# Patient Record
Sex: MALE | Race: WHITE | Employment: OTHER | URBAN - METROPOLITAN AREA
[De-identification: names, ages, dates, MRNs, and addresses within clinical notes are randomized per-mention and may not be internally consistent; named-entity substitution may affect disease eponyms.]

---

## 2017-01-30 ENCOUNTER — THERAPY VISIT (OUTPATIENT)
Dept: PHYSICAL THERAPY | Facility: CLINIC | Age: 67
End: 2017-01-30
Payer: COMMERCIAL

## 2017-01-30 ENCOUNTER — OFFICE VISIT (OUTPATIENT)
Dept: INTERNAL MEDICINE | Facility: CLINIC | Age: 67
End: 2017-01-30
Payer: COMMERCIAL

## 2017-01-30 VITALS
HEIGHT: 64 IN | BODY MASS INDEX: 35 KG/M2 | DIASTOLIC BLOOD PRESSURE: 70 MMHG | RESPIRATION RATE: 16 BRPM | HEART RATE: 70 BPM | TEMPERATURE: 97.1 F | SYSTOLIC BLOOD PRESSURE: 128 MMHG | WEIGHT: 205 LBS | OXYGEN SATURATION: 100 %

## 2017-01-30 DIAGNOSIS — M50.30 DDD (DEGENERATIVE DISC DISEASE), CERVICAL: Primary | ICD-10-CM

## 2017-01-30 DIAGNOSIS — D64.9 ANEMIA: Primary | ICD-10-CM

## 2017-01-30 DIAGNOSIS — M54.2 CERVICAL PAIN: Primary | ICD-10-CM

## 2017-01-30 DIAGNOSIS — D50.9 IRON DEFICIENCY ANEMIA, UNSPECIFIED IRON DEFICIENCY ANEMIA TYPE: ICD-10-CM

## 2017-01-30 DIAGNOSIS — Z12.11 ENCOUNTER FOR SCREENING FOR MALIGNANT NEOPLASM OF COLON: ICD-10-CM

## 2017-01-30 LAB
ERYTHROCYTE [DISTWIDTH] IN BLOOD BY AUTOMATED COUNT: 14.8 % (ref 10–15)
HCT VFR BLD AUTO: 35.8 % (ref 40–53)
HGB BLD-MCNC: 11.4 G/DL (ref 13.3–17.7)
MCH RBC QN AUTO: 28.6 PG (ref 26.5–33)
MCHC RBC AUTO-ENTMCNC: 31.8 G/DL (ref 31.5–36.5)
MCV RBC AUTO: 90 FL (ref 78–100)
PLATELET # BLD AUTO: 297 10E9/L (ref 150–450)
RBC # BLD AUTO: 3.98 10E12/L (ref 4.4–5.9)
WBC # BLD AUTO: 8.6 10E9/L (ref 4–11)

## 2017-01-30 PROCEDURE — 97161 PT EVAL LOW COMPLEX 20 MIN: CPT | Mod: GP | Performed by: PHYSICAL THERAPIST

## 2017-01-30 PROCEDURE — 85027 COMPLETE CBC AUTOMATED: CPT | Performed by: INTERNAL MEDICINE

## 2017-01-30 PROCEDURE — 99214 OFFICE O/P EST MOD 30 MIN: CPT | Performed by: INTERNAL MEDICINE

## 2017-01-30 PROCEDURE — 36415 COLL VENOUS BLD VENIPUNCTURE: CPT | Performed by: INTERNAL MEDICINE

## 2017-01-30 PROCEDURE — 97110 THERAPEUTIC EXERCISES: CPT | Mod: GP | Performed by: PHYSICAL THERAPIST

## 2017-01-30 NOTE — NURSING NOTE
"Chief Complaint   Patient presents with     Neck Pain       Initial /70 mmHg  Pulse 70  Temp(Src) 97.1  F (36.2  C) (Oral)  Resp 16  Ht 5' 4\" (1.626 m)  Wt 205 lb (92.987 kg)  BMI 35.17 kg/m2  SpO2 100% Estimated body mass index is 35.17 kg/(m^2) as calculated from the following:    Height as of this encounter: 5' 4\" (1.626 m).    Weight as of this encounter: 205 lb (92.987 kg).  BP completed using cuff size: large    "

## 2017-01-30 NOTE — MR AVS SNAPSHOT
After Visit Summary   1/30/2017    Angel Sanders    MRN: [id number]           Patient Information     Date Of Birth          1950        Visit Information        Provider Department      1/30/2017 10:20 AM Tony Peter MD [facility]        Today's Diagnoses     DDD (degenerative disc disease), cervical    -  1       Care Instructions    You can reach your [place] Care Team any time of the day by calling [phone].  This number will put you in touch with the 24 hour nurse line even if the clinic is closed.  The clinic hours for [facility] are:  Monday through Thursday 7am to 6pm   Friday 7am to 5pm   Saturday 8am to 12p for Pediatrics only.    To  contact your  please call [phone].  This is a direct number for your care team during clinic hours.    [facility] is now open for your convenience:  Monday through Friday 7:30am to 7pm  Saturday and Sunday 9am to 3pm  They are closed on all major holidays.          Follow-ups after your visit        Additional Services     [facility] PT, HAND, AND CHIROPRACTIC REFERRAL       **This order will print in the [facility] Scheduling Office**    Physical Therapy, Hand Therapy and Chiropractic Care are available through:    *[facility]  *[facility]  *[facility]    Call one number to schedule at any of the above locations: [phone].    Your provider has referred you to: Physical Therapy at [facility] or [facility]    Indication/Reason for Referral: Neck Pain  Onset of Illness: 2 months   Therapy Orders: Evaluate and Treat  Special Programs: None  Special Request: None    Leana Dueñas      Additional Comments for the Therapist or Chiropractor: history of DDD     Please be aware that coverage of these services is subject to the terms and limitations of your health insurance plan.  Call member services at your health plan with any benefit or coverage questions.       Please bring the following to your appointment:    *Your personal calendar for scheduling future appointments  *Comfortable clothing                  Your next 10 appointments already scheduled     Mar 17, 2017 10:00 AM   US CAROTID BILATERAL with RAYMUNDO   [facility] [place] ([facility])    [address]  [place] [address]   [phone]           Please bring a list of your medicines (including vitamins, minerals and over-the-counter drugs). Also, tell your doctor about any allergies you may have. Wear comfortable clothes and leave your valuables at home.  You do not need to do anything special to prepare for your exam.  Please call the Imaging Department at your exam site with any questions.            Mar 22, 2017 10:15 AM   New Visit with Dre Kruse MD   [facility] [place] ([facility])    [address]  [place] [address]   [phone]              Who to contact     If you have questions or need follow up information about today's clinic visit or your schedule please contact [facility] directly at [phone].  Normal or non-critical lab and imaging results will be communicated to you by MyChart, letter or phone within 4 business days after the clinic has received the results. If you do not hear from us within 7 days, please contact the clinic through MyChart or phone. If you have a critical or abnormal lab result, we will notify you by phone as soon as possible.  Submit refill requests through [employer] or call your pharmacy and they will forward the refill request to us. Please allow 3 business days for your refill to be completed.          Additional Information About Your Visit        [employer] Information     [employer] lets you send messages to your doctor, view your test results, renew your prescriptions, schedule appointments and more. To sign up, go to  [email or web address] . Click on \"Log in\" on the left side of the screen, which will take you to the Welcome page. Then click on \"Sign up Now\" on the right side of the page.     You will be asked to enter the access code listed below, as well as some personal information. Please follow the directions to create your username and password.     Your access code is: [email or web address]  Expires: 3/22/2017 11:46 AM     Your access code will  in 90 days. If you need help or a new code, please call your [facility] or [phone].        Care EveryWhere ID     This is your Care EveryWhere ID. This could be used by other organizations to access your [place] medical records  [id number]        Your Vitals Were     Pulse Temperature Respirations Height BMI (Body Mass Index) Pulse Oximetry    70 97.1  F (36.2  C) (Oral) 16 5' 4\" (1.626 m) 35.17 kg/m2 100%       Blood Pressure from Last 3 Encounters:   17 128/70   16 132/80   10/28/16 112/64    Weight from Last 3 Encounters:   17 205 lb (92.987 kg)   16 205 lb (92.987 kg)   10/28/16 208 lb (94.348 kg)              We Performed the Following     [facility] PT, HAND, AND CHIROPRACTIC REFERRAL        Primary Care Provider Office Phone # Fax #    Tony Peter [phone]       [facility] [address] [place] [address]        Thank you!     Thank you for choosing [facility]  for your care. Our goal is always to provide you with excellent care. Hearing back from our patients is one way we can continue to improve our services. Please take a few minutes to complete the written survey that you may receive in the mail after your visit with us. Thank you!             Your Updated Medication List - Protect others around you: Learn how to safely use, store and throw away your medicines at www.disposemymeds.org.          This list is accurate as of: 17 11:01 AM.  Always use your most recent med list. "                   Brand Name Dispense Instructions for use    aspirin 81 MG tablet      1 TABLET DAILY       atorvastatin 80 MG tablet    LIPITOR    90 tablet    Take 1 tablet (80 mg) by mouth daily       folic acid-vit B6-vit B12 0.8-10-0.115 MG Tabs per tablet    FOLGARD     Take 1 tablet by mouth daily       IRON SUPPLEMENT PO      Take 325 mg by mouth daily (with breakfast)       lisinopril 5 MG tablet    PRINIVIL/ZESTRIL    90 tablet    Take 1 tablet (5 mg) by mouth daily       methocarbamol 750 MG tablet    ROBAXIN    30 tablet    Take 1 tablet (750 mg) by mouth 3 times daily as needed for muscle spasms       metoprolol 25 MG tablet    LOPRESSOR    180 tablet    Take 1 tablet (25 mg) by mouth 2 times daily       nitroglycerin 0.4 MG sublingual tablet    NITROSTAT    25 tablet    Place 1 tablet (0.4 mg) under the tongue every 5 minutes as needed May repeat X 2 and if chest pain persists, call 91

## 2017-01-30 NOTE — PROGRESS NOTES
"  SUBJECTIVE:                                                    Angel Sanders is a 66 year old male who presents to clinic today for the following health issues:      Neck Pain      Duration: long term    Description:  Location: all of neck  Radiation: none and radiates to either right or left top of shoulder    Intensity:  moderate    Accompanying signs and symptoms: NA    History (similar episodes/previous evaluation): None    Precipitating or alleviating factors: None    Therapies tried and outcome: None     Patient is seen for a follow up visit.  Has had recurrent neck pain, stiffness, for 2 - 3 months. No injury. Pain in the posterior left neck with radiation to the shoulder.   No arm weakness or numbness. Tried NSAID, Tylenol, Prednisone and muscle relaxant. Not better. Temporary improvement with recurrent pain .   Patient has anemia with low iron . Has been treated with PO iron and vit D . Currently symptomatic with none. No significant other symptoms of GI bleed.  Needs recheck .     Problem list and histories reviewed & adjusted, as indicated.  Additional history: as documented    Problem list, Medication list, Allergies, and Medical/Social/Surgical histories reviewed in [facility] and updated as appropriate.    ROS:  Constitutional, HEENT, cardiovascular, pulmonary, gi and gu systems are negative, except as otherwise noted.    OBJECTIVE:                                                    /70 mmHg  Pulse 70  Temp(Src) 97.1  F (36.2  C) (Oral)  Resp 16  Ht 5' 4\" (1.626 m)  Wt 205 lb (92.987 kg)  BMI 35.17 kg/m2  SpO2 100%  Body mass index is 35.17 kg/(m^2).  GENERAL: healthy, alert and no distress  NECK: no adenopathy, no asymmetry, masses, or scars and thyroid normal to palpation  RESP: lungs clear to auscultation - no rales, rhonchi or wheezes  CV: regular rate and rhythm, normal S1 S2, no S3 or S4, no murmur, click or rub, no peripheral edema and peripheral pulses strong  ABDOMEN: soft, " nontender, no hepatosplenomegaly, no masses and bowel sounds normal  MS: no gross musculoskeletal defects noted, no edema,          Posterior neck tenderness left para vertebral. Restricted ROM   NEURO: Normal strength and tone, mentation intact and speech normal    Diagnostic Test Results:  none      ASSESSMENT/PLAN:                                                      Problem List Items Addressed This Visit     Anemia    Relevant Orders    CBC with platelets (Completed)      Other Visit Diagnoses     DDD (degenerative disc disease), cervical    -  Primary     Relevant Orders     [facility] PT, HAND, AND CHIROPRACTIC REFERRAL            Refer to PT   Consider MRI if not improved   Assess anemia status     Follow-Up:with results     Tony Peter MD  [facility]

## 2017-01-30 NOTE — PROGRESS NOTES
Subjective:    Angel Sanders is a 66 year old male with a cervical spine condition.  Condition occurred with:  Insidious onset.  Condition occurred: other.  This is a recurrent condition  History of intermittent cervical pain since 2012 of unknown etiology. Most recent episode began 12-16. Pt referred by MD for therapy on 1-30-17.    Patient reports pain:  Cervical right side and cervical left side (L>R).    Pain is described as aching and is intermittent and reported as 4/10.  Associated symptoms:  Loss of strength and loss of motion/stiffness. Pain is worse during the night.  Symptoms are exacerbated by rotating head, looking up or down and driving and relieved by rest, heat and NSAID's.  Since onset symptoms are gradually improving.  Special tests:  CT scan (2013 pt reports herniated disc).  Previous treatment: 2-16.  There was moderate improvement following previous treatment.  General health as reported by patient is good.  Pertinent medical history includes:  Chemical dependency.  Medical allergies: yes (latex).  Other surgeries include:  Heart surgery, orthopedic surgery and other (cardiac stent, carotid stend, rotator cuff).  Current medications:  High blood pressure medication.  Current occupation is retired.        Barriers include:  None as reported by the patient.    Red flags:  None as reported by the patient.                      Objective:    Standing Alignment:    Cervical/thoracic deviations alignment: forward head and shoulder posture.                    Flexibility/Screens:         Spine:  Decreased left spine flexibility:  Upper Trap and Levator    Decreased right spine flexibility:  Upper Trap and Levator                  Cervical/Thoracic Evaluation    AROM:  AROM Cervical:    Flexion:            60%  Extension:       40%  Rotation:         Left: 50%     Right: 50%  Side Bend:      Left: 30%     Right:  30%    Strength: weak scapular stabilizers  Headaches: none  Cervical Myotomes:   normal                  DTR's:  normal          Cervical Dermatomes:  normal                    Cervical Palpation:  : muscle guarding and tenderness to palpation left uppr trapezius.                                                      General     ROS    Assessment/Plan:      Patient is a 66 year old male with cervical complaints.    Patient has the following significant findings with corresponding treatment plan.                Diagnosis 1:  Cervical pain  Pain -  hot/cold therapy, manual therapy, self management, education and home program  Decreased ROM/flexibility - manual therapy, therapeutic exercise, therapeutic activity and home program  Decreased strength - therapeutic exercise, therapeutic activities and home program    Therapy Evaluation Codes:   1) History comprised of:   Personal factors that impact the plan of care:      Age, Past/current experiences and Time since onset of symptoms.    Comorbidity factors that impact the plan of care are:      Chemical Dependency, Heart problems and High blood pressure.     Medications impacting care: High blood pressure.  2) Examination of Body Systems comprised of:   Body structures and functions that impact the plan of care:      Cervical spine and Shoulder.   Activity limitations that impact the plan of care are:      Driving, Lifting, Reading/Computer work, Sitting and Sleeping.  3) Clinical presentation characteristics are:   Stable/Uncomplicated.  4) Decision-Making    Low complexity using standardized patient assessment instrument and/or measureable assessment of functional outcome.  Cumulative Therapy Evaluation is: Low complexity.    Previous and current functional limitations:  (See Goal Flow Sheet for this information)    Short term and Long term goals: (See Goal Flow Sheet for this information)     Communication ability:  Patient appears to be able to clearly communicate and understand verbal and written communication and follow directions  correctly.  Treatment Explanation - The following has been discussed with the patient:   RX ordered/plan of care  Anticipated outcomes  Possible risks and side effects  This patient would benefit from PT intervention to resume normal activities.   Rehab potential is good.    Frequency:  1 X week, once daily  Duration:  for 6 weeks  Discharge Plan:  Achieve all LTG.  Independent in home treatment program.  Reach maximal therapeutic benefit.    Please refer to the daily flowsheet for treatment today, total treatment time and time spent performing 1:1 timed codes.

## 2017-02-02 DIAGNOSIS — D64.9 ANEMIA: ICD-10-CM

## 2017-02-02 LAB
FOLATE SERPL-MCNC: 31.2 NG/ML
IRON SATN MFR SERPL: 15 % (ref 15–46)
IRON SERPL-MCNC: 50 UG/DL (ref 35–180)
TIBC SERPL-MCNC: 331 UG/DL (ref 240–430)
VIT B12 SERPL-MCNC: 893 PG/ML (ref 193–986)

## 2017-02-02 PROCEDURE — 36415 COLL VENOUS BLD VENIPUNCTURE: CPT | Performed by: INTERNAL MEDICINE

## 2017-02-02 PROCEDURE — 83550 IRON BINDING TEST: CPT | Performed by: INTERNAL MEDICINE

## 2017-02-02 PROCEDURE — 82607 VITAMIN B-12: CPT | Performed by: INTERNAL MEDICINE

## 2017-02-02 PROCEDURE — 82746 ASSAY OF FOLIC ACID SERUM: CPT | Performed by: INTERNAL MEDICINE

## 2017-02-02 PROCEDURE — 83540 ASSAY OF IRON: CPT | Performed by: INTERNAL MEDICINE

## 2017-02-02 PROCEDURE — 82274 ASSAY TEST FOR BLOOD FECAL: CPT | Performed by: INTERNAL MEDICINE

## 2017-02-03 DIAGNOSIS — Z12.11 ENCOUNTER FOR SCREENING FOR MALIGNANT NEOPLASM OF COLON: ICD-10-CM

## 2017-02-03 DIAGNOSIS — D64.9 ANEMIA: ICD-10-CM

## 2017-02-03 LAB — HEMOCCULT STL QL IA: NEGATIVE

## 2017-02-06 ENCOUNTER — THERAPY VISIT (OUTPATIENT)
Dept: PHYSICAL THERAPY | Facility: CLINIC | Age: 67
End: 2017-02-06
Payer: COMMERCIAL

## 2017-02-06 DIAGNOSIS — M54.2 CERVICAL PAIN: Primary | ICD-10-CM

## 2017-02-06 PROCEDURE — 97110 THERAPEUTIC EXERCISES: CPT | Mod: GP | Performed by: PHYSICAL THERAPY ASSISTANT

## 2017-02-06 NOTE — PROGRESS NOTES
Subjective:    HPI                    Objective:    System    Physical Exam    General     ROS    Assessment/Plan:      PROGRESS  REPORT    Progress reporting period is from 1/30/17 to 2/6/17.      SUBJECTIVE  Subjective changes noted by patient:  The pain has been 0/10.  Has not taken any med's OTC for 1 week.   Driving is better and not having any issues.  Current pain level is .  Current Pain level: 0/10    Previous pain level was:   Initial Pain level: 4/10   Changes in function:  Yes (See Goal flowsheet attached for changes in current functional level)     Adverse reaction to treatment or activity: None     OBJECTIVE  Changes noted in objective findings:  Yes, Objective: CROM flexion 100%, extension at 90%, left rotation at 50%, right at 75%. NDI scores are at 0/10.  Patient has some tightness at the end range of extension and rotation to the left.  Instructed patient to continue to push the ROM everyday.

## 2017-03-03 PROBLEM — M54.2 CERVICAL PAIN: Status: RESOLVED | Noted: 2017-01-30 | Resolved: 2017-03-03

## 2017-03-03 NOTE — PROGRESS NOTES
Subjective:    HPI                    Objective:    System    Physical Exam    General     ROS    Assessment/Plan:      ASSESSMENT/PLAN  Updated problem list and treatment plan: Diagnosis 1:  Cervical pain  Pain -  self management, education and home program  Decreased ROM/flexibility - therapeutic exercise, therapeutic activity and home program  Decreased strength - therapeutic exercise, therapeutic activities and home program  Progress toward STG/LTGs have been made:  Yes,   Assessment of Progress: The patient's condition is improving.  Self Management Plans:  Patient has been instructed in a home treatment program.  I have re-evaluated this patient and find that the nature, scope, duration and intensity of the therapy is appropriate for the medical condition of the patient.  Angel continues to require the following intervention to meet STG and LT's:  PT intervention is no longer required to meet STG/LTG.    Recommendations:  This patient is ready to be discharged from therapy and continue their home treatment program.    Please refer to the daily flowsheet for treatment today, total treatment time and time spent performing 1:1 timed codes.

## 2017-03-17 ENCOUNTER — HOSPITAL ENCOUNTER (OUTPATIENT)
Dept: CARDIOLOGY | Facility: CLINIC | Age: 67
Discharge: HOME | End: 2017-03-17
Attending: INTERNAL MEDICINE | Admitting: INTERNAL MEDICINE
Payer: MEDICARE

## 2017-03-17 DIAGNOSIS — I77.9 BILATERAL CAROTID ARTERY DISEASE (H): ICD-10-CM

## 2017-03-17 PROCEDURE — 93880 EXTRACRANIAL BILAT STUDY: CPT | Mod: 26 | Performed by: INTERNAL MEDICINE

## 2017-03-17 PROCEDURE — 93880 EXTRACRANIAL BILAT STUDY: CPT

## 2017-03-22 ENCOUNTER — OFFICE VISIT (OUTPATIENT)
Dept: CARDIOLOGY | Facility: CLINIC | Age: 67
End: 2017-03-22
Attending: INTERNAL MEDICINE
Payer: COMMERCIAL

## 2017-03-22 VITALS
DIASTOLIC BLOOD PRESSURE: 78 MMHG | SYSTOLIC BLOOD PRESSURE: 136 MMHG | WEIGHT: 208 LBS | BODY MASS INDEX: 35.51 KG/M2 | OXYGEN SATURATION: 93 % | HEIGHT: 64 IN | HEART RATE: 60 BPM

## 2017-03-22 DIAGNOSIS — I77.9 BILATERAL CAROTID ARTERY DISEASE (H): ICD-10-CM

## 2017-03-22 DIAGNOSIS — I77.9 LEFT-SIDED CAROTID ARTERY DISEASE (H): Primary | ICD-10-CM

## 2017-03-22 DIAGNOSIS — I65.22 OCCLUSION AND STENOSIS OF LEFT CAROTID ARTERY: ICD-10-CM

## 2017-03-22 DIAGNOSIS — I25.10 CORONARY ARTERY DISEASE INVOLVING NATIVE CORONARY ARTERY OF NATIVE HEART WITHOUT ANGINA PECTORIS: ICD-10-CM

## 2017-03-22 DIAGNOSIS — E78.00 HIGH CHOLESTEROL: ICD-10-CM

## 2017-03-22 DIAGNOSIS — I10 ESSENTIAL HYPERTENSION, BENIGN: ICD-10-CM

## 2017-03-22 PROCEDURE — 93000 ELECTROCARDIOGRAM COMPLETE: CPT | Performed by: INTERNAL MEDICINE

## 2017-03-22 PROCEDURE — 99213 OFFICE O/P EST LOW 20 MIN: CPT | Mod: 25 | Performed by: INTERNAL MEDICINE

## 2017-03-22 RX ORDER — LISINOPRIL 5 MG/1
5 TABLET ORAL DAILY
Qty: 90 TABLET | Refills: 3 | Status: SHIPPED | OUTPATIENT
Start: 2017-03-22 | End: 2018-03-18

## 2017-03-22 RX ORDER — METOPROLOL TARTRATE 25 MG/1
25 TABLET, FILM COATED ORAL 2 TIMES DAILY
Qty: 180 TABLET | Refills: 3 | Status: SHIPPED | OUTPATIENT
Start: 2017-03-22 | End: 2018-03-18

## 2017-03-22 RX ORDER — ATORVASTATIN CALCIUM 80 MG/1
80 TABLET, FILM COATED ORAL DAILY
Qty: 90 TABLET | Refills: 3 | Status: SHIPPED | OUTPATIENT
Start: 2017-03-22 | End: 2018-03-18

## 2017-03-22 NOTE — PROGRESS NOTES
HPI and Plan:   See dictation(#[id number])    Orders Placed This Encounter   Procedures     US Carotid Bilateral     Follow-Up with Cardiologist       Orders Placed This Encounter   Medications     lisinopril (PRINIVIL/ZESTRIL) 5 MG tablet     Sig: Take 1 tablet (5 mg) by mouth daily     Dispense:  90 tablet     Refill:  3     metoprolol (LOPRESSOR) 25 MG tablet     Sig: Take 1 tablet (25 mg) by mouth 2 times daily     Dispense:  180 tablet     Refill:  3     atorvastatin (LIPITOR) 80 MG tablet     Sig: Take 1 tablet (80 mg) by mouth daily     Dispense:  90 tablet     Refill:  3       Medications Discontinued During This Encounter   Medication Reason     lisinopril (PRINIVIL,ZESTRIL) 5 MG tablet Reorder     metoprolol (LOPRESSOR) 25 MG tablet Reorder     atorvastatin (LIPITOR) 80 MG tablet Reorder         Encounter Diagnoses   Name Primary?     Bilateral carotid artery disease (H)      Left-sided carotid artery disease (H) Yes     Occlusion and stenosis of left carotid artery       Coronary artery disease involving native coronary artery of native heart without angina pectoris      Essential hypertension, benign      High cholesterol        CURRENT MEDICATIONS:  Current Outpatient Prescriptions   Medication Sig Dispense Refill     lisinopril (PRINIVIL/ZESTRIL) 5 MG tablet Take 1 tablet (5 mg) by mouth daily 90 tablet 3     metoprolol (LOPRESSOR) 25 MG tablet Take 1 tablet (25 mg) by mouth 2 times daily 180 tablet 3     atorvastatin (LIPITOR) 80 MG tablet Take 1 tablet (80 mg) by mouth daily 90 tablet 3     folic acid-vit B6-vit B12 (FOLGARD) 0.8-10-0.115 MG TABS Take 1 tablet by mouth daily       nitroglycerin (NITROSTAT) 0.4 MG SL tablet Place 1 tablet (0.4 mg) under the tongue every 5 minutes as needed May repeat X 2 and if chest pain persists, call 911 25 tablet 2     Ferrous Sulfate (IRON SUPPLEMENT PO) Take 325 mg by mouth daily (with breakfast)        ASPIRIN 81 MG OR TABS 1 TABLET DAILY       methocarbamol  (ROBAXIN) 750 MG tablet Take 1 tablet (750 mg) by mouth 3 times daily as needed for muscle spasms (Patient not taking: Reported on 3/22/2017) 30 tablet 1     [DISCONTINUED] atorvastatin (LIPITOR) 80 MG tablet Take 1 tablet (80 mg) by mouth daily 90 tablet 3     [DISCONTINUED] lisinopril (PRINIVIL,ZESTRIL) 5 MG tablet Take 1 tablet (5 mg) by mouth daily 90 tablet 3     [DISCONTINUED] metoprolol (LOPRESSOR) 25 MG tablet Take 1 tablet (25 mg) by mouth 2 times daily 180 tablet 3       ALLERGIES     Allergies   Allergen Reactions     Adhesive Tape Rash       PAST MEDICAL HISTORY:  Past Medical History:   Diagnosis Date     Carotid artery obstruction 2009    stent placed     Coronary atherosclerosis of unspecified type of vessel, native or graft 2008    Acute inf MI; RCA stent; followed by Cardiology     Heart attack (H)      Herniated cervical disc      Hyperlipidaemia      Hyperlipidemia LDL goal <100 8/9/2012     Hypertension      Occlusion and stenosis of carotid artery without mention of cerebral infarction 2008    50-69% stenosis of left ICA     Stented coronary artery     1 coronary /1 carotid       PAST SURGICAL HISTORY:  Past Surgical History:   Procedure Laterality Date     ARTHROTOMY SHOULDER, ROTATOR CUFF REPAIR, COMBINED  10/23/2013    Procedure: COMBINED ARTHROTOMY SHOULDER, ROTATOR CUFF REPAIR;  Left Shoulder Open Decompression, Distal Clavical Resection, Rotator Cuff Repair , Bicep Tenolysis, and Bicep Tenodesis   ;  Surgeon: Zhang Mattson MD;  Location: [facility] OR     C NONSPECIFIC PROCEDURE  2008    RCA stent; see [facility]     PHACOEMULSIFICATION CLEAR CORNEA WITH STANDARD INTRAOCULAR LENS IMPLANT  11/20/2013    Procedure: PHACOEMULSIFICATION CLEAR CORNEA WITH STANDARD INTRAOCULAR LENS IMPLANT;  RIGHT PHACOEMULSIFICATION CLEAR CORNEA WITH STANDARD INTRAOCULAR LENS IMPLANT ;  Surgeon: Rikki Reddy MD;  Location: [facility]     PHACOEMULSIFICATION CLEAR CORNEA WITH STANDARD INTRAOCULAR LENS IMPLANT  12/3/2013  "   Procedure: PHACOEMULSIFICATION CLEAR CORNEA WITH STANDARD INTRAOCULAR LENS IMPLANT;  LEFT PHACOEMULSIFICATION CLEAR CORNEA WITH STANDARD INTRAOCULAR LENS IMPLANT;  Surgeon: Rikki Reddy MD;  Location[facility]       FAMILY HISTORY:  Family History   Problem Relation Age of Onset     Breast Cancer Mother      HEART DISEASE Father        SOCIAL HISTORY:  Social History     Social History     Marital status:      Spouse name: N/A     Number of children: N/A     Years of education: N/A     Social History Main Topics     Smoking status: Former Smoker     Packs/day: 1.00     Years: 40.00     Types: Cigarettes     Quit date: 1/4/2008     Smokeless tobacco: Never Used     Alcohol use No      Comment: Sober for 25 years     Drug use: No     Sexual activity: Yes     Partners: Female     Other Topics Concern     Caffeine Concern No     Occupational Exposure No     Hobby Hazards No     Sleep Concern No     Stress Concern No     Weight Concern Yes     overweight     Special Diet Yes     low salt and low cholesterol     Back Care No     Exercise Yes     3-4 x week walking     Seat Belt Yes     Social History Narrative       Review of Systems:  Skin:  Negative       Eyes:  Negative      ENT:  Negative      Respiratory:  Negative       Cardiovascular:  Negative      Gastroenterology: Negative      Genitourinary:  Negative      Musculoskeletal:  Positive for neck pain herniated disc in neck   Neurologic:  Negative      Psychiatric:  Negative      Heme/Lymph/Imm:  Negative      Endocrine:  Negative        Physical Exam:  Vitals: /78  Pulse 60  Ht 1.626 m (5' 4\")  Wt 94.3 kg (208 lb)  SpO2 93%  BMI 35.7 kg/m2    Constitutional:           Skin:           Head:           Eyes:           ENT:           Neck:           Chest:             Cardiac:                    Abdomen:           Vascular:                                          Extremities and Back:                 Neurological:                 CC  Enrique " Terrell Tierney MD   [facility]  [address]  [place] [id number]

## 2017-03-22 NOTE — MR AVS SNAPSHOT
After Visit Summary   3/22/2017    Angel Sanders    MRN: [id number]           Patient Information     Date Of Birth          1950        Visit Information        Provider Department      3/22/2017 10:15 AM Dre Kruse MD [facility]        Today's Diagnoses     Left-sided carotid artery disease (H)    -  1    Bilateral carotid artery disease (H)        Occlusion and stenosis of left carotid artery         Coronary artery disease involving native coronary artery of native heart without angina pectoris        Essential hypertension, benign        High cholesterol           Follow-ups after your visit        Additional Services     Follow-Up with Cardiologist                 Future tests that were ordered for you today     Open Future Orders        Priority Expected Expires Ordered    Follow-Up with Cardiologist Routine 3/22/2018 8/4/2018 3/22/2017    US Carotid Bilateral Routine 3/22/2018 4/12/2018 3/22/2017            Who to contact     If you have questions or need follow up information about today's clinic visit or your schedule please contact [facility] directly at [phone].  Normal or non-critical lab and imaging results will be communicated to you by MyChart, letter or phone within 4 business days after the clinic has received the results. If you do not hear from us within 7 days, please contact the clinic through MyChart or phone. If you have a critical or abnormal lab result, we will notify you by phone as soon as possible.  Submit refill requests through [employer] or call your pharmacy and they will forward the refill request to us. Please allow 3 business days for your refill to be completed.          Additional Information About Your Visit        Care EveryWhere ID     This is your Care EveryWhere ID. This could be used by other organizations to access your [facility] medical records  [id number]       "  Your Vitals Were     Pulse Height Pulse Oximetry BMI (Body Mass Index)          60 1.626 m (5' 4\") 93% 35.7 kg/m2         Blood Pressure from Last 3 Encounters:   03/22/17 136/78   01/30/17 128/70   12/22/16 132/80    Weight from Last 3 Encounters:   03/22/17 94.3 kg (208 lb)   01/30/17 93 kg (205 lb)   12/22/16 93 kg (205 lb)              We Performed the Following     EKG 12-lead complete w/read - Clinics (to be scheduled)     Follow-Up with Cardiologist          Where to get your medicines      [facility] - [place], [place] - [address] [facility]  [address] [facility], [place] [address]     Phone:  [phone]     atorvastatin 80 MG tablet    lisinopril 5 MG tablet    metoprolol 25 MG tablet          Primary Care Provider Office Phone # Fax #    Tony Peter [phone]       [facility] [address] [place] [address]        Thank you!     Thank you for choosing [facility] [place]  for your care. Our goal is always to provide you with excellent care. Hearing back from our patients is one way we can continue to improve our services. Please take a few minutes to complete the written survey that you may receive in the mail after your visit with us. Thank you!             Your Updated Medication List - Protect others around you: Learn how to safely use, store and throw away your medicines at www.disposemymeds.org.          This list is accurate as of: 3/22/17 10:58 AM.  Always use your most recent med list.                   Brand Name Dispense Instructions for use    aspirin 81 MG tablet      1 TABLET DAILY       atorvastatin 80 MG tablet    LIPITOR    90 tablet    Take 1 tablet (80 mg) by mouth daily       folic acid-vit B6-vit B12 0.8-10-0.115 MG Tabs per tablet    FOLGARD     Take 1 tablet by mouth daily       IRON SUPPLEMENT PO      Take 325 mg by mouth daily (with " breakfast)       lisinopril 5 MG tablet    PRINIVIL/ZESTRIL    90 tablet    Take 1 tablet (5 mg) by mouth daily       methocarbamol 750 MG tablet    ROBAXIN    30 tablet    Take 1 tablet (750 mg) by mouth 3 times daily as needed for muscle spasms       metoprolol 25 MG tablet    LOPRESSOR    180 tablet    Take 1 tablet (25 mg) by mouth 2 times daily       nitroglycerin 0.4 MG sublingual tablet    NITROSTAT    25 tablet    Place 1 tablet (0.4 mg) under the tongue every 5 minutes as needed May repeat X 2 and if chest pain persists, call [phone]

## 2017-03-22 NOTE — LETTER
3/22/2017    Tony Peter MD  [facility]   [address] [facility] [address]    RE: Angel Sanders       Dear Colleague,    I had the pleasure of seeing Angel Sanders in the [facility].    REASON FOR CLINIC VISIT:  Followup CAD.      Mr. Sanders is a very pleasant 66-year-old gentleman with history of coronary artery disease with history of inferior STEMI in 2008 due to occlusion of the RCA, and he underwent successful angioplasty and stenting with 3 x 28 mm Cypher drug-eluting stent.  There was 40%-50% narrowing distal to the stent in the RCA, LAD had 40% proximal, 30% mid and 50% distal stenosis, circumflex had 10%-20% proximal stenosis and first OM had 25% stenosis.  He had a nuclear stress test in 2012 that showed inferior infarct, no ischemia.  He had an echocardiogram done in 2014 that showed normal EF of 55%-60%.  He has other comorbidities of history of left internal carotid artery stenting in 2009.  He has other comorbidities of hypertension and dyslipidemia.  He has been seen by my colleague, Dr. Tierney, in the Cardiology Clinic in the past.  Today, I am seeing him for the first time.  He tells me overall he is doing quite well.  He does not do any dedicated exercise, but he walks a lot and with none of the physical activities has he noticed any chest pain or shortness of breath.  No current tobacco abuse.  He had an ultrasound of the carotid arteries that showed less than 50% stenosis of bilateral internal carotid artery, and left carotid artery stent appears patent with normal velocities.  Lipid panel last year shows LDL quite well controlled at 42.  Blood pressure is also well controlled.  He is on 80 mg of Lipitor, 25 mg b.i.d. of metoprolol, 5 mg daily of lisinopril and baby aspirin.      PHYSICAL EXAMINATION:   VITAL SIGNS:  Blood pressure 136/78, heart rate 60 regular, weight 208 pounds, BMI 35.78.   GENERAL:  The patient appears pleasant,  comfortable.   NECK:  Normal JVP, no bruit.   CARDIOVASCULAR SYSTEM:  S1, S2 normal, no murmur, rub or gallop.   RESPIRATORY SYSTEM:  Clear to auscultation bilaterally.   ABDOMEN:  Soft, nontender.   EXTREMITIES:  No pitting pedal edema.   NEUROLOGICAL:  Alert, oriented x3.   PSYCHIATRIC:  Normal affect.   SKIN:  No obvious rash.   HEENT:  No pallor or icterus.     Outpatient Encounter Prescriptions as of 3/22/2017   Medication Sig Dispense Refill     lisinopril (PRINIVIL/ZESTRIL) 5 MG tablet Take 1 tablet (5 mg) by mouth daily 90 tablet 3     metoprolol (LOPRESSOR) 25 MG tablet Take 1 tablet (25 mg) by mouth 2 times daily 180 tablet 3     atorvastatin (LIPITOR) 80 MG tablet Take 1 tablet (80 mg) by mouth daily 90 tablet 3     folic acid-vit B6-vit B12 (FOLGARD) 0.8-10-0.115 MG TABS Take 1 tablet by mouth daily       nitroglycerin (NITROSTAT) 0.4 MG SL tablet Place 1 tablet (0.4 mg) under the tongue every 5 minutes as needed May repeat X 2 and if chest pain persists, call 911 25 tablet 2     Ferrous Sulfate (IRON SUPPLEMENT PO) Take 325 mg by mouth daily (with breakfast)        ASPIRIN 81 MG OR TABS 1 TABLET DAILY       methocarbamol (ROBAXIN) 750 MG tablet Take 1 tablet (750 mg) by mouth 3 times daily as needed for muscle spasms (Patient not taking: Reported on 3/22/2017) 30 tablet 1     [DISCONTINUED] atorvastatin (LIPITOR) 80 MG tablet Take 1 tablet (80 mg) by mouth daily 90 tablet 3     [DISCONTINUED] lisinopril (PRINIVIL,ZESTRIL) 5 MG tablet Take 1 tablet (5 mg) by mouth daily 90 tablet 3     [DISCONTINUED] metoprolol (LOPRESSOR) 25 MG tablet Take 1 tablet (25 mg) by mouth 2 times daily 180 tablet 3     No facility-administered encounter medications on file as of 3/22/2017.       IMPRESSION AND PLAN:  A very delightful 66-year-old gentleman with history of inferior STEMI in 2008, status post drug-eluting stent PCI of the RCA.  He has mild to moderate residual RCA disease distal to the stent and mild to moderate  LAD disease.  Stress test in 2012 showed inferior infarct, no ischemia.  He has other comorbidities of history of left internal carotid artery stenting in 2009, hypertension, and dyslipidemia.  Overall, cardiovascular status-wise he is doing quite well.  Ultrasound of carotid showed patent stent.  LDL and blood pressure are well controlled.  He is on appropriate CAD medical regimen therapy of aspirin, high-intensity statin, beta blocker and ACE inhibitor.  Clinically, he does not appear to have any anginal symptoms.  If he continues to feel well, we can see him back in 1 year's time with a repeat ultrasound of carotids.  In the interim, if he notices any exertional symptoms, he should call us and we will see him sooner.                 Again, thank you for allowing me to participate in the care of your patient.      Sincerely,    Dre Kruse MD     [facility]

## 2017-03-23 NOTE — PROGRESS NOTES
REASON FOR CLINIC VISIT:  Followup CAD.      HISTORY OF PRESENT ILLNESS:  Mr. Sanders is a very pleasant 66-year-old gentleman with history of coronary artery disease with history of inferior STEMI in 2008 due to occlusion of the RCA, and he underwent successful angioplasty and stenting with 3 x 28 mm Cypher drug-eluting stent.  There was 40%-50% narrowing distal to the stent in the RCA, LAD had 40% proximal, 30% mid and 50% distal stenosis, circumflex had 10%-20% proximal stenosis and first OM had 25% stenosis.  He had a nuclear stress test in 2012 that showed inferior infarct, no ischemia.  He had an echocardiogram done in 2014 that showed normal EF of 55%-60%.  He has other comorbidities of history of left internal carotid artery stenting in 2009.  He has other comorbidities of hypertension and dyslipidemia.  He has been seen by my colleague, Dr. Tierney, in the Cardiology Clinic in the past.  Today, I am seeing him for the first time.  He tells me overall he is doing quite well.  He does not do any dedicated exercise, but he walks a lot and with none of the physical activities has he noticed any chest pain or shortness of breath.  No current tobacco abuse.  He had an ultrasound of the carotid arteries that showed less than 50% stenosis of bilateral internal carotid artery, and left carotid artery stent appears patent with normal velocities.  Lipid panel last year shows LDL quite well controlled at 42.  Blood pressure is also well controlled.  He is on 80 mg of Lipitor, 25 mg b.i.d. of metoprolol, 5 mg daily of lisinopril and baby aspirin.      PHYSICAL EXAMINATION:   VITAL SIGNS:  Blood pressure 136/78, heart rate 60 regular, weight 208 pounds, BMI 35.78.   GENERAL:  The patient appears pleasant, comfortable.   NECK:  Normal JVP, no bruit.   CARDIOVASCULAR SYSTEM:  S1, S2 normal, no murmur, rub or gallop.   RESPIRATORY SYSTEM:  Clear to auscultation bilaterally.   ABDOMEN:  Soft, nontender.   EXTREMITIES:  No  pitting pedal edema.   NEUROLOGICAL:  Alert, oriented x3.   PSYCHIATRIC:  Normal affect.   SKIN:  No obvious rash.   HEENT:  No pallor or icterus.      IMPRESSION AND PLAN:  A very delightful 66-year-old gentleman with history of inferior STEMI in , status post drug-eluting stent PCI of the RCA.  He has mild to moderate residual RCA disease distal to the stent and mild to moderate LAD disease.  Stress test in  showed inferior infarct, no ischemia.  He has other comorbidities of history of left internal carotid artery stenting in , hypertension, and dyslipidemia.  Overall, cardiovascular status-wise he is doing quite well.  Ultrasound of carotid showed patent stent.  LDL and blood pressure are well controlled.  He is on appropriate CAD medical regimen therapy of aspirin, high-intensity statin, beta blocker and ACE inhibitor.  Clinically, he does not appear to have any anginal symptoms.  If he continues to feel well, we can see him back in 1 year's time with a repeat ultrasound of carotids.  In the interim, if he notices any exertional symptoms, he should call us and we will see him sooner.         PAL BOWSER MD             D: 2017 10:57   T: 2017 01:13   MT: ENOCH      Name:     TIMBO RICH   MRN:      [id number]        Account:      [id number]   :      1950           Service Date: 2017      Document: [id number]

## 2017-10-22 ENCOUNTER — OFFICE VISIT (OUTPATIENT)
Dept: URGENT CARE | Facility: URGENT CARE | Age: 67
End: 2017-10-22
Payer: COMMERCIAL

## 2017-10-22 VITALS
WEIGHT: 211.2 LBS | BODY MASS INDEX: 36.25 KG/M2 | SYSTOLIC BLOOD PRESSURE: 145 MMHG | HEART RATE: 78 BPM | OXYGEN SATURATION: 96 % | DIASTOLIC BLOOD PRESSURE: 70 MMHG | TEMPERATURE: 98.3 F

## 2017-10-22 DIAGNOSIS — J01.00 ACUTE MAXILLARY SINUSITIS, RECURRENCE NOT SPECIFIED: Primary | ICD-10-CM

## 2017-10-22 PROCEDURE — 99213 OFFICE O/P EST LOW 20 MIN: CPT | Performed by: NURSE PRACTITIONER

## 2017-10-22 RX ORDER — MULTIPLE VITAMINS W/ MINERALS TAB 9MG-400MCG
1 TAB ORAL DAILY
COMMUNITY

## 2017-10-22 RX ORDER — POLYMYXIN B SULFATE AND TRIMETHOPRIM 1; 10000 MG/ML; [USP'U]/ML
1 SOLUTION OPHTHALMIC
Qty: 1 BOTTLE | Refills: 0 | Status: SHIPPED | OUTPATIENT
Start: 2017-10-22 | End: 2017-10-29

## 2017-10-22 NOTE — PROGRESS NOTES
"SUBJECTIVE:   Angel Sanders is a 67 year old male presenting with a chief complaint of   Chief Complaint   Patient presents with     Eye Problem     Bilateral eye redness, itching  x 2 days      Angel has had a URI for 2 weeks.  On 10/19 he got suddenly worse with congestion and eye irritation.  He has had problems with feeling congested, blowing his nose a lot.  No fever.  Eating and drinking okay.    On 10/20 his eyes became irritated.  They are itchy.  He is using \"clear eyes\" since yesterday without improvement.  His eyes are watery.  His vision is okay.  No eye pain.  He is in clinic today mostly to make sure his eyes are okay.    He is taking coricidan for his URIsymptoms which helps slightly.       Past Medical History:   Diagnosis Date     Carotid artery obstruction 2009    stent placed     Coronary atherosclerosis of unspecified type of vessel, native or graft 2008    Acute inf MI; RCA stent; followed by Cardiology     Heart attack      Herniated cervical disc      Hyperlipidaemia      Hyperlipidemia LDL goal <100 8/9/2012     Hypertension      Occlusion and stenosis of carotid artery without mention of cerebral infarction 2008    50-69% stenosis of left ICA     Stented coronary artery     1 coronary /1 carotid     Current Outpatient Prescriptions   Medication Sig Dispense Refill     multivitamin, therapeutic with minerals (MULTI-VITAMIN) TABS tablet Take 1 tablet by mouth daily       lisinopril (PRINIVIL/ZESTRIL) 5 MG tablet Take 1 tablet (5 mg) by mouth daily 90 tablet 3     metoprolol (LOPRESSOR) 25 MG tablet Take 1 tablet (25 mg) by mouth 2 times daily 180 tablet 3     atorvastatin (LIPITOR) 80 MG tablet Take 1 tablet (80 mg) by mouth daily 90 tablet 3     folic acid-vit B6-vit B12 (FOLGARD) 0.8-10-0.115 MG TABS Take 1 tablet by mouth daily       nitroglycerin (NITROSTAT) 0.4 MG SL tablet Place 1 tablet (0.4 mg) under the tongue every 5 minutes as needed May repeat X 2 and if chest pain persists, call " 911 25 tablet 2     Ferrous Sulfate (IRON SUPPLEMENT PO) Take 325 mg by mouth daily (with breakfast)        ASPIRIN 81 MG OR TABS 1 TABLET DAILY       methocarbamol (ROBAXIN) 750 MG tablet Take 1 tablet (750 mg) by mouth 3 times daily as needed for muscle spasms (Patient not taking: Reported on 3/22/2017) 30 tablet 1     Social History   Substance Use Topics     Smoking status: Former Smoker     Packs/day: 1.00     Years: 40.00     Types: Cigarettes     Quit date: 1/4/2008     Smokeless tobacco: Never Used     Alcohol use No      Comment: Sober for 25 years       ROS:  Constitutional, HEENT, cardiovascular, pulmonary, GI, , musculoskeletal, neuro, skin, endocrine and psych systems are negative, except as otherwise noted.      OBJECTIVE:  /70  Pulse 78  Temp 98.3  F (36.8  C) (Oral)  Wt 211 lb 3.2 oz (95.8 kg)  SpO2 96%  BMI 36.25 kg/m2  EXAM:  Constitutional: healthy, alert, active and no distress  Neck: Neck supple. No adenopathy.  Eyes: bilateral eyes MAY, + conjunctival injection. Watery.   ENT: Bilateral TM's are retracted wtihout erythema.   Posterior oropharynx is clear.  Nares clear with congestion.  Cardiovascular: S1, S2  Respiratory: Respirations easy and regular. No respiratory distress. Lungs sounds CTA.  Skin: warm and dry. The skin below his eyes is slightly puffy, irritated.   Psychiatric: mentation appears normal. and affect normal/bright      ASSESSMENT:  (J01.00) Acute maxillary sinusitis, recurrence not specified  (primary encounter diagnosis)  Comment: with conjunctival irritation  Plan: amoxicillin-clavulanate (AUGMENTIN) 875-125 MG         per tablet, trimethoprim-polymyxin b (POLYTRIM)        ophthalmic solution          Take antibiotic as prescribed.   He is to use the eye drops as prescribed. Hot pack to the eyes 4+ times a day.  Symptomatic management (push fluids, good nutrition, MVI if indicated, OTC decongestants, etc).   Wash hands frequently.   Coricidan okay. Return prn  any problems, questions, or concerns.

## 2017-10-22 NOTE — PATIENT INSTRUCTIONS
Sinusitis (Antibiotic Treatment)    The sinuses are air-filled spaces within the bones of the face. They connect to the inside of the nose. Sinusitis is an inflammation of the tissue lining the sinus cavity. Sinus inflammation can occur during a cold. It can also be due to allergies to pollens and other particles in the air. Sinusitis can cause symptoms of sinus congestion and fullness. A sinus infection causes fever, headache and facial pain. There is often green or yellow drainage from the nose or into the back of the throat (post-nasal drip). You have been given antibiotics to treat this condition.  Home care:    Take the full course of antibiotics as instructed. Do not stop taking them, even if you feel better.    Drink plenty of water, hot tea, and other liquids. This may help thin mucus. It also may promote sinus drainage.    Heat may help soothe painful areas of the face. Use a towel soaked in hot water. Or,  the shower and direct the hot spray onto your face. Using a vaporizer along with a menthol rub at night may also help.     An expectorant containing guaifenesin may help thin the mucus and promote drainage from the sinuses.    Over-the-counter decongestants may be used unless a similar medicine was prescribed. Nasal sprays work the fastest. Use one that contains phenylephrine or oxymetazoline. First blow the nose gently. Then use the spray. Do not use these medicines more often than directed on the label or symptoms may get worse. You may also use tablets containing pseudoephedrine. Avoid products that combine ingredients, because side effects may be increased. Read labels. You can also ask the pharmacist for help. (NOTE: Persons with high blood pressure should not use decongestants. They can raise blood pressure.)    Over-the-counter antihistamines may help if allergies contributed to your sinusitis.      Do not use nasal rinses or irrigation during an acute sinus infection, unless told to by  your health care provider. Rinsing may spread the infection to other sinuses.    Use acetaminophen or ibuprofen to control pain, unless another pain medicine was prescribed. (If you have chronic liver or kidney disease or ever had a stomach ulcer, talk with your doctor before using these medicines. Aspirin should never be used in anyone under 18 years of age who is ill with a fever. It may cause severe liver damage.)    Don't smoke. This can worsen symptoms.  Follow-up care  Follow up with your healthcare provider or our staff if you are not improving within the next week.  When to seek medical advice  Call your healthcare provider if any of these occur:    Facial pain or headache becoming more severe    Stiff neck    Unusual drowsiness or confusion    Swelling of the forehead or eyelids    Vision problems, including blurred or double vision    Fever of 100.4 F (38 C) or higher, or as directed by your healthcare provider    Seizure    Breathing problems    Symptoms not resolving within 10 days  Date Last Reviewed: 4/13/2015 2000-2017 [employer]. [address] [place] [address], [place] [id number]. All rights reserved. This information is not intended as a substitute for professional medical care. Always follow your healthcare professional's instructions.

## 2017-10-22 NOTE — NURSING NOTE
"Chief Complaint   Patient presents with     Eye Problem     Bilateral eye redness, itching  x 2 days        Initial /70  Pulse 78  Temp 98.3  F (36.8  C) (Oral)  Wt 211 lb 3.2 oz (95.8 kg)  SpO2 96%  BMI 36.25 kg/m2 Estimated body mass index is 36.25 kg/(m^2) as calculated from the following:    Height as of 3/22/17: 5' 4\" (1.626 m).    Weight as of this encounter: 211 lb 3.2 oz (95.8 kg).  Medication Reconciliation: complete    "

## 2017-11-08 ENCOUNTER — OFFICE VISIT (OUTPATIENT)
Dept: INTERNAL MEDICINE | Facility: CLINIC | Age: 67
End: 2017-11-08
Payer: COMMERCIAL

## 2017-11-08 VITALS
WEIGHT: 207.6 LBS | RESPIRATION RATE: 16 BRPM | SYSTOLIC BLOOD PRESSURE: 136 MMHG | TEMPERATURE: 97.9 F | HEART RATE: 100 BPM | DIASTOLIC BLOOD PRESSURE: 70 MMHG | OXYGEN SATURATION: 92 % | BODY MASS INDEX: 35.44 KG/M2 | HEIGHT: 64 IN

## 2017-11-08 DIAGNOSIS — H10.13 ALLERGIC CONJUNCTIVITIS, BILATERAL: Primary | ICD-10-CM

## 2017-11-08 PROCEDURE — 99213 OFFICE O/P EST LOW 20 MIN: CPT | Performed by: NURSE PRACTITIONER

## 2017-11-08 RX ORDER — OLOPATADINE HYDROCHLORIDE 1 MG/ML
1 SOLUTION/ DROPS OPHTHALMIC 2 TIMES DAILY
Qty: 5 ML | Refills: 3 | Status: SHIPPED | OUTPATIENT
Start: 2017-11-08 | End: 2017-12-01

## 2017-11-08 RX ORDER — NITROGLYCERIN 0.4 MG/1
0.4 TABLET SUBLINGUAL EVERY 5 MIN PRN
Qty: 25 TABLET | Refills: 2 | Status: SHIPPED | OUTPATIENT
Start: 2017-11-08

## 2017-11-08 NOTE — PROGRESS NOTES
SUBJECTIVE:   Angel Sanders is a 67 year old male who presents to clinic today for the following health issues:      ED/UC Followup:    Facility:  [facility]  Date of visit: 10/22/17  Reason for visit: URI, conjunctivitis   Current Status: improved on augmentin             Problem list and histories reviewed & adjusted, as indicated.  Additional history: as documented    Patient Active Problem List   Diagnosis     Coronary atherosclerosis     Cerebral infarction due to occlusion or stenosis of carotid artery     CARDIOVASCULAR SCREENING; LDL GOAL LESS THAN 100     Heel pain     Hyperlipidemia LDL goal <100     Advanced directives, counseling/discussion     Anemia     Benign essential hypertension     Past Surgical History:   Procedure Laterality Date     ARTHROTOMY SHOULDER, ROTATOR CUFF REPAIR, COMBINED  10/23/2013    Procedure: COMBINED ARTHROTOMY SHOULDER, ROTATOR CUFF REPAIR;  Left Shoulder Open Decompression, Distal Clavical Resection, Rotator Cuff Repair , Bicep Tenolysis, and Bicep Tenodesis   ;  Surgeon: Zhang Mattson MD;  Location: [facility] OR     C NONSPECIFIC PROCEDURE  2008    RCA stent; see PMH     PHACOEMULSIFICATION CLEAR CORNEA WITH STANDARD INTRAOCULAR LENS IMPLANT  11/20/2013    Procedure: PHACOEMULSIFICATION CLEAR CORNEA WITH STANDARD INTRAOCULAR LENS IMPLANT;  RIGHT PHACOEMULSIFICATION CLEAR CORNEA WITH STANDARD INTRAOCULAR LENS IMPLANT ;  Surgeon: Rikki Reddy MD;  Location: [facility]     PHACOEMULSIFICATION CLEAR CORNEA WITH STANDARD INTRAOCULAR LENS IMPLANT  12/3/2013    Procedure: PHACOEMULSIFICATION CLEAR CORNEA WITH STANDARD INTRAOCULAR LENS IMPLANT;  LEFT PHACOEMULSIFICATION CLEAR CORNEA WITH STANDARD INTRAOCULAR LENS IMPLANT;  Surgeon: Rikki Reddy MD;  Location: [facility]       Social History   Substance Use Topics     Smoking status: Former Smoker     Packs/day: 1.00     Years: 40.00     Types: Cigarettes     Quit date: 1/4/2008     Smokeless tobacco: Never Used     Alcohol use  No      Comment: Sober for 25 years     Family History   Problem Relation Age of Onset     Breast Cancer Mother      HEART DISEASE Father          Current Outpatient Prescriptions   Medication Sig Dispense Refill     nitroGLYcerin (NITROSTAT) 0.4 MG sublingual tablet Place 1 tablet (0.4 mg) under the tongue every 5 minutes as needed May repeat X 2 and if chest pain persists, call 911 25 tablet 2     olopatadine (PATANOL) 0.1 % ophthalmic solution Place 1 drop into both eyes 2 times daily 5 mL 3     multivitamin, therapeutic with minerals (MULTI-VITAMIN) TABS tablet Take 1 tablet by mouth daily       lisinopril (PRINIVIL/ZESTRIL) 5 MG tablet Take 1 tablet (5 mg) by mouth daily 90 tablet 3     metoprolol (LOPRESSOR) 25 MG tablet Take 1 tablet (25 mg) by mouth 2 times daily 180 tablet 3     atorvastatin (LIPITOR) 80 MG tablet Take 1 tablet (80 mg) by mouth daily 90 tablet 3     folic acid-vit B6-vit B12 (FOLGARD) 0.8-10-0.115 MG TABS Take 1 tablet by mouth daily       Ferrous Sulfate (IRON SUPPLEMENT PO) Take 325 mg by mouth daily (with breakfast)        ASPIRIN 81 MG OR TABS 1 TABLET DAILY       [DISCONTINUED] nitroglycerin (NITROSTAT) 0.4 MG SL tablet Place 1 tablet (0.4 mg) under the tongue every 5 minutes as needed May repeat X 2 and if chest pain persists, call 911 25 tablet 2     BP Readings from Last 3 Encounters:   11/08/17 136/70   10/22/17 145/70   03/22/17 136/78    Wt Readings from Last 3 Encounters:   11/08/17 207 lb 9.6 oz (94.2 kg)   10/22/17 211 lb 3.2 oz (95.8 kg)   03/22/17 208 lb (94.3 kg)                        Reviewed and updated as needed this visit by clinical staffTobacco  Allergies  Meds  Med Hx  Surg Hx  Fam Hx  Soc Hx      Reviewed and updated as needed this visit by Provider         ROS:  C: NEGATIVE for fever, chills, change in weight  EYES: POSITIVE for itching bilateral and redness bilateral  E/M: NEGATIVE for ear, mouth and throat problems  R: NEGATIVE for significant cough or  "SOB  CV: NEGATIVE for chest pain, palpitations or peripheral edema    OBJECTIVE:     /70 (BP Location: Right arm, Patient Position: Sitting, Cuff Size: Adult Large)  Pulse 100  Temp 97.9  F (36.6  C) (Oral)  Resp 16  Ht 5' 4\" (1.626 m)  Wt 207 lb 9.6 oz (94.2 kg)  SpO2 92%  BMI 35.63 kg/m2  Body mass index is 35.63 kg/(m^2).  GENERAL: healthy, alert and no distress  EYES: Eyes grossly normal to inspection, eyelids- periorbital edema OU and conjunctiva/corneas- conjunctival injection OU  HENT: ear canals and TM's normal, nose and mouth without ulcers or lesions  NECK: no adenopathy, no asymmetry, masses, or scars and thyroid normal to palpation  RESP: lungs clear to auscultation - no rales, rhonchi or wheezes  CV: regular rate and rhythm, normal S1 S2, no S3 or S4, no murmur, click or rub, no peripheral edema and peripheral pulses strong        ASSESSMENT/PLAN:               ICD-10-CM    1. Allergic conjunctivitis, bilateral H10.13 nitroGLYcerin (NITROSTAT) 0.4 MG sublingual tablet     olopatadine (PATANOL) 0.1 % ophthalmic solution       Call if not improvement.    Jen Jacob NP  [facility]    "

## 2017-11-08 NOTE — MR AVS SNAPSHOT
"              After Visit Summary   11/8/2017    Angel Sanders    MRN: [id number]           Patient Information     Date Of Birth          1950        Visit Information        Provider Department      11/8/2017 7:20 AM Jen Jacob NP [facility]        Today's Diagnoses     Allergic conjunctivitis, bilateral    -  1       Follow-ups after your visit        Your next 10 appointments already scheduled     Nov 13, 2017  7:40 AM CST   PHYSICAL with Tony Peter MD   [facility] ([facility])    [address]  [facility] [id number] [phone]              Who to contact     If you have questions or need follow up information about today's clinic visit or your schedule please contact [facility] directly at [phone].  Normal or non-critical lab and imaging results will be communicated to you by MyChart, letter or phone within 4 business days after the clinic has received the results. If you do not hear from us within 7 days, please contact the clinic through MyChart or phone. If you have a critical or abnormal lab result, we will notify you by phone as soon as possible.  Submit refill requests through [employer] or call your pharmacy and they will forward the refill request to us. Please allow 3 business days for your refill to be completed.          Additional Information About Your Visit        MyChart Information     [employer] lets you send messages to your doctor, view your test results, renew your prescriptions, schedule appointments and more. To sign up, go to [email or web address] . Click on \"Log in\" on the left side of the screen, which will take you to the Welcome page. Then click on \"Sign up Now\" on the right side of the page.     You will be asked to enter the access code listed below, as well as some personal information. Please follow the directions to create your username and password.     Your access code " "is: [email or web address]  Expires: 2018  4:34 PM     Your access code will  in 90 days. If you need help or a new code, please call your [facility] clinic or [phone].        Care EveryWhere ID     This is your Care EveryWhere ID. This could be used by other organizations to access your [facility] medical records  [id number]        Your Vitals Were     Pulse Temperature Respirations Height Pulse Oximetry BMI (Body Mass Index)    100 97.9  F (36.6  C) (Oral) 16 5' 4\" (1.626 m) 92% 35.63 kg/m2       Blood Pressure from Last 3 Encounters:   17 136/70   10/22/17 145/70   17 136/78    Weight from Last 3 Encounters:   17 207 lb 9.6 oz (94.2 kg)   10/22/17 211 lb 3.2 oz (95.8 kg)   17 208 lb (94.3 kg)              Today, you had the following     No orders found for display         Today's Medication Changes          These changes are accurate as of: 17  8:25 AM.  If you have any questions, ask your nurse or doctor.               Start taking these medicines.        Dose/Directions    olopatadine 0.1 % ophthalmic solution   Commonly known as:  PATANOL   Used for:  Allergic conjunctivitis, bilateral   Started by:  Jen Jacob NP        Dose:  1 drop   Place 1 drop into both eyes 2 times daily   Quantity:  5 mL   Refills:  3            Where to get your medicines      These medications were sent to [facility] [place], [place] - [address]  [facility] [address] [facility] [address]     Phone:  [phone]     nitroGLYcerin 0.4 MG sublingual tablet    olopatadine 0.1 % ophthalmic solution                Primary Care Provider Office Phone # Fax #    Tony Peter [phone]       [address]  [facility] [address]        Equal Access to Services     CUONG HUYNH AH: Veronica franz Sosally, waaxda luqadaha, qaybta kaalmelissa lowry, duke victoria'aan ah. [facility] [phone].    ATENCIÓN: Si habla " español, tiene a perez disposición servicios gratuitos de asistencia lingüística. Chris anthony [phone].    We comply with applicable federal civil rights laws and [place] laws. We do not discriminate on the basis of race, color, national origin, age, disability, sex, sexual orientation, or gender identity.            Thank you!     Thank you for choosing [facility]  for your care. Our goal is always to provide you with excellent care. Hearing back from our patients is one way we can continue to improve our services. Please take a few minutes to complete the written survey that you may receive in the mail after your visit with us. Thank you!             Your Updated Medication List - Protect others around you: Learn how to safely use, store and throw away your medicines at www.disposemymeds.org.          This list is accurate as of: 11/8/17  8:25 AM.  Always use your most recent med list.                   Brand Name Dispense Instructions for use Diagnosis    aspirin 81 MG tablet      1 TABLET DAILY        atorvastatin 80 MG tablet    LIPITOR    90 tablet    Take 1 tablet (80 mg) by mouth daily    High cholesterol       folic acid-vit B6-vit B12 0.8-10-0.115 MG Tabs per tablet    FOLGARD     Take 1 tablet by mouth daily        IRON SUPPLEMENT PO      Take 325 mg by mouth daily (with breakfast)        lisinopril 5 MG tablet    PRINIVIL/ZESTRIL    90 tablet    Take 1 tablet (5 mg) by mouth daily    Essential hypertension, benign       metoprolol 25 MG tablet    LOPRESSOR    180 tablet    Take 1 tablet (25 mg) by mouth 2 times daily    Essential hypertension, benign       Multi-vitamin Tabs tablet      Take 1 tablet by mouth daily        nitroGLYcerin 0.4 MG sublingual tablet    NITROSTAT    25 tablet    Place 1 tablet (0.4 mg) under the tongue every 5 minutes as needed May repeat X 2 and if chest pain persists, call 911    Allergic conjunctivitis, bilateral       olopatadine 0.1 % ophthalmic solution     PATANOL    5 mL    Place 1 drop into both eyes 2 times daily    Allergic conjunctivitis, bilateral

## 2017-11-08 NOTE — NURSING NOTE
"Chief Complaint   Patient presents with     URI     cold since 10/22/17, was seen at [facility] given rx of amoxicillin and polymyxin eye drops, cold now back.       Initial /70 (BP Location: Right arm, Patient Position: Sitting, Cuff Size: Adult Large)  Pulse 100  Temp 97.9  F (36.6  C) (Oral)  Resp 16  Ht 5' 4\" (1.626 m)  Wt 207 lb 9.6 oz (94.2 kg)  SpO2 92%  BMI 35.63 kg/m2 Estimated body mass index is 35.63 kg/(m^2) as calculated from the following:    Height as of this encounter: 5' 4\" (1.626 m).    Weight as of this encounter: 207 lb 9.6 oz (94.2 kg).  Medication Reconciliation: complete    "

## 2017-11-11 ENCOUNTER — NURSE TRIAGE (OUTPATIENT)
Dept: NURSING | Facility: CLINIC | Age: 67
End: 2017-11-11

## 2017-11-11 ENCOUNTER — OFFICE VISIT (OUTPATIENT)
Dept: URGENT CARE | Facility: URGENT CARE | Age: 67
End: 2017-11-11
Payer: COMMERCIAL

## 2017-11-11 VITALS
OXYGEN SATURATION: 94 % | DIASTOLIC BLOOD PRESSURE: 91 MMHG | TEMPERATURE: 97 F | WEIGHT: 210.13 LBS | HEART RATE: 76 BPM | BODY MASS INDEX: 36.07 KG/M2 | SYSTOLIC BLOOD PRESSURE: 154 MMHG

## 2017-11-11 DIAGNOSIS — B96.89 BACTERIAL CONJUNCTIVITIS OF BOTH EYES: Primary | ICD-10-CM

## 2017-11-11 DIAGNOSIS — A49.01 INFECTION DUE TO STAPHYLOCOCCUS AUREUS: ICD-10-CM

## 2017-11-11 DIAGNOSIS — J01.90 ACUTE SINUSITIS, RECURRENCE NOT SPECIFIED, UNSPECIFIED LOCATION: ICD-10-CM

## 2017-11-11 DIAGNOSIS — H02.849 SWELLING OF EYELID, UNSPECIFIED LATERALITY: ICD-10-CM

## 2017-11-11 DIAGNOSIS — H10.9 BACTERIAL CONJUNCTIVITIS OF BOTH EYES: Primary | ICD-10-CM

## 2017-11-11 PROCEDURE — 99214 OFFICE O/P EST MOD 30 MIN: CPT | Performed by: PHYSICIAN ASSISTANT

## 2017-11-11 RX ORDER — MUPIROCIN 20 MG/G
OINTMENT TOPICAL 3 TIMES DAILY
Qty: 22 G | Refills: 1 | Status: SHIPPED | OUTPATIENT
Start: 2017-11-11 | End: 2017-11-16

## 2017-11-11 RX ORDER — OFLOXACIN 3 MG/ML
SOLUTION/ DROPS OPHTHALMIC
Qty: 1 BOTTLE | Refills: 0 | Status: SHIPPED | OUTPATIENT
Start: 2017-11-11 | End: 2017-12-01

## 2017-11-11 RX ORDER — LORATADINE 10 MG/1
10 TABLET ORAL DAILY
Qty: 30 TABLET | Refills: 0 | Status: SHIPPED | OUTPATIENT
Start: 2017-11-11 | End: 2018-03-28

## 2017-11-11 NOTE — MR AVS SNAPSHOT
"              After Visit Summary   11/11/2017    Angel Sanders    MRN: [id number]           Patient Information     Date Of Birth          1950        Visit Information        Provider Department      11/11/2017 4:45 PM Yogesh Younger PA-C [facility]        Today's Diagnoses     Bacterial conjunctivitis of both eyes    -  1    Infection due to Staphylococcus aureus        Acute sinusitis, recurrence not specified, unspecified location        Swelling of eyelid, unspecified laterality           Follow-ups after your visit        Your next 10 appointments already scheduled     Nov 13, 2017  7:40 AM CST   PHYSICAL with Tony Peter MD   [facility] ([facility])    [address]  [facility] [address] [phone]              Who to contact     If you have questions or need follow up information about today's clinic visit or your schedule please contact [facility] directly at [phone].  Normal or non-critical lab and imaging results will be communicated to you by [email or web address], letter or phone within 4 business days after the clinic has received the results. If you do not hear from us within 7 days, please contact the clinic through [email or web address] or phone. If you have a critical or abnormal lab result, we will notify you by phone as soon as possible.  Submit refill requests through [employer] or call your pharmacy and they will forward the refill request to us. Please allow 3 business days for your refill to be completed.          Additional Information About Your Visit        [email or web address] Information     [employer] lets you send messages to your doctor, view your test results, renew your prescriptions, schedule appointments and more. To sign up, go to [email or web address] . Click on \"Log in\" on the left side of the screen, which will take you to the Welcome page. Then click on \"Sign up Now\" on the right side of the " page.     You will be asked to enter the access code listed below, as well as some personal information. Please follow the directions to create your username and password.     Your access code is: [email or web address]  Expires: 2018  4:34 PM     Your access code will  in 90 days. If you need help or a new code, please call your [facility] clinic or [phone].        Care EveryWhere ID     This is your Care EveryWhere ID. This could be used by other organizations to access your [facility] medical records  [id number]        Your Vitals Were     Pulse Temperature Pulse Oximetry BMI (Body Mass Index)          76 97  F (36.1  C) (Oral) 94% 36.07 kg/m2         Blood Pressure from Last 3 Encounters:   17 (!) 154/91   17 136/70   10/22/17 145/70    Weight from Last 3 Encounters:   17 210 lb 2 oz (95.3 kg)   17 207 lb 9.6 oz (94.2 kg)   10/22/17 211 lb 3.2 oz (95.8 kg)              Today, you had the following     No orders found for display         Today's Medication Changes          These changes are accurate as of: 17  5:56 PM.  If you have any questions, ask your nurse or doctor.               Start taking these medicines.        Dose/Directions    amoxicillin-clavulanate 875-125 MG per tablet   Commonly known as:  AUGMENTIN   Used for:  Bacterial conjunctivitis of both eyes, Acute sinusitis, recurrence not specified, unspecified location   Started by:  Yogesh Younger PA-C        Dose:  1 tablet   Take 1 tablet by mouth 2 times daily   Quantity:  20 tablet   Refills:  0       loratadine 10 MG tablet   Commonly known as:  CLARITIN   Used for:  Swelling of eyelid, unspecified laterality   Started by:  Yogesh Younger PA-C        Dose:  10 mg   Take 1 tablet (10 mg) by mouth daily   Quantity:  30 tablet   Refills:  0       mupirocin 2 % ointment   Commonly known as:  BACTROBAN   Used for:  Infection due to Staphylococcus aureus   Started by:  Yogesh Younger PA-C        Apply topically 3  times daily for 5 days   Quantity:  22 g   Refills:  1       ofloxacin 0.3 % ophthalmic solution   Commonly known as:  OCUFLOX   Used for:  Bacterial conjunctivitis of both eyes, Infection due to Staphylococcus aureus, Acute sinusitis, recurrence not specified, unspecified location   Started by:  Yogesh Younger PA-C        Instill 1-2 drops in the affected eye(s) every 2 hours while awake for 2 days then 1-2 drops every 4 hours while awake for the next 5 days.   Quantity:  1 Bottle   Refills:  0            Where to get your medicines      These medications were sent to [address] [address]     Phone:  [phone]     amoxicillin-clavulanate 875-125 MG per tablet    loratadine 10 MG tablet    mupirocin 2 % ointment    ofloxacin 0.3 % ophthalmic solution                Primary Care Provider Office Phone # Fax #    Tony Peter [phone]       [address] [facility] [address]        Equal Access to Services     [facility]: Hadii aad ku hadasho Soomaali, waaxda luqadaha, qaybta kaalmada adeegyada, waxay suman haysean nevarez . So [facility] [place] [facility] [phone].    ATENCIÓN: Si habla español, tiene a perez disposición servicios gratuitos de asistencia lingüística. Llame al [phone].    We comply with applicable federal civil rights laws and [place] laws. We do not discriminate on the basis of race, color, national origin, age, disability, sex, sexual orientation, or gender identity.            Thank you!     Thank you for choosing [facility]  for your care. Our goal is always to provide you with excellent care. Hearing back from our patients is one way we can continue to improve our services. Please take a few minutes to complete the written survey that you may receive in the mail after your visit with us. Thank you!             Your Updated Medication List - Protect  others around you: Learn how to safely use, store and throw away your medicines at www.disposemymeds.org.          This list is accurate as of: 11/11/17  5:56 PM.  Always use your most recent med list.                   Brand Name Dispense Instructions for use Diagnosis    amoxicillin-clavulanate 875-125 MG per tablet    AUGMENTIN    20 tablet    Take 1 tablet by mouth 2 times daily    Bacterial conjunctivitis of both eyes, Acute sinusitis, recurrence not specified, unspecified location       aspirin 81 MG tablet      1 TABLET DAILY        atorvastatin 80 MG tablet    LIPITOR    90 tablet    Take 1 tablet (80 mg) by mouth daily    High cholesterol       folic acid-vit B6-vit B12 0.8-10-0.115 MG Tabs per tablet    FOLGARD     Take 1 tablet by mouth daily        IRON SUPPLEMENT PO      Take 325 mg by mouth daily (with breakfast)        lisinopril 5 MG tablet    PRINIVIL/ZESTRIL    90 tablet    Take 1 tablet (5 mg) by mouth daily    Essential hypertension, benign       loratadine 10 MG tablet    CLARITIN    30 tablet    Take 1 tablet (10 mg) by mouth daily    Swelling of eyelid, unspecified laterality       metoprolol 25 MG tablet    LOPRESSOR    180 tablet    Take 1 tablet (25 mg) by mouth 2 times daily    Essential hypertension, benign       Multi-vitamin Tabs tablet      Take 1 tablet by mouth daily        mupirocin 2 % ointment    BACTROBAN    22 g    Apply topically 3 times daily for 5 days    Infection due to Staphylococcus aureus       nitroGLYcerin 0.4 MG sublingual tablet    NITROSTAT    25 tablet    Place 1 tablet (0.4 mg) under the tongue every 5 minutes as needed May repeat X 2 and if chest pain persists, call 911    Allergic conjunctivitis, bilateral       ofloxacin 0.3 % ophthalmic solution    OCUFLOX    1 Bottle    Instill 1-2 drops in the affected eye(s) every 2 hours while awake for 2 days then 1-2 drops every 4 hours while awake for the next 5 days.    Bacterial conjunctivitis of both eyes, Infection  due to Staphylococcus aureus, Acute sinusitis, recurrence not specified, unspecified location       olopatadine 0.1 % ophthalmic solution    PATANOL    5 mL    Place 1 drop into both eyes 2 times daily    Allergic conjunctivitis, bilateral

## 2017-11-11 NOTE — PROGRESS NOTES
SUBJECTIVE:   Angel Sanders is a 67 year old male presenting with a chief complaint of having redness mattering or eyes and swelling of nose, nasal discharge.  Onset of symptoms was 3 day(s) ago.  Course of illness is worsening.    Severity moderate  Current and Associated symptoms: edema and erythema around eyes  Treatment measures tried include allergy eye drops.  Predisposing factors include recent eye infection and now having nasal infection.    Past Medical History:   Diagnosis Date     Carotid artery obstruction 2009    stent placed     Coronary atherosclerosis of unspecified type of vessel, native or graft 2008    Acute inf MI; RCA stent; followed by Cardiology     Heart attack      Herniated cervical disc      Hyperlipidaemia      Hyperlipidemia LDL goal <100 8/9/2012     Hypertension      Occlusion and stenosis of carotid artery without mention of cerebral infarction 2008    50-69% stenosis of left ICA     Stented coronary artery     1 coronary /1 carotid        Allergies   Allergen Reactions     Adhesive Tape Rash         Social History   Substance Use Topics     Smoking status: Former Smoker     Packs/day: 1.00     Years: 40.00     Types: Cigarettes     Quit date: 1/4/2008     Smokeless tobacco: Never Used     Alcohol use No      Comment: Sober for 25 years       ROS:  CONSTITUTIONAL:NEGATIVE for fever, chills, change in weight  INTEGUMENTARY/SKIN: POSITIVE for erythema, edema, nasal swelling   EYES: positive for erythema, conjuctival injection and discharge  ENT/MOUTH: positive for nasal infections, discharge and swelling around eyes  RESP:NEGATIVE for significant cough or SOB  CV: NEGATIVE for chest pain, palpitations or peripheral edema  MUSCULOSKELETAL: NEGATIVE for significant arthralgias or myalgia  NEURO: NEGATIVE for weakness, dizziness or paresthesias    OBJECTIVE  :BP (!) 154/91  Pulse 76  Temp 97  F (36.1  C) (Oral)  Wt 210 lb 2 oz (95.3 kg)  SpO2 94%  BMI 36.07 kg/m2  GENERAL APPEARANCE:  healthy, alert and no distress  EYES: Positive for bilateral eye redness, mattering of eyes bilaterally  HENT: TM normal.  Positive for nasal drainage, discharge and nasal sores  NECK: supple, nontender, no lymphadenopathy  RESP: lungs clear to auscultation - no rales, rhonchi or wheezes  CV: regular rates and rhythm, normal S1 S2, no murmur noted  ABDOMEN:  soft, nontender, no HSM or masses and bowel sounds normal  NEURO: Normal strength and tone, sensory exam grossly normal,  normal speech and mentation  SKIN: positive dermatitis around eyes, edema of cheeks, nasal sores and swelling around nares bilaterally    ASSESSMENT/PLAN:      ICD-10-CM    1. Bacterial conjunctivitis of both eyes H10.9 ofloxacin (OCUFLOX) 0.3 % ophthalmic solution     amoxicillin-clavulanate (AUGMENTIN) 875-125 MG per tablet   2. Infection due to Staphylococcus aureus A49.01 ofloxacin (OCUFLOX) 0.3 % ophthalmic solution     mupirocin (BACTROBAN) 2 % ointment   3. Acute sinusitis, recurrence not specified, unspecified location J01.90 ofloxacin (OCUFLOX) 0.3 % ophthalmic solution     amoxicillin-clavulanate (AUGMENTIN) 875-125 MG per tablet   4. Swelling of eyelid, unspecified laterality H02.849 loratadine (CLARITIN) 10 MG tablet       Concern about bacterial conjuctivitis  There seems to be some atopic dermatitis around eyes, possibly from drainage  There is likely nasal infection with staph around nose and possibly cheek  Will use augmentin, ocuflox and bactroban for staph coverage  If this fails to work then may consider using clindamycin for nasal MRSA coverage  Follow up with PCP in 2 days for recheck  See orders in Epic

## 2017-11-11 NOTE — TELEPHONE ENCOUNTER
Caller reports that he continues to have red puffy ,tearing eyes after using eye drops prescribed for allergic conjunctivitis for several days. Last month had same symptoms and was treatedwith antibiotic drops an got better. Denies ever having purulent drainage or crusting of eyelids.  Triage protocol reviewed  Advised to coninue current  Eye drops and follow up with provider  In 2 days as planned  Call for new or worsening symptoms  Additional Information    Negative: Red eye present more than 7 days    [1] Red eye AND [2] no blurred vision AND [3] minimal or no pain (all triage questions negative)    Protocols used: EYE - RED WITHOUT PUS-ADULT-  Magda Love RN  FNA

## 2017-11-11 NOTE — NURSING NOTE
"Chief Complaint   Patient presents with     Eye Problem     redness burning with pain and swelling of eyes.        Initial BP (!) 154/91  Pulse 76  Temp 97  F (36.1  C) (Oral)  Wt 210 lb 2 oz (95.3 kg)  SpO2 94%  BMI 36.07 kg/m2 Estimated body mass index is 36.07 kg/(m^2) as calculated from the following:    Height as of 11/8/17: 5' 4\" (1.626 m).    Weight as of this encounter: 210 lb 2 oz (95.3 kg).  Medication Reconciliation: complete    "

## 2017-11-13 ENCOUNTER — OFFICE VISIT (OUTPATIENT)
Dept: INTERNAL MEDICINE | Facility: CLINIC | Age: 67
End: 2017-11-13
Payer: COMMERCIAL

## 2017-11-13 VITALS
SYSTOLIC BLOOD PRESSURE: 122 MMHG | BODY MASS INDEX: 34.26 KG/M2 | HEIGHT: 65 IN | HEART RATE: 100 BPM | DIASTOLIC BLOOD PRESSURE: 78 MMHG | WEIGHT: 205.6 LBS | TEMPERATURE: 97.6 F | OXYGEN SATURATION: 96 %

## 2017-11-13 DIAGNOSIS — Z12.5 SCREENING FOR PROSTATE CANCER: ICD-10-CM

## 2017-11-13 DIAGNOSIS — I25.10 ATHEROSCLEROSIS OF NATIVE CORONARY ARTERY OF NATIVE HEART WITHOUT ANGINA PECTORIS: ICD-10-CM

## 2017-11-13 DIAGNOSIS — Z00.00 ROUTINE GENERAL MEDICAL EXAMINATION AT A HEALTH CARE FACILITY: Primary | ICD-10-CM

## 2017-11-13 DIAGNOSIS — I10 BENIGN ESSENTIAL HYPERTENSION: ICD-10-CM

## 2017-11-13 DIAGNOSIS — L03.211 FACIAL CELLULITIS: ICD-10-CM

## 2017-11-13 DIAGNOSIS — E78.5 HYPERLIPIDEMIA LDL GOAL <100: ICD-10-CM

## 2017-11-13 DIAGNOSIS — Z23 NEED FOR PROPHYLACTIC VACCINATION AND INOCULATION AGAINST INFLUENZA: ICD-10-CM

## 2017-11-13 LAB
ALBUMIN SERPL-MCNC: 3.7 G/DL (ref 3.4–5)
ALBUMIN UR-MCNC: 100 MG/DL
ALP SERPL-CCNC: 108 U/L (ref 40–150)
ALT SERPL W P-5'-P-CCNC: 31 U/L (ref 0–70)
ANION GAP SERPL CALCULATED.3IONS-SCNC: 5 MMOL/L (ref 3–14)
APPEARANCE UR: CLEAR
AST SERPL W P-5'-P-CCNC: 33 U/L (ref 0–45)
BILIRUB SERPL-MCNC: 0.6 MG/DL (ref 0.2–1.3)
BILIRUB UR QL STRIP: NEGATIVE
BUN SERPL-MCNC: 18 MG/DL (ref 7–30)
CALCIUM SERPL-MCNC: 10.1 MG/DL (ref 8.5–10.1)
CHLORIDE SERPL-SCNC: 100 MMOL/L (ref 94–109)
CHOLEST SERPL-MCNC: 94 MG/DL
CO2 SERPL-SCNC: 29 MMOL/L (ref 20–32)
COLOR UR AUTO: YELLOW
CREAT SERPL-MCNC: 1.13 MG/DL (ref 0.66–1.25)
ERYTHROCYTE [DISTWIDTH] IN BLOOD BY AUTOMATED COUNT: 14.3 % (ref 10–15)
GFR SERPL CREATININE-BSD FRML MDRD: 64 ML/MIN/1.7M2
GLUCOSE SERPL-MCNC: 96 MG/DL (ref 70–99)
GLUCOSE UR STRIP-MCNC: NEGATIVE MG/DL
HCT VFR BLD AUTO: 38.4 % (ref 40–53)
HDLC SERPL-MCNC: 45 MG/DL
HGB BLD-MCNC: 12.6 G/DL (ref 13.3–17.7)
HGB UR QL STRIP: NEGATIVE
HYALINE CASTS #/AREA URNS LPF: ABNORMAL /LPF
KETONES UR STRIP-MCNC: NEGATIVE MG/DL
LDLC SERPL CALC-MCNC: 38 MG/DL
LEUKOCYTE ESTERASE UR QL STRIP: NEGATIVE
MCH RBC QN AUTO: 28.9 PG (ref 26.5–33)
MCHC RBC AUTO-ENTMCNC: 32.8 G/DL (ref 31.5–36.5)
MCV RBC AUTO: 88 FL (ref 78–100)
MUCOUS THREADS #/AREA URNS LPF: PRESENT /LPF
NITRATE UR QL: NEGATIVE
NONHDLC SERPL-MCNC: 49 MG/DL
PH UR STRIP: 6 PH (ref 5–7)
PLATELET # BLD AUTO: 247 10E9/L (ref 150–450)
POTASSIUM SERPL-SCNC: 4.6 MMOL/L (ref 3.4–5.3)
PROT SERPL-MCNC: 7.8 G/DL (ref 6.8–8.8)
PSA SERPL-ACNC: 1.46 UG/L (ref 0–4)
RBC # BLD AUTO: 4.36 10E12/L (ref 4.4–5.9)
RBC #/AREA URNS AUTO: ABNORMAL /HPF
SODIUM SERPL-SCNC: 134 MMOL/L (ref 133–144)
SOURCE: ABNORMAL
SP GR UR STRIP: 1.01 (ref 1–1.03)
TRIGL SERPL-MCNC: 55 MG/DL
TSH SERPL DL<=0.005 MIU/L-ACNC: 1.3 MU/L (ref 0.4–4)
UROBILINOGEN UR STRIP-ACNC: 0.2 EU/DL (ref 0.2–1)
WBC # BLD AUTO: 8.7 10E9/L (ref 4–11)
WBC #/AREA URNS AUTO: ABNORMAL /HPF

## 2017-11-13 PROCEDURE — 84443 ASSAY THYROID STIM HORMONE: CPT | Performed by: INTERNAL MEDICINE

## 2017-11-13 PROCEDURE — G0008 ADMIN INFLUENZA VIRUS VAC: HCPCS | Performed by: INTERNAL MEDICINE

## 2017-11-13 PROCEDURE — G0103 PSA SCREENING: HCPCS | Performed by: INTERNAL MEDICINE

## 2017-11-13 PROCEDURE — 99397 PER PM REEVAL EST PAT 65+ YR: CPT | Mod: 25 | Performed by: INTERNAL MEDICINE

## 2017-11-13 PROCEDURE — 80053 COMPREHEN METABOLIC PANEL: CPT | Performed by: INTERNAL MEDICINE

## 2017-11-13 PROCEDURE — 81001 URINALYSIS AUTO W/SCOPE: CPT | Performed by: INTERNAL MEDICINE

## 2017-11-13 PROCEDURE — 36415 COLL VENOUS BLD VENIPUNCTURE: CPT | Performed by: INTERNAL MEDICINE

## 2017-11-13 PROCEDURE — 93000 ELECTROCARDIOGRAM COMPLETE: CPT | Performed by: INTERNAL MEDICINE

## 2017-11-13 PROCEDURE — 80061 LIPID PANEL: CPT | Performed by: INTERNAL MEDICINE

## 2017-11-13 PROCEDURE — 85027 COMPLETE CBC AUTOMATED: CPT | Performed by: INTERNAL MEDICINE

## 2017-11-13 PROCEDURE — 90662 IIV NO PRSV INCREASED AG IM: CPT | Performed by: INTERNAL MEDICINE

## 2017-11-13 NOTE — PROGRESS NOTES
SUBJECTIVE:   Angel Sanders is a 67 year old male who presents for Preventive Visit.      Are you in the first 12 months of your Medicare coverage?  No    Physical   Annual:     Getting at least 3 servings of Calcium per day::  NO    Bi-annual eye exam::  Yes    Dental care twice a year::  Yes    Sleep apnea or symptoms of sleep apnea::  None    Diet::  Regular (no restrictions)    Frequency of exercise::  2-3 days/week    Duration of exercise::  15-30 minutes    Taking medications regularly::  Yes    Medication side effects::  None    Additional concerns today::  YES      COGNITIVE SCREEN  1) Repeat 3 items (Banana, Sunrise, Chair)    2) Clock draw: NORMAL  3) 3 item recall: Recalls 3 objects  Results: 3 items recalled: COGNITIVE IMPAIRMENT LESS LIKELY    Mini-CogTM Copyright S Anastasia. Licensed by the author for use in [facility] Image Searcher; reprinted with permission ([email or web address]). All rights reserved.          Reviewed and updated as needed this visit by clinical staffTobacco  Allergies  Meds  Med Hx  Surg Hx  Fam Hx  Soc Hx        Reviewed and updated as needed this visit by Provider        Social History   Substance Use Topics     Smoking status: Former Smoker     Packs/day: 1.00     Years: 40.00     Types: Cigarettes     Quit date: 1/4/2008     Smokeless tobacco: Never Used     Alcohol use No      Comment: Sober for 25 years       none          PROBLEMS TO ADD ON...    Today's PHQ-2 Score: PHQ-2 ( 1999 Pfizer) 1/30/2017   Q1: Little interest or pleasure in doing things 0   Q2: Feeling down, depressed or hopeless 0   PHQ-2 Score 0       Do you feel safe in your environment - Yes    Do you have a Health Care Directive?: No: Advance care planning was reviewed with patient; patient declined at this time.      Current providers sharing in care for this patient include: Patient Care Team:  Tony Peter MD as PCP - General      Hearing impairment: No    Ability to successfully perform activities of  daily living: Yes, no assistance needed     Fall risk:         Home safety:  lack of grab bars in the bathroom  Has h/o HTN. on medical treatment. BP has been controlled. No side effects from medications. No CP, HA, dizziness. good compliance with medications and low salt diet.  Has H/O hyperlipidemia. On medical treatment and diet. No side effects. No muscle weakness, myalgias or upset stomach.   Has h/o ischemic heart disease, asymptomatic regarding chest pains, SOB,palpitations. Has good compliance with treatment, diet and exercise.  Has facial cellulitis, erythema, around the eyes, under the nose, drainage and bleeding under the nose.     The following health maintenance items are reviewed in Epic and correct as of today:Health Maintenance   Topic Date Due     HEPATITIS C SCREENING  04/19/1967     AORTIC ANEURYSM SCREENING (SYSTEM ASSIGNED)  04/19/2014     INFLUENZA VACCINE (SYSTEM ASSIGNED)  09/01/2017     FALL RISK ASSESSMENT  01/30/2018     TETANUS IMMUNIZATION (SYSTEM ASSIGNED)  03/25/2018     COLON CANCER SCREEN (SYSTEM ASSIGNED)  03/25/2018     ADVANCE DIRECTIVE PLANNING Q5 YRS  11/12/2018     LIPID SCREEN Q5 YR MALE (SYSTEM ASSIGNED)  10/03/2021     PNEUMOCOCCAL  Completed     Labs reviewed in EPIC        Review of Systems  C: NEGATIVE for fever, chills, change in weight  I: NEGATIVE for worrisome rashes, moles or lesions  E: NEGATIVE for vision changes or irritation  E/M: NEGATIVE for ear, mouth and throat problems  R: NEGATIVE for significant cough or SOB  B: NEGATIVE for masses, tenderness or discharge  CV: NEGATIVE for chest pain, palpitations or peripheral edema  GI: NEGATIVE for nausea, abdominal pain, heartburn, or change in bowel habits  : NEGATIVE for frequency, dysuria, or hematuria  M: NEGATIVE for significant arthralgias or myalgia  N: NEGATIVE for weakness, dizziness or paresthesias  E: NEGATIVE for temperature intolerance, skin/hair changes  H: NEGATIVE for bleeding problems  P: NEGATIVE  "for changes in mood or affect    OBJECTIVE:   /78 (BP Location: Right arm, Patient Position: Sitting, Cuff Size: Adult Large)  Pulse 100  Temp 97.6  F (36.4  C) (Oral)  Ht 5' 5\" (1.651 m)  Wt 205 lb 9.6 oz (93.3 kg)  SpO2 96%  BMI 34.21 kg/m2 Estimated body mass index is 34.21 kg/(m^2) as calculated from the following:    Height as of this encounter: 5' 5\" (1.651 m).    Weight as of this encounter: 205 lb 9.6 oz (93.3 kg).  Physical Exam  GENERAL: healthy, alert and no distress  EYES: Eyes grossly normal to inspection, PERRL and conjunctivae and sclerae normal  HENT: ear canals and TM's normal, nose and mouth without ulcers or lesions  NECK: no adenopathy, no asymmetry, masses, or scars and thyroid normal to palpation  RESP: lungs clear to auscultation - no rales, rhonchi or wheezes  CV: regular rate and rhythm, normal S1 S2, no S3 or S4, no murmur, click or rub, no peripheral edema and peripheral pulses strong  ABDOMEN: soft, nontender, no hepatosplenomegaly, no masses and bowel sounds normal  MS: no gross musculoskeletal defects noted, no edema  SKIN: skin erythema and edema periorbital and under nose   NEURO: Normal strength and tone, mentation intact and speech normal  PSYCH: mentation appears normal, affect normal/bright    ASSESSMENT / PLAN:       ICD-10-CM    1. Routine general medical examination at a health care facility Z00.00 Lipid panel reflex to direct LDL Fasting     CBC with platelets     Comprehensive metabolic panel     Prostate spec antigen screen     TSH with free T4 reflex     *UA reflex to Microscopic and Culture ([facility] (except [place] and [place])   2. Atherosclerosis of native coronary artery of native heart without angina pectoris I25.10 EKG 12-lead complete w/read - Clinics   3. Hyperlipidemia LDL goal <100 E78.5 Lipid panel reflex to direct LDL Fasting   4. Benign essential hypertension I10 CBC with platelets     Comprehensive metabolic panel     TSH " "with free T4 reflex     *UA reflex to Microscopic and Culture ([facility] (except [place] and [place])   5. Facial cellulitis L03.211    6. Screening for prostate cancer Z12.5 Prostate spec antigen screen     Assess EKG  Assess LAB  Cont treatment,finish Augmentin for 10 days. Improved.     End of Life Planning:  Patient currently has an advanced directive: Yes.  Practitioner is supportive of decision.    COUNSELING:  Reviewed preventive health counseling, as reflected in patient instructions       Regular exercise       Healthy diet/nutrition       Vision screening       Hearing screening       Immunizations    Vaccinated for: Influenza           Colon cancer screening       Prostate cancer screening          Estimated body mass index is 34.21 kg/(m^2) as calculated from the following:    Height as of this encounter: 5' 5\" (1.651 m).    Weight as of this encounter: 205 lb 9.6 oz (93.3 kg).  Weight management plan: Discussed healthy diet and exercise guidelines and patient will follow up in 12 months in clinic to re-evaluate.   reports that he quit smoking about 9 years ago. His smoking use included Cigarettes. He has a 40.00 pack-year smoking history. He has never used smokeless tobacco.        Appropriate preventive services were discussed with this patient, including applicable screening as appropriate for cardiovascular disease, diabetes, osteopenia/osteoporosis, and glaucoma.  As appropriate for age/gender, discussed screening for colorectal cancer, prostate cancer, breast cancer, and cervical cancer. Checklist reviewing preventive services available has been given to the patient.    Reviewed patients plan of care and provided an AVS. The Intermediate Care Plan ( asthma action plan, low back pain action plan, and migraine action plan) for Angel meets the Care Plan requirement. This Care Plan has been established and reviewed with the Patient.    Counseling Resources:  ATP IV " Guidelines  Pooled Cohorts Equation Calculator  Breast Cancer Risk Calculator  FRAX Risk Assessment  ICSI Preventive Guidelines  Dietary Guidelines for Americans, 2010  EUDOWEB's MyPlate  ASA Prophylaxis  Lung CA Screening    Tony Peter MD  [facility] for HPI/ROS submitted by the patient on 11/13/2017   PHQ-2 Score: 0

## 2017-11-13 NOTE — PROGRESS NOTES
Injectable Influenza Immunization Documentation    1.  Is the person to be vaccinated sick today?   No    2. Does the person to be vaccinated have an allergy to a component   of the vaccine?   No  Egg Allergy Algorithm Link    3. Has the person to be vaccinated ever had a serious reaction   to influenza vaccine in the past?   No    4. Has the person to be vaccinated ever had Guillain-Barré syndrome?   No    Form completed by Tony Peter MD           Answers for HPI/ROS submitted by the patient on 11/13/2017   Annual Exam:  Getting at least 3 servings of Calcium per day:: NO  Bi-annual eye exam:: Yes  Dental care twice a year:: Yes  Sleep apnea or symptoms of sleep apnea:: None  Diet:: Regular (no restrictions)  Frequency of exercise:: 2-3 days/week  Taking medications regularly:: Yes  Medication side effects:: None  Additional concerns today:: YES  PHQ-2 Score: 0  Duration of exercise:: 15-30 minutes

## 2017-11-13 NOTE — NURSING NOTE
"Chief Complaint   Patient presents with     Physical     physical is fasiting     RECHECK     [facility] last Wedn Dr. Jacob       Initial /78 (BP Location: Right arm, Patient Position: Sitting, Cuff Size: Adult Large)  Pulse 100  Temp 97.6  F (36.4  C) (Oral)  Ht 5' 5\" (1.651 m)  Wt 205 lb 9.6 oz (93.3 kg)  SpO2 96%  BMI 34.21 kg/m2 Estimated body mass index is 34.21 kg/(m^2) as calculated from the following:    Height as of this encounter: 5' 5\" (1.651 m).    Weight as of this encounter: 205 lb 9.6 oz (93.3 kg).  Medication Reconciliation: complete   Zeina Garcia, [facility]      "

## 2017-11-30 ENCOUNTER — OFFICE VISIT (OUTPATIENT)
Dept: URGENT CARE | Facility: URGENT CARE | Age: 67
End: 2017-11-30
Payer: COMMERCIAL

## 2017-11-30 VITALS
WEIGHT: 209 LBS | TEMPERATURE: 97 F | OXYGEN SATURATION: 94 % | HEIGHT: 65 IN | BODY MASS INDEX: 34.82 KG/M2 | HEART RATE: 74 BPM | DIASTOLIC BLOOD PRESSURE: 85 MMHG | RESPIRATION RATE: 16 BRPM | SYSTOLIC BLOOD PRESSURE: 159 MMHG

## 2017-11-30 DIAGNOSIS — H10.33 ACUTE CONJUNCTIVITIS OF BOTH EYES, UNSPECIFIED ACUTE CONJUNCTIVITIS TYPE: Primary | ICD-10-CM

## 2017-11-30 DIAGNOSIS — J34.89 NASAL CRUSTING: ICD-10-CM

## 2017-11-30 DIAGNOSIS — J30.9 ALLERGIC RHINITIS, UNSPECIFIED CHRONICITY, UNSPECIFIED SEASONALITY, UNSPECIFIED TRIGGER: ICD-10-CM

## 2017-11-30 PROCEDURE — 99213 OFFICE O/P EST LOW 20 MIN: CPT | Performed by: FAMILY MEDICINE

## 2017-11-30 RX ORDER — FLUTICASONE PROPIONATE 50 MCG
2 SPRAY, SUSPENSION (ML) NASAL DAILY
Qty: 1 BOTTLE | Refills: 0 | Status: SHIPPED | OUTPATIENT
Start: 2017-11-30 | End: 2018-03-28

## 2017-11-30 RX ORDER — ERYTHROMYCIN 5 MG/G
0.5 OINTMENT OPHTHALMIC 4 TIMES DAILY
Qty: 1 G | Refills: 0 | Status: SHIPPED | OUTPATIENT
Start: 2017-11-30 | End: 2017-12-07

## 2017-11-30 NOTE — MR AVS SNAPSHOT
"              After Visit Summary   11/30/2017    Angel Sanders    MRN: [id number]           Patient Information     Date Of Birth          1950        Visit Information        Provider Department      11/30/2017 6:20 PM Shanae Marcano MD [facility]        Today's Diagnoses     Acute conjunctivitis of both eyes, unspecified acute conjunctivitis type    -  1    Allergic rhinitis, unspecified chronicity, unspecified seasonality, unspecified trigger        Nasal crusting          Care Instructions    -Stop the Claritin.  -Start Flonase, as prescribed.  -Start Erythromycin opthalmic ointment, as prescribed.  -Warm packs 20 minutes 3 times daily.    -Mupirocin ointment (previously prescribed) - Apply twice daily for 5 days.    -Follow up if worsening symptoms or not improving over the next 48-72 hours.          Follow-ups after your visit        Who to contact     If you have questions or need follow up information about today's clinic visit or your schedule please contact [facility] [place] [facility] directly at [phone].  Normal or non-critical lab and imaging results will be communicated to you by [employer], letter or phone within 4 business days after the clinic has received the results. If you do not hear from us within 7 days, please contact the clinic through [employer] or phone. If you have a critical or abnormal lab result, we will notify you by phone as soon as possible.  Submit refill requests through [employer] or call your pharmacy and they will forward the refill request to us. Please allow 3 business days for your refill to be completed.          Additional Information About Your Visit        [employer] Information     [employer] lets you send messages to your doctor, view your test results, renew your prescriptions, schedule appointments and more. To sign up, go to [email or web address] . Click on \"Log in\" on the left side of the screen, which " "will take you to the Welcome page. Then click on \"Sign up Now\" on the right side of the page.     You will be asked to enter the access code listed below, as well as some personal information. Please follow the directions to create your username and password.     Your access code is: [email or web address]  Expires: 2018  4:34 PM     Your access code will  in 90 days. If you need help or a new code, please call your [place] clinic or [phone].        Care EveryWhere ID     This is your Care EveryWhere ID. This could be used by other organizations to access your [place] medical records  [id number]        Your Vitals Were     Pulse Temperature Respirations Height Pulse Oximetry BMI (Body Mass Index)    74 97  F (36.1  C) (Oral) 16 5' 5\" (1.651 m) 94% 34.78 kg/m2       Blood Pressure from Last 3 Encounters:   17 159/85   17 122/78   17 (!) 154/91    Weight from Last 3 Encounters:   17 209 lb (94.8 kg)   17 205 lb 9.6 oz (93.3 kg)   17 210 lb 2 oz (95.3 kg)              Today, you had the following     No orders found for display         Today's Medication Changes          These changes are accurate as of: 17  7:41 PM.  If you have any questions, ask your nurse or doctor.               Start taking these medicines.        Dose/Directions    erythromycin ophthalmic ointment   Commonly known as:  ROMYCIN   Used for:  Acute conjunctivitis of both eyes, unspecified acute conjunctivitis type   Started by:  Shanae Marcano MD        Dose:  0.5 inch   Place 0.5 inches into both eyes 4 times daily for 7 days   Quantity:  1 g   Refills:  0       fluticasone 50 MCG/ACT spray   Commonly known as:  FLONASE   Used for:  Allergic rhinitis, unspecified chronicity, unspecified seasonality, unspecified trigger   Started by:  Shanae Marcano MD        Dose:  2 spray   Spray 2 sprays into both nostrils daily   Quantity:  1 Bottle   Refills:  0       "      Where to get your medicines      These medications were sent to [facility], [place] - [address]  [address] [facility] [address]     Phone:  [phone]     erythromycin ophthalmic ointment    fluticasone 50 MCG/ACT spray                Primary Care Provider Office Phone # Fax #    Tony Peter [phone]       [address]  [facility] [address]        Equal Access to Services     [facility] CenterMARY[facility]UZ : Hadii aad ku hadasho Soomaali, waaxda luqadaha, qaybta kaalmada adeegyada, waxay idiin hayaan adeeg kharash la'sean . So [facility] [phone].    ATENCIÓN: Si habla español, tiene a perez disposición servicios gratuitos de asistencia lingüística. [facility] [phone].    We comply with applicable federal civil rights laws and [place] laws. We do not discriminate on the basis of race, color, national origin, age, disability, sex, sexual orientation, or gender identity.            Thank you!     Thank you for choosing [facility]  for your care. Our goal is always to provide you with excellent care. Hearing back from our patients is one way we can continue to improve our services. Please take a few minutes to complete the written survey that you may receive in the mail after your visit with us. Thank you!             Your Updated Medication List - Protect others around you: Learn how to safely use, store and throw away your medicines at www.disposemymeds.org.          This list is accurate as of: 11/30/17  7:41 PM.  Always use your most recent med list.                   Brand Name Dispense Instructions for use Diagnosis    amoxicillin-clavulanate 875-125 MG per tablet    AUGMENTIN    20 tablet    Take 1 tablet by mouth 2 times daily    Bacterial conjunctivitis of both eyes, Acute sinusitis, recurrence not specified, unspecified location       aspirin 81 MG tablet      1 TABLET DAILY        atorvastatin 80 MG tablet    LIPITOR     90 tablet    Take 1 tablet (80 mg) by mouth daily    High cholesterol       erythromycin ophthalmic ointment    ROMYCIN    1 g    Place 0.5 inches into both eyes 4 times daily for 7 days    Acute conjunctivitis of both eyes, unspecified acute conjunctivitis type       fluticasone 50 MCG/ACT spray    FLONASE    1 Bottle    Spray 2 sprays into both nostrils daily    Allergic rhinitis, unspecified chronicity, unspecified seasonality, unspecified trigger       folic acid-vit B6-vit B12 0.8-10-0.115 MG Tabs per tablet    FOLGARD     Take 1 tablet by mouth daily        IRON SUPPLEMENT PO      Take 325 mg by mouth daily (with breakfast)        lisinopril 5 MG tablet    PRINIVIL/ZESTRIL    90 tablet    Take 1 tablet (5 mg) by mouth daily    Essential hypertension, benign       loratadine 10 MG tablet    CLARITIN    30 tablet    Take 1 tablet (10 mg) by mouth daily    Swelling of eyelid, unspecified laterality       metoprolol 25 MG tablet    LOPRESSOR    180 tablet    Take 1 tablet (25 mg) by mouth 2 times daily    Essential hypertension, benign       Multi-vitamin Tabs tablet      Take 1 tablet by mouth daily        nitroGLYcerin 0.4 MG sublingual tablet    NITROSTAT    25 tablet    Place 1 tablet (0.4 mg) under the tongue every 5 minutes as needed May repeat X 2 and if chest pain persists, call 911    Allergic conjunctivitis, bilateral       ofloxacin 0.3 % ophthalmic solution    OCUFLOX    1 Bottle    Instill 1-2 drops in the affected eye(s) every 2 hours while awake for 2 days then 1-2 drops every 4 hours while awake for the next 5 days.    Bacterial conjunctivitis of both eyes, Infection due to Staphylococcus aureus, Acute sinusitis, recurrence not specified, unspecified location       olopatadine 0.1 % ophthalmic solution    PATANOL    5 mL    Place 1 drop into both eyes 2 times daily    Allergic conjunctivitis, bilateral

## 2017-12-01 ASSESSMENT — ENCOUNTER SYMPTOMS
SINUS PAIN: 0
FEVER: 0
DYSURIA: 0
HEADACHES: 0
SORE THROAT: 0
SHORTNESS OF BREATH: 0

## 2017-12-01 NOTE — PROGRESS NOTES
"SUBJECTIVE:   Angel Sanders is a 67 year old male presenting with a chief complaint of   Chief Complaint   Patient presents with     Urgent Care     Swollen Eye     On and off since 10/2017 states he was given antiobiotics and medication seemed to help but eyes are swollen still        Onset of symptoms was 36 hours ago.  Course of illness is gradually worsening.    Severity of symptoms is mild, but patient wants to make sure his condition doesn't get worse.  Current and Associated symptoms: runny nose, postnasal drainage, itchy/watery eyes (clear drainage, no purulence), and puffy/red eyes  Treatment measures tried include Claritin (not helping), Patanol (previous similar condition, \"it made it worse\"), and Ocuflox (possibly helping).  Predisposing factors include None.    Patient denies previous history of allergic rhinitis, but he has cats at home.  2 courses of Augmentin for sinusitis in the past month, with Polytrim and Ocuflox prescribed for conjunctivitis in the past month.  Similar symptoms resolved after 5 days with Augmentin and Ocuflox 11/11/2017 (note reviewed), per patient.  Patient states he was asymptomatic for 2 weeks after his recent treatment, but his symptoms recurred 36 hours ago.    Eye Pain/Pressure?:  No  Vision Concerns/Halo Effect?:  No  Photophobia?:  No  Foreign Body Sensation?:  No  Trauma?:  No  Associated Signs and Symptoms:  See above  Contact lens wearer:  No  History of Glaucoma?:  No    Patient has a crusted area involving his right nares, which he would like looked at today.  Patient was prescribed Mupirocin 2% ointment for this, but he is uncertain how to use it.    Review of Systems   Constitutional: Negative for fever.   HENT: Negative for sinus pain and sore throat.    Respiratory: Negative for shortness of breath.    Cardiovascular: Negative for chest pain.   Genitourinary: Negative for dysuria.   Neurological: Negative for headaches.       Past Medical History:   Diagnosis " "Date     Carotid artery obstruction 2009    stent placed     Coronary atherosclerosis of unspecified type of vessel, native or graft 2008    Acute inf MI; RCA stent; followed by Cardiology     Heart attack      Herniated cervical disc      Hyperlipidaemia      Hyperlipidemia LDL goal <100 8/9/2012     Hypertension      Occlusion and stenosis of carotid artery without mention of cerebral infarction 2008    50-69% stenosis of left ICA     Stented coronary artery     1 coronary /1 carotid     Current Outpatient Prescriptions   Medication Sig Dispense Refill                          loratadine (CLARITIN) 10 MG tablet Take 1 tablet (10 mg) by mouth daily 30 tablet 0     nitroGLYcerin (NITROSTAT) 0.4 MG sublingual tablet Place 1 tablet (0.4 mg) under the tongue every 5 minutes as needed May repeat X 2 and if chest pain persists, call 911 25 tablet 2            multivitamin, therapeutic with minerals (MULTI-VITAMIN) TABS tablet Take 1 tablet by mouth daily       lisinopril (PRINIVIL/ZESTRIL) 5 MG tablet Take 1 tablet (5 mg) by mouth daily 90 tablet 3     metoprolol (LOPRESSOR) 25 MG tablet Take 1 tablet (25 mg) by mouth 2 times daily 180 tablet 3     atorvastatin (LIPITOR) 80 MG tablet Take 1 tablet (80 mg) by mouth daily 90 tablet 3     folic acid-vit B6-vit B12 (FOLGARD) 0.8-10-0.115 MG TABS Take 1 tablet by mouth daily       Ferrous Sulfate (IRON SUPPLEMENT PO) Take 325 mg by mouth daily (with breakfast)        ASPIRIN 81 MG OR TABS 1 TABLET DAILY              Social History   Substance Use Topics     Smoking status: Former Smoker     Packs/day: 1.00     Years: 40.00     Types: Cigarettes     Quit date: 1/4/2008     Smokeless tobacco: Never Used     Alcohol use No      Comment: Sober for 25 years       OBJECTIVE  /85  Pulse 74  Temp 97  F (36.1  C) (Oral)  Resp 16  Ht 5' 5\" (1.651 m)  Wt 209 lb (94.8 kg)  SpO2 94%  BMI 34.78 kg/m2    Physical Exam   Constitutional: He is oriented to person, place, and time. " He does not have a sickly appearance. No distress.   HENT:   Right Ear: Tympanic membrane, external ear and ear canal normal.   Left Ear: Tympanic membrane, external ear and ear canal normal.   Nose: Mucosal edema and rhinorrhea present. Right sinus exhibits no maxillary sinus tenderness. Left sinus exhibits no maxillary sinus tenderness.   Mouth/Throat: Uvula is midline and mucous membranes are normal. No oral lesions. No trismus in the jaw. No uvula swelling. No oropharyngeal exudate, posterior oropharyngeal edema or tonsillar abscesses.   Mild erythema noted in the posterior pharynx, with clear postnasal drainage.  There is subtle yellowish crusting noted just within and exterior to the right nares.   Eyes: EOM are normal. Pupils are equal, round, and reactive to light. Right eye exhibits no hordeolum. No foreign body present in the right eye. Left eye exhibits no hordeolum. No foreign body present in the left eye. No scleral icterus.   The upper and lower eyelids (particularly the eyelash lines) are mildly erythematous and edematous bilaterally, with subtle injection of the conjunctiva noted.  No purulent drainage or photophobia.   Neck: Normal range of motion. Neck supple. No tracheal deviation present. No thyromegaly present.   Cardiovascular: Normal rate, regular rhythm and normal heart sounds.  Exam reveals no gallop and no friction rub.    No murmur heard.  Pulmonary/Chest: Effort normal and breath sounds normal. No stridor. No respiratory distress. He has no wheezes. He has no rales.   Abdominal: Soft. He exhibits no distension and no mass. There is no tenderness. There is no rebound and no guarding.   Musculoskeletal: Normal range of motion. He exhibits no edema.   Lymphadenopathy:     He has no cervical adenopathy.   Neurological: He is alert and oriented to person, place, and time.   No acute neurologic deficits.   Skin:   See Eye exam section.   Psychiatric: Affect normal.   Nursing note and vitals  reviewed.      Labs:  No results found for this or any previous visit (from the past 24 hour(s)).      ASSESSMENT:      ICD-10-CM    1. Acute conjunctivitis of both eyes, unspecified acute conjunctivitis type H10.33 erythromycin (ROMYCIN) ophthalmic ointment   2. Allergic rhinitis, unspecified chronicity, unspecified seasonality, unspecified trigger J30.9 fluticasone (FLONASE) 50 MCG/ACT spray   3. Nasal crusting J34.89         Medical Decision Making:    Differential Diagnosis:  Blepharitis, URI, MRSA of right nares, and Impetigo of right nares.  Doubt orbital cellulitis.    PLAN:    Patient Instructions   -Stop the Claritin.  -Start Flonase, as prescribed.  -Start Erythromycin opthalmic ointment, as prescribed.  -Warm packs 20 minutes 3 times daily.    -Mupirocin ointment (previously prescribed) - Apply twice daily for 5 days.    -Follow up if worsening symptoms or not improving over the next 48-72 hours.  -Follow up immediate if emergent symptoms, as discussed.

## 2017-12-01 NOTE — PATIENT INSTRUCTIONS
-Stop the Claritin.  -Start Flonase, as prescribed.  -Start Erythromycin opthalmic ointment, as prescribed.  -Warm packs 20 minutes 3 times daily.    -Mupirocin ointment (previously prescribed) - Apply twice daily for 5 days.    -Follow up if worsening symptoms or not improving over the next 48-72 hours.

## 2018-02-03 NOTE — MR AVS SNAPSHOT
"              After Visit Summary   11/13/2017    Angel Sanders    MRN: [id number]           Patient Information     Date Of Birth          1950        Visit Information        Provider Department      11/13/2017 7:40 AM Tony Peter MD [facility]        Today's Diagnoses     Routine general medical examination at a health care facility    -  1    Atherosclerosis of native coronary artery of native heart without angina pectoris        Hyperlipidemia LDL goal <100        Benign essential hypertension        Facial cellulitis        Screening for prostate cancer        Need for prophylactic vaccination and inoculation against influenza           Follow-ups after your visit        Who to contact     If you have questions or need follow up information about today's clinic visit or your schedule please contact [facility] directly at [phone].  Normal or non-critical lab and imaging results will be communicated to you by [employer], letter or phone within 4 business days after the clinic has received the results. If you do not hear from us within 7 days, please contact the clinic through [employer] or phone. If you have a critical or abnormal lab result, we will notify you by phone as soon as possible.  Submit refill requests through "[employer] or call your pharmacy and they will forward the refill request to us. Please allow 3 business days for your refill to be completed.          Additional Information About Your Visit        MyChart Information     "[employer] lets you send messages to your doctor, view your test results, renew your prescriptions, schedule appointments and more. To sign up, go to [email or web address] . Click on \"Log in\" on the left side of the screen, which will take you to the Welcome page. Then click on \"Sign up Now\" on the right side of the page.     You will be asked to enter the access code listed below, as well as some personal information. Please follow " "the directions to create your username and password.     Your access code is: [email or web address]  Expires: 2018  4:34 PM     Your access code will  in 90 days. If you need help or a new code, please call your [place] clinic or [phone].        Care EveryWhere ID     This is your Care EveryWhere ID. This could be used by other organizations to access your [place] medical records  [id number]        Your Vitals Were     Pulse Temperature Height Pulse Oximetry BMI (Body Mass Index)       100 97.6  F (36.4  C) (Oral) 5' 5\" (1.651 m) 96% 34.21 kg/m2        Blood Pressure from Last 3 Encounters:   17 122/78   17 (!) 154/91   17 136/70    Weight from Last 3 Encounters:   17 205 lb 9.6 oz (93.3 kg)   17 210 lb 2 oz (95.3 kg)   17 207 lb 9.6 oz (94.2 kg)              We Performed the Following     *UA reflex to Microscopic and Culture ([facility] (except [place])     ADMIN INFLUENZA (For MEDICARE Patients ONLY) []     CBC with platelets     Comprehensive metabolic panel     EKG 12-lead complete w/read - Clinics     FLU VACCINE, INCREASED ANTIGEN, PRESV FREE, AGE 65+ [[id number]]     Lipid panel reflex to direct LDL Fasting     Prostate spec antigen screen     TSH with free T4 reflex     Urine Microscopic     Vaccine Administration, Initial [[id number]]        Primary Care Provider Office Phone # Fax #    Tony Peter [phone]       [address] [facility] [address]        Equal Access to Services     CUONG HUYNH : Hadjudy Kemp, lori silver, qaduke pastrana. So [facility] [phone].    ATENCIÓN: Si habla español, tiene a perez disposición servicios gratuitos de asistencia lingüística. Llame al [phone].    We comply with applicable federal civil rights laws and [place] laws. We do not discriminate on the basis of race, color, national origin, age, " Post partum Day #1      Pt without complaints  vital signs stable      Abdomen soft  fundus firm, non tender  extremities non tender    lochia wnl      Patient doing well  Routine post partum care  Discharge for PP day #2 disability, sex, sexual orientation, or gender identity.            Thank you!     Thank you for choosing [facility]  for your care. Our goal is always to provide you with excellent care. Hearing back from our patients is one way we can continue to improve our services. Please take a few minutes to complete the written survey that you may receive in the mail after your visit with us. Thank you!             Your Updated Medication List - Protect others around you: Learn how to safely use, store and throw away your medicines at www.disposemymeds.org.          This list is accurate as of: 11/13/17 11:59 PM.  Always use your most recent med list.                   Brand Name Dispense Instructions for use Diagnosis    amoxicillin-clavulanate 875-125 MG per tablet    AUGMENTIN    20 tablet    Take 1 tablet by mouth 2 times daily    Bacterial conjunctivitis of both eyes, Acute sinusitis, recurrence not specified, unspecified location       aspirin 81 MG tablet      1 TABLET DAILY        atorvastatin 80 MG tablet    LIPITOR    90 tablet    Take 1 tablet (80 mg) by mouth daily    High cholesterol       folic acid-vit B6-vit B12 0.8-10-0.115 MG Tabs per tablet    FOLGARD     Take 1 tablet by mouth daily        IRON SUPPLEMENT PO      Take 325 mg by mouth daily (with breakfast)        lisinopril 5 MG tablet    PRINIVIL/ZESTRIL    90 tablet    Take 1 tablet (5 mg) by mouth daily    Essential hypertension, benign       loratadine 10 MG tablet    CLARITIN    30 tablet    Take 1 tablet (10 mg) by mouth daily    Swelling of eyelid, unspecified laterality       metoprolol 25 MG tablet    LOPRESSOR    180 tablet    Take 1 tablet (25 mg) by mouth 2 times daily    Essential hypertension, benign       Multi-vitamin Tabs tablet      Take 1 tablet by mouth daily        mupirocin 2 % ointment    BACTROBAN    22 g    Apply topically 3 times daily for 5 days    Infection due to Staphylococcus aureus       nitroGLYcerin 0.4  MG sublingual tablet    NITROSTAT    25 tablet    Place 1 tablet (0.4 mg) under the tongue every 5 minutes as needed May repeat X 2 and if chest pain persists, call 911    Allergic conjunctivitis, bilateral       ofloxacin 0.3 % ophthalmic solution    OCUFLOX    1 Bottle    Instill 1-2 drops in the affected eye(s) every 2 hours while awake for 2 days then 1-2 drops every 4 hours while awake for the next 5 days.    Bacterial conjunctivitis of both eyes, Infection due to Staphylococcus aureus, Acute sinusitis, recurrence not specified, unspecified location       olopatadine 0.1 % ophthalmic solution    PATANOL    5 mL    Place 1 drop into both eyes 2 times daily    Allergic conjunctivitis, bilateral

## 2018-03-18 DIAGNOSIS — I10 ESSENTIAL HYPERTENSION, BENIGN: ICD-10-CM

## 2018-03-18 DIAGNOSIS — E78.00 HIGH CHOLESTEROL: ICD-10-CM

## 2018-03-18 RX ORDER — METOPROLOL TARTRATE 25 MG/1
25 TABLET, FILM COATED ORAL 2 TIMES DAILY
Qty: 180 TABLET | Refills: 0 | Status: SHIPPED | OUTPATIENT
Start: 2018-03-18 | End: 2018-03-28

## 2018-03-18 RX ORDER — ATORVASTATIN CALCIUM 80 MG/1
80 TABLET, FILM COATED ORAL DAILY
Qty: 90 TABLET | Refills: 0 | Status: SHIPPED | OUTPATIENT
Start: 2018-03-18 | End: 2018-03-28

## 2018-03-18 RX ORDER — LISINOPRIL 5 MG/1
5 TABLET ORAL DAILY
Qty: 90 TABLET | Refills: 0 | Status: SHIPPED | OUTPATIENT
Start: 2018-03-18 | End: 2018-03-28

## 2018-03-23 ENCOUNTER — HOSPITAL ENCOUNTER (OUTPATIENT)
Dept: CARDIOLOGY | Facility: CLINIC | Age: 68
Discharge: HOME | End: 2018-03-23
Attending: INTERNAL MEDICINE | Admitting: INTERNAL MEDICINE
Payer: MEDICARE

## 2018-03-23 DIAGNOSIS — I65.22 OCCLUSION AND STENOSIS OF LEFT CAROTID ARTERY: ICD-10-CM

## 2018-03-23 PROCEDURE — 93880 EXTRACRANIAL BILAT STUDY: CPT | Mod: 26 | Performed by: INTERNAL MEDICINE

## 2018-03-23 PROCEDURE — 93880 EXTRACRANIAL BILAT STUDY: CPT

## 2018-03-26 ENCOUNTER — PRE VISIT (OUTPATIENT)
Dept: CARDIOLOGY | Facility: CLINIC | Age: 68
End: 2018-03-26

## 2018-03-28 ENCOUNTER — OFFICE VISIT (OUTPATIENT)
Dept: CARDIOLOGY | Facility: CLINIC | Age: 68
End: 2018-03-28
Attending: INTERNAL MEDICINE
Payer: COMMERCIAL

## 2018-03-28 VITALS
DIASTOLIC BLOOD PRESSURE: 79 MMHG | HEART RATE: 88 BPM | WEIGHT: 211.3 LBS | SYSTOLIC BLOOD PRESSURE: 132 MMHG | BODY MASS INDEX: 35.2 KG/M2 | HEIGHT: 65 IN

## 2018-03-28 DIAGNOSIS — I10 BENIGN ESSENTIAL HYPERTENSION: Primary | ICD-10-CM

## 2018-03-28 DIAGNOSIS — I25.10 CORONARY ARTERY DISEASE INVOLVING NATIVE CORONARY ARTERY OF NATIVE HEART WITHOUT ANGINA PECTORIS: ICD-10-CM

## 2018-03-28 DIAGNOSIS — I25.10 ATHEROSCLEROSIS OF NATIVE CORONARY ARTERY OF NATIVE HEART WITHOUT ANGINA PECTORIS: ICD-10-CM

## 2018-03-28 DIAGNOSIS — I10 ESSENTIAL HYPERTENSION, BENIGN: ICD-10-CM

## 2018-03-28 DIAGNOSIS — I77.9 CAROTID ARTERY DISEASE, UNSPECIFIED LATERALITY (H): ICD-10-CM

## 2018-03-28 DIAGNOSIS — E78.5 HYPERLIPIDEMIA LDL GOAL <100: ICD-10-CM

## 2018-03-28 DIAGNOSIS — E78.00 HIGH CHOLESTEROL: ICD-10-CM

## 2018-03-28 PROCEDURE — 93000 ELECTROCARDIOGRAM COMPLETE: CPT | Performed by: INTERNAL MEDICINE

## 2018-03-28 PROCEDURE — 99214 OFFICE O/P EST MOD 30 MIN: CPT | Performed by: INTERNAL MEDICINE

## 2018-03-28 RX ORDER — ATORVASTATIN CALCIUM 80 MG/1
80 TABLET, FILM COATED ORAL DAILY
Qty: 90 TABLET | Refills: 4 | Status: SHIPPED | OUTPATIENT
Start: 2018-03-28 | End: 2019-03-26

## 2018-03-28 RX ORDER — METOPROLOL TARTRATE 25 MG/1
25 TABLET, FILM COATED ORAL 2 TIMES DAILY
Qty: 180 TABLET | Refills: 4 | Status: SHIPPED | OUTPATIENT
Start: 2018-03-28 | End: 2019-02-27

## 2018-03-28 RX ORDER — LISINOPRIL 5 MG/1
5 TABLET ORAL DAILY
Qty: 90 TABLET | Refills: 4 | Status: SHIPPED | OUTPATIENT
Start: 2018-03-28 | End: 2019-03-26

## 2018-03-28 NOTE — LETTER
3/28/2018      Tony Peter MD  [address] [facility] [address]      RE: Angel Sanders       Dear Colleague,    I had the pleasure of seeing Angel Sanders in the [facility].    Service Date: 03/28/2018      REASON FOR CLINIC VISIT:  Followup CAD.      HISTORY OF PRESENT ILLNESS:  Mr. Sanders is a very pleasant, 67-year-old gentleman with history of CAD with history of inferior STEMI in 2008, status post 3 x 28 mm Cypher drug-eluting stent to the RCA.  He was noted to have 40%-50% narrowing at the distal RCA distal to the stent.  LAD had 40% proximal, 30% mid and 50% distal stenoses.  Circumflex had 10%-20% proximal stenosis.  OM1 had 25% stenosis.  He had a nuclear stress test in 2012 that showed an inferior infarct and no ischemia.  Echocardiogram in 2014 showed normal LV function.  He has other comorbidities of history of left internal carotid artery stenting in 2009 and comorbidities of hypertension, dyslipidemia and obesity.      Today he is coming for routine followup.  He has been seen by Dr. Tierney in the past, and I saw him for the first time last year.  He had a repeat carotid ultrasound that showed a patent left carotid artery stent, less than 50% right internal carotid artery stenosis with some irregular heterogeneous plaque at the carotid bulb and proximal to mid ICA.  There were increased velocities in terms of the left subclavian artery, but with normal antegrade vertebral flow.  The peak systolic subclavian velocity was recorded as 2.86 m/sec on the left side.  The patient is today coming for routine followup.  He has no significant complaints.  No chest discomfort, shortness of breath, dizziness, presyncope or syncope.  He is right-handed.  No arm discomfort, either left- or right-sided.  He is on:   1.  Lipitor 80 mg daily.   2.  Metoprolol tartrate 25 mg b.i.d.   3.  Lisinopril 5 mg daily.   4.  Baby aspirin.        LDL is well controlled at 38.   BMP checked a few months ago was normal.  He does not use any tobacco anymore; he quit more than 10 years ago.  He had an EKG done today that shows sinus rhythm with right bundle branch block, occasional PVC.  Right bundle branch block is old.      PHYSICAL EXAMINATION:   VITAL SIGNS:  Blood pressure 132/79; this was left upper extremity blood pressure measurement.  Right upper extremity blood pressure measurement was 138/78.  Heart rate 88 and regular.  Weight 211 pounds.  BMI 35.24.   GENERAL:  The patient appears pleasant, comfortable.   NECK:  Normal JVP, no subclavian bruit.   CARDIOVASCULAR:  S1, S2 normal.  No murmur, rub or gallop.   RESPIRATORY:  Clear to auscultation bilaterally.     GASTROINTESTINAL:  Abdomen is soft and nontender.   EXTREMITIES:  No pitting pedal edema.    VASCULAR:  Bilateral upper extremity radial and brachial 2+.   PSYCHIATRIC:  Normal affect.   SKIN:  No obvious rash.     HEENT:  No pallor or icterus.      IMAGING:  EKG as noted above shows sinus rhythm with right bundle branch block and occasional PVC.      IMPRESSION AND PLAN:  A pleasant, 67-year-old gentleman with history of CAD with history of inferior STEMI in 2008 with subsequent negative stress test in 2012.  Normal LV function on echocardiogram in 2014.  Clinically, no symptoms of angina or congestive heart failure.  Other comorbidities of history of left carotid artery stent with a patent stent on recent carotid ultrasound with mildly elevated left subclavian peak systolic velocity.  Clinically, 2+ pulses bilaterally in upper extremities.  No significant blood pressure difference between upper extremities and clinically no symptoms of claudication in the upper extremities.  At this time, I recommend continuing cardiac medications of aspirin, high-intensity statin, beta blocker and ACE inhibitor.  At his request, I renewed his cardiac prescriptions.  If he continues to feel well, we can see him back in 1 year with a repeat  carotid ultrasound, sooner if he notices any change in clinical status.         PAL BOWSER MD             D: 2018   T: 2018   MT: adonis      Name:     TIMBO RICH   MRN:      [id number]        Account:      [id number]   :      1950           Service Date: 2018      Document: [id number]         Outpatient Encounter Prescriptions as of 3/28/2018   Medication Sig Dispense Refill     Docusate Calcium (STOOL SOFTENER PO) Take by mouth daily       atorvastatin (LIPITOR) 80 MG tablet Take 1 tablet (80 mg) by mouth daily 90 tablet 4     metoprolol tartrate (LOPRESSOR) 25 MG tablet Take 1 tablet (25 mg) by mouth 2 times daily 180 tablet 4     lisinopril (PRINIVIL/ZESTRIL) 5 MG tablet Take 1 tablet (5 mg) by mouth daily 90 tablet 4     nitroGLYcerin (NITROSTAT) 0.4 MG sublingual tablet Place 1 tablet (0.4 mg) under the tongue every 5 minutes as needed May repeat X 2 and if chest pain persists, call 911 25 tablet 2     multivitamin, therapeutic with minerals (MULTI-VITAMIN) TABS tablet Take 1 tablet by mouth daily       folic acid-vit B6-vit B12 (FOLGARD) 0.8-10-0.115 MG TABS Take 1 tablet by mouth daily       Ferrous Sulfate (IRON SUPPLEMENT PO) Take 325 mg by mouth daily (with breakfast)        ASPIRIN 81 MG OR TABS 1 TABLET DAILY       [DISCONTINUED] atorvastatin (LIPITOR) 80 MG tablet Take 1 tablet (80 mg) by mouth daily 90 tablet 0     [DISCONTINUED] lisinopril (PRINIVIL/ZESTRIL) 5 MG tablet Take 1 tablet (5 mg) by mouth daily 90 tablet 0     [DISCONTINUED] metoprolol tartrate (LOPRESSOR) 25 MG tablet Take 1 tablet (25 mg) by mouth 2 times daily 180 tablet 0     [DISCONTINUED] fluticasone (FLONASE) 50 MCG/ACT spray Spray 2 sprays into both nostrils daily 1 Bottle 0     [DISCONTINUED] amoxicillin-clavulanate (AUGMENTIN) 875-125 MG per tablet Take 1 tablet by mouth 2 times daily (Patient not taking: Reported on 2017) 20 tablet 0     [DISCONTINUED] loratadine (CLARITIN) 10 MG tablet Take  1 tablet (10 mg) by mouth daily 30 tablet 0     No facility-administered encounter medications on file as of 3/28/2018.      Again, thank you for allowing me to participate in the care of your patient.      Sincerely,    Dre Kruse MD     [facility]

## 2018-03-28 NOTE — PROGRESS NOTES
Service Date: 03/28/2018      REASON FOR CLINIC VISIT:  Followup CAD.      HISTORY OF PRESENT ILLNESS:  Mr. Sanders is a very pleasant, 67-year-old gentleman with history of CAD with history of inferior STEMI in 2008, status post 3 x 28 mm Cypher drug-eluting stent to the RCA.  He was noted to have 40%-50% narrowing at the distal RCA distal to the stent.  LAD had 40% proximal, 30% mid and 50% distal stenoses.  Circumflex had 10%-20% proximal stenosis.  OM1 had 25% stenosis.  He had a nuclear stress test in 2012 that showed an inferior infarct and no ischemia.  Echocardiogram in 2014 showed normal LV function.  He has other comorbidities of history of left internal carotid artery stenting in 2009 and comorbidities of hypertension, dyslipidemia and obesity.      Today he is coming for routine followup.  He has been seen by Dr. Tierney in the past, and I saw him for the first time last year.  He had a repeat carotid ultrasound that showed a patent left carotid artery stent, less than 50% right internal carotid artery stenosis with some irregular heterogeneous plaque at the carotid bulb and proximal to mid ICA.  There were increased velocities in terms of the left subclavian artery, but with normal antegrade vertebral flow.  The peak systolic subclavian velocity was recorded as 2.86 m/sec on the left side.  The patient is today coming for routine followup.  He has no significant complaints.  No chest discomfort, shortness of breath, dizziness, presyncope or syncope.  He is right-handed.  No arm discomfort, either left- or right-sided.  He is on:   1.  Lipitor 80 mg daily.   2.  Metoprolol tartrate 25 mg b.i.d.   3.  Lisinopril 5 mg daily.   4.  Baby aspirin.        LDL is well controlled at 38.  BMP checked a few months ago was normal.  He does not use any tobacco anymore; he quit more than 10 years ago.  He had an EKG done today that shows sinus rhythm with right bundle branch block, occasional PVC.  Right bundle  branch block is old.      PHYSICAL EXAMINATION:   VITAL SIGNS:  Blood pressure 132/79; this was left upper extremity blood pressure measurement.  Right upper extremity blood pressure measurement was 138/78.  Heart rate 88 and regular.  Weight 211 pounds.  BMI 35.24.   GENERAL:  The patient appears pleasant, comfortable.   NECK:  Normal JVP, no subclavian bruit.   CARDIOVASCULAR:  S1, S2 normal.  No murmur, rub or gallop.   RESPIRATORY:  Clear to auscultation bilaterally.     GASTROINTESTINAL:  Abdomen is soft and nontender.   EXTREMITIES:  No pitting pedal edema.    VASCULAR:  Bilateral upper extremity radial and brachial 2+.   PSYCHIATRIC:  Normal affect.   SKIN:  No obvious rash.     HEENT:  No pallor or icterus.      IMAGING:  EKG as noted above shows sinus rhythm with right bundle branch block and occasional PVC.      IMPRESSION AND PLAN:  A pleasant, 67-year-old gentleman with history of CAD with history of inferior STEMI in 2008 with subsequent negative stress test in 2012.  Normal LV function on echocardiogram in 2014.  Clinically, no symptoms of angina or congestive heart failure.  Other comorbidities of history of left carotid artery stent with a patent stent on recent carotid ultrasound with mildly elevated left subclavian peak systolic velocity.  Clinically, 2+ pulses bilaterally in upper extremities.  No significant blood pressure difference between upper extremities and clinically no symptoms of claudication in the upper extremities.  At this time, I recommend continuing cardiac medications of aspirin, high-intensity statin, beta blocker and ACE inhibitor. I renewed his cardiac prescriptions.  If he continues to feel well, we can see him back in 1 year with a repeat carotid ultrasound, sooner if he notices any change in clinical status.         PAL BOWSER MD             D: 03/28/2018   T: 03/28/2018   MT: adonis      Name:     TIMBO RICH   MRN:      [id number]        Account:      [id number]    :      1950           Service Date: 2018      Document: [id number]

## 2018-03-28 NOTE — PROGRESS NOTES
HPI and Plan:   See dictation(#[id number])    Orders Placed This Encounter   Procedures     US Carotid Bilateral     Follow-Up with Cardiologist     EKG 12-lead complete w/read - Clinics (performed today)       Orders Placed This Encounter   Medications     Docusate Calcium (STOOL SOFTENER PO)     Sig: Take by mouth daily     atorvastatin (LIPITOR) 80 MG tablet     Sig: Take 1 tablet (80 mg) by mouth daily     Dispense:  90 tablet     Refill:  4     metoprolol tartrate (LOPRESSOR) 25 MG tablet     Sig: Take 1 tablet (25 mg) by mouth 2 times daily     Dispense:  180 tablet     Refill:  4     lisinopril (PRINIVIL/ZESTRIL) 5 MG tablet     Sig: Take 1 tablet (5 mg) by mouth daily     Dispense:  90 tablet     Refill:  4       Medications Discontinued During This Encounter   Medication Reason     amoxicillin-clavulanate (AUGMENTIN) 875-125 MG per tablet Therapy completed     fluticasone (FLONASE) 50 MCG/ACT spray Stopped by Patient     loratadine (CLARITIN) 10 MG tablet Stopped by Patient     atorvastatin (LIPITOR) 80 MG tablet Reorder     metoprolol tartrate (LOPRESSOR) 25 MG tablet Reorder     lisinopril (PRINIVIL/ZESTRIL) 5 MG tablet Reorder         Encounter Diagnoses   Name Primary?     Coronary artery disease involving native coronary artery of native heart without angina pectoris      Benign essential hypertension Yes     Atherosclerosis of native coronary artery of native heart without angina pectoris      Hyperlipidemia LDL goal <100      Carotid artery disease, unspecified laterality (H)      High cholesterol      Essential hypertension, benign        CURRENT MEDICATIONS:  Current Outpatient Prescriptions   Medication Sig Dispense Refill     Docusate Calcium (STOOL SOFTENER PO) Take by mouth daily       atorvastatin (LIPITOR) 80 MG tablet Take 1 tablet (80 mg) by mouth daily 90 tablet 4     metoprolol tartrate (LOPRESSOR) 25 MG tablet Take 1 tablet (25 mg) by mouth 2 times daily 180 tablet 4     lisinopril  (PRINIVIL/ZESTRIL) 5 MG tablet Take 1 tablet (5 mg) by mouth daily 90 tablet 4     nitroGLYcerin (NITROSTAT) 0.4 MG sublingual tablet Place 1 tablet (0.4 mg) under the tongue every 5 minutes as needed May repeat X 2 and if chest pain persists, call 911 25 tablet 2     multivitamin, therapeutic with minerals (MULTI-VITAMIN) TABS tablet Take 1 tablet by mouth daily       folic acid-vit B6-vit B12 (FOLGARD) 0.8-10-0.115 MG TABS Take 1 tablet by mouth daily       Ferrous Sulfate (IRON SUPPLEMENT PO) Take 325 mg by mouth daily (with breakfast)        ASPIRIN 81 MG OR TABS 1 TABLET DAILY       [DISCONTINUED] atorvastatin (LIPITOR) 80 MG tablet Take 1 tablet (80 mg) by mouth daily 90 tablet 0     [DISCONTINUED] lisinopril (PRINIVIL/ZESTRIL) 5 MG tablet Take 1 tablet (5 mg) by mouth daily 90 tablet 0     [DISCONTINUED] metoprolol tartrate (LOPRESSOR) 25 MG tablet Take 1 tablet (25 mg) by mouth 2 times daily 180 tablet 0       ALLERGIES     Allergies   Allergen Reactions     Adhesive Tape Rash       PAST MEDICAL HISTORY:  Past Medical History:   Diagnosis Date     Carotid artery obstruction 2009    stent placed     Coronary atherosclerosis of unspecified type of vessel, native or graft 2008    Acute inf MI; RCA stent; followed by Cardiology     Heart attack      Herniated cervical disc      Hyperlipidaemia      Hyperlipidemia LDL goal <100 8/9/2012     Hypertension      Occlusion and stenosis of carotid artery without mention of cerebral infarction 2008    50-69% stenosis of left ICA     Stented coronary artery     1 coronary /1 carotid       PAST SURGICAL HISTORY:  Past Surgical History:   Procedure Laterality Date     ARTHROTOMY SHOULDER, ROTATOR CUFF REPAIR, COMBINED  10/23/2013    Procedure: COMBINED ARTHROTOMY SHOULDER, ROTATOR CUFF REPAIR;  Left Shoulder Open Decompression, Distal Clavical Resection, Rotator Cuff Repair , Bicep Tenolysis, and Bicep Tenodesis   ;  Surgeon: Zhang Mattson MD;  Location:  OR       NONSPECIFIC PROCEDURE  2008    RCA stent; see PMH     PHACOEMULSIFICATION CLEAR CORNEA WITH STANDARD INTRAOCULAR LENS IMPLANT  11/20/2013    Procedure: PHACOEMULSIFICATION CLEAR CORNEA WITH STANDARD INTRAOCULAR LENS IMPLANT;  RIGHT PHACOEMULSIFICATION CLEAR CORNEA WITH STANDARD INTRAOCULAR LENS IMPLANT ;  Surgeon: Rikki Reddy MD;  Location: [facility]     PHACOEMULSIFICATION CLEAR CORNEA WITH STANDARD INTRAOCULAR LENS IMPLANT  12/3/2013    Procedure: PHACOEMULSIFICATION CLEAR CORNEA WITH STANDARD INTRAOCULAR LENS IMPLANT;  LEFT PHACOEMULSIFICATION CLEAR CORNEA WITH STANDARD INTRAOCULAR LENS IMPLANT;  Surgeon: Rikki Reddy MD;  Location: [facility]       FAMILY HISTORY:  Family History   Problem Relation Age of Onset     Breast Cancer Mother      HEART DISEASE Father        SOCIAL HISTORY:  Social History     Social History     Marital status:      Spouse name: N/A     Number of children: N/A     Years of education: N/A     Social History Main Topics     Smoking status: Former Smoker     Packs/day: 1.00     Years: 40.00     Types: Cigarettes     Quit date: 1/4/2008     Smokeless tobacco: Never Used     Alcohol use No      Comment: Sober for 25 years     Drug use: No     Sexual activity: Yes     Partners: Female     Other Topics Concern     Caffeine Concern No     Occupational Exposure No     Hobby Hazards No     Sleep Concern No     Stress Concern No     Weight Concern Yes     overweight     Special Diet Yes     low salt and low cholesterol     Back Care No     Exercise Yes     3-4 x week walking     Seat Belt Yes     Social History Narrative       Review of Systems:  Skin:  Positive for bruising     Eyes:  Negative      ENT:  Negative      Respiratory:  Negative       Cardiovascular:  Negative      Gastroenterology: Negative      Genitourinary:  Negative      Musculoskeletal:  Positive for neck pain herniated disc in neck  Neurologic:  Negative      Psychiatric:  Negative      Heme/Lymph/Imm:  Negative  "     Endocrine:  Negative        Physical Exam:  Vitals: /79 (BP Location: Left arm, Patient Position: Chair, Cuff Size: Adult Large)  Pulse 88  Ht 1.651 m (5' 5\")  Wt 95.8 kg (211 lb 4.8 oz)  BMI 35.16 kg/m2    Constitutional:           Skin:             Head:           Eyes:           Lymph:      ENT:           Neck:           Respiratory:            Cardiac:                                                           GI:           Extremities and Muscular Skeletal:                 Neurological:           Psych:             CC  Dre Kruse MD  [address]  [place] [address]                  "

## 2018-03-28 NOTE — LETTER
3/28/2018    Tony Peter MD  [address] [facility] [address]    RE: Angel Sanders       Dear Colleague,    I had the pleasure of seeing Angel Sanders in the [facility].    HPI and Plan:   See dictation(#[id number])    Orders Placed This Encounter   Procedures     US Carotid Bilateral     Follow-Up with Cardiologist     EKG 12-lead complete w/read - Clinics (performed today)       Orders Placed This Encounter   Medications     Docusate Calcium (STOOL SOFTENER PO)     Sig: Take by mouth daily     atorvastatin (LIPITOR) 80 MG tablet     Sig: Take 1 tablet (80 mg) by mouth daily     Dispense:  90 tablet     Refill:  4     metoprolol tartrate (LOPRESSOR) 25 MG tablet     Sig: Take 1 tablet (25 mg) by mouth 2 times daily     Dispense:  180 tablet     Refill:  4     lisinopril (PRINIVIL/ZESTRIL) 5 MG tablet     Sig: Take 1 tablet (5 mg) by mouth daily     Dispense:  90 tablet     Refill:  4       Medications Discontinued During This Encounter   Medication Reason     amoxicillin-clavulanate (AUGMENTIN) 875-125 MG per tablet Therapy completed     fluticasone (FLONASE) 50 MCG/ACT spray Stopped by Patient     loratadine (CLARITIN) 10 MG tablet Stopped by Patient     atorvastatin (LIPITOR) 80 MG tablet Reorder     metoprolol tartrate (LOPRESSOR) 25 MG tablet Reorder     lisinopril (PRINIVIL/ZESTRIL) 5 MG tablet Reorder         Encounter Diagnoses   Name Primary?     Coronary artery disease involving native coronary artery of native heart without angina pectoris      Benign essential hypertension Yes     Atherosclerosis of native coronary artery of native heart without angina pectoris      Hyperlipidemia LDL goal <100      Carotid artery disease, unspecified laterality (H)      High cholesterol      Essential hypertension, benign        CURRENT MEDICATIONS:  Current Outpatient Prescriptions   Medication Sig Dispense Refill     Docusate Calcium (STOOL SOFTENER PO) Take by mouth daily        atorvastatin (LIPITOR) 80 MG tablet Take 1 tablet (80 mg) by mouth daily 90 tablet 4     metoprolol tartrate (LOPRESSOR) 25 MG tablet Take 1 tablet (25 mg) by mouth 2 times daily 180 tablet 4     lisinopril (PRINIVIL/ZESTRIL) 5 MG tablet Take 1 tablet (5 mg) by mouth daily 90 tablet 4     nitroGLYcerin (NITROSTAT) 0.4 MG sublingual tablet Place 1 tablet (0.4 mg) under the tongue every 5 minutes as needed May repeat X 2 and if chest pain persists, call 911 25 tablet 2     multivitamin, therapeutic with minerals (MULTI-VITAMIN) TABS tablet Take 1 tablet by mouth daily       folic acid-vit B6-vit B12 (FOLGARD) 0.8-10-0.115 MG TABS Take 1 tablet by mouth daily       Ferrous Sulfate (IRON SUPPLEMENT PO) Take 325 mg by mouth daily (with breakfast)        ASPIRIN 81 MG OR TABS 1 TABLET DAILY       [DISCONTINUED] atorvastatin (LIPITOR) 80 MG tablet Take 1 tablet (80 mg) by mouth daily 90 tablet 0     [DISCONTINUED] lisinopril (PRINIVIL/ZESTRIL) 5 MG tablet Take 1 tablet (5 mg) by mouth daily 90 tablet 0     [DISCONTINUED] metoprolol tartrate (LOPRESSOR) 25 MG tablet Take 1 tablet (25 mg) by mouth 2 times daily 180 tablet 0       ALLERGIES     Allergies   Allergen Reactions     Adhesive Tape Rash       PAST MEDICAL HISTORY:  Past Medical History:   Diagnosis Date     Carotid artery obstruction 2009    stent placed     Coronary atherosclerosis of unspecified type of vessel, native or graft 2008    Acute inf MI; RCA stent; followed by Cardiology     Heart attack      Herniated cervical disc      Hyperlipidaemia      Hyperlipidemia LDL goal <100 8/9/2012     Hypertension      Occlusion and stenosis of carotid artery without mention of cerebral infarction 2008    50-69% stenosis of left ICA     Stented coronary artery     1 coronary /1 carotid       PAST SURGICAL HISTORY:  Past Surgical History:   Procedure Laterality Date     ARTHROTOMY SHOULDER, ROTATOR CUFF REPAIR, COMBINED  10/23/2013    Procedure: COMBINED ARTHROTOMY  SHOULDER, ROTATOR CUFF REPAIR;  Left Shoulder Open Decompression, Distal Clavical Resection, Rotator Cuff Repair , Bicep Tenolysis, and Bicep Tenodesis   ;  Surgeon: Zhang Mtatson MD;  Location: [facility]     C NONSPECIFIC PROCEDURE  2008    RCA stent; see PMH     PHACOEMULSIFICATION CLEAR CORNEA WITH STANDARD INTRAOCULAR LENS IMPLANT  11/20/2013    Procedure: PHACOEMULSIFICATION CLEAR CORNEA WITH STANDARD INTRAOCULAR LENS IMPLANT;  RIGHT PHACOEMULSIFICATION CLEAR CORNEA WITH STANDARD INTRAOCULAR LENS IMPLANT ;  Surgeon: Rikki Reddy MD;  Location: [facility]     PHACOEMULSIFICATION CLEAR CORNEA WITH STANDARD INTRAOCULAR LENS IMPLANT  12/3/2013    Procedure: PHACOEMULSIFICATION CLEAR CORNEA WITH STANDARD INTRAOCULAR LENS IMPLANT;  LEFT PHACOEMULSIFICATION CLEAR CORNEA WITH STANDARD INTRAOCULAR LENS IMPLANT;  Surgeon: Rikki Reddy MD;  Location: [facility]       FAMILY HISTORY:  Family History   Problem Relation Age of Onset     Breast Cancer Mother      HEART DISEASE Father        SOCIAL HISTORY:  Social History     Social History     Marital status:      Spouse name: N/A     Number of children: N/A     Years of education: N/A     Social History Main Topics     Smoking status: Former Smoker     Packs/day: 1.00     Years: 40.00     Types: Cigarettes     Quit date: 1/4/2008     Smokeless tobacco: Never Used     Alcohol use No      Comment: Sober for 25 years     Drug use: No     Sexual activity: Yes     Partners: Female     Other Topics Concern     Caffeine Concern No     Occupational Exposure No     Hobby Hazards No     Sleep Concern No     Stress Concern No     Weight Concern Yes     overweight     Special Diet Yes     low salt and low cholesterol     Back Care No     Exercise Yes     3-4 x week walking     Seat Belt Yes     Social History Narrative       Review of Systems:  Skin:  Positive for bruising     Eyes:  Negative      ENT:  Negative      Respiratory:  Negative       Cardiovascular:  Negative    "   Gastroenterology: Negative      Genitourinary:  Negative      Musculoskeletal:  Positive for neck pain herniated disc in neck  Neurologic:  Negative      Psychiatric:  Negative      Heme/Lymph/Imm:  Negative      Endocrine:  Negative        Physical Exam:  Vitals: /79 (BP Location: Left arm, Patient Position: Chair, Cuff Size: Adult Large)  Pulse 88  Ht 1.651 m (5' 5\")  Wt 95.8 kg (211 lb 4.8 oz)  BMI 35.16 kg/m2    Constitutional:           Skin:             Head:           Eyes:           Lymph:      ENT:           Neck:           Respiratory:            Cardiac:                                                           GI:           Extremities and Muscular Skeletal:                 Neurological:           Psych:             CC  Dre Kruse MD  [address]  [place] [address]                    Thank you for allowing me to participate in the care of your patient.      Sincerely,     Dre Kruse MD     [facility]    cc:   Dre Kruse MD  [address] [place]  [place] [address]        "

## 2018-03-28 NOTE — MR AVS SNAPSHOT
After Visit Summary   3/28/2018    Angel Sanders    MRN: [id number]           Patient Information     Date Of Birth          1950        Visit Information        Provider Department      3/28/2018 9:45 AM Dre Kruse MD [facility]        Today's Diagnoses     Benign essential hypertension    -  1    Coronary artery disease involving native coronary artery of native heart without angina pectoris        Atherosclerosis of native coronary artery of native heart without angina pectoris        Hyperlipidemia LDL goal <100        Carotid artery disease, unspecified laterality (H)        High cholesterol        Essential hypertension, benign           Follow-ups after your visit        Additional Services     Follow-Up with Cardiologist                 Future tests that were ordered for you today     Open Future Orders        Priority Expected Expires Ordered    US Carotid Bilateral Routine 3/28/2019 3/29/2019 3/28/2018    Follow-Up with Cardiologist Routine 3/28/2019 3/29/2019 3/28/2018            Who to contact     If you have questions or need follow up information about today's clinic visit or your schedule please contact [facility] directly at [phone].  Normal or non-critical lab and imaging results will be communicated to you by [employer], letter or phone within 4 business days after the clinic has received the results. If you do not hear from us within 7 days, please contact the clinic through [facility] or phone. If you have a critical or abnormal lab result, we will notify you by phone as soon as possible.  Submit refill requests through [employer] or call your pharmacy and they will forward the refill request to us. Please allow 3 business days for your refill to be completed.          Additional Information About Your Visit        [facility] [employer] Information     [employer] lets you send messages to your doctor, view  "your test results, renew your prescriptions, schedule appointments and more. To sign up, go to [email or web address] . Click on \"Log in\" on the left side of the screen, which will take you to the Welcome page. Then click on \"Sign up Now\" on the right side of the page.     You will be asked to enter the access code listed below, as well as some personal information. Please follow the directions to create your username and password.     Your access code is: [email or web address]  Expires: 2018 10:05 AM     Your access code will  in 90 days. If you need help or a new code, please call your [place] clinic or [phone].        Care EveryWhere ID     This is your Care EveryWhere ID. This could be used by other organizations to access your [place] medical records  [id number]        Your Vitals Were     Pulse Height BMI (Body Mass Index)             88 1.651 m (5' 5\") 35.16 kg/m2          Blood Pressure from Last 3 Encounters:   18 132/79   17 159/85   17 122/78    Weight from Last 3 Encounters:   18 95.8 kg (211 lb 4.8 oz)   17 94.8 kg (209 lb)   17 93.3 kg (205 lb 9.6 oz)              We Performed the Following     EKG 12-lead complete w/read - Clinics (performed today)     Follow-Up with Cardiologist          Where to get your medicines      These medications were sent to [facility] - [place], [place] - [address] [facility] [address]  [address] [facility] [address] [place] [address]     Phone:  [phone]     atorvastatin 80 MG tablet    lisinopril 5 MG tablet    metoprolol tartrate 25 MG tablet          Primary Care Provider Office Phone # Fax #    Tony Peter [phone]       [address] [facility] [address]        Equal Access to Services     [facility] AMINA AH: Hadii brendan Kemp, lori silver, qaybta kaduke gee. [facility] [phone].    ATENCIÓN: Si krishan blanchard, " tiene a perez disposición servicios gratuitos de asistencia lingüística. Chris anthony [phone].    We comply with applicable federal civil rights laws and [place] laws. We do not discriminate on the basis of race, color, national origin, age, disability, sex, sexual orientation, or gender identity.            Thank you!     Thank you for choosing [facility]  for your care. Our goal is always to provide you with excellent care. Hearing back from our patients is one way we can continue to improve our services. Please take a few minutes to complete the written survey that you may receive in the mail after your visit with us. Thank you!             Your Updated Medication List - Protect others around you: Learn how to safely use, store and throw away your medicines at www.disposemymeds.org.          This list is accurate as of 3/28/18 10:06 AM.  Always use your most recent med list.                   Brand Name Dispense Instructions for use Diagnosis    aspirin 81 MG tablet      1 TABLET DAILY        atorvastatin 80 MG tablet    LIPITOR    90 tablet    Take 1 tablet (80 mg) by mouth daily    High cholesterol       folic acid-vit B6-vit B12 0.8-10-0.115 MG Tabs per tablet    FOLGARD     Take 1 tablet by mouth daily        IRON SUPPLEMENT PO      Take 325 mg by mouth daily (with breakfast)        lisinopril 5 MG tablet    PRINIVIL/ZESTRIL    90 tablet    Take 1 tablet (5 mg) by mouth daily    Essential hypertension, benign       metoprolol tartrate 25 MG tablet    LOPRESSOR    180 tablet    Take 1 tablet (25 mg) by mouth 2 times daily    Essential hypertension, benign       Multi-vitamin Tabs tablet      Take 1 tablet by mouth daily        nitroGLYcerin 0.4 MG sublingual tablet    NITROSTAT    25 tablet    Place 1 tablet (0.4 mg) under the tongue every 5 minutes as needed May repeat X 2 and if chest pain persists, call 911    Allergic conjunctivitis, bilateral       STOOL  SOFTENER PO      Take by mouth daily

## 2018-05-01 ENCOUNTER — OFFICE VISIT (OUTPATIENT)
Dept: INTERNAL MEDICINE | Facility: CLINIC | Age: 68
End: 2018-05-01
Payer: COMMERCIAL

## 2018-05-01 VITALS
BODY MASS INDEX: 34.82 KG/M2 | HEART RATE: 80 BPM | WEIGHT: 209 LBS | RESPIRATION RATE: 16 BRPM | TEMPERATURE: 97.9 F | SYSTOLIC BLOOD PRESSURE: 134 MMHG | HEIGHT: 65 IN | OXYGEN SATURATION: 94 % | DIASTOLIC BLOOD PRESSURE: 84 MMHG

## 2018-05-01 DIAGNOSIS — Z23 NEED FOR TD VACCINE: Primary | ICD-10-CM

## 2018-05-01 DIAGNOSIS — Z12.11 SPECIAL SCREENING FOR MALIGNANT NEOPLASMS, COLON: ICD-10-CM

## 2018-05-01 DIAGNOSIS — D64.9 ANEMIA, UNSPECIFIED TYPE: ICD-10-CM

## 2018-05-01 LAB
BASOPHILS # BLD AUTO: 0 10E9/L (ref 0–0.2)
BASOPHILS NFR BLD AUTO: 0.3 %
DIFFERENTIAL METHOD BLD: ABNORMAL
EOSINOPHIL # BLD AUTO: 0 10E9/L (ref 0–0.7)
EOSINOPHIL NFR BLD AUTO: 0.4 %
ERYTHROCYTE [DISTWIDTH] IN BLOOD BY AUTOMATED COUNT: 14.8 % (ref 10–15)
HCT VFR BLD AUTO: 37.5 % (ref 40–53)
HGB BLD-MCNC: 12 G/DL (ref 13.3–17.7)
LYMPHOCYTES # BLD AUTO: 2.3 10E9/L (ref 0.8–5.3)
LYMPHOCYTES NFR BLD AUTO: 29.7 %
MCH RBC QN AUTO: 28.2 PG (ref 26.5–33)
MCHC RBC AUTO-ENTMCNC: 32 G/DL (ref 31.5–36.5)
MCV RBC AUTO: 88 FL (ref 78–100)
MONOCYTES # BLD AUTO: 1.4 10E9/L (ref 0–1.3)
MONOCYTES NFR BLD AUTO: 17.6 %
NEUTROPHILS # BLD AUTO: 4.1 10E9/L (ref 1.6–8.3)
NEUTROPHILS NFR BLD AUTO: 52 %
PLATELET # BLD AUTO: 247 10E9/L (ref 150–450)
RBC # BLD AUTO: 4.26 10E12/L (ref 4.4–5.9)
WBC # BLD AUTO: 7.9 10E9/L (ref 4–11)

## 2018-05-01 PROCEDURE — 85025 COMPLETE CBC W/AUTO DIFF WBC: CPT | Performed by: INTERNAL MEDICINE

## 2018-05-01 PROCEDURE — 90471 IMMUNIZATION ADMIN: CPT | Performed by: INTERNAL MEDICINE

## 2018-05-01 PROCEDURE — 36415 COLL VENOUS BLD VENIPUNCTURE: CPT | Performed by: INTERNAL MEDICINE

## 2018-05-01 PROCEDURE — 90714 TD VACC NO PRESV 7 YRS+ IM: CPT | Performed by: INTERNAL MEDICINE

## 2018-05-01 PROCEDURE — 99214 OFFICE O/P EST MOD 30 MIN: CPT | Mod: 25 | Performed by: INTERNAL MEDICINE

## 2018-05-01 ASSESSMENT — PAIN SCALES - GENERAL: PAINLEVEL: NO PAIN (0)

## 2018-05-01 NOTE — NURSING NOTE
"Chief Complaint   Patient presents with     RECHECK     Hypertension       Initial /86 (BP Location: Right arm, Patient Position: Chair, Cuff Size: Adult Large)  Pulse 80  Temp 97.9  F (36.6  C) (Oral)  Resp 16  Ht 5' 5\" (1.651 m)  Wt 209 lb (94.8 kg)  SpO2 94%  BMI 34.78 kg/m2 Estimated body mass index is 34.78 kg/(m^2) as calculated from the following:    Height as of this encounter: 5' 5\" (1.651 m).    Weight as of this encounter: 209 lb (94.8 kg).  Medication Reconciliation: complete     Screening Questionnaire for Adult Immunization    Are you sick today?   No   Do you have allergies to medications, food, a vaccine component or latex?   No   Have you ever had a serious reaction after receiving a vaccination?   No   Do you have a long-term health problem with heart disease, lung disease, asthma, kidney disease, metabolic disease (e.g. diabetes), anemia, or other blood disorder?   Yes   Do you have cancer, leukemia, HIV/AIDS, or any other immune system problem?   No   In the past 3 months, have you taken medications that affect  your immune system, such as prednisone, other steroids, or anticancer drugs; drugs for the treatment of rheumatoid arthritis, Crohn s disease, or psoriasis; or have you had radiation treatments?   No   Have you had a seizure, or a brain or other nervous system problem?   No   During the past year, have you received a transfusion of blood or blood     products, or been given immune (gamma) globulin or antiviral drug?   No   For women: Are you pregnant or is there a chance you could become        pregnant during the next month?   No   Have you received any vaccinations in the past 4 weeks?   No     Immunization questionnaire was positive for at least one answer.  Notified Dr. Peter.        Per orders of Dr. Peter, injection of Td given by Ayesha Monroy. Patient instructed to remain in clinic for 15 minutes afterwards, and to report any adverse reaction to me " immediately.       Screening performed by Ayesha Monroy on 5/1/2018 at 8:31 AM.

## 2018-05-01 NOTE — MR AVS SNAPSHOT
After Visit Summary   5/1/2018    Angel Sanders    MRN: [id number]           Patient Information     Date Of Birth          1950        Visit Information        Provider Department      5/1/2018 8:20 AM Tony Peter MD [facility]        Today's Diagnoses     Need for TD vaccine    -  1    Special screening for malignant neoplasms, colon        Anemia, unspecified type           Follow-ups after your visit        Additional Services     GASTROENTEROLOGY ADULT REF PROCEDURE ONLY       Last Lab Result: Creatinine (mg/dL)       Date                     Value                 11/13/2017               1.13             ----------  Body mass index is 34.78 kg/(m^2).     Needed:  No  Language:  English    Patient will be contacted to schedule procedure.     Please be aware that coverage of these services is subject to the terms and limitations of your health insurance plan.  Call member services at your health plan with any benefit or coverage questions.  Any procedures must be performed at a [place] facility OR coordinated by your clinic's referral office.    Please bring the following with you to your appointment:    (1) Any X-Rays, CTs or MRIs which have been performed.  Contact the facility where they were done to arrange for  prior to your scheduled appointment.    (2) List of current medications   (3) This referral request   (4) Any documents/labs given to you for this referral                  Who to contact     If you have questions or need follow up information about today's clinic visit or your schedule please contact [facility] directly at [phone].  Normal or non-critical lab and imaging results will be communicated to you by MyChart, letter or phone within 4 business days after the clinic has received the results. If you do not hear from us within 7 days, please contact the clinic through MyChart or phone. If you have a critical  "or abnormal lab result, we will notify you by phone as soon as possible.  Submit refill requests through [employer]. or call your pharmacy and they will forward the refill request to us. Please allow 3 business days for your refill to be completed.          Additional Information About Your Visit        Care EveryWhere ID     This is your Care EveryWhere ID. This could be used by other organizations to access your [place] medical records  [id number]        Your Vitals Were     Pulse Temperature Respirations Height Pulse Oximetry BMI (Body Mass Index)    80 97.9  F (36.6  C) (Oral) 16 5' 5\" (1.651 m) 94% 34.78 kg/m2       Blood Pressure from Last 3 Encounters:   05/01/18 144/86   03/28/18 132/79   11/30/17 159/85    Weight from Last 3 Encounters:   05/01/18 209 lb (94.8 kg)   03/28/18 211 lb 4.8 oz (95.8 kg)   11/30/17 209 lb (94.8 kg)              We Performed the Following     CBC with platelets differential     GASTROENTEROLOGY ADULT REF PROCEDURE ONLY     TD (ADULT, 7+) PRESERVE FREE        Primary Care Provider Office Phone # Fax #    Tony Peter [phone]       [address] [facility] [address]        Equal Access to Services     CUONG HUYNH : Hadii aad ku hadasho Soomaali, waaxda luqadaha, qaybta kaalmada adeegyada, waxay idiin haytariqn rand grewal lakev . So [facility] [phone].    ATENCIÓN: Si habla español, tiene a perez disposición servicios gratuitos de asistencia lingüística. Llame al [phone].    We comply with applicable federal civil rights laws and [place] laws. We do not discriminate on the basis of race, color, national origin, age, disability, sex, sexual orientation, or gender identity.            Thank you!     Thank you for choosing [facility]  for your care. Our goal is always to provide you with excellent care. Hearing back from our patients is one way we can continue to improve our services. Please take a few minutes to complete the " written survey that you may receive in the mail after your visit with us. Thank you!             Your Updated Medication List - Protect others around you: Learn how to safely use, store and throw away your medicines at www.disposemymeds.org.          This list is accurate as of 5/1/18  8:58 AM.  Always use your most recent med list.                   Brand Name Dispense Instructions for use Diagnosis    aspirin 81 MG tablet      1 TABLET DAILY        atorvastatin 80 MG tablet    LIPITOR    90 tablet    Take 1 tablet (80 mg) by mouth daily    High cholesterol       folic acid-vit B6-vit B12 0.8-10-0.115 MG Tabs per tablet    FOLGARD     Take 1 tablet by mouth daily        IRON SUPPLEMENT PO      Take 325 mg by mouth daily (with breakfast)        lisinopril 5 MG tablet    PRINIVIL/ZESTRIL    90 tablet    Take 1 tablet (5 mg) by mouth daily    Essential hypertension, benign       metoprolol tartrate 25 MG tablet    LOPRESSOR    180 tablet    Take 1 tablet (25 mg) by mouth 2 times daily    Essential hypertension, benign       Multi-vitamin Tabs tablet      Take 1 tablet by mouth daily        nitroGLYcerin 0.4 MG sublingual tablet    NITROSTAT    25 tablet    Place 1 tablet (0.4 mg) under the tongue every 5 minutes as needed May repeat X 2 and if chest pain persists, call 911    Allergic conjunctivitis, bilateral       STOOL SOFTENER PO      Take by mouth daily

## 2018-05-01 NOTE — PROGRESS NOTES
SUBJECTIVE:   Angel Sanders is a 68 year old male who presents to clinic today for the following health issues:     Anemia & Hypertension Follow-up    Patient is seen for a follow up visit.  No acute complaints, no medication change or new medical conditions.  Patient has anemia - mild, with normal B12 , Folate , iron, FIT testing. Currently not symptomatic with dizziness, weakness, SOB. No bleeding from GI,  noted.   Has h/o HTN. on medical treatment. BP has been controlled. No side effects from medications. No CP, HA, dizziness. good compliance with medications and low salt diet.        Outpatient blood pressures are being checked at home.  Results are 120-130/70-80.    Low Salt Diet: no added salt      Amount of exercise or physical activity: 3 days/week for an average of 15-30 minutes    Problems taking medications regularly: No    Medication side effects: none    Diet: low salt        PROBLEMS TO ADD ON...  Has H/O hyperlipidemia. On medical treatment and diet. No side effects. No muscle weakness, myalgias or upset stomach.       Problem list and histories reviewed & adjusted, as indicated.  Additional history: as documented    Patient Active Problem List   Diagnosis     Coronary atherosclerosis     Cerebral infarction due to occlusion or stenosis of carotid artery     CARDIOVASCULAR SCREENING; LDL GOAL LESS THAN 100     Heel pain     Hyperlipidemia LDL goal <100     Advanced directives, counseling/discussion     Anemia     Benign essential hypertension     Past Surgical History:   Procedure Laterality Date     ARTHROTOMY SHOULDER, ROTATOR CUFF REPAIR, COMBINED  10/23/2013    Procedure: COMBINED ARTHROTOMY SHOULDER, ROTATOR CUFF REPAIR;  Left Shoulder Open Decompression, Distal Clavical Resection, Rotator Cuff Repair , Bicep Tenolysis, and Bicep Tenodesis   ;  Surgeon: Zhang Mattson MD;  Location: [facility] OR     C NONSPECIFIC PROCEDURE  2008    RCA stent; see PMH     PHACOEMULSIFICATION CLEAR CORNEA WITH  STANDARD INTRAOCULAR LENS IMPLANT  11/20/2013    Procedure: PHACOEMULSIFICATION CLEAR CORNEA WITH STANDARD INTRAOCULAR LENS IMPLANT;  RIGHT PHACOEMULSIFICATION CLEAR CORNEA WITH STANDARD INTRAOCULAR LENS IMPLANT ;  Surgeon: Rikki Reddy MD;  Location: [facility]     PHACOEMULSIFICATION CLEAR CORNEA WITH STANDARD INTRAOCULAR LENS IMPLANT  12/3/2013    Procedure: PHACOEMULSIFICATION CLEAR CORNEA WITH STANDARD INTRAOCULAR LENS IMPLANT;  LEFT PHACOEMULSIFICATION CLEAR CORNEA WITH STANDARD INTRAOCULAR LENS IMPLANT;  Surgeon: Rikki Reddy MD;  Location: [facility]       Social History   Substance Use Topics     Smoking status: Former Smoker     Packs/day: 1.00     Years: 40.00     Types: Cigarettes     Quit date: 1/4/2008     Smokeless tobacco: Never Used     Alcohol use No      Comment: Sober for 25 years     Family History   Problem Relation Age of Onset     Breast Cancer Mother      HEART DISEASE Father          Current Outpatient Prescriptions   Medication Sig Dispense Refill     ASPIRIN 81 MG OR TABS 1 TABLET DAILY       atorvastatin (LIPITOR) 80 MG tablet Take 1 tablet (80 mg) by mouth daily 90 tablet 4     Docusate Calcium (STOOL SOFTENER PO) Take by mouth daily       Ferrous Sulfate (IRON SUPPLEMENT PO) Take 325 mg by mouth daily (with breakfast)        folic acid-vit B6-vit B12 (FOLGARD) 0.8-10-0.115 MG TABS Take 1 tablet by mouth daily       lisinopril (PRINIVIL/ZESTRIL) 5 MG tablet Take 1 tablet (5 mg) by mouth daily 90 tablet 4     metoprolol tartrate (LOPRESSOR) 25 MG tablet Take 1 tablet (25 mg) by mouth 2 times daily 180 tablet 4     multivitamin, therapeutic with minerals (MULTI-VITAMIN) TABS tablet Take 1 tablet by mouth daily       nitroGLYcerin (NITROSTAT) 0.4 MG sublingual tablet Place 1 tablet (0.4 mg) under the tongue every 5 minutes as needed May repeat X 2 and if chest pain persists, call 911 25 tablet 2       Reviewed and updated as needed this visit by clinical staff  Tobacco  Allergies   "Meds  Med Hx  Surg Hx  Fam Hx  Soc Hx      Reviewed and updated as needed this visit by Provider         ROS:  Constitutional, HEENT, cardiovascular, pulmonary, gi and gu systems are negative, except as otherwise noted.    OBJECTIVE:     /84  Pulse 80  Temp 97.9  F (36.6  C) (Oral)  Resp 16  Ht 5' 5\" (1.651 m)  Wt 209 lb (94.8 kg)  SpO2 94%  BMI 34.78 kg/m2  Body mass index is 34.78 kg/(m^2).   GENERAL: obese, alert and no distress  NECK: no adenopathy, no asymmetry, masses, or scars and thyroid normal to palpation  RESP: lungs clear to auscultation - no rales, rhonchi or wheezes  CV: regular rate and rhythm, normal S1 S2, no S3 or S4, no murmur, click or rub, no peripheral edema and peripheral pulses strong  ABDOMEN: soft, nontender, no hepatosplenomegaly, no masses and bowel sounds normal  MS: no gross musculoskeletal defects noted, no edema    Diagnostic Test Results:  none     ASSESSMENT/PLAN:     Problem List Items Addressed This Visit     Anemia    Relevant Orders    CBC with platelets differential (Completed)      Other Visit Diagnoses     Need for TD vaccine    -  Primary    Relevant Orders    TD (ADULT, 7+) PRESERVE FREE (Completed)    Special screening for malignant neoplasms, colon        Relevant Orders    GASTROENTEROLOGY ADULT REF PROCEDURE ONLY           Recheck CBC  Assess colonoscopy   Immunized   Continue current treatment     Follow-Up:in 6 months     Tony Peter MD  [facility]  "

## 2018-05-15 ENCOUNTER — HOSPITAL ENCOUNTER (OUTPATIENT)
Facility: CLINIC | Age: 68
Discharge: HOME | End: 2018-05-15
Attending: INTERNAL MEDICINE | Admitting: INTERNAL MEDICINE
Payer: MEDICARE

## 2018-05-15 VITALS
OXYGEN SATURATION: 95 % | DIASTOLIC BLOOD PRESSURE: 98 MMHG | SYSTOLIC BLOOD PRESSURE: 130 MMHG | RESPIRATION RATE: 15 BRPM

## 2018-05-15 LAB — COLONOSCOPY: NORMAL

## 2018-05-15 PROCEDURE — 25000128 H RX IP 250 OP 636: Performed by: INTERNAL MEDICINE

## 2018-05-15 PROCEDURE — 45378 DIAGNOSTIC COLONOSCOPY: CPT | Performed by: INTERNAL MEDICINE

## 2018-05-15 PROCEDURE — G0121 COLON CA SCRN NOT HI RSK IND: HCPCS | Performed by: INTERNAL MEDICINE

## 2018-05-15 PROCEDURE — G0500 MOD SEDAT ENDO SERVICE >5YRS: HCPCS | Performed by: INTERNAL MEDICINE

## 2018-05-15 RX ORDER — NALOXONE HYDROCHLORIDE 0.4 MG/ML
.1-.4 INJECTION, SOLUTION INTRAMUSCULAR; INTRAVENOUS; SUBCUTANEOUS
Status: DISCONTINUED | OUTPATIENT
Start: 2018-05-15 | End: 2018-05-15 | Stop reason: HOSPADM

## 2018-05-15 RX ORDER — ONDANSETRON 4 MG/1
4 TABLET, ORALLY DISINTEGRATING ORAL EVERY 6 HOURS PRN
Status: DISCONTINUED | OUTPATIENT
Start: 2018-05-15 | End: 2018-05-15 | Stop reason: HOSPADM

## 2018-05-15 RX ORDER — FLUMAZENIL 0.1 MG/ML
0.2 INJECTION, SOLUTION INTRAVENOUS
Status: DISCONTINUED | OUTPATIENT
Start: 2018-05-15 | End: 2018-05-15 | Stop reason: HOSPADM

## 2018-05-15 RX ORDER — ONDANSETRON 2 MG/ML
4 INJECTION INTRAMUSCULAR; INTRAVENOUS EVERY 6 HOURS PRN
Status: DISCONTINUED | OUTPATIENT
Start: 2018-05-15 | End: 2018-05-15 | Stop reason: HOSPADM

## 2018-05-15 RX ORDER — ONDANSETRON 2 MG/ML
4 INJECTION INTRAMUSCULAR; INTRAVENOUS
Status: DISCONTINUED | OUTPATIENT
Start: 2018-05-15 | End: 2018-05-15 | Stop reason: HOSPADM

## 2018-05-15 RX ORDER — FENTANYL CITRATE 50 UG/ML
INJECTION, SOLUTION INTRAMUSCULAR; INTRAVENOUS PRN
Status: DISCONTINUED | OUTPATIENT
Start: 2018-05-15 | End: 2018-05-15 | Stop reason: HOSPADM

## 2018-05-15 RX ORDER — LIDOCAINE 40 MG/G
CREAM TOPICAL
Status: DISCONTINUED | OUTPATIENT
Start: 2018-05-15 | End: 2018-05-15 | Stop reason: HOSPADM

## 2018-05-15 NOTE — DISCHARGE INSTRUCTIONS
Eating a High-Fiber Diet  Fiber is what gives strength and structure to plants. Most grains, beans, vegetables, and fruits contain fiber. Foods rich in fiber are often low in calories and fat, and they fill you up more. They may also reduce your risks for certain health problems. To find out the amount of fiber in canned, packaged, or frozen foods, read the  Nutrition Facts  label. It tells you how much fiber is in a serving.      Types of Fiber and Their Benefits  There are two types of fiber: insoluble and soluble. They both aid digestion and help you maintain a healthy weight.  Insoluble fiber: This is found in whole grains, cereals, certain fruits and vegetables (such as apple skin, corn, and carrots). Insoluble fiber may prevent constipation and reduce the risk of certain types of cancer.   Soluble fiber: This type of fiber is in oats, beans, and certain fruits and vegetables (such as strawberries and peas). Soluble fiber can reduce cholesterol (which may help lower the risk of heart disease), and helps control blood sugar levels.  Look for High-Fiber Foods  Whole-grain breads and cereals: Try to eat 6-8 ounces a day. Include wheat and oat bran cereals, whole-wheat muffins or toast, and corn tortillas in your meals.  Fruits: Try to eat 2 cups a day. Apples, oranges, strawberries, pears, and bananas are good sources. (Note: Fruit juice is low in fiber.)  Vegetables: Try to eat 3 cups a day. Add asparagus, carrots, broccoli, peas, and corn to your meals.  Legumes (beans): One cup of cooked lentils gives you over 15 grams of fiber. Try navy beans, lentils, and chickpeas.  Seeds:  A small handful of seeds gives you about 3 grams of fiber. Try sunflower seeds.    Keep Track of Your Fiber  A healthy diet includes 31 grams of fiber a day if you have a 2,000-calorie diet. Keep track of how much fiber you eat. Start by reading food labels. Then eat a variety of foods high in fiber. Ask your doctor about supplemental  fiber products.            [id number] [employer] [facility], [address] [place] [address], [place] [address]. All rights reserved. This information is not intended as a substitute for professional medical care. Always follow your healthcare professional's instructions.    Understanding Diverticulosis and Diverticulitis     Pouches or diverticula usually occur in the lower part of the colon called the sigmoid.      Diverticulitis occurs when the pouches become inflamed.     The colon (large intestine) is the last part of the digestive tract. It absorbs water from stool and changes it from a liquid to a solid. In certain cases, small pouches called diverticula can form in the colon wall. This condition is called diverticulosis. The pouches can become infected. If this happens, it becomes a more serious problem called diverticulitis. These problems can be painful. But they can be managed.   Managing Your Condition  Diet changes or taking medications are often tried first. These may be enough to bring relief. If the case is bad, surgery may be done. You and your doctor can discuss the plan that is best for you.  If You Have Diverticulosis  Diet changes are often enough to control symptoms. The main changes are adding fiber (roughage) and drinking more water. Fiber absorbs water as it travels through your colon. This helps your stool stay soft and move smoothly. Water helps this process. If needed, you may be told to take over-the-counter stool softeners. To help relieve pain, antispasmodic medications may be prescribed.  If You Have Diverticulitis  Treatment depends on how bad your symptoms are.  For mild symptoms: You may be put on a liquid diet for a short time. You may also be prescribed antibiotics. If these two steps relieve your symptoms, you may then be prescribed a high-fiber diet. If you still have symptoms, your doctor will discuss further treatment options with you.  For severe symptoms: You may need to be admitted to the  hospital. There, you can be given IV antibiotics and fluids. Once symptoms are under control, the above treatments may be tried. If these don t control your condition, your doctor may discuss the option of having surgery with you.  [place] to Colon Health  Help keep your colon healthy with a diet that includes plenty of high-fiber fruits, vegetables, and whole grains. Drink plenty of liquids like water and juice. Your doctor may also recommend avoiding seeds and nuts.          [id number] [employer] [place], [address] [place] [address], [place] [address]. All rights reserved. This information is not intended as a substitute for professional medical care. Always follow your healthcare professional's instructions.

## 2018-05-15 NOTE — IP AVS SNAPSHOT
MRN:[id number]                      After Visit Summary   5/15/2018    Angel Sanders    MRN: [id number]           Thank you!     Thank you for choosing [facility] for your care. Our goal is always to provide you with excellent care. Hearing back from our patients is one way we can continue to improve our services. Please take a few minutes to complete the written survey that you may receive in the mail after you visit. If you would like to speak to someone directly about your visit please contact Patient Relations at [phone]. Thank you!          Patient Information     Date Of Birth          1950        About your hospital stay     You were admitted on:  May 15, 2018 You last received care in the:  [facility]    You were discharged on:  May 15, 2018       Who to Call     For medical emergencies, please call 911.  For non-urgent questions about your medical care, please call your primary care provider or clinic, [phone]  For questions related to your surgery, please call your surgery clinic        Attending Provider     Provider Specialty    Cory Hernandez MD Gastroenterology       Primary Care Provider Office Phone # Fax #    Tony Peter [phone]      Further instructions from your care team         Eating a High-Fiber Diet  Fiber is what gives strength and structure to plants. Most grains, beans, vegetables, and fruits contain fiber. Foods rich in fiber are often low in calories and fat, and they fill you up more. They may also reduce your risks for certain health problems. To find out the amount of fiber in canned, packaged, or frozen foods, read the  Nutrition Facts  label. It tells you how much fiber is in a serving.      Types of Fiber and Their Benefits  There are two types of fiber: insoluble and soluble. They both aid digestion and help you maintain a healthy weight.  Insoluble fiber: This is found in whole grains,  cereals, certain fruits and vegetables (such as apple skin, corn, and carrots). Insoluble fiber may prevent constipation and reduce the risk of certain types of cancer.   Soluble fiber: This type of fiber is in oats, beans, and certain fruits and vegetables (such as strawberries and peas). Soluble fiber can reduce cholesterol (which may help lower the risk of heart disease), and helps control blood sugar levels.  Look for High-Fiber Foods  Whole-grain breads and cereals: Try to eat 6-8 ounces a day. Include wheat and oat bran cereals, whole-wheat muffins or toast, and corn tortillas in your meals.  Fruits: Try to eat 2 cups a day. Apples, oranges, strawberries, pears, and bananas are good sources. (Note: Fruit juice is low in fiber.)  Vegetables: Try to eat 3 cups a day. Add asparagus, carrots, broccoli, peas, and corn to your meals.  Legumes (beans): One cup of cooked lentils gives you over 15 grams of fiber. Try navy beans, lentils, and chickpeas.  Seeds:  A small handful of seeds gives you about 3 grams of fiber. Try sunflower seeds.    Keep Track of Your Fiber  A healthy diet includes 31 grams of fiber a day if you have a 2,000-calorie diet. Keep track of how much fiber you eat. Start by reading food labels. Then eat a variety of foods high in fiber. Ask your doctor about supplemental fiber products.            7660-8760 [address], [place] [address]. All rights reserved. This information is not intended as a substitute for professional medical care. Always follow your healthcare professional's instructions.    Understanding Diverticulosis and Diverticulitis     Pouches or diverticula usually occur in the lower part of the colon called the sigmoid.      Diverticulitis occurs when the pouches become inflamed.     The colon (large intestine) is the last part of the digestive tract. It absorbs water from stool and changes it from a liquid to a solid. In certain cases, small pouches  called diverticula can form in the colon wall. This condition is called diverticulosis. The pouches can become infected. If this happens, it becomes a more serious problem called diverticulitis. These problems can be painful. But they can be managed.   Managing Your Condition  Diet changes or taking medications are often tried first. These may be enough to bring relief. If the case is bad, surgery may be done. You and your doctor can discuss the plan that is best for you.  If You Have Diverticulosis  Diet changes are often enough to control symptoms. The main changes are adding fiber (roughage) and drinking more water. Fiber absorbs water as it travels through your colon. This helps your stool stay soft and move smoothly. Water helps this process. If needed, you may be told to take over-the-counter stool softeners. To help relieve pain, antispasmodic medications may be prescribed.  If You Have Diverticulitis  Treatment depends on how bad your symptoms are.  For mild symptoms: You may be put on a liquid diet for a short time. You may also be prescribed antibiotics. If these two steps relieve your symptoms, you may then be prescribed a high-fiber diet. If you still have symptoms, your doctor will discuss further treatment options with you.  For severe symptoms: You may need to be admitted to the hospital. There, you can be given IV antibiotics and fluids. Once symptoms are under control, the above treatments may be tried. If these don t control your condition, your doctor may discuss the option of having surgery with you.  [place] to Colon Health  Help keep your colon healthy with a diet that includes plenty of high-fiber fruits, vegetables, and whole grains. Drink plenty of liquids like water and juice. Your doctor may also recommend avoiding seeds and nuts.          1427-4642 [facility], [address] [place] [address], [place] [address]. All rights reserved. This information is not intended as a substitute for  professional medical care. Always follow your healthcare professional's instructions.    Pending Results     No orders found from 5/13/2018 to 5/16/2018.            Admission Information     Date & Time Provider Department Dept. Phone    5/15/2018 Cory Hernandez MD [facility] [phone]      Your Vitals Were     Blood Pressure Respirations Pulse Oximetry             159/102 22 94%         Care EveryWhere ID     This is your Care EveryWhere ID. This could be used by other organizations to access your [facility] medical records  [id number]        Equal Access to Services     [facility]: Hadii brendan golden hadasho Soomaali, waaxda luqadaha, qaybta kaalmada adeegyada, duke nevarez . So [facility] [phone].    ATENCIÓN: Si habla español, tiene a perez disposición servicios gratuitos de asistencia lingüística. Llame al [phone].    We comply with applicable federal civil rights laws and [place] laws. We do not discriminate on the basis of race, color, national origin, age, disability, sex, sexual orientation, or gender identity.               Review of your medicines      CONTINUE these medicines which have NOT CHANGED        Dose / Directions    aspirin 81 MG tablet        1 TABLET DAILY   Refills:  0       atorvastatin 80 MG tablet   Commonly known as:  LIPITOR   Used for:  High cholesterol        Dose:  80 mg   Take 1 tablet (80 mg) by mouth daily   Quantity:  90 tablet   Refills:  4       folic acid-vit B6-vit B12 0.8-10-0.115 MG Tabs per tablet   Commonly known as:  FOLGARD        Dose:  1 tablet   Take 1 tablet by mouth daily   Refills:  0       IRON SUPPLEMENT PO        Dose:  325 mg   Take 325 mg by mouth daily (with breakfast)   Refills:  0       lisinopril 5 MG tablet   Commonly known as:  PRINIVIL/ZESTRIL   Used for:  Essential hypertension, benign        Dose:  5 mg   Take 1 tablet (5 mg) by mouth daily   Quantity:  90 tablet   Refills:  4       metoprolol  tartrate 25 MG tablet   Commonly known as:  LOPRESSOR   Used for:  Essential hypertension, benign        Dose:  25 mg   Take 1 tablet (25 mg) by mouth 2 times daily   Quantity:  180 tablet   Refills:  4       Multi-vitamin Tabs tablet        Dose:  1 tablet   Take 1 tablet by mouth daily   Refills:  0       nitroGLYcerin 0.4 MG sublingual tablet   Commonly known as:  NITROSTAT   Used for:  Allergic conjunctivitis, bilateral        Dose:  0.4 mg   Place 1 tablet (0.4 mg) under the tongue every 5 minutes as needed May repeat X 2 and if chest pain persists, call 911   Quantity:  25 tablet   Refills:  2       STOOL SOFTENER PO        Take by mouth daily   Refills:  0                Protect others around you: Learn how to safely use, store and throw away your medicines at www.disposemymeds.org.             Medication List: This is a list of all your medications and when to take them. Check marks below indicate your daily home schedule. Keep this list as a reference.      Medications           Morning Afternoon Evening Bedtime As Needed    aspirin 81 MG tablet   1 TABLET DAILY                                atorvastatin 80 MG tablet   Commonly known as:  LIPITOR   Take 1 tablet (80 mg) by mouth daily                                folic acid-vit B6-vit B12 0.8-10-0.115 MG Tabs per tablet   Commonly known as:  FOLGARD   Take 1 tablet by mouth daily                                IRON SUPPLEMENT PO   Take 325 mg by mouth daily (with breakfast)                                lisinopril 5 MG tablet   Commonly known as:  PRINIVIL/ZESTRIL   Take 1 tablet (5 mg) by mouth daily                                metoprolol tartrate 25 MG tablet   Commonly known as:  LOPRESSOR   Take 1 tablet (25 mg) by mouth 2 times daily                                Multi-vitamin Tabs tablet   Take 1 tablet by mouth daily                                nitroGLYcerin 0.4 MG sublingual tablet   Commonly known as:  NITROSTAT   Place 1 tablet (0.4 mg)  under the tongue every 5 minutes as needed May repeat X 2 and if chest pain persists, call 911                                STOOL SOFTENER PO   Take by mouth daily

## 2018-05-15 NOTE — H&P
Pre-Endoscopy History and Physical     Angel Sanders MRN# [id number]   YOB: 1950 Age: 68 year old     Date of Procedure: 5/15/2018  Primary care provider: Tony Peter  Type of Endoscopy: Colonoscopy with possible biopsy, possible polypectomy  Reason for Procedure: screen  Type of Anesthesia Anticipated: Conscious Sedation    HPI:    Angel is a 68 year old male who will be undergoing the above procedure.      A history and physical has been performed. The patient's medications and allergies have been reviewed. The risks and benefits of the procedure and the sedation options and risks were discussed with the patient.  All questions were answered and informed consent was obtained.      He denies a personal or family history of anesthesia complications or bleeding disorders.     Patient Active Problem List   Diagnosis     Coronary atherosclerosis     Cerebral infarction due to occlusion or stenosis of carotid artery     CARDIOVASCULAR SCREENING; LDL GOAL LESS THAN 100     Heel pain     Hyperlipidemia LDL goal <100     Advanced directives, counseling/discussion     Anemia     Benign essential hypertension        Past Medical History:   Diagnosis Date     Carotid artery obstruction 2009    stent placed     Coronary atherosclerosis of unspecified type of vessel, native or graft 2008    Acute inf MI; RCA stent; followed by Cardiology     Heart attack      Herniated cervical disc      Hyperlipidaemia      Hyperlipidemia LDL goal <100 8/9/2012     Hypertension      Occlusion and stenosis of carotid artery without mention of cerebral infarction 2008    50-69% stenosis of left ICA     Stented coronary artery     1 coronary /1 carotid        Past Surgical History:   Procedure Laterality Date     ARTHROTOMY SHOULDER, ROTATOR CUFF REPAIR, COMBINED  10/23/2013    Procedure: COMBINED ARTHROTOMY SHOULDER, ROTATOR CUFF REPAIR;  Left Shoulder Open Decompression, Distal Clavical Resection, Rotator Cuff Repair ,  Bicep Tenolysis, and Bicep Tenodesis   ;  Surgeon: Zhang Mattson MD;  Location: [facility] OR     C NONSPECIFIC PROCEDURE  2008    RCA stent; see PMH     PHACOEMULSIFICATION CLEAR CORNEA WITH STANDARD INTRAOCULAR LENS IMPLANT  11/20/2013    Procedure: PHACOEMULSIFICATION CLEAR CORNEA WITH STANDARD INTRAOCULAR LENS IMPLANT;  RIGHT PHACOEMULSIFICATION CLEAR CORNEA WITH STANDARD INTRAOCULAR LENS IMPLANT ;  Surgeon: Rikki Reddy MD;  Location: [facility]     PHACOEMULSIFICATION CLEAR CORNEA WITH STANDARD INTRAOCULAR LENS IMPLANT  12/3/2013    Procedure: PHACOEMULSIFICATION CLEAR CORNEA WITH STANDARD INTRAOCULAR LENS IMPLANT;  LEFT PHACOEMULSIFICATION CLEAR CORNEA WITH STANDARD INTRAOCULAR LENS IMPLANT;  Surgeon: Rikki Rdedy MD;  Location: [facility]       Social History   Substance Use Topics     Smoking status: Former Smoker     Packs/day: 1.00     Years: 40.00     Types: Cigarettes     Quit date: 1/4/2008     Smokeless tobacco: Never Used     Alcohol use No      Comment: Sober for 25 years       Family History   Problem Relation Age of Onset     Breast Cancer Mother      HEART DISEASE Father      Colon Cancer No family hx of        Prior to Admission medications    Medication Sig Start Date End Date Taking? Authorizing Provider   ASPIRIN 81 MG OR TABS 1 TABLET DAILY   Yes Reported, Patient   atorvastatin (LIPITOR) 80 MG tablet Take 1 tablet (80 mg) by mouth daily 3/28/18  Yes Dre Kruse MD   Docusate Calcium (STOOL SOFTENER PO) Take by mouth daily   Yes Reported, Patient   Ferrous Sulfate (IRON SUPPLEMENT PO) Take 325 mg by mouth daily (with breakfast)    Yes Reported, Patient   folic acid-vit B6-vit B12 (FOLGARD) 0.8-10-0.115 MG TABS Take 1 tablet by mouth daily   Yes Reported, Patient   lisinopril (PRINIVIL/ZESTRIL) 5 MG tablet Take 1 tablet (5 mg) by mouth daily 3/28/18  Yes Dre Kruse MD   metoprolol tartrate (LOPRESSOR) 25 MG tablet Take 1 tablet (25 mg) by mouth 2 times daily 3/28/18  Yes Uriah  "MD Dre   multivitamin, therapeutic with minerals (MULTI-VITAMIN) TABS tablet Take 1 tablet by mouth daily   Yes Reported, Patient   nitroGLYcerin (NITROSTAT) 0.4 MG sublingual tablet Place 1 tablet (0.4 mg) under the tongue every 5 minutes as needed May repeat X 2 and if chest pain persists, call 911 11/8/17   Jen Jacob NP       Allergies   Allergen Reactions     Adhesive Tape Rash        REVIEW OF SYSTEMS:   5 point ROS negative except as noted above in HPI, including Gen., Resp., CV, GI &  system review.    PHYSICAL EXAM:   BP (!) 143/97 (Cuff Size: Adult Large)  Resp 16  SpO2 92% Estimated body mass index is 34.78 kg/(m^2) as calculated from the following:    Height as of 5/1/18: 1.651 m (5' 5\").    Weight as of 5/1/18: 94.8 kg (209 lb).   GENERAL APPEARANCE: alert, and oriented  MENTAL STATUS: alert  AIRWAY EXAM: Mallampatti Class I (visualization of the soft palate, fauces, uvula, anterior and posterior pillars)  RESP: lungs clear to auscultation - no rales, rhonchi or wheezes  CV: regular rates and rhythm  DIAGNOSTICS:    Not indicated    IMPRESSION   ASA Class 1 - Healthy patient, no medical problems    PLAN:   Plan for Colonoscopy with possible biopsy, possible polypectomy. We discussed the risks, benefits and alternatives and the patient wished to proceed.    The above has been forwarded to the consulting provider.      Signed Electronically by: Cory Hernandez  May 15, 2018          "

## 2018-05-15 NOTE — OR NURSING
Noticed pt heart rate in 110,S Md notified pt denies pain or sob or weakness pt said he feels good pt said he did not take his heart meds for two days pt said he will

## 2018-05-15 NOTE — IP AVS SNAPSHOT
[facility]    [address] [facility] [address]    Phone:  [phone]    Fax:  [phone]                                       After Visit Summary   5/15/2018    Angel Sanders    MRN: [id number]           After Visit Summary Signature Page     I have received my discharge instructions, and my questions have been answered. I have discussed any challenges I see with this plan with the nurse or doctor.    ..........................................................................................................................................  Patient/Patient Representative Signature      ..........................................................................................................................................  Patient Representative Print Name and Relationship to Patient    ..................................................               ................................................  Date                                            Time    ..........................................................................................................................................  Reviewed by Signature/Title    ...................................................              ..............................................  Date                                                            Time

## 2018-11-05 ENCOUNTER — OFFICE VISIT (OUTPATIENT)
Dept: URGENT CARE | Facility: URGENT CARE | Age: 68
End: 2018-11-05
Payer: COMMERCIAL

## 2018-11-05 VITALS
WEIGHT: 212.8 LBS | TEMPERATURE: 97.8 F | OXYGEN SATURATION: 94 % | BODY MASS INDEX: 35.41 KG/M2 | HEART RATE: 87 BPM | SYSTOLIC BLOOD PRESSURE: 136 MMHG | DIASTOLIC BLOOD PRESSURE: 96 MMHG
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
## 2018-11-05 DIAGNOSIS — S01.01XA LACERATION OF SCALP, INITIAL ENCOUNTER: ICD-10-CM

## 2018-11-05 PROCEDURE — 12002 RPR S/N/AX/GEN/TRNK2.6-7.5CM: CPT | Performed by: FAMILY MEDICINE

## 2018-11-05 PROCEDURE — 99213 OFFICE O/P EST LOW 20 MIN: CPT | Mod: 25 | Performed by: FAMILY MEDICINE

## 2018-11-05 NOTE — MR AVS SNAPSHOT
After Visit Summary   11/5/2018    Angel Sanders    MRN: [id number]           Patient Information     Date Of Birth          1950        Visit Information        Provider Department      11/5/2018 4:45 PM Dk Timmons MD [facility]        Today's Diagnoses     Fall from ground level    -  1    Laceration of scalp, initial encounter          Care Instructions    Return in 7-10 days to a [facility] site for removal of staples    Apply topical antibiotic to the area    If you develop confusion, moderate to severe headache, weakness, have slurred speech, or memory difficulties you should go to emergency department immediately          Follow-ups after your visit        Your next 10 appointments already scheduled     Nov 14, 2018  8:20 AM CST   PHYSICAL with Tony Peter MD   [facility] ([facility])    [address]  [facility] [place] [id number] [phone]              Who to contact     If you have questions or need follow up information about today's clinic visit or your schedule please contact [facility] directly at [phone].  Normal or non-critical lab and imaging results will be communicated to you by MyChart, letter or phone within 4 business days after the clinic has received the results. If you do not hear from us within 7 days, please contact the clinic through MyChart or phone. If you have a critical or abnormal lab result, we will notify you by phone as soon as possible.  Submit refill requests through [employer] or call your pharmacy and they will forward the refill request to us. Please allow 3 business days for your refill to be completed.          Additional Information About Your Visit        Care EveryWhere ID     This is your Care EveryWhere ID. This could be used by other organizations to access your [facility] medical records  [id number]        Your Vitals Were     Pulse  Temperature Pulse Oximetry BMI (Body Mass Index)          87 97.8  F (36.6  C) 94% 35.41 kg/m2         Blood Pressure from Last 3 Encounters:   11/05/18 (!) 136/96   05/15/18 (!) 130/98   05/01/18 134/84    Weight from Last 3 Encounters:   11/05/18 212 lb 12.8 oz (96.5 kg)   05/01/18 209 lb (94.8 kg)   03/28/18 211 lb 4.8 oz (95.8 kg)              Today, you had the following     No orders found for display       Primary Care Provider Office Phone # Fax #    Tony Peter [phone]       [address]  [facility] [address]        Equal Access to Services     CUONG HUYNH : Veronica farraro Sosally, waaxda luqadaha, qaybta kaalmada adeegyada, duke nevarez . So [facility] [phone].    ATENCIÓN: Si habla español, tiene a perez disposición servicios gratuitos de asistencia lingüística. Llame al [phone].    We comply with applicable federal civil rights laws and [place] laws. We do not discriminate on the basis of race, color, national origin, age, disability, sex, sexual orientation, or gender identity.            Thank you!     Thank you for choosing [facility]  for your care. Our goal is always to provide you with excellent care. Hearing back from our patients is one way we can continue to improve our services. Please take a few minutes to complete the written survey that you may receive in the mail after your visit with us. Thank you!             Your Updated Medication List - Protect others around you: Learn how to safely use, store and throw away your medicines at www.disposemymeds.org.          This list is accurate as of 11/5/18  6:35 PM.  Always use your most recent med list.                   Brand Name Dispense Instructions for use Diagnosis    aspirin 81 MG tablet      1 TABLET DAILY        atorvastatin 80 MG tablet    LIPITOR    90 tablet    Take 1 tablet (80 mg) by mouth daily    High cholesterol       folic acid-vit B6-vit  B12 0.8-10-0.115 MG Tabs per tablet    FOLGARD     Take 1 tablet by mouth daily        IRON SUPPLEMENT PO      Take 325 mg by mouth daily (with breakfast)        lisinopril 5 MG tablet    PRINIVIL/ZESTRIL    90 tablet    Take 1 tablet (5 mg) by mouth daily    Essential hypertension, benign       metoprolol tartrate 25 MG tablet    LOPRESSOR    180 tablet    Take 1 tablet (25 mg) by mouth 2 times daily    Essential hypertension, benign       Multi-vitamin Tabs tablet      Take 1 tablet by mouth daily        nitroGLYcerin 0.4 MG sublingual tablet    NITROSTAT    25 tablet    Place 1 tablet (0.4 mg) under the tongue every 5 minutes as needed May repeat X 2 and if chest pain persists, call 911    Allergic conjunctivitis, bilateral       STOOL SOFTENER PO      Take by mouth daily

## 2018-11-05 NOTE — PATIENT INSTRUCTIONS
Return in 7-10 days to a [facility] site for removal of staples    Apply topical antibiotic to the area    If you develop confusion, moderate to severe headache, weakness, have slurred speech, or memory difficulties you should go to emergency department immediately

## 2018-11-05 NOTE — PROGRESS NOTES
"Subjective:   Angel Sanders is a 68 year old male who presents for   Chief Complaint   Patient presents with     Head Injury     Pt presents with large 4\"  gash on Left side of head.  Bleeding under control.  Pt is NOT presenting with any dizziness or vision issues.   4pm fell/lost balance and hit left side of head on a can containing paint/liquid. This can was not open. Patient did not pass out. No hx of previous head injuries. No slurred speech or confusion. Wife accompanies him and drove him today. Denies visual difficulties. Pain is minimal to mild at this time.  No hx of falls.     Patient takes aspirin.     Patient Active Problem List    Diagnosis Date Noted     Benign essential hypertension 09/12/2016     Priority: Medium     Advanced directives, counseling/discussion 11/12/2013     Priority: Medium     Discussed Advance Directive planning with patient; information given to patient to review.           Anemia 11/12/2013     Priority: Medium     Hyperlipidemia LDL goal <100 08/09/2012     Priority: Medium     Heel pain 12/19/2011     Priority: Medium     CARDIOVASCULAR SCREENING; LDL GOAL LESS THAN 100 10/31/2010     Priority: Medium     Coronary atherosclerosis      Priority: Medium     Acute inf MI; RCA stent; followed by Cardiology  Problem list name updated by automated process. Provider to review       Cerebral infarction due to occlusion or stenosis of carotid artery      Priority: Medium     50-69% stenosis of left ICA  Problem list name updated by automated process. Provider to review         Current Outpatient Prescriptions   Medication     ASPIRIN 81 MG OR TABS     atorvastatin (LIPITOR) 80 MG tablet     Docusate Calcium (STOOL SOFTENER PO)     Ferrous Sulfate (IRON SUPPLEMENT PO)     folic acid-vit B6-vit B12 (FOLGARD) 0.8-10-0.115 MG TABS     lisinopril (PRINIVIL/ZESTRIL) 5 MG tablet     metoprolol tartrate (LOPRESSOR) 25 MG tablet     multivitamin, therapeutic with minerals (MULTI-VITAMIN) TABS " tablet     nitroGLYcerin (NITROSTAT) 0.4 MG sublingual tablet     No current facility-administered medications for this visit.        ROS:  As above per HPI    Objective:   BP (!) 136/96 (BP Location: Right arm, Patient Position: Chair)  Pulse 87  Temp 97.8  F (36.6  C)  Wt 212 lb 12.8 oz (96.5 kg)  SpO2 94%  BMI 35.41 kg/m2, Body mass index is 35.41 kg/(m^2).  Gen:  NAD, well-nourished, sitting in chair comfortably, pleasant  HEENT: EOMI, sclera anicteric, Head normocephalic, ; nares patent; 4-4.5 cm laceration of left occipitoparietal region that is superificial in depth without revealing dura. Good hemostasis with pressure. Very small scalp hematoma lateral to the middle point of this laceration  Neck: trachea midline, no thyromegaly  CV:  Hemodynamically stable  Pulm:  no increased work of breathing   Neuro: no slurred speech, GCS 15, CN II-XII intact  Extrem: no cyanosis, edema or clubbing  Skin: no obvious rashes or abnormalities  Psych: Euthymic, linear thoughts, normal rate of speech        Assessment & Plan:   Angel Sanders, 68 year old male who presents with:    Fall from ground level  Laceration of scalp, initial encounter  Normal mentation and no LOC. CN II-XII intact. Palpating the area did not reveal any step off or obvious fracture. Discussed with Neurosurgery colleague who agrees head CT not indicated unless having neurologic symptoms currently - if were to have symptoms later staples could be removed prior to performing head CT. Prior to performing procedure about 2.5 hours had elapsed from time of injury thus felt comfortable with repair at this time as hematoma unlikely to continue to expand.     Procedure  Wound was cleansed with saline and chlorhexidine. Topical LET gel was allowed to rest on wound for 20-25 minutes ; afterwards wound cleansed again with saline -  4 staples ([employer]) were inserted in the central portions where furthest deviation was seen. Discussed follow-up in 7-10 days  for removal. Recommended topical antibiotic to area daily. Nonadherent pad applied with topical antibiotic and soft flexible tape used to wrap around head to keep padding in place.      Patient up to date on tetanus received dose on 5/1/18    Discussed with wife warning signs/symptoms of complicated head injury suggestive of emergent evaluation (see AVS summary)     Dk Timmons MD   [facility]     Options for treatment and/or follow-up care were reviewed with the patient. Angel Sanders and/or legal guardian was engaged and actively involved in the decision making process. Patient/guardian verbalized understanding of the options discussed and was satisfied with the final plan.

## 2018-11-14 ENCOUNTER — OFFICE VISIT (OUTPATIENT)
Dept: INTERNAL MEDICINE | Facility: CLINIC | Age: 68
End: 2018-11-14
Payer: COMMERCIAL

## 2018-11-14 VITALS
DIASTOLIC BLOOD PRESSURE: 78 MMHG | OXYGEN SATURATION: 95 % | SYSTOLIC BLOOD PRESSURE: 138 MMHG | TEMPERATURE: 98.3 F | RESPIRATION RATE: 8 BRPM | HEIGHT: 64 IN | BODY MASS INDEX: 35.17 KG/M2 | HEART RATE: 99 BPM | WEIGHT: 206 LBS

## 2018-11-14 DIAGNOSIS — H61.22 IMPACTED CERUMEN OF LEFT EAR: ICD-10-CM

## 2018-11-14 DIAGNOSIS — E66.01 MORBID OBESITY (H): ICD-10-CM

## 2018-11-14 DIAGNOSIS — I25.10 ATHEROSCLEROSIS OF NATIVE CORONARY ARTERY OF NATIVE HEART WITHOUT ANGINA PECTORIS: ICD-10-CM

## 2018-11-14 DIAGNOSIS — E78.5 HYPERLIPIDEMIA LDL GOAL <100: ICD-10-CM

## 2018-11-14 DIAGNOSIS — Z23 NEED FOR PROPHYLACTIC VACCINATION AND INOCULATION AGAINST INFLUENZA: ICD-10-CM

## 2018-11-14 DIAGNOSIS — Z00.00 ENCOUNTER FOR PREVENTATIVE ADULT HEALTH CARE EXAMINATION: Primary | ICD-10-CM

## 2018-11-14 DIAGNOSIS — S01.01XD LACERATION OF SCALP, SUBSEQUENT ENCOUNTER: ICD-10-CM

## 2018-11-14 DIAGNOSIS — I10 BENIGN ESSENTIAL HYPERTENSION: ICD-10-CM

## 2018-11-14 LAB
ALBUMIN UR-MCNC: >=300 MG/DL
APPEARANCE UR: CLEAR
BILIRUB UR QL STRIP: ABNORMAL
COLOR UR AUTO: YELLOW
ERYTHROCYTE [DISTWIDTH] IN BLOOD BY AUTOMATED COUNT: 14.9 % (ref 10–15)
GLUCOSE UR STRIP-MCNC: NEGATIVE MG/DL
HCT VFR BLD AUTO: 37 % (ref 40–53)
HGB BLD-MCNC: 11.9 G/DL (ref 13.3–17.7)
HGB UR QL STRIP: ABNORMAL
KETONES UR STRIP-MCNC: ABNORMAL MG/DL
LEUKOCYTE ESTERASE UR QL STRIP: NEGATIVE
MCH RBC QN AUTO: 28.1 PG (ref 26.5–33)
MCHC RBC AUTO-ENTMCNC: 32.2 G/DL (ref 31.5–36.5)
MCV RBC AUTO: 88 FL (ref 78–100)
NITRATE UR QL: NEGATIVE
PH UR STRIP: 5.5 PH (ref 5–7)
PLATELET # BLD AUTO: 269 10E9/L (ref 150–450)
RBC # BLD AUTO: 4.23 10E12/L (ref 4.4–5.9)
RBC #/AREA URNS AUTO: NORMAL /HPF
SOURCE: ABNORMAL
SP GR UR STRIP: 1.02 (ref 1–1.03)
UROBILINOGEN UR STRIP-ACNC: 0.2 EU/DL (ref 0.2–1)
WBC # BLD AUTO: 8.3 10E9/L (ref 4–11)
WBC #/AREA URNS AUTO: NORMAL /HPF

## 2018-11-14 PROCEDURE — 80053 COMPREHEN METABOLIC PANEL: CPT | Performed by: INTERNAL MEDICINE

## 2018-11-14 PROCEDURE — 84443 ASSAY THYROID STIM HORMONE: CPT | Performed by: INTERNAL MEDICINE

## 2018-11-14 PROCEDURE — 85027 COMPLETE CBC AUTOMATED: CPT | Performed by: INTERNAL MEDICINE

## 2018-11-14 PROCEDURE — 80061 LIPID PANEL: CPT | Performed by: INTERNAL MEDICINE

## 2018-11-14 PROCEDURE — G0103 PSA SCREENING: HCPCS | Performed by: INTERNAL MEDICINE

## 2018-11-14 PROCEDURE — G0008 ADMIN INFLUENZA VIRUS VAC: HCPCS | Performed by: INTERNAL MEDICINE

## 2018-11-14 PROCEDURE — 69210 REMOVE IMPACTED EAR WAX UNI: CPT | Performed by: INTERNAL MEDICINE

## 2018-11-14 PROCEDURE — 90662 IIV NO PRSV INCREASED AG IM: CPT | Performed by: INTERNAL MEDICINE

## 2018-11-14 PROCEDURE — 36415 COLL VENOUS BLD VENIPUNCTURE: CPT | Performed by: INTERNAL MEDICINE

## 2018-11-14 PROCEDURE — 99397 PER PM REEVAL EST PAT 65+ YR: CPT | Mod: 25 | Performed by: INTERNAL MEDICINE

## 2018-11-14 PROCEDURE — 81001 URINALYSIS AUTO W/SCOPE: CPT | Performed by: INTERNAL MEDICINE

## 2018-11-14 ASSESSMENT — ENCOUNTER SYMPTOMS
HEMATOCHEZIA: 0
CHILLS: 0
DIZZINESS: 0
DIARRHEA: 0
HEMATURIA: 0
COUGH: 0
CONSTIPATION: 0
ABDOMINAL PAIN: 0

## 2018-11-14 ASSESSMENT — ACTIVITIES OF DAILY LIVING (ADL): CURRENT_FUNCTION: NO ASSISTANCE NEEDED

## 2018-11-14 NOTE — PROGRESS NOTES
"SUBJECTIVE:   Angel Sanders is a 68 year old male who presents for Preventive Visit.      Are you in the first 12 months of your Medicare coverage?  No    Annual Wellness Visit     In general, how would you rate your overall health?  Fair    Frequency of exercise:  2-3 days/week    Duration of exercise:  15-30 minutes    Do you usually eat at least 4 servings of fruit and vegetables a day, include whole grains    & fiber and avoid regularly eating high fat or \"junk\" foods?  No    Taking medications regularly:  Yes    Medication side effects:  None    Ability to successfully perform activities of daily living:  No assistance needed    Home Safety:  No safety concerns identified    Hearing Impairment:  Difficulty following a conversation in a noisy restaurant or crowded room    In the past 6 months, have you been bothered by leaking of urine?  No    In general, how would you rate your overall mental or emotional health?  Good    PHQ-2 Total Score: 0    Additional concerns today:  No        Fall risk:       COGNITIVE SCREEN  1) Repeat 3 items (Leader, Season, Table)    2) Clock draw: NORMAL  3) 3 item recall: Recalls 2 objects   Results: NORMAL clock, 1-2 items recalled: COGNITIVE IMPAIRMENT LESS LIKELY    Mini-CogTM Copyright YNES Oconnor. Licensed by the author for use in [facility]; reprinted with permission (soob@[facility]). All rights reserved.        Reviewed and updated as needed this visit by clinical staff         Reviewed and updated as needed this visit by Provider        Social History   Substance Use Topics     Smoking status: Former Smoker     Packs/day: 1.00     Years: 40.00     Types: Cigarettes     Quit date: 1/4/2008     Smokeless tobacco: Never Used     Alcohol use No      Comment: Sober for 25 years       Alcohol Use 11/14/2018   If you drink alcohol do you typically have greater than 3 drinks per day OR greater than 7 drinks per week? Not Applicable           PROBLEMS TO ADD ON...  Has h/o " HTN. on medical treatment. BP has been controlled. No side effects from medications. No CP, HA, dizziness. good compliance with medications and low salt diet.  Has h/o ischemic heart disease, asymptomatic regarding chest pains, SOB,palpitations. Has good compliance with treatment, diet and exercise.  Has H/O hyperlipidemia. On medical treatment and diet. No side effects. No muscle weakness, myalgias or upset stomach.     Has fallen back words 10 days ago, hit his head on a garbage bin, lost balance and had laceration of the scalp, seen in the UC , staples placed, healing. No neurologic symptoms. Needs staples removed.     Do you feel safe in your environment - Yes    Do you have a Health Care Directive?: No: Advance care planning reviewed with patient; information given to patient to review.    Current providers sharing in care for this patient include:   Patient Care Team:  Tony Peter MD as PCP - General    The following health maintenance items are reviewed in Epic and correct as of today:  Health Maintenance   Topic Date Due     HEPATITIS C SCREENING  04/19/1967     AORTIC ANEURYSM SCREENING (SYSTEM ASSIGNED)  04/19/2014     FALL RISK ASSESSMENT  01/30/2018     INFLUENZA VACCINE (1) 09/01/2018     ADVANCE DIRECTIVE PLANNING Q5 YRS  11/12/2018     PHQ-2 Q1 YR  05/01/2019     LIPID SCREEN Q5 YR MALE (SYSTEM ASSIGNED)  11/13/2022     TETANUS IMMUNIZATION (SYSTEM ASSIGNED)  05/01/2028     COLON CANCER SCREEN (SYSTEM ASSIGNED)  05/15/2028     PNEUMOCOCCAL  Completed     Labs reviewed in EPIC  BP Readings from Last 3 Encounters:   11/14/18 138/78   11/05/18 (!) 136/96   05/15/18 (!) 130/98    Wt Readings from Last 3 Encounters:   11/14/18 206 lb (93.4 kg)   11/05/18 212 lb 12.8 oz (96.5 kg)   05/01/18 209 lb (94.8 kg)                        Review of Systems   Constitutional: Negative for chills.   HENT: Negative for congestion and ear pain.    Respiratory: Negative for cough.    Cardiovascular: Negative for  "chest pain.   Gastrointestinal: Negative for abdominal pain, constipation, diarrhea and hematochezia.   Genitourinary: Negative for hematuria.   Neurological: Negative for dizziness.     CONSTITUTIONAL: NEGATIVE for fever, chills, change in weight  INTEGUMENTARY/SKIN: NEGATIVE for worrisome rashes, moles or lesions  EYES: NEGATIVE for vision changes or irritation  ENT/MOUTH: NEGATIVE for ear, mouth and throat problems  RESP: NEGATIVE for significant cough or SOB  BREAST: NEGATIVE for masses, tenderness or discharge  CV: NEGATIVE for chest pain, palpitations or peripheral edema  GI: NEGATIVE for nausea, abdominal pain, heartburn, or change in bowel habits  : NEGATIVE for frequency, dysuria, or hematuria  MUSCULOSKELETAL: NEGATIVE for significant arthralgias or myalgia  NEURO: NEGATIVE for weakness, dizziness or paresthesias  ENDOCRINE: NEGATIVE for temperature intolerance, skin/hair changes  HEME: NEGATIVE for bleeding problems  PSYCHIATRIC: NEGATIVE for changes in mood or affect    OBJECTIVE:   There were no vitals taken for this visit. Estimated body mass index is 35.41 kg/(m^2) as calculated from the following:    Height as of 5/1/18: 5' 5\" (1.651 m).    Weight as of 11/5/18: 212 lb 12.8 oz (96.5 kg).  Physical Exam  GENERAL:obese, alert and no distress  EYES: Eyes grossly normal to inspection, PERRL and conjunctivae and sclerae normal  HENT: ear canals - left is obstructed with soft yellow cerumen, and TM's normal, nose and mouth without ulcers or lesions  NECK: no adenopathy, no asymmetry, masses, or scars and thyroid normal to palpation  RESP: lungs clear to auscultation - no rales, rhonchi or wheezes  CV: regular rate and rhythm, normal S1 S2, no S3 or S4, no murmur, click or rub, no peripheral edema and peripheral pulses strong  ABDOMEN: soft, nontender, no hepatosplenomegaly, no masses and bowel sounds normal  MS: no gross musculoskeletal defects noted, no edema  SKIN: no suspicious lesions or rashes, left " "posterior scalp, occipital area 6 cm healed laceration with 4 staples   NEURO: Normal strength and tone, mentation intact and speech normal  PSYCH: mentation appears normal, affect normal/bright    Diagnostic Test Results:  none     ASSESSMENT / PLAN:       ICD-10-CM    1. Encounter for preventative adult health care examination Z00.00 CBC with platelets     Comprehensive metabolic panel     Lipid panel reflex to direct LDL Fasting     TSH with free T4 reflex     Prostate spec antigen screen     *UA reflex to Microscopic   2. Morbid obesity (H) E66.01    3. Benign essential hypertension I10 CBC with platelets     Comprehensive metabolic panel     Lipid panel reflex to direct LDL Fasting     TSH with free T4 reflex     *UA reflex to Microscopic     OFFICE/OUTPT VISIT,EST,LEVL III   4. Hyperlipidemia LDL goal <100 E78.5 OFFICE/OUTPT VISIT,EST,LEVL III   5. Atherosclerosis of native coronary artery of native heart without angina pectoris I25.10 OFFICE/OUTPT VISIT,EST,LEVL III   6. Laceration of scalp, subsequent encounter S01.01XD OFFICE/OUTPT VISIT,EST,LEVL III   7. Impacted cerumen of left ear H61.22 OFFICE/OUTPT VISIT,EST,LEVL III     [place] from scalp laceration removed, healed well    Left ear canal cerumen removed with curette       End of Life Planning:  Patient currently has an advanced directive: Yes.  Practitioner is supportive of decision.    COUNSELING:  Reviewed preventive health counseling, as reflected in patient instructions       Regular exercise       Healthy diet/nutrition       Vision screening       Hearing screening       Dental care       Immunizations    Vaccinated for: Influenza             Colon cancer screening       Prostate cancer screening    BP Readings from Last 1 Encounters:   11/05/18 (!) 136/96     Estimated body mass index is 35.41 kg/(m^2) as calculated from the following:    Height as of 5/1/18: 5' 5\" (1.651 m).    Weight as of 11/5/18: 212 lb 12.8 oz (96.5 kg).      Weight " management plan: Discussed healthy diet and exercise guidelines and patient will follow up in 6 months in clinic to re-evaluate.     reports that he quit smoking about 10 years ago. His smoking use included Cigarettes. He has a 40.00 pack-year smoking history. He has never used smokeless tobacco.      Appropriate preventive services were discussed with this patient, including applicable screening as appropriate for cardiovascular disease, diabetes, osteopenia/osteoporosis, and glaucoma.  As appropriate for age/gender, discussed screening for colorectal cancer, prostate cancer, breast cancer, and cervical cancer. Checklist reviewing preventive services available has been given to the patient.    Reviewed patients plan of care and provided an AVS. The Intermediate Care Plan ( asthma action plan, low back pain action plan, and migraine action plan) for Angel meets the Care Plan requirement. This Care Plan has been established and reviewed with the Patient.    Counseling Resources:  ATP IV Guidelines  Pooled Cohorts Equation Calculator  Breast Cancer Risk Calculator  FRAX Risk Assessment  ICSI Preventive Guidelines  Dietary Guidelines for Americans, 2010  USDA's MyPlate  ASA Prophylaxis  Lung CA Screening    Tony Peter MD  [facility]

## 2018-11-14 NOTE — PROGRESS NOTES

## 2018-11-14 NOTE — NURSING NOTE
"Vital signs:  Temp: 98.3  F (36.8  C) Temp src: Oral BP: 138/78 Pulse: 99   Resp: 8 SpO2: 95 %     Height: 5' 4\" (162.6 cm) Weight: 206 lb (93.4 kg)  Estimated body mass index is 35.36 kg/(m^2) as calculated from the following:    Height as of this encounter: 5' 4\" (1.626 m).    Weight as of this encounter: 206 lb (93.4 kg).          "

## 2018-11-14 NOTE — MR AVS SNAPSHOT
"              After Visit Summary   11/14/2018    Angel Sanders    MRN: [id number]           Patient Information     Date Of Birth          1950        Visit Information        Provider Department      11/14/2018 8:20 AM Tony Peter MD [facility]        Today's Diagnoses     Encounter for preventative adult health care examination    -  1    Morbid obesity (H)        Benign essential hypertension        Hyperlipidemia LDL goal <100        Atherosclerosis of native coronary artery of native heart without angina pectoris           Follow-ups after your visit        Who to contact     If you have questions or need follow up information about today's clinic visit or your schedule please contact [facility] directly at [phone].  Normal or non-critical lab and imaging results will be communicated to you by MyChart, letter or phone within 4 business days after the clinic has received the results. If you do not hear from us within 7 days, please contact the clinic through MyChart or phone. If you have a critical or abnormal lab result, we will notify you by phone as soon as possible.  Submit refill requests through [employer] or call your pharmacy and they will forward the refill request to us. Please allow 3 business days for your refill to be completed.          Additional Information About Your Visit        Care EveryWhere ID     This is your Care EveryWhere ID. This could be used by other organizations to access your [place] medical records  [id number]        Your Vitals Were     Pulse Temperature Respirations Height Pulse Oximetry BMI (Body Mass Index)    99 98.3  F (36.8  C) (Oral) 8 5' 4\" (1.626 m) 95% 35.36 kg/m2       Blood Pressure from Last 3 Encounters:   11/14/18 138/78   11/05/18 (!) 136/96   05/15/18 (!) 130/98    Weight from Last 3 Encounters:   11/14/18 206 lb (93.4 kg)   11/05/18 212 lb 12.8 oz (96.5 kg)   05/01/18 209 lb (94.8 kg)              We " Performed the Following     *UA reflex to Microscopic     CBC with platelets     Comprehensive metabolic panel     Lipid panel reflex to direct LDL Fasting     Prostate spec antigen screen     TSH with free T4 reflex        Primary Care Provider Office Phone # Fax #    Tony Peter [phone]       [address] [facility] [address]        Equal Access to Services     CUONG HUYNH : Hadii aad ku hadasho Soomaali, waaxda luqadaha, qaybta kaalmada adeegyada, waxay idiin hayaan adeeg khricksh laAlyciaaan . So [facility] [phone].    ATENCIÓN: Si habla español, tiene a perez disposición servicios gratuitos de asistencia lingüística. Llame al [phone].    We comply with applicable federal civil rights laws and [place] laws. We do not discriminate on the basis of race, color, national origin, age, disability, sex, sexual orientation, or gender identity.            Thank you!     Thank you for choosing [facility]  for your care. Our goal is always to provide you with excellent care. Hearing back from our patients is one way we can continue to improve our services. Please take a few minutes to complete the written survey that you may receive in the mail after your visit with us. Thank you!             Your Updated Medication List - Protect others around you: Learn how to safely use, store and throw away your medicines at www.disposemymeds.org.          This list is accurate as of 11/14/18  9:02 AM.  Always use your most recent med list.                   Brand Name Dispense Instructions for use Diagnosis    aspirin 81 MG tablet      1 TABLET DAILY        atorvastatin 80 MG tablet    LIPITOR    90 tablet    Take 1 tablet (80 mg) by mouth daily    High cholesterol       folic acid-vit B6-vit B12 0.8-10-0.115 MG Tabs per tablet    FOLGARD     Take 1 tablet by mouth daily        IRON SUPPLEMENT PO      Take 325 mg by mouth daily (with breakfast)        lisinopril 5 MG tablet     PRINIVIL/ZESTRIL    90 tablet    Take 1 tablet (5 mg) by mouth daily    Essential hypertension, benign       metoprolol tartrate 25 MG tablet    LOPRESSOR    180 tablet    Take 1 tablet (25 mg) by mouth 2 times daily    Essential hypertension, benign       Multi-vitamin Tabs tablet      Take 1 tablet by mouth daily        nitroGLYcerin 0.4 MG sublingual tablet    NITROSTAT    25 tablet    Place 1 tablet (0.4 mg) under the tongue every 5 minutes as needed May repeat X 2 and if chest pain persists, call 911    Allergic conjunctivitis, bilateral       STOOL SOFTENER PO      Take by mouth daily

## 2018-11-15 LAB
ALBUMIN SERPL-MCNC: 3.9 G/DL (ref 3.4–5)
ALP SERPL-CCNC: 107 U/L (ref 40–150)
ALT SERPL W P-5'-P-CCNC: 31 U/L (ref 0–70)
ANION GAP SERPL CALCULATED.3IONS-SCNC: 10 MMOL/L (ref 3–14)
AST SERPL W P-5'-P-CCNC: 38 U/L (ref 0–45)
BILIRUB SERPL-MCNC: 0.6 MG/DL (ref 0.2–1.3)
BUN SERPL-MCNC: 20 MG/DL (ref 7–30)
CALCIUM SERPL-MCNC: 9.8 MG/DL (ref 8.5–10.1)
CHLORIDE SERPL-SCNC: 104 MMOL/L (ref 94–109)
CHOLEST SERPL-MCNC: 95 MG/DL
CO2 SERPL-SCNC: 23 MMOL/L (ref 20–32)
CREAT SERPL-MCNC: 1.27 MG/DL (ref 0.66–1.25)
GFR SERPL CREATININE-BSD FRML MDRD: 56 ML/MIN/1.7M2
GLUCOSE SERPL-MCNC: 94 MG/DL (ref 70–99)
HDLC SERPL-MCNC: 40 MG/DL
LDLC SERPL CALC-MCNC: 42 MG/DL
NONHDLC SERPL-MCNC: 55 MG/DL
POTASSIUM SERPL-SCNC: 4.8 MMOL/L (ref 3.4–5.3)
PROT SERPL-MCNC: 8.2 G/DL (ref 6.8–8.8)
PSA SERPL-ACNC: 1.61 UG/L (ref 0–4)
SODIUM SERPL-SCNC: 137 MMOL/L (ref 133–144)
TRIGL SERPL-MCNC: 66 MG/DL
TSH SERPL DL<=0.005 MIU/L-ACNC: 0.94 MU/L (ref 0.4–4)

## 2018-11-25 ENCOUNTER — APPOINTMENT (OUTPATIENT)
Dept: GENERAL RADIOLOGY | Facility: CLINIC | Age: 68
End: 2018-11-25
Attending: EMERGENCY MEDICINE
Payer: MEDICARE

## 2018-11-25 ENCOUNTER — HOSPITAL ENCOUNTER (EMERGENCY)
Facility: CLINIC | Age: 68
Discharge: ANOTHER HOSPITAL | End: 2018-11-26
Attending: EMERGENCY MEDICINE | Admitting: EMERGENCY MEDICINE
Payer: MEDICARE

## 2018-11-25 DIAGNOSIS — I44.2 COMPLETE HEART BLOCK (H): ICD-10-CM

## 2018-11-25 PROCEDURE — 93005 ELECTROCARDIOGRAM TRACING: CPT

## 2018-11-25 PROCEDURE — 80048 BASIC METABOLIC PNL TOTAL CA: CPT | Performed by: EMERGENCY MEDICINE

## 2018-11-25 PROCEDURE — 84484 ASSAY OF TROPONIN QUANT: CPT | Mod: 91 | Performed by: EMERGENCY MEDICINE

## 2018-11-25 PROCEDURE — 99285 EMERGENCY DEPT VISIT HI MDM: CPT | Mod: 25

## 2018-11-25 PROCEDURE — 85025 COMPLETE CBC W/AUTO DIFF WBC: CPT | Performed by: EMERGENCY MEDICINE

## 2018-11-25 PROCEDURE — 71045 X-RAY EXAM CHEST 1 VIEW: CPT

## 2018-11-25 PROCEDURE — 84484 ASSAY OF TROPONIN QUANT: CPT

## 2018-11-25 RX ORDER — ATROPINE SULFATE 0.1 MG/ML
INJECTION INTRAVENOUS
Status: DISCONTINUED
Start: 2018-11-25 | End: 2018-11-26 | Stop reason: WASHOUT

## 2018-11-25 RX ORDER — ASPIRIN 81 MG/1
324 TABLET, CHEWABLE ORAL ONCE
Status: COMPLETED | OUTPATIENT
Start: 2018-11-25 | End: 2018-11-26

## 2018-11-25 ASSESSMENT — ENCOUNTER SYMPTOMS
HEADACHES: 0
FEVER: 0
COUGH: 0
WEAKNESS: 1
SHORTNESS OF BREATH: 0

## 2018-11-26 ENCOUNTER — APPOINTMENT (OUTPATIENT)
Dept: CARDIOLOGY | Facility: CLINIC | Age: 68
DRG: 244 | End: 2018-11-26
Attending: INTERNAL MEDICINE
Payer: MEDICARE

## 2018-11-26 ENCOUNTER — APPOINTMENT (OUTPATIENT)
Dept: CARDIOLOGY | Facility: CLINIC | Age: 68
DRG: 244 | End: 2018-11-26
Attending: NURSE PRACTITIONER
Payer: MEDICARE

## 2018-11-26 ENCOUNTER — HOSPITAL ENCOUNTER (INPATIENT)
Facility: CLINIC | Age: 68
LOS: 1 days | Discharge: HOME | DRG: 244 | End: 2018-11-27
Attending: HOSPITALIST | Admitting: HOSPITALIST
Payer: MEDICARE

## 2018-11-26 VITALS
SYSTOLIC BLOOD PRESSURE: 124 MMHG | DIASTOLIC BLOOD PRESSURE: 61 MMHG | TEMPERATURE: 97.3 F | OXYGEN SATURATION: 94 % | HEART RATE: 38 BPM | RESPIRATION RATE: 22 BRPM

## 2018-11-26 DIAGNOSIS — I44.2 COMPLETE HEART BLOCK (H): Primary | ICD-10-CM

## 2018-11-26 DIAGNOSIS — Z95.0 CARDIAC PACEMAKER IN SITU: ICD-10-CM

## 2018-11-26 LAB
ANION GAP SERPL CALCULATED.3IONS-SCNC: 5 MMOL/L (ref 3–14)
BASOPHILS # BLD AUTO: 0 10E9/L (ref 0–0.2)
BASOPHILS NFR BLD AUTO: 0.2 %
BUN SERPL-MCNC: 26 MG/DL (ref 7–30)
CALCIUM SERPL-MCNC: 8.8 MG/DL (ref 8.5–10.1)
CHLORIDE SERPL-SCNC: 99 MMOL/L (ref 94–109)
CO2 SERPL-SCNC: 28 MMOL/L (ref 20–32)
CREAT SERPL-MCNC: 1.4 MG/DL (ref 0.66–1.25)
DIFFERENTIAL METHOD BLD: ABNORMAL
EOSINOPHIL # BLD AUTO: 0 10E9/L (ref 0–0.7)
EOSINOPHIL NFR BLD AUTO: 0.4 %
ERYTHROCYTE [DISTWIDTH] IN BLOOD BY AUTOMATED COUNT: 14.5 % (ref 10–15)
ERYTHROCYTE [DISTWIDTH] IN BLOOD BY AUTOMATED COUNT: 14.8 % (ref 10–15)
GFR SERPL CREATININE-BSD FRML MDRD: 50 ML/MIN/1.7M2
GLUCOSE SERPL-MCNC: 180 MG/DL (ref 70–99)
HBA1C MFR BLD: 5.5 % (ref 0–5.6)
HCT VFR BLD AUTO: 33.6 % (ref 40–53)
HCT VFR BLD AUTO: 38.1 % (ref 40–53)
HGB BLD-MCNC: 11.2 G/DL (ref 13.3–17.7)
HGB BLD-MCNC: 11.9 G/DL (ref 13.3–17.7)
IMM GRANULOCYTES # BLD: 0 10E9/L (ref 0–0.4)
IMM GRANULOCYTES NFR BLD: 0.3 %
INR PPP: 0.98 (ref 0.86–1.14)
INTERPRETATION ECG - MUSE: NORMAL
LYMPHOCYTES # BLD AUTO: 4.1 10E9/L (ref 0.8–5.3)
LYMPHOCYTES NFR BLD AUTO: 39.3 %
MCH RBC QN AUTO: 27.9 PG (ref 26.5–33)
MCH RBC QN AUTO: 28.1 PG (ref 26.5–33)
MCHC RBC AUTO-ENTMCNC: 31.2 G/DL (ref 31.5–36.5)
MCHC RBC AUTO-ENTMCNC: 33.3 G/DL (ref 31.5–36.5)
MCV RBC AUTO: 84 FL (ref 78–100)
MCV RBC AUTO: 89 FL (ref 78–100)
MONOCYTES # BLD AUTO: 1.1 10E9/L (ref 0–1.3)
MONOCYTES NFR BLD AUTO: 10.5 %
NEUTROPHILS # BLD AUTO: 5.1 10E9/L (ref 1.6–8.3)
NEUTROPHILS NFR BLD AUTO: 49.3 %
NRBC # BLD AUTO: 0 10*3/UL
NRBC BLD AUTO-RTO: 0 /100
PLATELET # BLD AUTO: 240 10E9/L (ref 150–450)
PLATELET # BLD AUTO: 291 10E9/L (ref 150–450)
POTASSIUM SERPL-SCNC: 4.4 MMOL/L (ref 3.4–5.3)
RBC # BLD AUTO: 3.98 10E12/L (ref 4.4–5.9)
RBC # BLD AUTO: 4.26 10E12/L (ref 4.4–5.9)
SODIUM SERPL-SCNC: 132 MMOL/L (ref 133–144)
TROPONIN I BLD-MCNC: 0.02 UG/L (ref 0–0.08)
TROPONIN I SERPL-MCNC: <0.015 UG/L (ref 0–0.04)
WBC # BLD AUTO: 10.3 10E9/L (ref 4–11)
WBC # BLD AUTO: 8.8 10E9/L (ref 4–11)

## 2018-11-26 PROCEDURE — 25000132 ZZH RX MED GY IP 250 OP 250 PS 637: Performed by: INTERNAL MEDICINE

## 2018-11-26 PROCEDURE — 27210995 ZZH RX 272: Performed by: NURSE PRACTITIONER

## 2018-11-26 PROCEDURE — 25000132 ZZH RX MED GY IP 250 OP 250 PS 637: Performed by: HOSPITALIST

## 2018-11-26 PROCEDURE — 85610 PROTHROMBIN TIME: CPT | Performed by: NURSE PRACTITIONER

## 2018-11-26 PROCEDURE — A9270 NON-COVERED ITEM OR SERVICE: HCPCS | Performed by: HOSPITALIST

## 2018-11-26 PROCEDURE — 25000132 ZZH RX MED GY IP 250 OP 250 PS 637: Performed by: EMERGENCY MEDICINE

## 2018-11-26 PROCEDURE — 27210795 ZZH PAD DEFIB QUICK CR4

## 2018-11-26 PROCEDURE — 25000125 ZZHC RX 250: Performed by: INTERNAL MEDICINE

## 2018-11-26 PROCEDURE — 02HK3JZ INSERTION OF PACEMAKER LEAD INTO RIGHT VENTRICLE, PERCUTANEOUS APPROACH: ICD-10-PCS | Performed by: INTERNAL MEDICINE

## 2018-11-26 PROCEDURE — A9270 NON-COVERED ITEM OR SERVICE: HCPCS | Performed by: INTERNAL MEDICINE

## 2018-11-26 PROCEDURE — 93010 ELECTROCARDIOGRAM REPORT: CPT | Performed by: INTERNAL MEDICINE

## 2018-11-26 PROCEDURE — C1785 PMKR, DUAL, RATE-RESP: HCPCS

## 2018-11-26 PROCEDURE — 99152 MOD SED SAME PHYS/QHP 5/>YRS: CPT | Performed by: INTERNAL MEDICINE

## 2018-11-26 PROCEDURE — 25500064 ZZH RX 255 OP 636: Performed by: HOSPITALIST

## 2018-11-26 PROCEDURE — 25000132 ZZH RX MED GY IP 250 OP 250 PS 637

## 2018-11-26 PROCEDURE — 25000128 H RX IP 250 OP 636: Performed by: INTERNAL MEDICINE

## 2018-11-26 PROCEDURE — 36415 COLL VENOUS BLD VENIPUNCTURE: CPT | Performed by: HOSPITALIST

## 2018-11-26 PROCEDURE — 25000128 H RX IP 250 OP 636: Performed by: NURSE PRACTITIONER

## 2018-11-26 PROCEDURE — 27210784 ZZH KIT PACEMAKER CR8

## 2018-11-26 PROCEDURE — 33208 INSRT HEART PM ATRIAL & VENT: CPT | Mod: KX | Performed by: INTERNAL MEDICINE

## 2018-11-26 PROCEDURE — 25000128 H RX IP 250 OP 636: Performed by: HOSPITALIST

## 2018-11-26 PROCEDURE — 85027 COMPLETE CBC AUTOMATED: CPT | Performed by: NURSE PRACTITIONER

## 2018-11-26 PROCEDURE — 93005 ELECTROCARDIOGRAM TRACING: CPT

## 2018-11-26 PROCEDURE — 40000264 ECHO COMPLETE WITH OPTISON

## 2018-11-26 PROCEDURE — C1898 LEAD, PMKR, OTHER THAN TRANS: HCPCS

## 2018-11-26 PROCEDURE — 99152 MOD SED SAME PHYS/QHP 5/>YRS: CPT

## 2018-11-26 PROCEDURE — A9270 NON-COVERED ITEM OR SERVICE: HCPCS | Performed by: EMERGENCY MEDICINE

## 2018-11-26 PROCEDURE — 36415 COLL VENOUS BLD VENIPUNCTURE: CPT | Performed by: NURSE PRACTITIONER

## 2018-11-26 PROCEDURE — 99223 1ST HOSP IP/OBS HIGH 75: CPT | Mod: AI | Performed by: HOSPITALIST

## 2018-11-26 PROCEDURE — C1892 INTRO/SHEATH,FIXED,PEEL-AWAY: HCPCS

## 2018-11-26 PROCEDURE — 0JH606Z INSERTION OF PACEMAKER, DUAL CHAMBER INTO CHEST SUBCUTANEOUS TISSUE AND FASCIA, OPEN APPROACH: ICD-10-PCS | Performed by: INTERNAL MEDICINE

## 2018-11-26 PROCEDURE — A9270 NON-COVERED ITEM OR SERVICE: HCPCS

## 2018-11-26 PROCEDURE — 21000000 ZZH R&B IMCU HEART CARE

## 2018-11-26 PROCEDURE — 99207 ZZC APP CREDIT; MD BILLING SHARED VISIT: CPT | Performed by: INTERNAL MEDICINE

## 2018-11-26 PROCEDURE — 33208 INSRT HEART PM ATRIAL & VENT: CPT | Mod: KX

## 2018-11-26 PROCEDURE — 99153 MOD SED SAME PHYS/QHP EA: CPT

## 2018-11-26 PROCEDURE — 83036 HEMOGLOBIN GLYCOSYLATED A1C: CPT | Performed by: HOSPITALIST

## 2018-11-26 PROCEDURE — 02H63JZ INSERTION OF PACEMAKER LEAD INTO RIGHT ATRIUM, PERCUTANEOUS APPROACH: ICD-10-PCS | Performed by: INTERNAL MEDICINE

## 2018-11-26 PROCEDURE — 99221 1ST HOSP IP/OBS SF/LOW 40: CPT | Mod: 25 | Performed by: INTERNAL MEDICINE

## 2018-11-26 PROCEDURE — 93306 TTE W/DOPPLER COMPLETE: CPT | Mod: 26 | Performed by: INTERNAL MEDICINE

## 2018-11-26 RX ORDER — OXYCODONE AND ACETAMINOPHEN 5; 325 MG/1; MG/1
1 TABLET ORAL EVERY 4 HOURS PRN
Status: DISCONTINUED | OUTPATIENT
Start: 2018-11-26 | End: 2018-11-27 | Stop reason: HOSPADM

## 2018-11-26 RX ORDER — BISACODYL 10 MG
10 SUPPOSITORY, RECTAL RECTAL DAILY PRN
Status: DISCONTINUED | OUTPATIENT
Start: 2018-11-26 | End: 2018-11-27 | Stop reason: HOSPADM

## 2018-11-26 RX ORDER — FERROUS SULFATE 325(65) MG
325 TABLET ORAL
Status: DISCONTINUED | OUTPATIENT
Start: 2018-11-26 | End: 2018-11-27 | Stop reason: HOSPADM

## 2018-11-26 RX ORDER — BUPIVACAINE HYDROCHLORIDE 2.5 MG/ML
10-30 INJECTION, SOLUTION EPIDURAL; INFILTRATION; INTRACAUDAL
Status: COMPLETED | OUTPATIENT
Start: 2018-11-26 | End: 2018-11-26

## 2018-11-26 RX ORDER — FLUMAZENIL 0.1 MG/ML
0.2 INJECTION, SOLUTION INTRAVENOUS
Status: DISCONTINUED | OUTPATIENT
Start: 2018-11-26 | End: 2018-11-26 | Stop reason: HOSPADM

## 2018-11-26 RX ORDER — MAGNESIUM SULFATE HEPTAHYDRATE 40 MG/ML
2 INJECTION, SOLUTION INTRAVENOUS DAILY PRN
Status: DISCONTINUED | OUTPATIENT
Start: 2018-11-26 | End: 2018-11-27 | Stop reason: HOSPADM

## 2018-11-26 RX ORDER — LIDOCAINE 40 MG/G
CREAM TOPICAL
Status: DISCONTINUED | OUTPATIENT
Start: 2018-11-26 | End: 2018-11-26

## 2018-11-26 RX ORDER — LIDOCAINE 40 MG/G
CREAM TOPICAL
Status: DISCONTINUED | OUTPATIENT
Start: 2018-11-26 | End: 2018-11-27 | Stop reason: HOSPADM

## 2018-11-26 RX ORDER — SODIUM CHLORIDE 9 MG/ML
INJECTION, SOLUTION INTRAVENOUS CONTINUOUS
Status: DISCONTINUED | OUTPATIENT
Start: 2018-11-26 | End: 2018-11-26

## 2018-11-26 RX ORDER — POTASSIUM CL/LIDO/0.9 % NACL 10MEQ/0.1L
10 INTRAVENOUS SOLUTION, PIGGYBACK (ML) INTRAVENOUS
Status: DISCONTINUED | OUTPATIENT
Start: 2018-11-26 | End: 2018-11-27 | Stop reason: HOSPADM

## 2018-11-26 RX ORDER — ACETAMINOPHEN 325 MG/1
650 TABLET ORAL EVERY 4 HOURS PRN
Status: DISCONTINUED | OUTPATIENT
Start: 2018-11-26 | End: 2018-11-26

## 2018-11-26 RX ORDER — LORAZEPAM 2 MG/ML
.5-2 INJECTION INTRAMUSCULAR EVERY 10 MIN PRN
Status: DISCONTINUED | OUTPATIENT
Start: 2018-11-26 | End: 2018-11-26 | Stop reason: HOSPADM

## 2018-11-26 RX ORDER — METOPROLOL TARTRATE 25 MG/1
25 TABLET, FILM COATED ORAL 2 TIMES DAILY
Status: DISCONTINUED | OUTPATIENT
Start: 2018-11-26 | End: 2018-11-27 | Stop reason: HOSPADM

## 2018-11-26 RX ORDER — FENTANYL CITRATE 50 UG/ML
25-50 INJECTION, SOLUTION INTRAMUSCULAR; INTRAVENOUS
Status: DISCONTINUED | OUTPATIENT
Start: 2018-11-26 | End: 2018-11-26 | Stop reason: HOSPADM

## 2018-11-26 RX ORDER — PROTAMINE SULFATE 10 MG/ML
5-40 INJECTION, SOLUTION INTRAVENOUS EVERY 10 MIN PRN
Status: DISCONTINUED | OUTPATIENT
Start: 2018-11-26 | End: 2018-11-26 | Stop reason: HOSPADM

## 2018-11-26 RX ORDER — PROCHLORPERAZINE MALEATE 5 MG
5 TABLET ORAL EVERY 6 HOURS PRN
Status: DISCONTINUED | OUTPATIENT
Start: 2018-11-26 | End: 2018-11-27 | Stop reason: HOSPADM

## 2018-11-26 RX ORDER — NITROGLYCERIN 0.4 MG/1
0.4 TABLET SUBLINGUAL EVERY 5 MIN PRN
Status: DISCONTINUED | OUTPATIENT
Start: 2018-11-26 | End: 2018-11-27 | Stop reason: HOSPADM

## 2018-11-26 RX ORDER — ONDANSETRON 4 MG/1
4 TABLET, ORALLY DISINTEGRATING ORAL EVERY 6 HOURS PRN
Status: DISCONTINUED | OUTPATIENT
Start: 2018-11-26 | End: 2018-11-27 | Stop reason: HOSPADM

## 2018-11-26 RX ORDER — HYDRALAZINE HYDROCHLORIDE 20 MG/ML
10 INJECTION INTRAMUSCULAR; INTRAVENOUS EVERY 4 HOURS PRN
Status: DISCONTINUED | OUTPATIENT
Start: 2018-11-26 | End: 2018-11-27 | Stop reason: HOSPADM

## 2018-11-26 RX ORDER — METOPROLOL TARTRATE 25 MG/1
25 TABLET, FILM COATED ORAL ONCE
Status: COMPLETED | OUTPATIENT
Start: 2018-11-26 | End: 2018-11-26

## 2018-11-26 RX ORDER — PROCHLORPERAZINE 25 MG
12.5 SUPPOSITORY, RECTAL RECTAL EVERY 12 HOURS PRN
Status: DISCONTINUED | OUTPATIENT
Start: 2018-11-26 | End: 2018-11-27 | Stop reason: HOSPADM

## 2018-11-26 RX ORDER — POTASSIUM CHLORIDE 7.45 MG/ML
10 INJECTION INTRAVENOUS
Status: DISCONTINUED | OUTPATIENT
Start: 2018-11-26 | End: 2018-11-27 | Stop reason: HOSPADM

## 2018-11-26 RX ORDER — DOBUTAMINE HYDROCHLORIDE 200 MG/100ML
5-40 INJECTION INTRAVENOUS CONTINUOUS PRN
Status: DISCONTINUED | OUTPATIENT
Start: 2018-11-26 | End: 2018-11-26 | Stop reason: HOSPADM

## 2018-11-26 RX ORDER — METOCLOPRAMIDE HYDROCHLORIDE 5 MG/ML
5 INJECTION INTRAMUSCULAR; INTRAVENOUS EVERY 6 HOURS PRN
Status: DISCONTINUED | OUTPATIENT
Start: 2018-11-26 | End: 2018-11-27 | Stop reason: HOSPADM

## 2018-11-26 RX ORDER — ATORVASTATIN CALCIUM 80 MG/1
80 TABLET, FILM COATED ORAL DAILY
Status: DISCONTINUED | OUTPATIENT
Start: 2018-11-26 | End: 2018-11-27 | Stop reason: HOSPADM

## 2018-11-26 RX ORDER — CEFAZOLIN SODIUM 2 G/100ML
2 INJECTION, SOLUTION INTRAVENOUS ONCE
Status: COMPLETED | OUTPATIENT
Start: 2018-11-26 | End: 2018-11-26

## 2018-11-26 RX ORDER — ASPIRIN 81 MG/1
81 TABLET ORAL DAILY
Status: DISCONTINUED | OUTPATIENT
Start: 2018-11-26 | End: 2018-11-27 | Stop reason: HOSPADM

## 2018-11-26 RX ORDER — METOCLOPRAMIDE 5 MG/1
5 TABLET ORAL EVERY 6 HOURS PRN
Status: DISCONTINUED | OUTPATIENT
Start: 2018-11-26 | End: 2018-11-27 | Stop reason: HOSPADM

## 2018-11-26 RX ORDER — ONDANSETRON 2 MG/ML
4 INJECTION INTRAMUSCULAR; INTRAVENOUS EVERY 4 HOURS PRN
Status: DISCONTINUED | OUTPATIENT
Start: 2018-11-26 | End: 2018-11-26 | Stop reason: HOSPADM

## 2018-11-26 RX ORDER — BUPIVACAINE HYDROCHLORIDE 2.5 MG/ML
10-30 INJECTION, SOLUTION EPIDURAL; INFILTRATION; INTRACAUDAL
Status: DISCONTINUED | OUTPATIENT
Start: 2018-11-26 | End: 2018-11-26 | Stop reason: HOSPADM

## 2018-11-26 RX ORDER — POTASSIUM CHLORIDE 1500 MG/1
20-40 TABLET, EXTENDED RELEASE ORAL
Status: DISCONTINUED | OUTPATIENT
Start: 2018-11-26 | End: 2018-11-27 | Stop reason: HOSPADM

## 2018-11-26 RX ORDER — NALOXONE HYDROCHLORIDE 0.4 MG/ML
.1-.4 INJECTION, SOLUTION INTRAMUSCULAR; INTRAVENOUS; SUBCUTANEOUS
Status: DISCONTINUED | OUTPATIENT
Start: 2018-11-26 | End: 2018-11-27 | Stop reason: HOSPADM

## 2018-11-26 RX ORDER — LIDOCAINE HYDROCHLORIDE AND EPINEPHRINE 10; 10 MG/ML; UG/ML
10-30 INJECTION, SOLUTION INFILTRATION; PERINEURAL
Status: DISCONTINUED | OUTPATIENT
Start: 2018-11-26 | End: 2018-11-26 | Stop reason: HOSPADM

## 2018-11-26 RX ORDER — ACETAMINOPHEN 325 MG/1
650 TABLET ORAL EVERY 4 HOURS PRN
Status: DISCONTINUED | OUTPATIENT
Start: 2018-11-26 | End: 2018-11-27 | Stop reason: HOSPADM

## 2018-11-26 RX ORDER — MULTIPLE VITAMINS W/ MINERALS TAB 9MG-400MCG
1 TAB ORAL DAILY
Status: DISCONTINUED | OUTPATIENT
Start: 2018-11-26 | End: 2018-11-27 | Stop reason: HOSPADM

## 2018-11-26 RX ORDER — SODIUM CHLORIDE 450 MG/100ML
INJECTION, SOLUTION INTRAVENOUS CONTINUOUS
Status: DISCONTINUED | OUTPATIENT
Start: 2018-11-26 | End: 2018-11-26 | Stop reason: HOSPADM

## 2018-11-26 RX ORDER — NALOXONE HYDROCHLORIDE 0.4 MG/ML
.1-.4 INJECTION, SOLUTION INTRAMUSCULAR; INTRAVENOUS; SUBCUTANEOUS
Status: DISCONTINUED | OUTPATIENT
Start: 2018-11-26 | End: 2018-11-26

## 2018-11-26 RX ORDER — ADENOSINE 3 MG/ML
6-12 INJECTION, SOLUTION INTRAVENOUS EVERY 5 MIN PRN
Status: DISCONTINUED | OUTPATIENT
Start: 2018-11-26 | End: 2018-11-26 | Stop reason: HOSPADM

## 2018-11-26 RX ORDER — MORPHINE SULFATE 2 MG/ML
1-2 INJECTION, SOLUTION INTRAMUSCULAR; INTRAVENOUS EVERY 5 MIN PRN
Status: DISCONTINUED | OUTPATIENT
Start: 2018-11-26 | End: 2018-11-26 | Stop reason: HOSPADM

## 2018-11-26 RX ORDER — MAGNESIUM SULFATE HEPTAHYDRATE 40 MG/ML
4 INJECTION, SOLUTION INTRAVENOUS EVERY 4 HOURS PRN
Status: DISCONTINUED | OUTPATIENT
Start: 2018-11-26 | End: 2018-11-27 | Stop reason: HOSPADM

## 2018-11-26 RX ORDER — HEPARIN SODIUM 1000 [USP'U]/ML
1000-10000 INJECTION, SOLUTION INTRAVENOUS; SUBCUTANEOUS EVERY 5 MIN PRN
Status: DISCONTINUED | OUTPATIENT
Start: 2018-11-26 | End: 2018-11-26 | Stop reason: HOSPADM

## 2018-11-26 RX ORDER — NALOXONE HYDROCHLORIDE 0.4 MG/ML
0.4 INJECTION, SOLUTION INTRAMUSCULAR; INTRAVENOUS; SUBCUTANEOUS EVERY 5 MIN PRN
Status: DISCONTINUED | OUTPATIENT
Start: 2018-11-26 | End: 2018-11-26 | Stop reason: HOSPADM

## 2018-11-26 RX ORDER — KETOROLAC TROMETHAMINE 30 MG/ML
15 INJECTION, SOLUTION INTRAMUSCULAR; INTRAVENOUS
Status: DISCONTINUED | OUTPATIENT
Start: 2018-11-26 | End: 2018-11-26 | Stop reason: HOSPADM

## 2018-11-26 RX ORDER — IBUTILIDE FUMARATE 1 MG/10ML
1 INJECTION, SOLUTION INTRAVENOUS
Status: DISCONTINUED | OUTPATIENT
Start: 2018-11-26 | End: 2018-11-26 | Stop reason: HOSPADM

## 2018-11-26 RX ORDER — LIDOCAINE HYDROCHLORIDE 10 MG/ML
10-30 INJECTION, SOLUTION EPIDURAL; INFILTRATION; INTRACAUDAL; PERINEURAL
Status: DISCONTINUED | OUTPATIENT
Start: 2018-11-26 | End: 2018-11-26 | Stop reason: HOSPADM

## 2018-11-26 RX ORDER — CEFAZOLIN SODIUM 2 G/100ML
2 INJECTION, SOLUTION INTRAVENOUS
Status: COMPLETED | OUTPATIENT
Start: 2018-11-26 | End: 2018-11-26

## 2018-11-26 RX ORDER — FUROSEMIDE 10 MG/ML
20-100 INJECTION INTRAMUSCULAR; INTRAVENOUS
Status: DISCONTINUED | OUTPATIENT
Start: 2018-11-26 | End: 2018-11-26 | Stop reason: HOSPADM

## 2018-11-26 RX ORDER — POTASSIUM CHLORIDE 29.8 MG/ML
20 INJECTION INTRAVENOUS
Status: DISCONTINUED | OUTPATIENT
Start: 2018-11-26 | End: 2018-11-27 | Stop reason: HOSPADM

## 2018-11-26 RX ORDER — POTASSIUM CHLORIDE 1.5 G/1.58G
20-40 POWDER, FOR SOLUTION ORAL
Status: DISCONTINUED | OUTPATIENT
Start: 2018-11-26 | End: 2018-11-27 | Stop reason: HOSPADM

## 2018-11-26 RX ORDER — LISINOPRIL 5 MG/1
5 TABLET ORAL DAILY
Status: DISCONTINUED | OUTPATIENT
Start: 2018-11-26 | End: 2018-11-27 | Stop reason: HOSPADM

## 2018-11-26 RX ORDER — LIDOCAINE 40 MG/G
CREAM TOPICAL
Status: DISCONTINUED | OUTPATIENT
Start: 2018-11-26 | End: 2018-11-26 | Stop reason: HOSPADM

## 2018-11-26 RX ORDER — ONDANSETRON 2 MG/ML
4 INJECTION INTRAMUSCULAR; INTRAVENOUS EVERY 6 HOURS PRN
Status: DISCONTINUED | OUTPATIENT
Start: 2018-11-26 | End: 2018-11-27 | Stop reason: HOSPADM

## 2018-11-26 RX ORDER — ATROPINE SULFATE 0.1 MG/ML
.5-1 INJECTION INTRAVENOUS
Status: DISCONTINUED | OUTPATIENT
Start: 2018-11-26 | End: 2018-11-26 | Stop reason: HOSPADM

## 2018-11-26 RX ORDER — DIPHENHYDRAMINE HYDROCHLORIDE 50 MG/ML
25-50 INJECTION INTRAMUSCULAR; INTRAVENOUS
Status: DISCONTINUED | OUTPATIENT
Start: 2018-11-26 | End: 2018-11-26 | Stop reason: HOSPADM

## 2018-11-26 RX ORDER — LIDOCAINE HYDROCHLORIDE 10 MG/ML
10-30 INJECTION, SOLUTION EPIDURAL; INFILTRATION; INTRACAUDAL; PERINEURAL
Status: COMPLETED | OUTPATIENT
Start: 2018-11-26 | End: 2018-11-26

## 2018-11-26 RX ORDER — DOCUSATE SODIUM 100 MG/1
100 CAPSULE, LIQUID FILLED ORAL DAILY
Status: DISCONTINUED | OUTPATIENT
Start: 2018-11-26 | End: 2018-11-27 | Stop reason: HOSPADM

## 2018-11-26 RX ORDER — IBUTILIDE FUMARATE 1 MG/10ML
0.01 INJECTION, SOLUTION INTRAVENOUS
Status: DISCONTINUED | OUTPATIENT
Start: 2018-11-26 | End: 2018-11-26 | Stop reason: HOSPADM

## 2018-11-26 RX ORDER — PROTAMINE SULFATE 10 MG/ML
1-5 INJECTION, SOLUTION INTRAVENOUS
Status: DISCONTINUED | OUTPATIENT
Start: 2018-11-26 | End: 2018-11-26 | Stop reason: HOSPADM

## 2018-11-26 RX ADMIN — Medication 25000 UNITS: at 14:45

## 2018-11-26 RX ADMIN — ASPIRIN 81 MG: 81 TABLET, COATED ORAL at 08:47

## 2018-11-26 RX ADMIN — METOPROLOL TARTRATE 25 MG: 25 TABLET ORAL at 18:50

## 2018-11-26 RX ADMIN — FENTANYL CITRATE 100 MCG: 50 INJECTION, SOLUTION INTRAMUSCULAR; INTRAVENOUS at 15:12

## 2018-11-26 RX ADMIN — HUMAN ALBUMIN MICROSPHERES AND PERFLUTREN 9 ML: 10; .22 INJECTION, SOLUTION INTRAVENOUS at 14:00

## 2018-11-26 RX ADMIN — LISINOPRIL 5 MG: 5 TABLET ORAL at 08:47

## 2018-11-26 RX ADMIN — HYDRALAZINE HYDROCHLORIDE 10 MG: 20 INJECTION INTRAMUSCULAR; INTRAVENOUS at 16:38

## 2018-11-26 RX ADMIN — SODIUM CHLORIDE 30 ML/HR: 4.5 INJECTION, SOLUTION INTRAVENOUS at 10:20

## 2018-11-26 RX ADMIN — ASPIRIN 81 MG 324 MG: 81 TABLET ORAL at 00:00

## 2018-11-26 RX ADMIN — LIDOCAINE HYDROCHLORIDE 10 ML: 10 INJECTION, SOLUTION EPIDURAL; INFILTRATION; INTRACAUDAL; PERINEURAL at 14:46

## 2018-11-26 RX ADMIN — CEFAZOLIN SODIUM 2 G: 2 INJECTION, SOLUTION INTRAVENOUS at 14:25

## 2018-11-26 RX ADMIN — Medication 1 TABLET: at 08:47

## 2018-11-26 RX ADMIN — MIDAZOLAM 2 MG: 1 INJECTION INTRAMUSCULAR; INTRAVENOUS at 15:12

## 2018-11-26 RX ADMIN — SODIUM CHLORIDE: 9 INJECTION, SOLUTION INTRAVENOUS at 03:53

## 2018-11-26 RX ADMIN — MULTIPLE VITAMINS W/ MINERALS TAB 1 TABLET: TAB at 08:47

## 2018-11-26 RX ADMIN — FERROUS SULFATE TAB 325 MG (65 MG ELEMENTAL FE) 325 MG: 325 (65 FE) TAB at 08:47

## 2018-11-26 RX ADMIN — ATORVASTATIN CALCIUM 80 MG: 80 TABLET, FILM COATED ORAL at 08:47

## 2018-11-26 RX ADMIN — CEFAZOLIN SODIUM 2 G: 2 INJECTION, SOLUTION INTRAVENOUS at 21:24

## 2018-11-26 RX ADMIN — BUPIVACAINE HYDROCHLORIDE 25 MG: 2.5 INJECTION, SOLUTION EPIDURAL; INFILTRATION; INTRACAUDAL at 14:46

## 2018-11-26 ASSESSMENT — ACTIVITIES OF DAILY LIVING (ADL)
TRANSFERRING: 0-->INDEPENDENT
FALL_HISTORY_WITHIN_LAST_SIX_MONTHS: YES
BATHING: 0-->INDEPENDENT
WHICH_OF_THE_ABOVE_FUNCTIONAL_RISKS_HAD_A_RECENT_ONSET_OR_CHANGE?: FALL HISTORY
DRESS: 0-->INDEPENDENT
RETIRED_EATING: 0-->INDEPENDENT
TOILETING: 0-->INDEPENDENT
SWALLOWING: 0-->SWALLOWS FOODS/LIQUIDS WITHOUT DIFFICULTY
AMBULATION: 0-->INDEPENDENT
RETIRED_COMMUNICATION: 0-->UNDERSTANDS/COMMUNICATES WITHOUT DIFFICULTY
NUMBER_OF_TIMES_PATIENT_HAS_FALLEN_WITHIN_LAST_SIX_MONTHS: 2
COGNITION: 0 - NO COGNITION ISSUES REPORTED

## 2018-11-26 ASSESSMENT — ENCOUNTER SYMPTOMS
BRUISES/BLEEDS EASILY: 1
LIGHT-HEADEDNESS: 1

## 2018-11-26 NOTE — PLAN OF CARE
Problem: Patient Care Overview  Goal: Plan of Care/Patient Progress Review  Outcome: Improving  A&O. VSS at this time. Denies pain, SOB dizziness. HR 30's upon arrival; now 70s-90s SR. LS clear. UOP good. Plan for Cards consult today.

## 2018-11-26 NOTE — PHARMACY-ADMISSION MEDICATION HISTORY
Admission medication history interview status for the 11/26/2018  admission is complete. See EPIC admission navigator for prior to admission medications     Medication history source reliability:Good    Actions taken by pharmacist (provider contacted, etc):Chart review. Face to face interview with patient      Additional medication history information not noted on PTA med list : pt takes all of his evening medications with dinner.    Medication reconciliation/reorder completed by provider prior to medication history? Yes    Time spent in this activity: 15 minutes    Prior to Admission medications    Medication Sig Last Dose Taking? Auth Provider   ASPIRIN 81 MG OR TABS 1 TABLET DAILY 11/25/2018 at am Yes Reported, Patient   atorvastatin (LIPITOR) 80 MG tablet Take 1 tablet (80 mg) by mouth daily 11/25/2018 at pm Yes Dre Kruse MD   Docusate Calcium (STOOL SOFTENER PO) Take 100 mg by mouth daily  11/25/2018 at am Yes Reported, Patient   Ferrous Sulfate (IRON SUPPLEMENT PO) Take 325 mg by mouth daily (with breakfast)  11/25/2018 at am Yes Reported, Patient   folic acid-vit B6-vit B12 (FOLGARD) 0.8-10-0.115 MG TABS Take 1 tablet by mouth daily 11/25/2018 at am Yes Reported, Patient   lisinopril (PRINIVIL/ZESTRIL) 5 MG tablet Take 1 tablet (5 mg) by mouth daily 11/25/2018 at pm Yes Dre Kruse MD   metoprolol tartrate (LOPRESSOR) 25 MG tablet Take 1 tablet (25 mg) by mouth 2 times daily 11/25/2018 at pm Yes Dre Kruse MD   multivitamin, therapeutic with minerals (MULTI-VITAMIN) TABS tablet Take 1 tablet by mouth daily 11/25/2018 at am Yes Reported, Patient   nitroGLYcerin (NITROSTAT) 0.4 MG sublingual tablet Place 1 tablet (0.4 mg) under the tongue every 5 minutes as needed May repeat X 2 and if chest pain persists, call 911  at prn Yes Jen Jacob NP

## 2018-11-26 NOTE — PROGRESS NOTES
Service Date: 11/26/2018      REQUESTING PHYSICIAN:  Roman Egan MD.      REASON FOR CONSULTATION:  Complete AV block.      Mr. Sanders is a 68-year-old white male who had a syncopal episode while sitting on the toilet.  He fell off and injured his  face.  He was brought to the ER and was found to be in sinus tachycardia with complete AV block.  Prior to that, he was on Lopressor 25 mg p.o. b.i.d.  That has been discontinued.  He is now in sinus rhythm with right bundle branch block.  At the present time, he has no shortness of breath or chest pain.  There is no evidence of internal injury from the syncopal episode.      PAST MEDICAL HISTORY:  Coronary artery disease with MI, coronary stenting in 2008, carotid artery stenosis with previous endarterectomy, hypertension, hyperlipidemia, chronic renal insufficiency and diverticulosis.      FAMILY HISTORY:  Noncontributory to syncope.      SOCIAL HISTORY:  He does not smoke or abuse alcohol.        CODE STATUS:  He is full code.      REVIEW OF SYSTEMS:  Comprehensive review of the systems showed no fever, cough or diarrhea.      CURRENT MEDICATIONS:   1.  Aspirin 81 mg p.o. daily.   2.  Lipitor 80 mg p.o. daily.   3.  Colace 100 mg p.o. daily.   4.  Iron sulfate 325 mg p.o. daily.   5.  Folgard 1 tablet p.o. daily.   6.  Lisinopril 5 mg p.o. daily.   7.  Multivitamins 1 tablet p.o. daily.      ALLERGIES:  ADHESIVE TAPE.      PHYSICAL EXAMINATION   GENERAL:  He is alert and oriented without acute distress.   VITAL SIGNS:  Blood pressure was 166/91, heart rate 90 beats per minute, temperature 97.6 degrees, oxygen saturation 95% on room air.   HEENT:  Eyes and ENT were unremarkable.  The patient had no skin rash or lymph node enlargement.     NECK:  The jugular vein was not elevated.  I did not hear a carotid bruit.  The thyroid was normal.   LUNGS:  Clear.   CARDIAC:  Rhythm was regular and heart sounds were normal with no murmur.   ABDOMEN:  Examination showed no  hepatomegaly.   EXTREMITIES:  There was no pedal edema.   NEUROLOGIC:  Examination showed no focal deficit.      LABORATORY DATA:  His troponin is negative.  The white count is normal.      ASSESSMENT AND RECOMMENDATIONS:  Mr. Sanders is a 68-year-old white male with history of coronary artery disease.  He is admitted for syncope with confirmed complete AV block.  He is now having right bundle branch block.  Although metoprolol may have aggravated the AV block, but the patient will be at high risk of recurrent AV block with risk of syncope/injury.  Pacemaker implantation is recommended.  I explained the risks and benefits of pacemaker implantation to the patient who expressed understanding and consented for the procedure.  After the pacemaker implantation, we will adjust his blood pressure medications to make sure that his blood pressure is well controlled.         ANTHONY AARON MD             D: 2018   T: 2018   MT: HUDSON      Name:     TIMBO SANDERS   MRN:      [id number]        Account:      [id number]   :      1950           Service Date: 2018      Document: [id number]       cc: Roman Egan MD

## 2018-11-26 NOTE — ED PROVIDER NOTES
History     Chief Complaint:  Loss of Consciousness    HPI   Angel Sanders is a 68 year old male with previous MI s/p stent placement in 2008, carotid artery stenosis, hypertension, and hyperlipidemia who presents to the emergency department today for evaluation of loss of consciousness. The patient reports three days ago, he did have a syncopal event while sitting on the toilet and fall forward but was not seen at that time. Tonight, he was getting ready for bed when he felt weak and light headed, and had a syncopal event for a couple of seconds where he fell to the ground. He woke up and his wife found him on the ground. His wife called EMS who told her that he was hypertensive and bradycardic prompting her to drive him to the emergency department. At arrival, the patient does have bruising to his upper lip that he attributes to his fall three days ago. He denies chest pain, shortness of breath, headache, recent illness, and head injury or trauma.     Allergies:  Adhesive Tape    Medications:    ASPIRIN 81 MG OR TABS  atorvastatin (LIPITOR) 80 MG tablet  Docusate Calcium (STOOL SOFTENER PO)  Ferrous Sulfate (IRON SUPPLEMENT PO)  folic acid-vit B6-vit B12 (FOLGARD) 0.8-10-0.115 MG TABS  lisinopril (PRINIVIL/ZESTRIL) 5 MG tablet  metoprolol tartrate (LOPRESSOR) 25 MG tablet  multivitamin, therapeutic with minerals (MULTI-VITAMIN) TABS tablet  nitroGLYcerin (NITROSTAT) 0.4 MG sublingual tablet    Past Medical History:    Carotid artery obstruction  Coronary atherosclerosis of unspecified type of vessel  Heart attack  Herniated cervical disc  Hyperlipidemia  Hypertension  Occlusion and stenosis of carotid artery  Stented coronary artery    Past Surgical History:    Arthrotomy shoulder, rotator cuff repair  RCA stent   Colonoscopy  Phacoemulsification clear cornea with standard intraocular lens implant x2    Family History:    Breast cancer  Heart disease    Social History:  The patient was accompanied to the ED by  wife.  Smoking Status: Former, 1.00 ppd, 40 years, quit 2008  Smokeless Tobacco: Never  Alcohol Use: No  Marital Status:      Review of Systems   Constitutional: Negative for fever.   Respiratory: Negative for cough and shortness of breath.    Cardiovascular: Negative for chest pain.   Neurological: Positive for syncope, weakness and light-headedness. Negative for headaches.   Hematological: Bruises/bleeds easily.   All other systems reviewed and are negative.    Physical Exam     Patient Vitals for the past 24 hrs:   BP Temp Temp src Pulse Heart Rate Resp SpO2   11/26/18 0200 150/59 - - - (!) 33 22 96 %   11/26/18 0150 135/61 - - - (!) 33 24 95 %   11/26/18 0140 128/56 - - - (!) 34 - 94 %   11/26/18 0130 132/65 - - - (!) 35 15 93 %   11/26/18 0120 120/61 - - - (!) 35 16 93 %   11/26/18 0116 136/64 - - - - - -   11/26/18 0110 98/60 - - - (!) 35 14 93 %   11/26/18 0100 139/61 - - - (!) 35 10 94 %   11/26/18 0050 138/64 - - - (!) 35 12 95 %   11/26/18 0040 128/60 - - - (!) 35 9 96 %   11/26/18 0030 117/59 - - - (!) 35 24 95 %   11/26/18 0020 141/67 - - - (!) 35 12 96 %   11/26/18 0010 152/71 - - - (!) 36 20 96 %   11/26/18 0000 141/75 - - - (!) 35 25 98 %   11/25/18 2350 144/70 - - - (!) 37 15 100 %   11/25/18 2345 158/58 - - - (!) 37 18 -   11/25/18 2340 162/78 97.3  F (36.3  C) Oral (!) 38 (!) 37 9 94 %         Physical Exam  Constitutional:  Oriented to person, place, and time.  HENT:   Head:    Normocephalic.   Mouth/Throat:   Oropharynx is clear and moist.   Eyes:    EOM are normal. Pupils are equal, round, and reactive to light.   Neck:    Neck supple.   Cardiovascular:  Normal rate, regular rhythm and normal heart sounds.      Exam reveals no gallop and no friction rub.       No murmur heard.  Pulmonary/Chest:  Effort normal and breath sounds normal.      No respiratory distress. No wheezes. No rales.      No reproducible chest wall pain.  Abdominal:   Soft. No distension. No tenderness. No rebound and  no guarding.   Musculoskeletal:  Normal range of motion.   Neurological:   Alert and oriented to person, place, and time.           Moves all 4 extremities spontaneously    Skin:    No rash noted. Pale.    Emergency Department Course   ECG:  Indication: Syncope  Completed at 2339.  Read at 2340.   Sinus tachycardia with complete heart block and idioventricular rhythm  Right bundle branch block   Moderate voltage criteria for LVH, may by normal variant  Inferior infarct, age undetermined  Abnormal ECG   Rate 38 bpm. WA interval *. QRS duration 122. QT/QTc 464/368. P-R-T axes 44  -19  -16.    Imaging:  Radiology findings were communicated with the patient and family who voiced understanding of the findings.  XR Chest Port 1 view  IMPRESSION: No convincing evidence of active cardiopulmonary disease.  Report per radiology     Laboratory:  Laboratory findings were communicated with the patient and family who voiced understanding of the findings.  CBC: HGB 11.9 (L) o/w WNL. (WBC 10.3, )   BMP: Creatinine 1.40 (H), GFR estimate 50 (L),  (L), glucose 180 (H) o/w WNL  Troponin (Collected 2351): <0.015  Troponin POCT (Collected 2359): 0.02    Interventions:  0000 aspirin 324 mg PO    Emergency Department Course:  Nursing notes and vitals reviewed.  IV was inserted and blood was drawn for laboratory testing, results above.  The patient was sent for a XR Chest Port 1 view while in the emergency department, results above.   2340: I performed an exam of the patient as documented above.   0053: I spoke with Dr. Barker of the cardiology service from [facility] regarding patient's presentation, findings, and plan of care.  0109: I spoke with Dr. Ferguson of the interventional cardiology service from [facility] regarding patient's presentation, findings, and plan of care.  0127: I spoke with Dr. Becker of the hospitalist service from [facility] regarding patient's presentation, findings, and plan of care.  Findings and plan  explained to the Patient and spouse. Patient will be transferred to [facility] via EMS. Discussed the case with Dr. Becker, who will admit the patient to a monitored bed for further monitoring, evaluation, and treatment.  I personally reviewed the laboratory, imaging, and EKG results with the Patient and spouse and answered all related questions prior to transfer.    Impression & Plan    Medical Decision Making:  Angel Sanders is a 68 year old male who came in for syncope x2. He does have a heart rate within the mid 30s and what appears to be high degree AV block. I am unsure whether this is Mobitz type 2 versus a complete heart block. Nonetheless, he will require a pacemaker. Case was discussed with cardiology followed by interventional cardiology in the event that the patient should need temporary pacemaker which is not currently indicated at this time. Case has been discussed with the hospitalist at [facility] where the patient will be transferred to the CCU for further close monitoring and eventual pacemaker placement. He has not required any atropine or transcutaneous pacing as his blood pressure has remained stable. The patient is awake and alert.      Critical Care Time for this patient, exclusive of procedures, is 90 minutes.     Diagnosis:    ICD-10-CM    1. Complete heart block (H) I44.2        Disposition:  Transferred to CCU bed at [facility]    Scribe Disclosure:  ISrinivasa, am serving as a scribe at 11:40 PM on 11/25/2018 to document services personally performed by Paco Gabriel MD based on my observations and the provider's statements to me.     11/25/2018   [facility] EMERGENCY DEPARTMENT       Paco Gabriel MD  11/26/18 0338

## 2018-11-26 NOTE — H&P
[facility]    History and Physical  Hospitalist       Date of Admission:  11/26/2018    Assessment & Plan    Angel Sanders is a 68 year old male who had 2 syncopal episodes over the weekend.  He was seen in the [facility] ER where he has a complete heart block.  He has history of coronary artery disease with a right coronary artery stent about 10 years ago.  He was transferred to the [facility] CCU for further management.    Syncope due to complete heart block  The patient is hemodynamically stable and currently asymptomatic.  Cardiology will see him in the morning and he will likely undergo permanent pacemaker placement.  Hold beta-blocker.    Probable stage II-III chronic kidney disease  Creatinine is up slightly from baseline likely due to decreased cardiac output from his heart block.  We will hold his ACE inhibitor.    Hyperglycemia  No reported history of diabetes.  Check hemoglobin A1c.    Coronary artery disease status post stenting of right coronary artery   Carotid artery stenosis status post stenting of left internal carotid artery    Hypertension   Hold antihypertensive medications for the above reasons    Hyperlipidemia   Continue statin    DVT Prophylaxis  Pneumatic Compression Devices    Code Status  Full Code    Disposition  Expected discharge in 1-2 days once permanent pacemaker has been placed.    Donavan Contreras MD    Primary Care Physician   Tony Peter    Chief Complaint   Syncope     History is obtained from the patient, electronic health record and emergency department physician    History of Present Illness   Angel Sanders is a 68 year old male with history of coronary artery disease.  He has a stent in his right coronary artery.  He had normal LV function on a transthoracic echocardiogram in 2014.  Patient says that for the past month or so he has been feeling a little weak and tired.  He has not had any fever chills cough cold or any  other symptoms.  Last Friday, the day after Thanksgiving, at around midnight he passed out.  He did feel lightheaded but went down hard landing on his face.  His wife got him up and he went to bed.  He did not feel any different on Saturday or Sunday morning but Sunday evening around 1030 he passed out again.  His wife called 911 and he was taken to the [facility] ER.  On arrival there blood pressure was 162/78 temperature 97.3 heart rate 38 respirate 9R saturation 94%.  EKG showed a complete heart block.  Chest x-ray was negative.  Troponin was negative.  Creatinine was 1.4.  Glucose 180.  The cardiology service was contacted and the patient was transferred to the [facility] CCU for further management.  The patient currently has no specific complaints.  He has not been having any chest pain or shortness of breath.  His main symptom has been fatigue as mentioned above.    Past Medical History    I have reviewed this patient's medical history and updated it with pertinent information if needed.   Past Medical History:   Diagnosis Date     Carotid artery obstruction 2009    stent placed     Coronary atherosclerosis of unspecified type of vessel, native or graft 2008    Acute inf MI; RCA stent; followed by Cardiology     Diverticulosis      Heart attack (H)      Herniated cervical disc      Hyperlipidaemia      Hyperlipidemia LDL goal <100 8/9/2012     Hypertension      Occlusion and stenosis of carotid artery without mention of cerebral infarction 2008    50-69% stenosis of left ICA     Stented coronary artery     1 coronary /1 carotid       Past Surgical History   I have reviewed this patient's surgical history and updated it with pertinent information if needed.  Past Surgical History:   Procedure Laterality Date     ARTHROTOMY SHOULDER, ROTATOR CUFF REPAIR, COMBINED  10/23/2013    Procedure: COMBINED ARTHROTOMY SHOULDER, ROTATOR CUFF REPAIR;  Left Shoulder Open Decompression, Distal Clavical  Resection, Rotator Cuff Repair , Bicep Tenolysis, and Bicep Tenodesis   ;  Surgeon: Zhang Mattson MD;  Location: [facility] OR     C NONSPECIFIC PROCEDURE  2008    RCA stent; see [facility]     COLONOSCOPY N/A 5/15/2018    Procedure: COLONOSCOPY;  COLONOSCOPY ;  Surgeon: Cory Hernandez MD;  Location:  GI     PHACOEMULSIFICATION CLEAR CORNEA WITH STANDARD INTRAOCULAR LENS IMPLANT  2013    Procedure: PHACOEMULSIFICATION CLEAR CORNEA WITH STANDARD INTRAOCULAR LENS IMPLANT;  RIGHT PHACOEMULSIFICATION CLEAR CORNEA WITH STANDARD INTRAOCULAR LENS IMPLANT ;  Surgeon: Rikki Reddy MD;  Location: [facility]     PHACOEMULSIFICATION CLEAR CORNEA WITH STANDARD INTRAOCULAR LENS IMPLANT  12/3/2013    Procedure: PHACOEMULSIFICATION CLEAR CORNEA WITH STANDARD INTRAOCULAR LENS IMPLANT;  LEFT PHACOEMULSIFICATION CLEAR CORNEA WITH STANDARD INTRAOCULAR LENS IMPLANT;  Surgeon: Rikki Reddy MD;  Location: [facility]       Prior to Admission Medications   Prior to Admission Medications   Prescriptions Last Dose Informant Patient Reported? Taking?   ASPIRIN 81 MG OR TABS   Yes No   Si TABLET DAILY   Docusate Calcium (STOOL SOFTENER PO)   Yes No   Sig: Take by mouth daily   Ferrous Sulfate (IRON SUPPLEMENT PO)   Yes No   Sig: Take 325 mg by mouth daily (with breakfast)    atorvastatin (LIPITOR) 80 MG tablet   No No   Sig: Take 1 tablet (80 mg) by mouth daily   folic acid-vit B6-vit B12 (FOLGARD) 0.8-10-0.115 MG TABS   Yes No   Sig: Take 1 tablet by mouth daily   lisinopril (PRINIVIL/ZESTRIL) 5 MG tablet   No No   Sig: Take 1 tablet (5 mg) by mouth daily   metoprolol tartrate (LOPRESSOR) 25 MG tablet   No No   Sig: Take 1 tablet (25 mg) by mouth 2 times daily   multivitamin, therapeutic with minerals (MULTI-VITAMIN) TABS tablet   Yes No   Sig: Take 1 tablet by mouth daily   nitroGLYcerin (NITROSTAT) 0.4 MG sublingual tablet   No No   Sig: Place 1 tablet (0.4 mg) under the tongue every 5 minutes as needed May repeat X 2 and if chest  pain persists, call 911      Facility-Administered Medications: None     Allergies   Allergies   Allergen Reactions     Adhesive Tape Rash       Social History   I have reviewed this patient's social history and updated it with pertinent information if needed. Angel Sanders  reports that he quit smoking about 10 years ago. His smoking use included Cigarettes. He has a 40.00 pack-year smoking history. He has never used smokeless tobacco. He reports that he does not drink alcohol or use illicit drugs.    Family History   I have reviewed this patient's family history and updated it with pertinent information if needed.   Family History   Problem Relation Age of Onset     Breast Cancer Mother      HEART DISEASE Father 74      of heart failure     Colon Cancer No family hx of        Review of Systems   The 10 point Review of Systems is negative other than noted in the HPI or here.     Physical Exam       BP: 130/57   Heart Rate: (!) 31 Resp: 20 SpO2: 97 % O2 Device: None (Room air)    Vital Signs with Ranges  Temp:  [97.3  F (36.3  C)] 97.3  F (36.3  C)  Pulse:  [38] 38  Heart Rate:  [31-37] 31  Resp:  [9-25] 20  BP: ()/(56-98) 130/57  SpO2:  [93 %-100 %] 97 %  0 lbs 0 oz    Constitutional: Awake, alert, cooperative, no apparent distress.  Eyes: Conjunctiva and pupils examined and normal.  HEENT: Moist mucous membranes, normal dentition.  Respiratory: Clear to auscultation bilaterally, no crackles or wheezing.  Cardiovascular: bradycardic +S1S2  GI: Soft, non-distended, non-tender, normal bowel sounds.  Skin: No rashes, no cyanosis, no edema.  Musculoskeletal: No joint swelling, erythema or tenderness.  Neurologic: Cranial nerves 2-12 intact, normal strength and sensation.  Psychiatric: Alert, oriented to person, place and time, no obvious anxiety or depression.    Data   Data reviewed today:  I personally reviewed the EKG tracing showing complete heart block .    Recent Labs  Lab 18  [id number] 18  0925    WBC  --  10.3   HGB  --  11.9*   MCV  --  89   PLT  --  291   NA  --  132*   POTASSIUM  --  4.4   CHLORIDE  --  99   CO2  --  28   BUN  --  26   CR  --  1.40*   ANIONGAP  --  5   MARIA ALEJANDRA  --  8.8   GLC  --  180*   TROPI  --  <0.015   TROPONIN 0.02  --        Recent Results (from the past 24 hour(s))   XR Chest Port 1 View    Narrative    CHEST SINGLE VIEW PORTABLE   11/25/2018 11:58 PM     HISTORY: Syncope.    COMPARISON: 9/2/2009.    FINDINGS: The lungs are clear. Mild cardiomegaly.      Impression    IMPRESSION: No convincing evidence of active cardiopulmonary disease.    DAJA AMOR MD

## 2018-11-26 NOTE — PROGRESS NOTES
Admitted for syncope with injury, found to have complete AV block. Was on Lopressor 25 mg bid. Now in sinus rhythm with RBBB.  Negative troponin. Echo pending. No complaints.  History of CAD with MI/stenting in 2008, carotid stenosis, hypertension, hyperlipidemia, chronic renal insufficiency, diverticulosis.    Pt will likely have recurrent complete AV block. Pacemaker implantation is recommended. Risk and benefit explained, scheduled for today.

## 2018-11-26 NOTE — PROGRESS NOTES
Patient arrived via EMS from [facility]. Rhythm reads 3 deg AVB. Patient Asymptomatic.

## 2018-11-26 NOTE — ED NOTES
Applied monitoring devices (EKG, BP, and pulse ox) onto Patient.  Call light within reach.   Defib pads placed.

## 2018-11-26 NOTE — PROGRESS NOTES
[facility]  Hospitalist Progress Note  Roman Egan MD    Assessment & Plan   Angel Sanders is a 68 year old male with previous MI s/p stent placement in 2008, carotid artery stenosis, CKD, hypertension, and hyperlipidemia who originally presented to [facility] for evaluation of syncopal episodes x2. He was noted to have complete heart block. He was transferred over [facility] for further management.    Complete heart block with recurrent syncope.  EKG on admission with complete heart block, idioventricular rhythm, and a HR of 38. CXR unremarkable.  PTA on metoprolol 25 mg po bid. TSH 0.94 on 11/14/18. Patient with BMI of 34.23 but denies symptoms of TALIA.  Currently in NSR.    -Continue to hold PTA metoprolol  -Avoid AV node blocking agents  -Obtain echo  -Telemetry  -Cardiology consulted, appreciate input     CKD stage 2.  Baseline cr around 1.2. Cr 1.4 on admission.  -Continue IV NS  -OK to resume PTA lisinopril 5 mg po daily  -Continue to monitor renal function  -Avoid NSAIDs and nephrotoxins     Hyperglycemia  , likely reactive. Hgb A1c 5.5%. No reported history of diabetes.  -Continue to monitor BG     Coronary artery disease s/p JUANCARLOS to RCA in 01/2008,  Carotid artery stenosis status post stenting of left internal carotid artery  [PTA: ASA 81 mg daily, atorvastatin 80 mg daily, metoprolol 25 mg bid, lisinopril 5 mg daily]    -Continue PTA ASA. Atorvastatin, and lisinopril  -Continue to hold pTA metoprolol     Hypertension   [PTA: metoprolol 25 mg bid, lisinopril 5 mg daily]  -Resume PTA lisinopril  -Continue to hold PTA metoprolol as above  -IV hydralazine available prn     Hyperlipidemia   -Continue PTA atorvastatin      Active Diet Order      NPO per Anesthesia Guidelines for Procedure/Surgery Except for: Meds    DVT Prophylaxis: PCD  Code Status: Full code  Disposition: Pending cardiology input and hospital course.    D/W RN.    Interval History    Patient is doing well. He is currently in NSR. He reports no cp, sob, palpitation, dizziness, or other complaints.    Data reviewed today: I reviewed all new labs and imaging over the last 24 hours. I personally reviewed no images or EKG's today.    Physical Exam       BP: 129/71   Heart Rate: 90 Resp: 11 SpO2: 95 % O2 Device: None (Room air)    Vitals:    11/26/18 0300   Weight: 93.3 kg (205 lb 11 oz)     Vital Signs with Ranges  Temp:  [97.3  F (36.3  C)] 97.3  F (36.3  C)  Pulse:  [38] 38  Heart Rate:  [31-90] 90  Resp:  [9-25] 11  BP: ()/(56-98) 129/71  SpO2:  [93 %-100 %] 95 %  I/O's Last 24 hours       Constitutional: Comfortable, no acute distress  HEENT: No conjunctival pallor.  Neurologic: Awake, alert, fully oriented  Neck: Supple, no elevated JVP  Respiratory: Clear to auscultation  Cardiovascular: Normal S1 and S2. Regular rhythm and rate  GI: Abdomen soft, non tender, non distended  Extremities: No calf tenderness, no edema  Skin/integument: No acute rash, no cyanosis    Medications   All medications were reviewed.    sodium chloride 100 mL/hr at 11/26/18 0353       aspirin  81 mg Oral Daily     atorvastatin  80 mg Oral Daily     docusate sodium  100 mg Oral Daily     ferrous sulfate (IRON) tablet 325 mg  325 mg Oral Daily with breakfast     folic acid-vit B6-vit B12  1 tablet Oral Daily     multivitamin, therapeutic with minerals  1 tablet Oral Daily     sodium chloride (PF)  3 mL Intracatheter Q8H        Data     Recent Labs  Lab 11/25/18  2359 11/25/18  2351   WBC  --  10.3   HGB  --  11.9*   MCV  --  89   PLT  --  291   NA  --  132*   POTASSIUM  --  4.4   CHLORIDE  --  99   CO2  --  28   BUN  --  26   CR  --  1.40*   ANIONGAP  --  5   MARIA ALEJANDRA  --  8.8   GLC  --  180*   TROPI  --  <0.015   TROPONIN 0.02  --        Recent Results (from the past 24 hour(s))   XR Chest Port 1 View    Narrative    CHEST SINGLE VIEW PORTABLE   11/25/2018 11:58 PM     HISTORY: Syncope.    COMPARISON:  9/2/2009.    FINDINGS: The lungs are clear. Mild cardiomegaly.      Impression    IMPRESSION: No convincing evidence of active cardiopulmonary disease.    DAJA AMOR MD

## 2018-11-26 NOTE — IP AVS SNAPSHOT
MRN:[id number]                      After Visit Summary   11/26/2018    Angel Sanders    MRN: [id number]           Thank you!     Thank you for choosing [facility] for your care. Our goal is always to provide you with excellent care. Hearing back from our patients is one way we can continue to improve our services. Please take a few minutes to complete the written survey that you may receive in the mail after you visit with us. Thank you!        Patient Information     Date Of Birth          1950        Designated Caregiver       Most Recent Value    Caregiver    Will someone help with your care after discharge? yes    Name of designated caregiver Cynthia    Phone number of caregiver [phone]    Caregiver address Same as Pt      About your hospital stay     You were admitted on:  November 26, 2018 You last received care in the:  [facility]    You were discharged on:  November 27, 2018        Reason for your hospital stay       Complete heart block                  Who to Call     For medical emergencies, please call 911.  For non-urgent questions about your medical care, please call your primary care provider or clinic, [phone]          Attending Provider     Provider Specialty    Donavan Contreras MD Internal Medicine       Primary Care Provider Office Phone # Fax #    Tony Peter [phone]      After Care Instructions     Activity       Your activity upon discharge: activity as tolerated            Diet       Follow this diet upon discharge: Orders Placed This Encounter     Heart healthy diet                  Follow-up Appointments     Follow-up and recommended labs and tests        He is to follow up with KARLEY Rodriguez in 3 months at [facility].  Follow up with PCP as needed.                  Your next 10 appointments already scheduled     Dec 04, 2018 11:15 AM CST   Pacemaker Check with KENIA BEYER   [facility]  "[facility]   [place] ([facility])    [address]  [place] [id number] [phone] OPT 2              Additional Services     Follow-Up with Cardiac Advanced Practice Provider                 Pending Results     Date and Time Order Name Status Description    11/26/2018 1814 EKG 12-lead, tracing only Preliminary             Statement of Approval     Ordered          11/27/18 1009  I have reviewed and agree with all the recommendations and orders detailed in this document.  EFFECTIVE NOW     Approved and electronically signed by:  Roman Egan MD             Admission Information     Date & Time Provider Department Dept. Phone    11/26/2018 Donvaan Contreras MD [facility] [phone]      Your Vitals Were     Blood Pressure Temperature Respirations Height Weight Pulse Oximetry    146/73 (BP Location: Right arm) 97.5  F (36.4  C) (Oral) 20 1.651 m (5' 5\") 91.9 kg (202 lb 8 oz) 94%    BMI (Body Mass Index)                   33.7 kg/m2           Care EveryWhere ID     This is your Care EveryWhere ID. This could be used by other organizations to access your [place] medical records  [id number]        Equal Access to Services     CUONG HUYNH AH: Hadii brendan Kemp, waaxda luqadaha, qaybta kaalmada ademaryana, duke mae. [facility] [phone].    ATENCIÓN: Si habla español, tiene a perez disposición servicios gratrubinaos de asistencia lingüística. Chris al [phone].    We comply with applicable federal civil rights laws and [place] laws. We do not discriminate on the basis of race, color, national origin, age, disability, sex, sexual orientation, or gender identity.               Review of your medicines      CONTINUE these medicines which have NOT CHANGED        Dose / Directions    aspirin 81 MG tablet   Commonly known as:  ASA        1 TABLET DAILY   Refills:  0       atorvastatin 80 MG tablet "   Commonly known as:  LIPITOR   Used for:  High cholesterol        Dose:  80 mg   Take 1 tablet (80 mg) by mouth daily   Quantity:  90 tablet   Refills:  4       folic acid-vit B6-vit B12 0.8-10-0.115 MG Tabs per tablet   Commonly known as:  FOLGARD        Dose:  1 tablet   Take 1 tablet by mouth daily   Refills:  0       IRON SUPPLEMENT PO        Dose:  325 mg   Take 325 mg by mouth daily (with breakfast)   Refills:  0       lisinopril 5 MG tablet   Commonly known as:  PRINIVIL/ZESTRIL   Used for:  Essential hypertension, benign        Dose:  5 mg   Take 1 tablet (5 mg) by mouth daily   Quantity:  90 tablet   Refills:  4       metoprolol tartrate 25 MG tablet   Commonly known as:  LOPRESSOR   Used for:  Essential hypertension, benign        Dose:  25 mg   Take 1 tablet (25 mg) by mouth 2 times daily   Quantity:  180 tablet   Refills:  4       Multi-vitamin tablet        Dose:  1 tablet   Take 1 tablet by mouth daily   Refills:  0       nitroGLYcerin 0.4 MG sublingual tablet   Commonly known as:  NITROSTAT   Used for:  Allergic conjunctivitis, bilateral        Dose:  0.4 mg   Place 1 tablet (0.4 mg) under the tongue every 5 minutes as needed May repeat X 2 and if chest pain persists, call 911   Quantity:  25 tablet   Refills:  2       STOOL SOFTENER PO        Dose:  100 mg   Take 100 mg by mouth daily   Refills:  0                Protect others around you: Learn how to safely use, store and throw away your medicines at www.disposemymeds.org.             Medication List: This is a list of all your medications and when to take them. Check marks below indicate your daily home schedule. Keep this list as a reference.      Medications           Morning Afternoon Evening Bedtime As Needed    aspirin 81 MG tablet   Commonly known as:  ASA   1 TABLET DAILY                                   atorvastatin 80 MG tablet   Commonly known as:  LIPITOR   Take 1 tablet (80 mg) by mouth daily   Last time this was given:  80 mg on  11/27/2018  8:30 AM                                   folic acid-vit B6-vit B12 0.8-10-0.115 MG Tabs per tablet   Commonly known as:  FOLGARD   Take 1 tablet by mouth daily   Last time this was given:  1 tablet on 11/27/2018  8:31 AM                                   IRON SUPPLEMENT PO   Take 325 mg by mouth daily (with breakfast)   Last time this was given:  325 mg on 11/27/2018  8:31 AM                                   lisinopril 5 MG tablet   Commonly known as:  PRINIVIL/ZESTRIL   Take 1 tablet (5 mg) by mouth daily   Last time this was given:  5 mg on 11/27/2018  8:31 AM                                metoprolol tartrate 25 MG tablet   Commonly known as:  LOPRESSOR   Take 1 tablet (25 mg) by mouth 2 times daily   Last time this was given:  25 mg on 11/27/2018  8:31 AM                                      Multi-vitamin tablet   Take 1 tablet by mouth daily   Last time this was given:  1 tablet on 11/27/2018  8:31 AM                                   nitroGLYcerin 0.4 MG sublingual tablet   Commonly known as:  NITROSTAT   Place 1 tablet (0.4 mg) under the tongue every 5 minutes as needed May repeat X 2 and if chest pain persists, call 911                                STOOL SOFTENER PO   Take 100 mg by mouth daily   Last time this was given:  100 mg on 11/27/2018  8:31 AM                                          More Information      Home Care after a Pacemaker or ICD Implant  Wound care  Keep your incision (surgery wound) dry for 3 days. After 3 days, you may remove the outer bandage. Keep the strips of tape on. They will be removed at your clinic visit.  Check for signs of infection each day. These include increased redness, swelling or drainage, or a fever over 101 F (38.3 C). Call us if you see any of these signs.  If there are no signs of infection, you may shower in 3 days. If you take a tub bath, keep the wound dry until it is fully healed.  Pain  You may have mild to medium pain for 3 to 5 days. Take  acetaminophen (Tylenol) or ibuprofen (Advil) for the pain. Call us if the pain is severe or lasts more than 5 days.  Activity    You should slowly go back to your normal activities after 24 hours. Healing will take 4 to 6 weeks.    No driving for 3 days.    For at least 4 weeks:  ? Do not raise your affected arm above your shoulder.  ? Do not use your affected arm to push, pull or lift anything over 10 pounds.    Do not go swimming or boating alone. No swimming until your wound is fully healed.    Avoid climbing a ladder alone. It is best to stay within 4 feet of the ground.    Avoid anything that may cause rough contact or a hard hit to your chest. This includes football, hockey and other contact sports.  Follow-up visits  Return to the clinic in 7 to 10 days to have your device and wound checked. If you have an ICD, you will return in one month as well.  The battery in your device will need to be checked every 3 to 6 months. As it gets older, we will check it more often.  Telling others about your device  Before you have any medical tests or treatment, tell the doctors, dentists and other care providers about your device.  There are a few tests and treatments that may interfere with your device. (These include MRI, radiation therapy, electrocautery and others.) Your care team may need to take special steps to keep you safe.  Before you leave the hospital, you will receive a temporary ID card. A permanent card will be mailed to you about 6 to 8 weeks later. Always carry the ID card with you. It has important details about your device.  You should also get a MedicAlert ID that says you have a pacemaker or ICD. Please ask us for a MedicAlert brochure, or go to www.medicalert.org.  Safety near electrical equipment  All of these are safe to use:  (but keep them in good repair)    Microwaves    Radios    Cordless phones    Remote controls    Small electrical tools   Cell phones: Keep cell phones at least 6 inches from  your device. Do not carry a cell phone in a pocket near your device.   Security sanford: It is okay to walk through security sanford at airports and department stores. Tell airport security that you have a pacemaker or ICD--they should keep the screening wand at least 6 inches from your device. Full-body scanners are safe.   Plane and train travel: It is okay to fly or take a train.  Avoid the following:    MRI tests in the hospital (unless you have a Medtronic Revo MRI SureScan pacemaker)    Arc welding, chain saws and high-powered industrial or commercial tools    Power lines, power plants and large power generators    Electric body fat scales    Magnetic mattress pads or pillow.  If you have an ICD: What to do after a shock  Call the device clinic if you think you have been shocked. A shock could mean that your condition has changed and you need to see a doctor.  If you think you've been shocked:  1. Stop what you are doing and rest.  2. If you feel fine before and after the shock, call the device clinic when you have a chance.  3. If you feel unwell or receive more than one shock, call 911 or go to the emergency room.  Questions?  Please call [facility].    Appointment line: [phone]    In [place]:  ? Main clinic: [phone]  ? Device clinic:  Business hours: [phone]    In [place]:  ? Device nurse:  Business hours: [phone]  ? After hours: [phone]  Choose option 4, then ask for the on-call device nurse at job code [id number].  For informational purposed only. Not to replace the advice of your health care provider.  Copyright   2007 [employer]. All rights reserved. [employer] 360411 - REV 02/12.            About Arrhythmias    Electrical impulses cause the normal heart to beat 60 to 100 times a minute while at rest. These impulses come from a natural pacemaker deep inside the heart muscle. Each impulse causes the heart muscle to contract. This causes the blood to  flow through the heart and out to the tissues and organs of your body.  An arrhythmia is a change from the normal speed or pattern of these electrical impulses. This can cause the heart to beat too fast (tachycardia); or too slow (bradycardia); or in an unsteady pattern (irregular rhythm).  Symptoms of arrhythmias  Different people experience arrhythmias differently. Sometimes they may not have symptoms, but just notice a change in their pulse. Symptoms can include:    Fluttering feeling in the chest    Shortness of breath    Chest pain or pressure    Neck fullness    Lightheadedness or dizziness    Fainting or almost fainting    Palpitations (the sense that your heart is fluttering or beating fast or hard or irregularly)    Tiredness, fatigue, or weakness    Cardiac arrest  Causes of arrhythmias  Arrhythmias are most often due to heart disease such as:    Coronary artery disease    Heart valve disease    Enlarged heart    High blood pressure    Heart failure  Other causes of  arrhythmia include:    Certain medicines (such as asthma inhalers and decongestants)    Some herbal supplements    Cardiac stimulant drugs (such as cocaine, amphetamine, diet pills, certain decongestant cold medicines, caffeine, and nicotine)    Excessive alcohol use    Anxiety and panic disorder    Thyroid disease    Anemia    Diabetes    Sleep apnea    Obesity    Congenital heart disease    Cardiac genetic diseases  Arrhythmias can often be prevented. The cause and type of arrhythmia determines the best treatment. Sometimes your doctor may want to monitor your heart rate over a 24-hour period or longer. This can help identify the cause of your arrhythmia and find the best treatment. This can be done with a Holter monitor, a portable EKG recording device attached by wires to your chest. Or you may get an event monitor, which you can place over the skin in front of your heart to record heart rhythms. You can carry this with you as you go about  "your routine activities during the monitoring period. Implantable loop recorders may also be used to monitor the heart rhythm for up to 2 years. This miniature device is placed underneath the skin overlying the heart.  Home care  The following guidelines will help you care for yourself at home:    Avoid cardiac stimulants (such as cocaine, amphetamine, diet pills, certain decongestant cold medicines, caffeine, and nicotine).    If you smoke, stop smoking. Contact your doctor or a local stop-smoking program for help.    Tell your doctor about any prescription, over-the-counter, or herbal medicines you take. These may be affecting your heart rhythm.  Follow-up care  Follow up with your healthcare provider, or as advised. If a Holter monitor has been recommended, contact the cardiologist you have been referred to as soon as you can  the device. Other outpatient tests may also be arranged for you at that time.  Call 911  This is the fastest and safest way to get to the emergency department. The paramedics can also start treatment on the way to the hospital, if needed.  Don't wait until your symptoms are severe to call 911. Other reasons to call 911 besides chest pain include:    Chest, shoulder, arm, neck, or back pain    Shortness of breath    Feeling lightheaded, faint, or dizzy    Unexplained fainting    Rapid heart beat    Slower than usual heart rate compared to your normal    Very irregular heartbeat    Chest pain (angina) with weakness, dizziness, heavy sweating, nausea, or vomiting    Extreme drowsiness, or confusion    Weakness of an arm or leg or one side of the face    Difficulty with speech or vision  When to seek medical advice  Remember, things are not always like they are on TV. Sometimes it is not so obvious. You may only feel weak or just \"not right.\" If it is not clear or if you have any doubt, call for advice.    Seek help for chest pain, or it feels different from usual, even if your symptoms " are mild.    Don't drive yourself. Have someone else drive. If no one can drive you, call 911.    If your doctor has given you medicines to take when you have symptoms, take them, but don't delay getting help while trying to find them.  Date Last Reviewed: 4/25/2016 2000-2018 [employer]. [address] [place] [address]. All rights reserved. This information is not intended as a substitute for professional medical care. Always follow your healthcare professional's instructions.                Understanding Third-Degree Heart Block    Heart block is a condition in which the electrical wiring system of the heart does not work properly.  Sometimes it can result in a slow heartbeat that is either regular or irregular. This may cause symptoms.     With third-degree heart block, electrical signals are not relayed from the upper chambers of the heart (atria) to the lower chambers (ventricles). The signaling system in the lower chambers may take over as a backup, but this does not work well. People with third-degree heart block usually have a very slow heartbeat. Their heart does not do a good job of sending blood throughout the body. They usually have symptoms.  Third-degree heart block is sometimes called complete heart block.  What causes third-degree heart block?  Third-degree heart block may be caused by:    Damage to the heart from surgery    Damage to the heart muscle from a heart attack    Other types of heart disease that result in heart muscle damage    Heart valve disease    Other diseases, including rheumatic fever and sarcoidosis    Some medicines  In addition, some babies are born with heart block. Heart block may also run in families.  What are the symptoms of third-degree heart block?  Symptoms of third-degree heart block may include:    Chest pain    Lightheadedness, faintness, or dizziness    Feeling tired    Shortness of breath  How is third-degree heart block  treated?  Third-degree heart block is a serious condition that needs to be treated right away. Treatments for third-degree heart block include:    Taking medicines to increase the heart rate for the short-term (acutely)    Stopping medicines, if they are causing the heart block    Getting a pacemaker  What are the complications of third-degree heart block?  Third-degree heart block may cause a sudden loss of consciousness. It also may cause the heart to suddenly stop beating (sudden cardiac arrest).  When should I call my healthcare provider?  Call your healthcare provider right away if you have any of these:    Unusual tiredness    Shortness of breath    Chest pain    Weakness, dizziness, or fainting    Unusual drowsiness or confusion    Pain that gets worse    Symptoms that don t get better with treatment, or symptoms that get worse    New symptoms   Date Last Reviewed: 5/1/2016 2000-2018 [employer]. [address] [place] [address], [place] [address]. All rights reserved. This information is not intended as a substitute for professional medical care. Always follow your healthcare professional's instructions.

## 2018-11-26 NOTE — IP AVS SNAPSHOT
[facility]    [address]    [place] [address]    Phone:  [phone]                                       After Visit Summary   11/26/2018    Angel Sanders    MRN: [id number]           After Visit Summary Signature Page     I have received my discharge instructions, and my questions have been answered. I have discussed any challenges I see with this plan with the nurse or doctor.    ..........................................................................................................................................  Patient/Patient Representative Signature      ..........................................................................................................................................  Patient Representative Print Name and Relationship to Patient    ..................................................               ................................................  Date                                   Time    ..........................................................................................................................................  Reviewed by Signature/Title    ...................................................              ..............................................  Date                                               Time          22EPIC Rev 08/18

## 2018-11-26 NOTE — PROCEDURES
PROCEDURES PERFORMED:  1. Implantation of dual-chamber permanent pacemaker.  2. Cardiac fluoroscopy (1.2 minutes).  3. Conscious sedation, mild-moderate level.  Versed 2 mg, fentanyl 100 mcg.  Total sedation time was 45 minutes.     CLINICAL HISTORY:  68-year-old male with syncope due to paroxysmal high-grade AV block.  He is on low-dose beta-blocker which is not felt to be responsible for paroxysmal AV block.  Given the severity of his clinical presentation with syncope and injury permanent pacemaker implantation has been recommended.  The risks and benefits of the procedure were discussed in detail; the patient expressed understanding and provided consent.     PROCEDURE:  The patient was brought to the cardiac electrophysiology laboratory at [facility] on 11/26/2018. I determined this patient to be an appropriate candidate for the planned sedation and procedure and have reassessed the patient immediately prior to sedation and procedure.The patient was in the fasting nonsedated state. Informed consent had been obtained. The patient was placed on the procedure table and the anterior chest was prepped and draped in the usual sterile fashion.  We continuously monitored vital signs, oxygenation and level of sedation.  Antibiotic prophylaxis was administered.  Intravenous sedation was given to achieve patient comfort.     Using a fluoroscopic guided technique the left subclavian vein was cannulated without difficulty.  Two separate guidewires were introduced and retained.  Under local anesthesia an incision was created in the left pectoral region. The incision was taken down to the pectoralis muscle and fascia and a pocket was created for the pacemaker generator.    Using a peel-away introducer sheath the right ventricular lead was initially introduced.  The tip of the lead was brought at the high RV septum. Good sensing and pacing parameters were confirmed. 10 V pacing did not stimulate the diaphragm.  The lead was secured using interrupted Ethibond sutures.    Using another peel-away introducer sheath the right atrial lead was introduced. The tip of the lead was brought at the superior anterior RA. Good sensing and pacing parameters were confirmed. 10 V pacing did not stimulate the diaphragm. The lead was secured using interrupted Ethibond sutures.    The device pocket was then irrigated with antibiotic solution. The leads were then connected to the pacemaker generator which was placed in the pocket. The generator was secured using silk.  The wound was closed in 2 layers using 2.0 and 4.0 Vicryl.  Steri-Strips, sterile gauze, and a pressure dressing were placed over the wound.     There were no apparent complications. The patient was brought back to the hospital room in stable condition.    Estimated blood loss was 15-20 ml.        RESULTS:  A.  Implanted leads:  (i) RV lead:  [employer] lead model 7741, serial [id number].  R-wave sensing 7.3 mV.  Pacing threshold 0.3 V at 0.5 msec.  Pacing impedance 947 Ohms.   (ii) RA lead:  [employer] lead model 7740, serial [id number].  P-wave sensing 3.9 mV.  Pacing threshold 0.5 V at 0.5 msec.  Pacing impedance 664 Ohms.     B.  Pulse generator.  [employer] Accolade MRI DR, model L331, serial [id number].      C.  Programming. DDD 60/130 ppm.      CONCLUSIONS:  1.  Successful implantation of dual-chamber permanent pacemaker.  2.  No apparent in-lab complication.

## 2018-11-27 ENCOUNTER — APPOINTMENT (OUTPATIENT)
Dept: GENERAL RADIOLOGY | Facility: CLINIC | Age: 68
DRG: 244 | End: 2018-11-27
Attending: INTERNAL MEDICINE
Payer: MEDICARE

## 2018-11-27 VITALS
OXYGEN SATURATION: 94 % | DIASTOLIC BLOOD PRESSURE: 73 MMHG | SYSTOLIC BLOOD PRESSURE: 146 MMHG | BODY MASS INDEX: 33.74 KG/M2 | RESPIRATION RATE: 20 BRPM | WEIGHT: 202.5 LBS | HEIGHT: 65 IN | TEMPERATURE: 97.5 F

## 2018-11-27 LAB
ANION GAP SERPL CALCULATED.3IONS-SCNC: 6 MMOL/L (ref 3–14)
BUN SERPL-MCNC: 23 MG/DL (ref 7–30)
CALCIUM SERPL-MCNC: 8.5 MG/DL (ref 8.5–10.1)
CHLORIDE SERPL-SCNC: 104 MMOL/L (ref 94–109)
CO2 SERPL-SCNC: 23 MMOL/L (ref 20–32)
CREAT SERPL-MCNC: 1.14 MG/DL (ref 0.66–1.25)
GFR SERPL CREATININE-BSD FRML MDRD: 64 ML/MIN/1.7M2
GLUCOSE SERPL-MCNC: 103 MG/DL (ref 70–99)
POTASSIUM SERPL-SCNC: 4.2 MMOL/L (ref 3.4–5.3)
SODIUM SERPL-SCNC: 133 MMOL/L (ref 133–144)

## 2018-11-27 PROCEDURE — 25000128 H RX IP 250 OP 636: Performed by: INTERNAL MEDICINE

## 2018-11-27 PROCEDURE — A9270 NON-COVERED ITEM OR SERVICE: HCPCS | Performed by: INTERNAL MEDICINE

## 2018-11-27 PROCEDURE — 25000132 ZZH RX MED GY IP 250 OP 250 PS 637: Performed by: INTERNAL MEDICINE

## 2018-11-27 PROCEDURE — A9270 NON-COVERED ITEM OR SERVICE: HCPCS | Performed by: HOSPITALIST

## 2018-11-27 PROCEDURE — 25000132 ZZH RX MED GY IP 250 OP 250 PS 637: Performed by: HOSPITALIST

## 2018-11-27 PROCEDURE — 80048 BASIC METABOLIC PNL TOTAL CA: CPT | Performed by: HOSPITALIST

## 2018-11-27 PROCEDURE — 99232 SBSQ HOSP IP/OBS MODERATE 35: CPT | Performed by: INTERNAL MEDICINE

## 2018-11-27 PROCEDURE — 36415 COLL VENOUS BLD VENIPUNCTURE: CPT | Performed by: HOSPITALIST

## 2018-11-27 PROCEDURE — 40000986 XR CHEST 2 VW

## 2018-11-27 PROCEDURE — 99239 HOSP IP/OBS DSCHRG MGMT >30: CPT | Performed by: INTERNAL MEDICINE

## 2018-11-27 RX ADMIN — ATORVASTATIN CALCIUM 80 MG: 80 TABLET, FILM COATED ORAL at 08:30

## 2018-11-27 RX ADMIN — Medication 1 TABLET: at 08:31

## 2018-11-27 RX ADMIN — HYDRALAZINE HYDROCHLORIDE 10 MG: 20 INJECTION INTRAMUSCULAR; INTRAVENOUS at 02:08

## 2018-11-27 RX ADMIN — DOCUSATE SODIUM 100 MG: 100 CAPSULE, LIQUID FILLED ORAL at 08:31

## 2018-11-27 RX ADMIN — LISINOPRIL 5 MG: 5 TABLET ORAL at 08:31

## 2018-11-27 RX ADMIN — ASPIRIN 81 MG: 81 TABLET, COATED ORAL at 08:30

## 2018-11-27 RX ADMIN — MULTIPLE VITAMINS W/ MINERALS TAB 1 TABLET: TAB at 08:31

## 2018-11-27 RX ADMIN — FERROUS SULFATE TAB 325 MG (65 MG ELEMENTAL FE) 325 MG: 325 (65 FE) TAB at 08:31

## 2018-11-27 RX ADMIN — METOPROLOL TARTRATE 25 MG: 25 TABLET ORAL at 08:31

## 2018-11-27 ASSESSMENT — PAIN DESCRIPTION - DESCRIPTORS: DESCRIPTORS: ACHING

## 2018-11-27 NOTE — PROGRESS NOTES
Pt returned from PPM implant. Hypertensive initially. IV hydralazine given with good resulting b/p. Pt noted to be having wide run of what appears to be abarancy. Dr. Garcia on the unit and discussed with him. No new treatment/ interventions noted. Also noted pacer spike was on the QRS and discussed this with pacer rep. No need for intervention of these pacer spikes. Pacer pocket with pressure dressing on C/D/I . Spoke with Dr. Egan re home dose of metoprolol , instructed to call cards for order. Order for metoprolol obtained and given. Cont to closely monitor.

## 2018-11-27 NOTE — PROGRESS NOTES
[facility]  Hospitalist Progress Note  Roman Egan MD    Assessment & Plan   Angel Sanders is a 68 year old male with previous MI s/p stent placement in 2008, carotid artery stenosis, CKD, hypertension, and hyperlipidemia who originally presented to [facility] for evaluation of syncopal episodes x2. He was noted to have complete heart block. He was transferred over [facility] for further management.    Paroxysmal complete heart block with recurrent syncope s/p [employer] dual chamber permanent pacemaker 11/26/18.  EKG on admission with complete heart block, idioventricular rhythm, and a HR of 38. CXR unremarkable.  TSH 0.94 on 11/14/18. Patient with BMI of 34.23 but denies symptoms of TALIA.  Patient was seen by EP cardiology and given the high risk of recurrent CHB, a dual chamber pacemaker was placed on 11/26. No pneumothorax on CXR. PTA metoprolol was resumed after implantation of pacemaker.  He is to follow up with KARLEY Rodriguez in 3 months at [place].       CKD stage 2.  Baseline cr around 1.2 which remained stable.    Recent Labs  Lab 11/27/18  0510 11/25/18  2351   CR 1.14 1.40*      Hyperglycemia  , likely reactive. Hgb A1c 5.5%. No reported history of diabetes.     Coronary artery disease s/p JUANCARLOS to RCA in 01/2008,  Carotid artery stenosis status post stenting of left internal carotid artery  [PTA: ASA 81 mg daily, atorvastatin 80 mg daily, metoprolol 25 mg bid, lisinopril 5 mg daily]    -Continue PTA ASA. Atorvastatin, and lisinopril  -Continue to hold pTA metoprolol     Hypertension   [PTA: metoprolol 25 mg bid, lisinopril 5 mg daily]  -Resumed PTA lisinopril and metoprolol     Hyperlipidemia   -Continue PTA atorvastatin      Active Diet Order      Combination Diet Regular Diet Adult    DVT Prophylaxis: PCD  Code Status: Full code  Disposition: Home today.  D/W RN.    Interval History   Patient is doing well. In V paced rhythm  intermittently. He reports no cp, sob, palpitation, dizziness, or other complaints.    Data reviewed today: I reviewed all new labs and imaging over the last 24 hours. I personally reviewed no images or EKG's today.    Physical Exam   Temp: 97.5  F (36.4  C) Temp src: Oral BP: 121/75   Heart Rate: 127 Resp: 20 SpO2: 94 % O2 Device: None (Room air) Oxygen Delivery: 2 LPM  Vitals:    11/26/18 0300 11/27/18 0000   Weight: 93.3 kg (205 lb 11 oz) 91.9 kg (202 lb 8 oz)     Vital Signs with Ranges  Temp:  [97.5  F (36.4  C)-98.4  F (36.9  C)] 97.5  F (36.4  C)  Heart Rate:  [] 127  Resp:  [8-23] 20  BP: ()/() 121/75  SpO2:  [88 %-99 %] 94 %  I/O's Last 24 hours  I/O last 3 completed shifts:  In: 800 [P.O.:600; I.V.:200]  Out: 200 [Urine:200]    Constitutional: Comfortable, no acute distress  HEENT: No conjunctival pallor.  Neurologic: Awake, alert, fully oriented  Neck: Supple, no elevated JVP  Respiratory: Clear to auscultation  Cardiovascular: Normal S1 and S2. Regular rhythm and rate  GI: Abdomen soft, non tender, non distended  Extremities: No calf tenderness, no edema  Skin/integument: No acute rash, no cyanosis    Medications   All medications were reviewed.      aspirin  81 mg Oral Daily     atorvastatin  80 mg Oral Daily     docusate sodium  100 mg Oral Daily     ferrous sulfate (IRON) tablet 325 mg  325 mg Oral Daily with breakfast     folic acid-vit B6-vit B12  1 tablet Oral Daily     lisinopril  5 mg Oral Daily     metoprolol tartrate  25 mg Oral BID     multivitamin w/minerals  1 tablet Oral Daily     sodium chloride (PF)  3 mL Intracatheter Q8H        Data     Recent Labs  Lab 11/27/18  0510 11/26/18  1035 11/25/18  2359 11/25/18  2351   WBC  --  8.8  --  10.3   HGB  --  11.2*  --  11.9*   MCV  --  84  --  89   PLT  --  240  --  291   INR  --  0.98  --   --      --   --  132*   POTASSIUM 4.2  --   --  4.4   CHLORIDE 104  --   --  99   CO2 23  --   --  28   BUN 23  --   --  26   CR 1.14   --   --  1.40*   ANIONGAP 6  --   --  5   MARIA ALEJANDRA 8.5  --   --  8.8   *  --   --  180*   TROPI  --   --   --  <0.015   TROPONIN  --   --  0.02  --        Recent Results (from the past 24 hour(s))   X-ray Chest 2 vws*    Narrative    XR CHEST 2 VW 11/27/2018 8:42 AM    COMPARISON: 11/25/2018    HISTORY: Cardiac pacer.      Impression    IMPRESSION: 2-lead implanted cardiac pacer over the LEFT chest. No  pneumothorax seen on either side.    CARLTON MONTANA MD

## 2018-11-27 NOTE — PLAN OF CARE
Problem: Arrhythmia/Dysrhythmia (Symptomatic) (Adult)  Goal: Signs and Symptoms of Listed Potential Problems Will be Absent, Minimized or Managed (Arrhythmia/Dysrhythmia)  Signs and symptoms of listed potential problems will be absent, minimized or managed by discharge/transition of care (reference Arrhythmia/Dysrhythmia (Symptomatic) (Adult) CPG).   Outcome: No Change  Pt denied pain. Vitals stable, PRN Hydralazine given. Up w/ SBA. Tele shows SR w/ occ. pacing. Lip and hand wounds CDI. PPM placed 11/26, site CDI.  Plan for possible discharge today. Continue to monitor.

## 2018-11-27 NOTE — DISCHARGE INSTRUCTIONS
Discharge Instructions for Pacemaker Implantation  You have had a procedure to insert a pacemaker. Once inside your body, this small electronic device helps keep your heart from beating too slowly. A pacemaker can t fix existing heart problems. But it can help you feel better and have more energy. As you recover, follow all of the instructions you are given, including those below.  Activity    Follow the instructions you are given about limiting your activity.    Do not raise your arm on the incision side above shoulder level for 3 weeks. This gives the device lead wires time to attach securely inside your heart.    Ask your doctor when you can expect to return to work.    You can still exercise. It s good for your body and your heart. Talk with your doctor about an exercise plan.  Other Precautions    Follow your doctor's directions carefully for wound care. You may remove the outer dressing in 3 - 4 days. Leave the steri-strips in place; these will be removed at your 1 week follow-up. Never put any creams, lotions, or products like peroxide on an incision unless your doctor tells you to.     Before you receive any treatment, tell all health care providers (including your dentist) that you have a pacemaker.    You will be given an ID card that contains information about your pacemaker. Always carry this card with you. You can show this card if your pacemaker sets off a metal detector. You should also show it to avoid screening with a hand-held security wand.    Keep your cell phone away from your pacemaker. Don t carry the phone in your shirt pocket, even when it s turned off.    Avoid strong magnets. Examples are those used in MRIs or in hand-held security wands.    Avoid strong electrical fields. Examples are those made by radio transmitting towers,  ham  radios, and heavy-duty electrical equipment.    Avoid leaning over the open woods of a running car. A running engine creates an electrical field. Most  household and yard appliances will not cause any problems. If you use any large power tools, such as an industrial , talk with your doctor.   Follow-Up    Follow up in the device clinic as scheduled. 12/04/2018 @ 11:15    Make regular follow-up appointments with your doctor. He or she will check the pacemaker to make sure it s working properly.  When to Call The Device Clinic [phone]  Call your doctor immediately if you have any of the following:    Dizziness    Chest pain    Lack of energy    Fainting spells    Twitching chest muscles    Rapid pulse or pounding heartbeat    Shortness of breath    Pain around your pacemaker    Fever above 100.4 F (38 C) or other signs of infection (redness, swelling, drainage, or warmth at the incision site)    Hiccups that won t stop     5831-6281 [employer]. [address] [place] [address]. All rights reserved. This information is not intended as a substitute for professional medical care. Always follow your healthcare professional's instructions.

## 2018-11-27 NOTE — PROGRESS NOTES
Pt. Was admitted with 2 syncopal episodes.   11/26 PPM insertion- DDD    Site intact.   Pt. -130's before meds and then remained in 's - MD aware   Pt. And wife reviewed discharge instructions and meds and asked appropriate questions. Understood by both.   Discharge instructions signed and sent with patient.  Extra information about Heart Block and Arryhthmias attached   Discharge to home with wife transporting

## 2018-11-27 NOTE — PROGRESS NOTES
[facility]  EP Cardiology Progress Note  Date of Service: 11/27/2018  Primary Cardiologist: Dr. Kruse    Assessment & Plan    Angel Sanders is a 68 year old male with past medical history significant for CAD with MI/stenting in 2008, carotid stenosis, HTN, hyperlipidemia, chronic renal insufficiency, diverticulosis admitted on 11/26/2018 with syncope with injury and found to be in complete AV block.       ECHO (11/26/18) reveals EF 60-70% with early diastolic dysfunction    Interval History  Patient feels good this morning. He did receive hydralazine overnight for hypertension. SBP this morning is 121.  VS reviewed HR ; BP /.  Oxygen 94% with 2 L weaned to room air.      Assessment/Plan:  1.  Paroxysmal Complete AV block with syncope s/p [employer] dual chamber permanent pacemaker  Plan:    Continue metoprolol 25 mg twice daily     PA and lateral chest x-ray revealed no dislodgement and no pneumothorax    Device interrogation revealed normal device function.    Wound care as follows:   a) Do not get incision wet for three days.   b) Leave the Steri-Strips in place, allow to come off naturally. If they do not come off in two weeks, you may remove them.   c) Check incision daily and call if they notice one of the following: redness, drainage (pus or blood), swelling, warmth or if you develop fever.     Education provided by device nurses prior to discharge    Follow up with KARLEY Rodriguez in 3 months at [facility] to sign off      KARLEY Simeon [facility]  Text Page  (M-F 7:30 am - 4:00 pm)        Physical Exam   Temp: 98.4  F (36.9  C) Temp src: Axillary BP: 123/71   Heart Rate: 100 Resp: 8 SpO2: 96 % O2 Device: Nasal cannula Oxygen Delivery: 2 LPM  Vitals:    11/26/18 0300 11/27/18 0000   Weight: 93.3 kg (205 lb 11 oz) 91.9 kg (202 lb 8 oz)       GEN:  In general, this is a obese male in no acute distress.  Patient ambulatory.  HEENT:  Pupils normal  size, equal, and round. Sclerae nonicteric.  Mucous membranes moist,  no cyanosis.  NECK: Supple, no asymmetry, masses, or scars appreciated. Trachea midline.   C/V:  Regular rate and rhythm, no murmur, rub or gallop. No S3 or RV heave.   RESP: Respirations are unlabored.   GI: Abdomen soft, nontender, nondistended. No hepatosplenomegaly appreciated. No masses palpable, bowel sounds normal.  EXTREM: trace LE edema.   VASC: Radial and dorsalis pedis are normal in volumes and symmetric bilaterally.   NEURO: Alert and oriented, cooperative. No obvious focal deficits.   PSYCH: Normal affect.  SKIN: Warm and dry. No rashes or petechiae appreciated.  Device incision on upper left chest, covered with old scant drainage otherwise dry and intact dressing with a small amount of swelling.      Medications       aspirin  81 mg Oral Daily     atorvastatin  80 mg Oral Daily     docusate sodium  100 mg Oral Daily     ferrous sulfate (IRON) tablet 325 mg  325 mg Oral Daily with breakfast     folic acid-vit B6-vit B12  1 tablet Oral Daily     lisinopril  5 mg Oral Daily     metoprolol tartrate  25 mg Oral BID     multivitamin w/minerals  1 tablet Oral Daily     sodium chloride (PF)  3 mL Intracatheter Q8H       Data   Most Recent 3 CBC's:  Recent Labs   Lab Test  11/26/18   1035  11/25/18   2351  11/14/18   0914   WBC  8.8  10.3  8.3   HGB  11.2*  11.9*  11.9*   MCV  84  89  88   PLT  240  291  269     Most Recent 3 BMP's:  Recent Labs   Lab Test  11/27/18   0510  11/25/18   2351  11/14/18   0914   NA  133  132*  137   POTASSIUM  4.2  4.4  4.8   CHLORIDE  104  99  104   CO2  23  28  23   BUN  23  26  20   CR  1.14  1.40*  1.27*   ANIONGAP  6  5  10   MARIA ALEJANDRA  8.5  8.8  9.8   GLC  103*  180*  94     Most Recent 3 INR's:  Recent Labs   Lab Test  11/26/18   1035   INR  0.98     Most Recent 3 Troponin's:  Recent Labs   Lab Test  11/25/18   2359  11/25/18   2351  04/01/13   0157   TROPI   --   <0.015   --    TROPONIN  0.02   --   0.00          Last 24 hours labs reviewed

## 2018-11-27 NOTE — DISCHARGE SUMMARY
[facility]  Discharge Summary        Angel Sanders MRN# [id number]   YOB: 1950 Age: 68 year old     Date of Admission:  11/26/2018  Date of Discharge:  11/27/2018  Admitting Physician:  Donavan Contreras MD  Discharge Physician:             Roman Egan MD  Discharging Service:             Hospitalist     Primary Provider: Tony Mclean  Primary Care Physician Phone Number: [phone]     Discharge Diagnoses:     Paroxysmal complete heart block with recurrent syncope s/p [employer] dual chamber permanent pacemaker on 11/26/18.  Reactive hyperglycemia    Other/Chronic Medical Problems:      CKD stage 2  CAD, s/p RCA stent  Carotid artery stenosis, s/p left ICA stenting  HTN  DLP  Herniated cervical disk  Diverticulosis    Problem Oriented Hospital Course   Angel Sanders is a 68 year old male with previous MI s/p stent placement in 2008, carotid artery stenosis, CKD, hypertension, and hyperlipidemia who originally presented to [facility] for evaluation of syncopal episodes x2. He was noted to have complete heart block. He was transferred over [facility] for further management.    For a detailed history and physical exam, please refer to the dictated admission note by Dr. Donavan Contreras on 11/26/2018.     Paroxysmal complete heart block with recurrent syncope, s/p [employer] dual chamber permanent pacemaker 11/26/18.  EKG on admission with complete heart block, idioventricular rhythm, and a HR of 38. CXR unremarkable. TSH 0.94 on 11/14/18. Patient with BMI of 34.23 but denied symptoms of TALIA.  Patient was seen by EP cardiology and given the high risk of recurrent CHB, a dual chamber pacemaker was placed on 11/26. No pneumothorax on CXR post-procedure. PTA metoprolol was resumed after implantation of pacemaker. He is to follow up with KARLEY Rodriguez in 3 months at [place].       CKD stage 2.  Baseline cr  around 1.2 which remained stable.    Hyperglycemia  , likely reactive. Hgb A1c 5.5%. No reported history of diabetes. Improved.      Coronary artery disease s/p JUANCARLOS to RCA in 01/2008,  Carotid artery stenosis status post stenting of left internal carotid artery in 09/2009.  [PTA: ASA 81 mg daily, atorvastatin 80 mg daily, metoprolol 25 mg bid, lisinopril 5 mg daily]  Chronic and stable on PTA medications.       Hypertension   [PTA: metoprolol 25 mg bid, lisinopril 5 mg daily]  Chronic and stable on PTA medications.     Hyperlipidemia   Chronic and stable on PTA atorvastatin.           Code Status:      Full Code        Brief Hospital Stay Summary Sent Home With Patient in AVS:        Reason for your hospital stay       Complete heart block                        Important Results:      Na 133, K 4.2, Cr 1.14, Glu 103, Hgb 11.2        Pending Results:        Unresulted Labs Ordered in the Past 30 Days of this Admission     No orders found for last 61 day(s).              Discharge Instructions and Follow-Up:      Follow-up Appointments     Follow-up and recommended labs and tests        He is to follow up with KARLEY Rodriguez in 3 months at [place].  Follow up with PCP as needed.                        Discharge Disposition:      Discharged to home        Discharge Medications:        Current Discharge Medication List      CONTINUE these medications which have NOT CHANGED    Details   ASPIRIN 81 MG OR TABS 1 TABLET DAILY      atorvastatin (LIPITOR) 80 MG tablet Take 1 tablet (80 mg) by mouth daily  Qty: 90 tablet, Refills: 4    Associated Diagnoses: High cholesterol      Docusate Calcium (STOOL SOFTENER PO) Take 100 mg by mouth daily       Ferrous Sulfate (IRON SUPPLEMENT PO) Take 325 mg by mouth daily (with breakfast)       folic acid-vit B6-vit B12 (FOLGARD) 0.8-10-0.115 MG TABS Take 1 tablet by mouth daily      lisinopril (PRINIVIL/ZESTRIL) 5 MG tablet Take 1 tablet (5 mg) by mouth daily  Qty: 90  "tablet, Refills: 4    Associated Diagnoses: Essential hypertension, benign      metoprolol tartrate (LOPRESSOR) 25 MG tablet Take 1 tablet (25 mg) by mouth 2 times daily  Qty: 180 tablet, Refills: 4    Associated Diagnoses: Essential hypertension, benign      multivitamin, therapeutic with minerals (MULTI-VITAMIN) TABS tablet Take 1 tablet by mouth daily      nitroGLYcerin (NITROSTAT) 0.4 MG sublingual tablet Place 1 tablet (0.4 mg) under the tongue every 5 minutes as needed May repeat X 2 and if chest pain persists, call 911  Qty: 25 tablet, Refills: 2    Associated Diagnoses: Allergic conjunctivitis, bilateral                 Allergies:         Allergies   Allergen Reactions     Adhesive Tape Rash           Consultations This Hospital Stay:      EP cardiology consult        Condition and Physical on Discharge:      Discharge condition: Stable   Vitals: Blood pressure 121/75, temperature 97.5  F (36.4  C), temperature source Oral, resp. rate 20, height 1.651 m (5' 5\"), weight 91.9 kg (202 lb 8 oz), SpO2 94 %.           Diet and Activity:     After Care Instructions     Activity       Your activity upon discharge: activity as tolerated            Diet       Follow this diet upon discharge: Orders Placed This Encounter     Heart healthy diet                            Discharge Time:      Greater than 30 minutes.        Image Results From This Hospital Stay (For Non-EPIC Providers):        Results for orders placed or performed during the hospital encounter of 11/26/18   X-ray Chest 2 vws*    Narrative    XR CHEST 2 VW 11/27/2018 8:42 AM    COMPARISON: 11/25/2018    HISTORY: Cardiac pacer.      Impression    IMPRESSION: 2-lead implanted cardiac pacer over the LEFT chest. No  pneumothorax seen on either side.    CARLTON MONTANA MD     Recent Results (from the past 4320 hour(s))   ECHO COMPLETE WITH [employer]    Narrative    [id number]  [id number]  [id number]  [id number]^MOHALEXD^H           [facility] " [facility]  Echocardiography Laboratory  [address] [place] [id number]        Name: TIMBO RICH  MRN: [id number]  : 1950  Study Date: 2018 01:28 PM  Age: 68 yrs  Gender: Male  Patient Location: [facility]  Reason For Study: AV block, unspecified  Ordering Physician: MITA MURCIA  Referring Physician: MITA MURCIA  Performed By: CORINA Taveras     BSA: 2.0 m2  Height: 65 in  Weight: 205 lb  HR: 106  BP: 151/86 mmHg  _____________________________________________________________________________  __     _____________________________________________________________________________  __        Interpretation Summary     Left ventricular systolic function is normal.  The visual ejection fraction is estimated at 65-70%.  The study was technically difficult. Compared to the prior study dated ,  there have been no changes.  _____________________________________________________________________________  __        Left Ventricle  The left ventricle is normal in size. There is mild concentric left  ventricular hypertrophy. Left ventricular systolic function is normal. The  visual ejection fraction is estimated at 65-70%. Grade I or early diastolic  dysfunction. No regional wall motion abnormalities noted.     Right Ventricle  The right ventricle is normal size. The right ventricular systolic function is  normal.     Atria  Normal left atrial size. Right atrial size is normal.     Mitral Valve  There is no mitral regurgitation noted.        Tricuspid Valve  There is trace tricuspid regurgitation. Right ventricular systolic pressure  could not be approximated due to inadequate tricuspid regurgitation. Normal  IVC (1.5-2.5cm) with >50% respiratory collapse; right atrial pressure is  estimated at 5-10mmHg.     Aortic Valve  The aortic valve is trileaflet. No aortic regurgitation is present. No aortic  stenosis is present.     Pulmonic Valve  The pulmonic valve is not well visualized.      Vessels  The aortic root is normal size. The ascending aorta is Borderline dilated.     Pericardium  There is no pericardial effusion.        Rhythm  Sinus rhythm was noted.  _____________________________________________________________________________  __  MMode/2D Measurements & Calculations  IVSd: 1.5 cm     LVIDd: 4.0 cm  LVIDs: 3.3 cm  LVPWd: 1.2 cm  FS: 17.8 %  LV mass(C)d: 201.6 grams  LV mass(C)dI: 100.9 grams/m2  Ao root diam: 3.5 cm  LA dimension: 3.4 cm  asc Aorta Diam: 3.7 cm  LA/Ao: 0.97  LVOT diam: 2.0 cm  LVOT area: 3.2 cm2  LA Volume (BP): 52.0 ml  LA Volume Index (BP): 26.0 ml/m2  RWT: 0.60           Doppler Measurements & Calculations  MV E max ankit: 82.7 cm/sec  MV A max ankit: 133.3 cm/sec  MV E/A: 0.62  MV dec time: 0.20 sec  Pulm Sys Ankit: 61.7 cm/sec  Pulm Pichardo Ankit: 68.6 cm/sec  Pulm A Revs Ankit: 33.2 cm/sec  Pulm S/D: 0.90  E/E' av.4  Lateral E/e': 10.1  Medial E/e': 12.6           _____________________________________________________________________________  __           Report approved by: Ivis Shipman 2018 02:22 PM              Most Recent Lab Results In EPIC (For Non-EPIC Providers):    Most Recent 3 CBC's:  Recent Labs   Lab Test  18   1035  11/1814   WBC  8.8  10.3  8.3   HGB  11.2*  11.9*  11.9*   MCV  84  89  88   PLT  240  291  269      Most Recent 3 BMP's:  Recent Labs   Lab Test  18   0510  18   [id number]   NA  133  132*  137   POTASSIUM  4.2  4.4  4.8   CHLORIDE  104  99  104   CO2  23  28  23   BUN  23  26  20   CR  1.14  1.40*  1.27*   ANIONGAP  6  5  10   MARIA ALEJANDRA  8.5  8.8  9.8   GLC  103*  180*  94     Most Recent 3 Troponin's:  Recent Labs   Lab Test  18   0157   TROPI   --   <0.015   --    TROPONIN  0.02   --   0.00     Most Recent 3 INR's:  Recent Labs   Lab Test  18   1035   INR  0.98     Most Recent 2 LFT's:  Recent Labs   Lab Test  18   0914  17    0841   AST  38  33   ALT  31  31   ALKPHOS  107  108   BILITOTAL  0.6  0.6     Most Recent Cholesterol Panel:  Recent Labs   Lab Test  11/14/18   0914   CHOL  95   LDL  42   HDL  40   TRIG  66     Most Recent 6 Bacteria Isolates From Any Culture (See EPIC Reports for Culture Details):No lab results found.  Most Recent TSH, T4 and HgbA1c:   Recent Labs   Lab Test  11/26/18   0655  11/14/18   0914   TSH   --   0.94   A1C  5.5   --

## 2018-11-28 ENCOUNTER — TELEPHONE (OUTPATIENT)
Dept: INTERNAL MEDICINE | Facility: CLINIC | Age: 68
End: 2018-11-28

## 2018-11-28 NOTE — TELEPHONE ENCOUNTER
IP F/U    Date: 11/27/18  Diagnosis: Complete Heart Block  Is patient active in care coordination? No  Was patient in TCU? No

## 2018-11-29 ENCOUNTER — TELEPHONE (OUTPATIENT)
Dept: INTERNAL MEDICINE | Facility: CLINIC | Age: 68
End: 2018-11-29

## 2018-11-29 NOTE — TELEPHONE ENCOUNTER
Patient called and just wanted to let the Dr know that he was in emergency surgery and ended up with a pacemaker.

## 2018-12-03 LAB — INTERPRETATION ECG - MUSE: NORMAL

## 2018-12-04 ENCOUNTER — ALLIED HEALTH/NURSE VISIT (OUTPATIENT)
Dept: CARDIOLOGY | Facility: CLINIC | Age: 68
End: 2018-12-04
Payer: COMMERCIAL

## 2018-12-04 DIAGNOSIS — Z95.0 CARDIAC PACEMAKER IN SITU: Primary | ICD-10-CM

## 2018-12-04 PROCEDURE — 93280 PM DEVICE PROGR EVAL DUAL: CPT | Performed by: INTERNAL MEDICINE

## 2018-12-04 NOTE — PROGRESS NOTES
Keep Your Pharmacy Open [employer] Pacemaker Device Check  AP: 6 % : <1 %  Mode: DDD        Underlying Rhythm: SR, rate 60s  Heart Rate: Adequate variation per histogram  Sensing: stable    Pacing Threshold: stable   Impedance: stable  Battery Status: 15 years  Device Site: Dry and Intact. Slightly swollen with surrounding ecchymosis  Atrial Arrhythmia: none  Ventricular Arrhythmia: none  Setting Change: none    Care Plan: f/u 3 6 weeks PPM check. [place]

## 2018-12-04 NOTE — MR AVS SNAPSHOT
After Visit Summary   12/4/2018    Angel Sanders    MRN: [id number]           Patient Information     Date Of Birth          1950        Visit Information        Provider Department      12/4/2018 11:15 AM KENIA SULLIVANN [facility]        Today's Diagnoses     Cardiac pacemaker in situ    -  1       Follow-ups after your visit        Your next 10 appointments already scheduled     Feb 27, 2019  9:30 AM CST   [facility] EP RETURN with KARLEY Mckeon CNP   [facility] ([facility])    [address]  [facility] [address] [phone]              Who to contact     If you have questions or need follow up information about today's clinic visit or your schedule please contact [facility] directly at [phone].  Normal or non-critical lab and imaging results will be communicated to you by MyChart, letter or phone within 4 business days after the clinic has received the results. If you do not hear from us within 7 days, please contact the clinic through MyChart or phone. If you have a critical or abnormal lab result, we will notify you by phone as soon as possible.  Submit refill requests through [employer] or call your pharmacy and they will forward the refill request to us. Please allow 3 business days for your refill to be completed.          Additional Information About Your Visit        Care EveryWhere ID     This is your Care EveryWhere ID. This could be used by other organizations to access your [facility] medical records  [id number]         Blood Pressure from Last 3 Encounters:   11/27/18 146/73   11/26/18 124/61   11/14/18 138/78    Weight from Last 3 Encounters:   11/27/18 91.9 kg (202 lb 8 oz)   11/14/18 93.4 kg (206 lb)   11/05/18 96.5 kg (212 lb 12.8 oz)              We Performed the Following     PM DEVICE PROGRAMMING EVAL, DUAL LEAD PACER  ([id number])        Primary Care Provider Office Phone # Fax #    Tony Peter [phone]       [address] [facility] [address]        Equal Access to Services     CUONG HUYNH : Hadjudy brendan ku lenio Solauraali, waaxda luqadaha, qaybta kaalmada adeegyada, duke vargasn rand grewal laAlyciasean mae. So [facility] [phone].    ATENCIÓN: Si habla español, tiene a perez disposición servicios gratuitos de asistencia lingüística. Llame al [phone].    We comply with applicable federal civil rights laws and [place] laws. We do not discriminate on the basis of race, color, national origin, age, disability, sex, sexual orientation, or gender identity.            Thank you!     Thank you for choosing [facility]  for your care. Our goal is always to provide you with excellent care. Hearing back from our patients is one way we can continue to improve our services. Please take a few minutes to complete the written survey that you may receive in the mail after your visit with us. Thank you!             Your Updated Medication List - Protect others around you: Learn how to safely use, store and throw away your medicines at www.disposemymeds.org.          This list is accurate as of 12/4/18 11:26 AM.  Always use your most recent med list.                   Brand Name Dispense Instructions for use Diagnosis    aspirin 81 MG tablet    ASA     1 TABLET DAILY        atorvastatin 80 MG tablet    LIPITOR    90 tablet    Take 1 tablet (80 mg) by mouth daily    High cholesterol       folic acid-vit B6-vit B12 0.8-10-0.115 MG Tabs per tablet    FOLGARD     Take 1 tablet by mouth daily        IRON SUPPLEMENT PO      Take 325 mg by mouth daily (with breakfast)        lisinopril 5 MG tablet    PRINIVIL/ZESTRIL    90 tablet    Take 1 tablet (5 mg) by mouth daily    Essential hypertension, benign       metoprolol tartrate 25 MG tablet    LOPRESSOR    180 tablet    Take 1 tablet (25  mg) by mouth 2 times daily    Essential hypertension, benign       Multi-vitamin tablet      Take 1 tablet by mouth daily        nitroGLYcerin 0.4 MG sublingual tablet    NITROSTAT    25 tablet    Place 1 tablet (0.4 mg) under the tongue every 5 minutes as needed May repeat X 2 and if chest pain persists, call 911    Allergic conjunctivitis, bilateral       STOOL SOFTENER PO      Take 100 mg by mouth daily

## 2019-01-01 ENCOUNTER — OFFICE VISIT (OUTPATIENT)
Dept: INTERNAL MEDICINE | Facility: CLINIC | Age: 69
End: 2019-01-01
Payer: COMMERCIAL

## 2019-01-01 ENCOUNTER — TELEPHONE (OUTPATIENT)
Dept: INTERNAL MEDICINE | Facility: CLINIC | Age: 69
End: 2019-01-01

## 2019-01-01 ENCOUNTER — TRANSFERRED RECORDS (OUTPATIENT)
Dept: HEALTH INFORMATION MANAGEMENT | Facility: CLINIC | Age: 69
End: 2019-01-01

## 2019-01-01 ENCOUNTER — DOCUMENTATION ONLY (OUTPATIENT)
Dept: CARDIOLOGY | Facility: CLINIC | Age: 69
End: 2019-01-01

## 2019-01-01 ENCOUNTER — ANCILLARY PROCEDURE (OUTPATIENT)
Dept: CARDIOLOGY | Facility: CLINIC | Age: 69
End: 2019-01-01
Attending: INTERNAL MEDICINE
Payer: COMMERCIAL

## 2019-01-01 VITALS
WEIGHT: 204 LBS | HEART RATE: 87 BPM | OXYGEN SATURATION: 96 % | SYSTOLIC BLOOD PRESSURE: 142 MMHG | BODY MASS INDEX: 34.83 KG/M2 | RESPIRATION RATE: 16 BRPM | HEIGHT: 64 IN | TEMPERATURE: 98.2 F | DIASTOLIC BLOOD PRESSURE: 78 MMHG

## 2019-01-01 DIAGNOSIS — Z00.00 ENCOUNTER FOR PREVENTATIVE ADULT HEALTH CARE EXAMINATION: Primary | ICD-10-CM

## 2019-01-01 DIAGNOSIS — Z23 NEED FOR PROPHYLACTIC VACCINATION AND INOCULATION AGAINST INFLUENZA: ICD-10-CM

## 2019-01-01 DIAGNOSIS — E78.5 HYPERLIPIDEMIA LDL GOAL <100: ICD-10-CM

## 2019-01-01 DIAGNOSIS — J41.0 SIMPLE CHRONIC BRONCHITIS (H): ICD-10-CM

## 2019-01-01 DIAGNOSIS — E66.01 MORBID OBESITY (H): ICD-10-CM

## 2019-01-01 DIAGNOSIS — Z12.5 ENCOUNTER FOR SCREENING FOR MALIGNANT NEOPLASM OF PROSTATE: ICD-10-CM

## 2019-01-01 DIAGNOSIS — I10 BENIGN ESSENTIAL HYPERTENSION: ICD-10-CM

## 2019-01-01 DIAGNOSIS — D64.9 ANEMIA, UNSPECIFIED TYPE: ICD-10-CM

## 2019-01-01 DIAGNOSIS — Z95.0 PACEMAKER: ICD-10-CM

## 2019-01-01 LAB
ALBUMIN SERPL-MCNC: 3.9 G/DL (ref 3.4–5)
ALBUMIN UR-MCNC: 100 MG/DL
ALP SERPL-CCNC: 122 U/L (ref 40–150)
ALT SERPL W P-5'-P-CCNC: 29 U/L (ref 0–70)
ANION GAP SERPL CALCULATED.3IONS-SCNC: 9 MMOL/L (ref 3–14)
APPEARANCE UR: CLEAR
AST SERPL W P-5'-P-CCNC: 29 U/L (ref 0–45)
BILIRUB SERPL-MCNC: 0.6 MG/DL (ref 0.2–1.3)
BILIRUB UR QL STRIP: NEGATIVE
BUN SERPL-MCNC: 21 MG/DL (ref 7–30)
CALCIUM SERPL-MCNC: 9.4 MG/DL (ref 8.5–10.1)
CHLORIDE SERPL-SCNC: 102 MMOL/L (ref 94–109)
CHOLEST SERPL-MCNC: 110 MG/DL
CO2 SERPL-SCNC: 24 MMOL/L (ref 20–32)
COLOR UR AUTO: YELLOW
CREAT SERPL-MCNC: 1.2 MG/DL (ref 0.66–1.25)
ERYTHROCYTE [DISTWIDTH] IN BLOOD BY AUTOMATED COUNT: 14.9 % (ref 10–15)
FOLATE SERPL-MCNC: 36.9 NG/ML
GFR SERPL CREATININE-BSD FRML MDRD: 61 ML/MIN/{1.73_M2}
GLUCOSE SERPL-MCNC: 96 MG/DL (ref 70–99)
GLUCOSE UR STRIP-MCNC: NEGATIVE MG/DL
HCT VFR BLD AUTO: 40.3 % (ref 40–53)
HDLC SERPL-MCNC: 37 MG/DL
HGB BLD-MCNC: 13 G/DL (ref 13.3–17.7)
HGB UR QL STRIP: ABNORMAL
IRON SATN MFR SERPL: 17 % (ref 15–46)
IRON SERPL-MCNC: 61 UG/DL (ref 35–180)
KETONES UR STRIP-MCNC: NEGATIVE MG/DL
LDLC SERPL CALC-MCNC: 59 MG/DL
LEUKOCYTE ESTERASE UR QL STRIP: NEGATIVE
MCH RBC QN AUTO: 28.2 PG (ref 26.5–33)
MCHC RBC AUTO-ENTMCNC: 32.3 G/DL (ref 31.5–36.5)
MCV RBC AUTO: 87 FL (ref 78–100)
MDC_IDC_EPISODE_DTM: NORMAL
MDC_IDC_EPISODE_DURATION: 1 S
MDC_IDC_EPISODE_ID: NORMAL
MDC_IDC_EPISODE_TYPE: NORMAL
MDC_IDC_LEAD_IMPLANT_DT: NORMAL
MDC_IDC_LEAD_IMPLANT_DT: NORMAL
MDC_IDC_LEAD_LOCATION: NORMAL
MDC_IDC_LEAD_LOCATION: NORMAL
MDC_IDC_LEAD_LOCATION_DETAIL_1: NORMAL
MDC_IDC_LEAD_LOCATION_DETAIL_1: NORMAL
MDC_IDC_LEAD_MFG: NORMAL
MDC_IDC_LEAD_MFG: NORMAL
MDC_IDC_LEAD_MODEL: NORMAL
MDC_IDC_LEAD_MODEL: NORMAL
MDC_IDC_LEAD_POLARITY_TYPE: NORMAL
MDC_IDC_LEAD_POLARITY_TYPE: NORMAL
MDC_IDC_LEAD_SERIAL: NORMAL
MDC_IDC_LEAD_SERIAL: NORMAL
MDC_IDC_MSMT_BATTERY_DTM: NORMAL
MDC_IDC_MSMT_BATTERY_REMAINING_LONGEVITY: 150 MO
MDC_IDC_MSMT_BATTERY_REMAINING_PERCENTAGE: 100 %
MDC_IDC_MSMT_BATTERY_STATUS: NORMAL
MDC_IDC_MSMT_LEADCHNL_RA_IMPEDANCE_VALUE: 695 OHM
MDC_IDC_MSMT_LEADCHNL_RA_PACING_THRESHOLD_AMPLITUDE: 0.7 V
MDC_IDC_MSMT_LEADCHNL_RA_PACING_THRESHOLD_PULSEWIDTH: 0.4 MS
MDC_IDC_MSMT_LEADCHNL_RV_IMPEDANCE_VALUE: 658 OHM
MDC_IDC_MSMT_LEADCHNL_RV_PACING_THRESHOLD_AMPLITUDE: 2.1 V
MDC_IDC_MSMT_LEADCHNL_RV_PACING_THRESHOLD_PULSEWIDTH: 0.4 MS
MDC_IDC_PG_IMPLANT_DTM: NORMAL
MDC_IDC_PG_MFG: NORMAL
MDC_IDC_PG_MODEL: NORMAL
MDC_IDC_PG_SERIAL: NORMAL
MDC_IDC_PG_TYPE: NORMAL
MDC_IDC_SESS_CLINIC_NAME: NORMAL
MDC_IDC_SESS_DTM: NORMAL
MDC_IDC_SESS_TYPE: NORMAL
MDC_IDC_SET_BRADY_AT_MODE_SWITCH_MODE: NORMAL
MDC_IDC_SET_BRADY_AT_MODE_SWITCH_RATE: 170 {BEATS}/MIN
MDC_IDC_SET_BRADY_LOWRATE: 60 {BEATS}/MIN
MDC_IDC_SET_BRADY_MAX_SENSOR_RATE: 130 {BEATS}/MIN
MDC_IDC_SET_BRADY_MAX_TRACKING_RATE: 130 {BEATS}/MIN
MDC_IDC_SET_BRADY_MODE: NORMAL
MDC_IDC_SET_BRADY_PAV_DELAY_HIGH: 150 MS
MDC_IDC_SET_BRADY_PAV_DELAY_LOW: 200 MS
MDC_IDC_SET_BRADY_SAV_DELAY_HIGH: 150 MS
MDC_IDC_SET_BRADY_SAV_DELAY_LOW: 200 MS
MDC_IDC_SET_LEADCHNL_RA_PACING_AMPLITUDE: 2 V
MDC_IDC_SET_LEADCHNL_RA_PACING_CAPTURE_MODE: NORMAL
MDC_IDC_SET_LEADCHNL_RA_PACING_POLARITY: NORMAL
MDC_IDC_SET_LEADCHNL_RA_PACING_PULSEWIDTH: 0.4 MS
MDC_IDC_SET_LEADCHNL_RA_SENSING_ADAPTATION_MODE: NORMAL
MDC_IDC_SET_LEADCHNL_RA_SENSING_POLARITY: NORMAL
MDC_IDC_SET_LEADCHNL_RA_SENSING_SENSITIVITY: 0.25 MV
MDC_IDC_SET_LEADCHNL_RV_PACING_AMPLITUDE: 2.5 V
MDC_IDC_SET_LEADCHNL_RV_PACING_CAPTURE_MODE: NORMAL
MDC_IDC_SET_LEADCHNL_RV_PACING_POLARITY: NORMAL
MDC_IDC_SET_LEADCHNL_RV_PACING_PULSEWIDTH: 0.4 MS
MDC_IDC_SET_LEADCHNL_RV_SENSING_ADAPTATION_MODE: NORMAL
MDC_IDC_SET_LEADCHNL_RV_SENSING_POLARITY: NORMAL
MDC_IDC_SET_LEADCHNL_RV_SENSING_SENSITIVITY: 1.5 MV
MDC_IDC_SET_ZONE_DETECTION_INTERVAL: 375 MS
MDC_IDC_SET_ZONE_TYPE: NORMAL
MDC_IDC_SET_ZONE_VENDOR_TYPE: NORMAL
MDC_IDC_STAT_AT_BURDEN_PERCENT: 1 %
MDC_IDC_STAT_AT_DTM_END: NORMAL
MDC_IDC_STAT_AT_DTM_START: NORMAL
MDC_IDC_STAT_BRADY_DTM_END: NORMAL
MDC_IDC_STAT_BRADY_DTM_START: NORMAL
MDC_IDC_STAT_BRADY_RA_PERCENT_PACED: 30 %
MDC_IDC_STAT_BRADY_RV_PERCENT_PACED: 83 %
MDC_IDC_STAT_EPISODE_RECENT_COUNT: 0
MDC_IDC_STAT_EPISODE_RECENT_COUNT: 1
MDC_IDC_STAT_EPISODE_RECENT_COUNT_DTM_END: NORMAL
MDC_IDC_STAT_EPISODE_RECENT_COUNT_DTM_START: NORMAL
MDC_IDC_STAT_EPISODE_TYPE: NORMAL
MDC_IDC_STAT_EPISODE_VENDOR_TYPE: NORMAL
MUCOUS THREADS #/AREA URNS LPF: PRESENT /LPF
NITRATE UR QL: NEGATIVE
NONHDLC SERPL-MCNC: 73 MG/DL
PH UR STRIP: 7 PH (ref 5–7)
PLATELET # BLD AUTO: 221 10E9/L (ref 150–450)
POTASSIUM SERPL-SCNC: 4.9 MMOL/L (ref 3.4–5.3)
PROT SERPL-MCNC: 8.3 G/DL (ref 6.8–8.8)
PSA SERPL-ACNC: 1.64 UG/L (ref 0–4)
RBC # BLD AUTO: 4.61 10E12/L (ref 4.4–5.9)
RBC #/AREA URNS AUTO: ABNORMAL /HPF
SODIUM SERPL-SCNC: 135 MMOL/L (ref 133–144)
SOURCE: ABNORMAL
SP GR UR STRIP: 1.01 (ref 1–1.03)
TIBC SERPL-MCNC: 360 UG/DL (ref 240–430)
TRIGL SERPL-MCNC: 69 MG/DL
TSH SERPL DL<=0.005 MIU/L-ACNC: 1.54 MU/L (ref 0.4–4)
UROBILINOGEN UR STRIP-ACNC: 0.2 EU/DL (ref 0.2–1)
VIT B12 SERPL-MCNC: 1198 PG/ML (ref 193–986)
WBC # BLD AUTO: 7.8 10E9/L (ref 4–11)
WBC #/AREA URNS AUTO: ABNORMAL /HPF

## 2019-01-01 PROCEDURE — 80053 COMPREHEN METABOLIC PANEL: CPT | Performed by: INTERNAL MEDICINE

## 2019-01-01 PROCEDURE — 80061 LIPID PANEL: CPT | Performed by: INTERNAL MEDICINE

## 2019-01-01 PROCEDURE — 99213 OFFICE O/P EST LOW 20 MIN: CPT | Mod: 25 | Performed by: INTERNAL MEDICINE

## 2019-01-01 PROCEDURE — 36415 COLL VENOUS BLD VENIPUNCTURE: CPT | Performed by: INTERNAL MEDICINE

## 2019-01-01 PROCEDURE — 99397 PER PM REEVAL EST PAT 65+ YR: CPT | Mod: 25 | Performed by: INTERNAL MEDICINE

## 2019-01-01 PROCEDURE — 93294 REM INTERROG EVL PM/LDLS PM: CPT | Performed by: INTERNAL MEDICINE

## 2019-01-01 PROCEDURE — 84443 ASSAY THYROID STIM HORMONE: CPT | Performed by: INTERNAL MEDICINE

## 2019-01-01 PROCEDURE — G0103 PSA SCREENING: HCPCS | Performed by: INTERNAL MEDICINE

## 2019-01-01 PROCEDURE — 81001 URINALYSIS AUTO W/SCOPE: CPT | Performed by: INTERNAL MEDICINE

## 2019-01-01 PROCEDURE — G0008 ADMIN INFLUENZA VIRUS VAC: HCPCS | Performed by: INTERNAL MEDICINE

## 2019-01-01 PROCEDURE — 83540 ASSAY OF IRON: CPT | Performed by: INTERNAL MEDICINE

## 2019-01-01 PROCEDURE — 82607 VITAMIN B-12: CPT | Performed by: INTERNAL MEDICINE

## 2019-01-01 PROCEDURE — 82746 ASSAY OF FOLIC ACID SERUM: CPT | Performed by: INTERNAL MEDICINE

## 2019-01-01 PROCEDURE — 90662 IIV NO PRSV INCREASED AG IM: CPT | Performed by: INTERNAL MEDICINE

## 2019-01-01 PROCEDURE — 85027 COMPLETE CBC AUTOMATED: CPT | Performed by: INTERNAL MEDICINE

## 2019-01-01 PROCEDURE — 83550 IRON BINDING TEST: CPT | Performed by: INTERNAL MEDICINE

## 2019-01-01 ASSESSMENT — ENCOUNTER SYMPTOMS
SHORTNESS OF BREATH: 1
CONSTIPATION: 0
FEVER: 0
CHILLS: 0
ABDOMINAL PAIN: 0
SORE THROAT: 0
HEMATURIA: 0
ARTHRALGIAS: 0
PARESTHESIAS: 0
NERVOUS/ANXIOUS: 0
HEARTBURN: 0
PALPITATIONS: 0
HEMATOCHEZIA: 0
NAUSEA: 0
EYE PAIN: 0
FREQUENCY: 0
WEAKNESS: 0
DIZZINESS: 0
DIARRHEA: 0
DYSURIA: 0
COUGH: 1
HEADACHES: 0
MYALGIAS: 0
JOINT SWELLING: 0

## 2019-01-01 ASSESSMENT — ACTIVITIES OF DAILY LIVING (ADL): CURRENT_FUNCTION: NO ASSISTANCE NEEDED

## 2019-01-01 ASSESSMENT — MIFFLIN-ST. JEOR: SCORE: 1596.34

## 2019-01-23 ENCOUNTER — TELEPHONE (OUTPATIENT)
Dept: CARDIOLOGY | Facility: CLINIC | Age: 69
End: 2019-01-23

## 2019-01-23 ENCOUNTER — ANCILLARY PROCEDURE (OUTPATIENT)
Dept: CARDIOLOGY | Facility: CLINIC | Age: 69
End: 2019-01-23
Attending: INTERNAL MEDICINE
Payer: COMMERCIAL

## 2019-01-23 DIAGNOSIS — Z95.0 PACEMAKER: ICD-10-CM

## 2019-01-23 DIAGNOSIS — I65.23 OCCLUSION AND STENOSIS OF BILATERAL CAROTID ARTERIES: ICD-10-CM

## 2019-01-23 DIAGNOSIS — I77.9 CAROTID ARTERY DISEASE (H): Primary | ICD-10-CM

## 2019-01-23 PROCEDURE — 93280 PM DEVICE PROGR EVAL DUAL: CPT | Performed by: INTERNAL MEDICINE

## 2019-01-24 ENCOUNTER — TELEPHONE (OUTPATIENT)
Dept: CARDIOLOGY | Facility: CLINIC | Age: 69
End: 2019-01-24

## 2019-01-24 ENCOUNTER — OFFICE VISIT (OUTPATIENT)
Dept: URGENT CARE | Facility: URGENT CARE | Age: 69
End: 2019-01-24
Payer: COMMERCIAL

## 2019-01-24 ENCOUNTER — DOCUMENTATION ONLY (OUTPATIENT)
Dept: INTERNAL MEDICINE | Facility: CLINIC | Age: 69
End: 2019-01-24

## 2019-01-24 ENCOUNTER — ANCILLARY PROCEDURE (OUTPATIENT)
Dept: GENERAL RADIOLOGY | Facility: CLINIC | Age: 69
End: 2019-01-24
Payer: COMMERCIAL

## 2019-01-24 VITALS
TEMPERATURE: 96.5 F | RESPIRATION RATE: 20 BRPM | SYSTOLIC BLOOD PRESSURE: 132 MMHG | WEIGHT: 205 LBS | HEART RATE: 64 BPM | DIASTOLIC BLOOD PRESSURE: 80 MMHG | OXYGEN SATURATION: 99 % | BODY MASS INDEX: 34.11 KG/M2

## 2019-01-24 DIAGNOSIS — M25.471 RIGHT ANKLE SWELLING: ICD-10-CM

## 2019-01-24 DIAGNOSIS — I47.29 PAROXYSMAL VENTRICULAR TACHYCARDIA (H): Primary | ICD-10-CM

## 2019-01-24 DIAGNOSIS — I25.10 ATHEROSCLEROTIC HEART DISEASE OF NATIVE CORONARY ARTERY WITHOUT ANGINA PECTORIS: ICD-10-CM

## 2019-01-24 DIAGNOSIS — M10.9 ACUTE GOUTY ARTHRITIS: ICD-10-CM

## 2019-01-24 DIAGNOSIS — M25.471 RIGHT ANKLE SWELLING: Primary | ICD-10-CM

## 2019-01-24 LAB
BASOPHILS # BLD AUTO: 0 10E9/L (ref 0–0.2)
BASOPHILS NFR BLD AUTO: 0.2 %
DIFFERENTIAL METHOD BLD: ABNORMAL
EOSINOPHIL # BLD AUTO: 0 10E9/L (ref 0–0.7)
EOSINOPHIL NFR BLD AUTO: 0.3 %
ERYTHROCYTE [DISTWIDTH] IN BLOOD BY AUTOMATED COUNT: 14.3 % (ref 10–15)
ERYTHROCYTE [SEDIMENTATION RATE] IN BLOOD BY WESTERGREN METHOD: 65 MM/H (ref 0–20)
HCT VFR BLD AUTO: 36 % (ref 40–53)
HGB BLD-MCNC: 11.5 G/DL (ref 13.3–17.7)
LYMPHOCYTES # BLD AUTO: 2.4 10E9/L (ref 0.8–5.3)
LYMPHOCYTES NFR BLD AUTO: 19.7 %
MCH RBC QN AUTO: 27.6 PG (ref 26.5–33)
MCHC RBC AUTO-ENTMCNC: 31.9 G/DL (ref 31.5–36.5)
MCV RBC AUTO: 86 FL (ref 78–100)
MONOCYTES # BLD AUTO: 2 10E9/L (ref 0–1.3)
MONOCYTES NFR BLD AUTO: 16.3 %
NEUTROPHILS # BLD AUTO: 7.9 10E9/L (ref 1.6–8.3)
NEUTROPHILS NFR BLD AUTO: 63.5 %
PLATELET # BLD AUTO: 274 10E9/L (ref 150–450)
RBC # BLD AUTO: 4.17 10E12/L (ref 4.4–5.9)
URATE SERPL-MCNC: 4.8 MG/DL (ref 3.5–7.2)
WBC # BLD AUTO: 12.4 10E9/L (ref 4–11)

## 2019-01-24 PROCEDURE — 85025 COMPLETE CBC W/AUTO DIFF WBC: CPT | Performed by: FAMILY MEDICINE

## 2019-01-24 PROCEDURE — 73610 X-RAY EXAM OF ANKLE: CPT | Mod: RT

## 2019-01-24 PROCEDURE — 36415 COLL VENOUS BLD VENIPUNCTURE: CPT | Performed by: FAMILY MEDICINE

## 2019-01-24 PROCEDURE — 84550 ASSAY OF BLOOD/URIC ACID: CPT | Performed by: FAMILY MEDICINE

## 2019-01-24 PROCEDURE — 85652 RBC SED RATE AUTOMATED: CPT | Performed by: FAMILY MEDICINE

## 2019-01-24 PROCEDURE — 99213 OFFICE O/P EST LOW 20 MIN: CPT | Performed by: FAMILY MEDICINE

## 2019-01-24 RX ORDER — INDOMETHACIN 50 MG/1
50 CAPSULE ORAL
Qty: 30 CAPSULE | Refills: 1 | Status: SHIPPED | OUTPATIENT
Start: 2019-01-24 | End: 2019-05-06

## 2019-01-24 NOTE — TELEPHONE ENCOUNTER
Called patient with Dr Rodriguez's recommendations. Placed order for lexicon stress test. Patient transferred to scheduling.       Recommend lexiscan stress test for CAD risk stratification.     Thanks  Dre

## 2019-01-24 NOTE — PATIENT INSTRUCTIONS
Patient Education     Gout    Gout is an inflammation of a joint due to a build-up of gout crystals in the joint fluid. This occurs when there is an excess of uric acid (a normal waste product) in the body. Uric acid builds up in the body when the kidneys are unable to filter enough of it from the blood. This may occur with age. It is also associated with kidney disease. Gout occurs more often in people with obesity, diabetes, high blood pressure, or high levels of fats in the blood. It may run in families. Gout tends to come and go. A flare up of gout is called an attack. Drinking alcohol or eating certain foods (such as shellfish or foods with additives such as high-fructose corn syrup) may increase uric acid levels in the blood and cause a gout attack.  During a gout attack, the affected joint may become a hot, red, swollen and painful. If you have had one attack of gout, you are likely to have another. An attack of gout can be treated with medicine. If these attacks become frequent, a daily medicine may be prescribed to help the kidneys remove uric acid from the body.  Home care  During a gout attack:    Rest painful joints. If gout affects the joints of your foot or leg, you may want to use crutches for the first few days to keep from bearing weight on the affected joint.    When sitting or lying down, raise the painful joint to a level higher than your heart.    Apply an ice pack (ice cubes in a plastic bag wrapped in a thin towel) over the injured area for 20 minutes every 1 to 2 hours the first day for pain relief. Continue this 3 to 4 times a day for swelling and pain.    Avoid alcohol and foods listed below (see Preventing attacks) during a gout attack. Drink extra fluid to help flush the uric acid through your kidneys.    If you were prescribed a medicine to treat gout, take it as your healthcare provider has instructed. Don't skip doses.    Take anti-inflammatory medicine as directed.     If pain  medicines have been prescribed, take them exactly as directed.    Preventing attacks    Minimize or avoid alcohol use. Excess alcohol intake can cause a gout attack.    Limit these foods and beverages:  ? Organ meats, such as kidneys and liver  ? Certain seafoods (anchovies, sardines, shrimp, scallops, herring, mackerel)  ? Wild game, meat extracts and meat gravies  ? Foods and beverages sweetened with high-fructose corn syrup, such as sodas    Eat a healthy diet including low-fat and nonfat dairy, whole grains, and vegetables.    If you are overweight, talk to your healthcare provider about a weight reduction plan. Avoid fasting or extreme low calorie diets (less than 900 calories per day). This will increase uric acid levels in the body.    If you have diabetes or high blood pressure, work with your doctor to manage these conditions.    Protect the joint from injury. Trauma can trigger a gout attack.  Follow-up care  Follow up with your healthcare provider, or as advised.   When to seek medical advice  Call your healthcare provider if you have any of the following:    Fever over 100.4 F (38. C) with worsening joint pain    Increasing redness around the joint    Pain developing in another joint    Repeated vomiting, abdominal pain, or blood in the vomit or stool (black or red color)  Date Last Reviewed: 3/1/2017    9574-9427 [employer]. [address] [place] [address], [place] [address]. All rights reserved. This information is not intended as a substitute for professional medical care. Always follow your healthcare professional's instructions.           Patient Education     Gout Diet  Gout is a painful condition caused by an excess of uric acid, a waste product made by the body. Uric acid forms crystals that collect in the joints. The immune response to these crystals brings on symptoms of joint pain and swelling. This is called a gout attack. Often, medications and diet changes are combined to manage gout.  Below are some guidelines for changing your diet to help you manage gout and prevent attacks. Your healthcare provider will help you determine the best eating plan for you.  Eating to manage gout  Weight loss for those who are overweight may help reduce gout attacks.  Eat less of these foods  Eating too many foods containing purines may raise the levels of uric acid in your body. This raises your risk for a gout attack. Try to limit these foods and drinks:    Alcohol, such as beer and red wine. You may be told to avoid alcohol completely.    Soft drinks that contain sugar or high fructose corn syrup    Certain fish, including anchovies, sardines, fish eggs, and herring    Shellfish    Certain meats, such as red meat, hot dogs, luncheon meats, and turkey    Organ meats, such as liver, kidneys, and sweetbreads    Legumes, such as dried beans and peas    Other high fat foods such as gravy, whole milk, and high fat cheeses    Vegetables such as asparagus, cauliflower, spinach, and mushrooms used to be thought to contribute to an increased risk for a gout attack, but recent studies show that high purine vegetables don't increase the risk for a gout attack.  Eat more of these foods  Other foods may be helpful for people with gout. Add some of these foods to your diet:    Cherries contain chemicals that may lower uric acid.    Omega fatty acids. These are found in some fatty fish such as salmon, certain oils (flax, olive, or nut), and nuts themselves. Omega fatty acids may help prevent inflammation due to gout.    Dairy products that are low-fat or fat-free, such as cheese and yogurt    Complex carbohydrate foods, including whole grains, brown rice, oats, and beans    Coffee, in moderation    Water, approximately 64 ounces per day  Follow-up care  Follow up with your healthcare provider as advised.  When to seek medical advice  Call your healthcare provider right away if any of these occur:    Return of gout symptoms,  usually at night:    Severe pain, swelling, and heat in a joint, especially the base of the big toe    Affected joint is hard to move    Skin of the affected joint is purple or red    Fever of 100.4 F (38 C) or higher    Pain that doesn't get better even with prescribed medicine   Date Last Reviewed: 6/1/2018 2000-2018 [employer]. [address] [place] [address]. All rights reserved. This information is not intended as a substitute for professional medical care. Always follow your healthcare professional's instructions.

## 2019-01-24 NOTE — PROGRESS NOTES
SUBJECTIVE:  Rupertojohanny Sanders, a 68 year old male scheduled an appointment to discuss the following issues:  Right ankle swelling    Medical, social, surgical, and family histories reviewed.    Urgent Care    Musculoskeletal Problem (R foot pain 2Xdays pain 5)     Right foot pain for 2 days, mainly at lateral malleolus an lateral aspect.  No fall/trauma.  No hx of gout or arthritis.  Non-smoker.  No alcohol use.  Pt is retired.  Did eat shrimp and seafood yesterday prior to onset.  No hx of diabetes or neuropathy.  No rash.      ROS:  See HPI.  No nausea/vomiting.  No fever/chills.  No chest pain/SOB.  No BM/urine problems.  No dizziness or syncope.      OBJECTIVE:  /80 (BP Location: Left arm, Patient Position: Sitting, Cuff Size: Adult Regular)   Pulse 64   Temp 96.5  F (35.8  C)   Resp 20   Wt 93 kg (205 lb)   SpO2 99%   BMI 34.11 kg/m    EXAM:  GENERAL APPEARANCE:  alert and no distress; afebrile, not septic; moist mucus membrane  EYES: Eyes grossly normal to inspection, PERRL and conjunctivae and sclerae normal  HENT: ear canals and TM's normal and nose and mouth without ulcers or lesions  NECK: no adenopathy, no asymmetry, masses, or scars and thyroid normal to palpation  RESP: lungs clear to auscultation - no rales, rhonchi or wheezes  CV: regular rates and rhythm, normal S1 S2, no S3 or S4 and no murmur, click or rub  MS: extremities normal- no gross deformities noted; some redness & swelling with increased warmth right lateral malleolus and its inferior aspect; no tenderness on palpation or induration; no lymphangitis; ROM ankle mildly decreased; slight limp on walking; neurovascularly intact bilateral upper and lower extremities  SKIN: no suspicious lesions or rashes  NEURO: Normal strength and tone, mentation intact and speech normal    ASSESSMENT/PLAN:  (M25.471) Right ankle swelling  (primary encounter diagnosis)  Comment:  Very high ESR; normal uric acid; slight leukocytosis;  Xray right ankle  showed: There is soft tissue swelling about the ankle. No fracture  is seen. The mortise is congruent. There is a plantar calcaneal spur.  Plan: CBC with platelets and differential, ESR:         Erythrocyte sedimentation rate, XR Ankle Right         G/E 3 Views, Uric acid     Indomethacin prescribed to treat presumptive gouty arthritis.       Pt to f/up PCP within 1 week fore recheck, sooner if no improvement or worsening.  Warning signs and symptoms explained---must be seen ASAP if worsening or systemic symptoms.

## 2019-02-06 ENCOUNTER — DOCUMENTATION ONLY (OUTPATIENT)
Dept: LAB | Facility: CLINIC | Age: 69
End: 2019-02-06

## 2019-02-06 DIAGNOSIS — R70.0 ELEVATED ERYTHROCYTE SEDIMENTATION RATE: ICD-10-CM

## 2019-02-06 DIAGNOSIS — M79.89 FOOT SWELLING: Primary | ICD-10-CM

## 2019-02-06 DIAGNOSIS — D64.9 ANEMIA, UNSPECIFIED TYPE: ICD-10-CM

## 2019-02-06 LAB
MDC_IDC_LEAD_IMPLANT_DT: NORMAL
MDC_IDC_LEAD_IMPLANT_DT: NORMAL
MDC_IDC_LEAD_LOCATION: NORMAL
MDC_IDC_LEAD_LOCATION: NORMAL
MDC_IDC_LEAD_LOCATION_DETAIL_1: NORMAL
MDC_IDC_LEAD_LOCATION_DETAIL_1: NORMAL
MDC_IDC_LEAD_MFG: NORMAL
MDC_IDC_LEAD_MFG: NORMAL
MDC_IDC_LEAD_MODEL: NORMAL
MDC_IDC_LEAD_MODEL: NORMAL
MDC_IDC_LEAD_POLARITY_TYPE: NORMAL
MDC_IDC_LEAD_POLARITY_TYPE: NORMAL
MDC_IDC_LEAD_SERIAL: NORMAL
MDC_IDC_LEAD_SERIAL: NORMAL
MDC_IDC_MSMT_BATTERY_STATUS: NORMAL
MDC_IDC_MSMT_LEADCHNL_RA_IMPEDANCE_VALUE: 601 OHM
MDC_IDC_MSMT_LEADCHNL_RA_PACING_THRESHOLD_AMPLITUDE: 0.6 V
MDC_IDC_MSMT_LEADCHNL_RA_PACING_THRESHOLD_PULSEWIDTH: 0.4 MS
MDC_IDC_MSMT_LEADCHNL_RA_SENSING_INTR_AMPL: 3 MV
MDC_IDC_MSMT_LEADCHNL_RV_IMPEDANCE_VALUE: 792 OHM
MDC_IDC_MSMT_LEADCHNL_RV_PACING_THRESHOLD_AMPLITUDE: 0.7 V
MDC_IDC_MSMT_LEADCHNL_RV_PACING_THRESHOLD_PULSEWIDTH: 0.4 MS
MDC_IDC_MSMT_LEADCHNL_RV_SENSING_INTR_AMPL: 15 MV
MDC_IDC_PG_IMPLANT_DTM: NORMAL
MDC_IDC_PG_MFG: NORMAL
MDC_IDC_PG_MODEL: NORMAL
MDC_IDC_PG_SERIAL: NORMAL
MDC_IDC_PG_TYPE: NORMAL
MDC_IDC_SESS_CLINIC_NAME: NORMAL
MDC_IDC_SESS_DTM: NORMAL
MDC_IDC_SESS_TYPE: NORMAL
MDC_IDC_SET_BRADY_AT_MODE_SWITCH_MODE: NORMAL
MDC_IDC_SET_BRADY_AT_MODE_SWITCH_RATE: 170 {BEATS}/MIN
MDC_IDC_SET_BRADY_HYSTRATE: NORMAL
MDC_IDC_SET_BRADY_LOWRATE: 60 {BEATS}/MIN
MDC_IDC_SET_BRADY_MAX_SENSOR_RATE: 130 {BEATS}/MIN
MDC_IDC_SET_BRADY_MAX_TRACKING_RATE: 130 {BEATS}/MIN
MDC_IDC_SET_BRADY_MODE: NORMAL
MDC_IDC_SET_BRADY_PAV_DELAY_HIGH: 150 MS
MDC_IDC_SET_BRADY_PAV_DELAY_LOW: 200 MS
MDC_IDC_SET_BRADY_SAV_DELAY_HIGH: 150 MS
MDC_IDC_SET_BRADY_SAV_DELAY_LOW: 200 MS
MDC_IDC_SET_LEADCHNL_RA_PACING_AMPLITUDE: 2 V
MDC_IDC_SET_LEADCHNL_RA_PACING_ANODE_ELECTRODE_1: NORMAL
MDC_IDC_SET_LEADCHNL_RA_PACING_ANODE_LOCATION_1: NORMAL
MDC_IDC_SET_LEADCHNL_RA_PACING_CAPTURE_MODE: NORMAL
MDC_IDC_SET_LEADCHNL_RA_PACING_CATHODE_ELECTRODE_1: NORMAL
MDC_IDC_SET_LEADCHNL_RA_PACING_CATHODE_LOCATION_1: NORMAL
MDC_IDC_SET_LEADCHNL_RA_PACING_POLARITY: NORMAL
MDC_IDC_SET_LEADCHNL_RA_PACING_PULSEWIDTH: 0.4 MS
MDC_IDC_SET_LEADCHNL_RA_SENSING_ADAPTATION_MODE: NORMAL
MDC_IDC_SET_LEADCHNL_RA_SENSING_ANODE_ELECTRODE_1: NORMAL
MDC_IDC_SET_LEADCHNL_RA_SENSING_ANODE_LOCATION_1: NORMAL
MDC_IDC_SET_LEADCHNL_RA_SENSING_CATHODE_ELECTRODE_1: NORMAL
MDC_IDC_SET_LEADCHNL_RA_SENSING_CATHODE_LOCATION_1: NORMAL
MDC_IDC_SET_LEADCHNL_RA_SENSING_POLARITY: NORMAL
MDC_IDC_SET_LEADCHNL_RA_SENSING_SENSITIVITY: 0.25 MV
MDC_IDC_SET_LEADCHNL_RV_PACING_AMPLITUDE: 1.4 V
MDC_IDC_SET_LEADCHNL_RV_PACING_ANODE_ELECTRODE_1: NORMAL
MDC_IDC_SET_LEADCHNL_RV_PACING_ANODE_LOCATION_1: NORMAL
MDC_IDC_SET_LEADCHNL_RV_PACING_CAPTURE_MODE: NORMAL
MDC_IDC_SET_LEADCHNL_RV_PACING_CATHODE_ELECTRODE_1: NORMAL
MDC_IDC_SET_LEADCHNL_RV_PACING_CATHODE_LOCATION_1: NORMAL
MDC_IDC_SET_LEADCHNL_RV_PACING_POLARITY: NORMAL
MDC_IDC_SET_LEADCHNL_RV_PACING_PULSEWIDTH: 0.4 MS
MDC_IDC_SET_LEADCHNL_RV_SENSING_ADAPTATION_MODE: NORMAL
MDC_IDC_SET_LEADCHNL_RV_SENSING_ANODE_ELECTRODE_1: NORMAL
MDC_IDC_SET_LEADCHNL_RV_SENSING_ANODE_LOCATION_1: NORMAL
MDC_IDC_SET_LEADCHNL_RV_SENSING_CATHODE_ELECTRODE_1: NORMAL
MDC_IDC_SET_LEADCHNL_RV_SENSING_CATHODE_LOCATION_1: NORMAL
MDC_IDC_SET_LEADCHNL_RV_SENSING_POLARITY: NORMAL
MDC_IDC_SET_LEADCHNL_RV_SENSING_SENSITIVITY: 1.5 MV
MDC_IDC_SET_ZONE_DETECTION_INTERVAL: 375 MS
MDC_IDC_SET_ZONE_TYPE: NORMAL
MDC_IDC_SET_ZONE_VENDOR_TYPE: NORMAL
MDC_IDC_STAT_EPISODE_RECENT_COUNT: 3
MDC_IDC_STAT_EPISODE_RECENT_COUNT_DTM_END: NORMAL
MDC_IDC_STAT_EPISODE_RECENT_COUNT_DTM_START: NORMAL
MDC_IDC_STAT_EPISODE_TOTAL_COUNT: 3
MDC_IDC_STAT_EPISODE_TOTAL_COUNT_DTM_END: NORMAL
MDC_IDC_STAT_EPISODE_TYPE: NORMAL
MDC_IDC_STAT_EPISODE_TYPE: NORMAL
MDC_IDC_STAT_EPISODE_VENDOR_TYPE: NORMAL
MDC_IDC_STAT_EPISODE_VENDOR_TYPE: NORMAL

## 2019-02-07 DIAGNOSIS — M79.89 FOOT SWELLING: ICD-10-CM

## 2019-02-07 LAB
ANION GAP SERPL CALCULATED.3IONS-SCNC: 5 MMOL/L (ref 3–14)
BUN SERPL-MCNC: 22 MG/DL (ref 7–30)
CALCIUM SERPL-MCNC: 8.9 MG/DL (ref 8.5–10.1)
CHLORIDE SERPL-SCNC: 103 MMOL/L (ref 94–109)
CO2 SERPL-SCNC: 24 MMOL/L (ref 20–32)
CREAT SERPL-MCNC: 1.23 MG/DL (ref 0.66–1.25)
ERYTHROCYTE [DISTWIDTH] IN BLOOD BY AUTOMATED COUNT: 14.7 % (ref 10–15)
ERYTHROCYTE [SEDIMENTATION RATE] IN BLOOD BY WESTERGREN METHOD: 65 MM/H (ref 0–20)
GFR SERPL CREATININE-BSD FRML MDRD: 59 ML/MIN/{1.73_M2}
GLUCOSE SERPL-MCNC: 103 MG/DL (ref 70–99)
HCT VFR BLD AUTO: 33.4 % (ref 40–53)
HGB BLD-MCNC: 10.6 G/DL (ref 13.3–17.7)
MCH RBC QN AUTO: 27.5 PG (ref 26.5–33)
MCHC RBC AUTO-ENTMCNC: 31.7 G/DL (ref 31.5–36.5)
MCV RBC AUTO: 87 FL (ref 78–100)
PLATELET # BLD AUTO: 277 10E9/L (ref 150–450)
POTASSIUM SERPL-SCNC: 4.5 MMOL/L (ref 3.4–5.3)
RBC # BLD AUTO: 3.85 10E12/L (ref 4.4–5.9)
SODIUM SERPL-SCNC: 132 MMOL/L (ref 133–144)
WBC # BLD AUTO: 8 10E9/L (ref 4–11)

## 2019-02-07 PROCEDURE — 85652 RBC SED RATE AUTOMATED: CPT | Performed by: INTERNAL MEDICINE

## 2019-02-07 PROCEDURE — 36415 COLL VENOUS BLD VENIPUNCTURE: CPT | Performed by: INTERNAL MEDICINE

## 2019-02-07 PROCEDURE — 80048 BASIC METABOLIC PNL TOTAL CA: CPT | Performed by: INTERNAL MEDICINE

## 2019-02-07 PROCEDURE — 85027 COMPLETE CBC AUTOMATED: CPT | Performed by: INTERNAL MEDICINE

## 2019-02-11 ENCOUNTER — OFFICE VISIT (OUTPATIENT)
Dept: INTERNAL MEDICINE | Facility: CLINIC | Age: 69
End: 2019-02-11
Payer: COMMERCIAL

## 2019-02-11 VITALS
DIASTOLIC BLOOD PRESSURE: 81 MMHG | RESPIRATION RATE: 16 BRPM | BODY MASS INDEX: 33.61 KG/M2 | HEIGHT: 65 IN | WEIGHT: 201.7 LBS | OXYGEN SATURATION: 97 % | SYSTOLIC BLOOD PRESSURE: 159 MMHG | TEMPERATURE: 97.8 F | HEART RATE: 88 BPM

## 2019-02-11 DIAGNOSIS — E78.5 HYPERLIPIDEMIA LDL GOAL <100: Primary | ICD-10-CM

## 2019-02-11 DIAGNOSIS — I10 BENIGN ESSENTIAL HYPERTENSION: ICD-10-CM

## 2019-02-11 DIAGNOSIS — R70.0 ELEVATED ERYTHROCYTE SEDIMENTATION RATE: ICD-10-CM

## 2019-02-11 DIAGNOSIS — D64.9 ANEMIA, UNSPECIFIED TYPE: ICD-10-CM

## 2019-02-11 PROCEDURE — 84165 PROTEIN E-PHORESIS SERUM: CPT | Performed by: INTERNAL MEDICINE

## 2019-02-11 PROCEDURE — 00000402 ZZHCL STATISTIC TOTAL PROTEIN: Performed by: INTERNAL MEDICINE

## 2019-02-11 PROCEDURE — 84166 PROTEIN E-PHORESIS/URINE/CSF: CPT | Performed by: INTERNAL MEDICINE

## 2019-02-11 PROCEDURE — 36415 COLL VENOUS BLD VENIPUNCTURE: CPT | Performed by: INTERNAL MEDICINE

## 2019-02-11 PROCEDURE — 99214 OFFICE O/P EST MOD 30 MIN: CPT | Performed by: INTERNAL MEDICINE

## 2019-02-11 ASSESSMENT — MIFFLIN-ST. JEOR: SCORE: 1606.79

## 2019-02-11 NOTE — PROGRESS NOTES
SUBJECTIVE:   Angel Sanders is a 68 year old male who presents to clinic today for the following health issues:    No acute complaints, no medication change or new medical conditions.      Hyperlipidemia Follow-Up  Has H/O hyperlipidemia. On medical treatment and diet. No side effects. No muscle weakness, myalgias or upset stomach.       Rate your low fat/cholesterol diet?: good    Taking statin?  Yes, no muscle aches from statin    Other lipid medications/supplements?:  none    Hypertension Follow-up  Has h/o HTN. on medical treatment. BP has been controlled. No side effects from medications. No CP, HA, dizziness. good compliance with medications and low salt diet.  Has not taken his BP medications today     Outpatient blood pressures are not being checked.    Low Salt Diet: not monitoring salt      Amount of exercise or physical activity: 2-3 days/week for an average of 15-30 minutes    Problems taking medications regularly: No    Medication side effects: none    Diet: regular (no restriction).    Pt also need to discuss about his lab result.    PROBLEMS TO ADD ON...    Patient has anemia with mild CKD - stage 3. Has been tested for iron , B12, folate deficiency, normal, has had elevated ESR on recent check for right ankle pain , had elevated ESR to 65. Pain in the ankle has resolved.     Problem list and histories reviewed & adjusted, as indicated.  Additional history: as documented    Patient Active Problem List   Diagnosis     Coronary atherosclerosis     Cerebral infarction due to occlusion or stenosis of carotid artery     CARDIOVASCULAR SCREENING; LDL GOAL LESS THAN 100     Heel pain     Hyperlipidemia LDL goal <100     Anemia     Benign essential hypertension     Obesity (BMI 35.0-39.9) with comorbidity (H)     Complete heart block (H)     Past Surgical History:   Procedure Laterality Date     ARTHROTOMY SHOULDER, ROTATOR CUFF REPAIR, COMBINED  10/23/2013    Procedure: COMBINED ARTHROTOMY SHOULDER,  ROTATOR CUFF REPAIR;  Left Shoulder Open Decompression, Distal Clavical Resection, Rotator Cuff Repair , Bicep Tenolysis, and Bicep Tenodesis   ;  Surgeon: Zhang Mattson MD;  Location: [facility] OR     C NONSPECIFIC PROCEDURE      RCA stent; see [facility]     COLONOSCOPY N/A 5/15/2018    Procedure: COLONOSCOPY;  COLONOSCOPY ;  Surgeon: Cory Hernandez MD;  Location:  GI     PHACOEMULSIFICATION CLEAR CORNEA WITH STANDARD INTRAOCULAR LENS IMPLANT  2013    Procedure: PHACOEMULSIFICATION CLEAR CORNEA WITH STANDARD INTRAOCULAR LENS IMPLANT;  RIGHT PHACOEMULSIFICATION CLEAR CORNEA WITH STANDARD INTRAOCULAR LENS IMPLANT ;  Surgeon: Rikki Reddy MD;  Location: [facility]     PHACOEMULSIFICATION CLEAR CORNEA WITH STANDARD INTRAOCULAR LENS IMPLANT  12/3/2013    Procedure: PHACOEMULSIFICATION CLEAR CORNEA WITH STANDARD INTRAOCULAR LENS IMPLANT;  LEFT PHACOEMULSIFICATION CLEAR CORNEA WITH STANDARD INTRAOCULAR LENS IMPLANT;  Surgeon: Rikki Reddy MD;  Location: [facility]       Social History     Tobacco Use     Smoking status: Former Smoker     Packs/day: 1.00     Years: 40.00     Pack years: 40.00     Types: Cigarettes     Last attempt to quit: 2008     Years since quittin.1     Smokeless tobacco: Never Used   Substance Use Topics     Alcohol use: No     Alcohol/week: 0.0 oz     Comment: Sober for 25 years     Family History   Problem Relation Age of Onset     Breast Cancer Mother      Heart Disease Father 74         of heart failure     Colon Cancer No family hx of          Current Outpatient Medications   Medication Sig Dispense Refill     ASPIRIN 81 MG OR TABS 1 TABLET DAILY       atorvastatin (LIPITOR) 80 MG tablet Take 1 tablet (80 mg) by mouth daily 90 tablet 4     Docusate Calcium (STOOL SOFTENER PO) Take 100 mg by mouth daily        Ferrous Sulfate (IRON SUPPLEMENT PO) Take 325 mg by mouth daily (with breakfast)        folic acid-vit B6-vit B12 (FOLGARD) 0.8-10-0.115 MG TABS Take 1 tablet by  "mouth daily       lisinopril (PRINIVIL/ZESTRIL) 5 MG tablet Take 1 tablet (5 mg) by mouth daily 90 tablet 4     metoprolol tartrate (LOPRESSOR) 25 MG tablet Take 1 tablet (25 mg) by mouth 2 times daily 180 tablet 4     multivitamin, therapeutic with minerals (MULTI-VITAMIN) TABS tablet Take 1 tablet by mouth daily       indomethacin (INDOCIN) 50 MG capsule Take 1 capsule (50 mg) by mouth 3 times daily (with meals) (Patient not taking: Reported on 2/11/2019) 30 capsule 1     nitroGLYcerin (NITROSTAT) 0.4 MG sublingual tablet Place 1 tablet (0.4 mg) under the tongue every 5 minutes as needed May repeat X 2 and if chest pain persists, call 911 (Patient not taking: Reported on 2/11/2019) 25 tablet 2       Reviewed and updated as needed this visit by clinical staff  Tobacco  Allergies  Meds  Med Hx  Surg Hx  Fam Hx  Soc Hx      Reviewed and updated as needed this visit by Provider         ROS:  Constitutional, HEENT, cardiovascular, pulmonary, gi and gu systems are negative, except as otherwise noted.    OBJECTIVE:     /81 (BP Location: Left arm, Patient Position: Sitting, Cuff Size: Adult Regular)   Pulse 88   Temp 97.8  F (36.6  C) (Oral)   Resp 16   Ht 1.651 m (5' 5\")   Wt 91.5 kg (201 lb 11.2 oz)   SpO2 97%   BMI 33.56 kg/m    Body mass index is 33.56 kg/m .   GENERALobese, alert and no distress  EYES: Eyes grossly normal to inspection, PERRL and conjunctivae and sclerae normal  HENT: ear canals and TM's normal, nose and mouth without ulcers or lesions  NECK: no adenopathy, no asymmetry, masses, or scars and thyroid normal to palpation  RESP: lungs clear to auscultation - no rales, rhonchi or wheezes  CV: regular rate and rhythm, normal S1 S2, no S3 or S4, no murmur, click or rub, no peripheral edema and peripheral pulses strong  ABDOMEN: soft, nontender, no hepatosplenomegaly, no masses and bowel sounds normal  MS: no gross musculoskeletal defects noted, no edema  SKIN: no suspicious lesions or " rashes  NEURO: Normal strength and tone, mentation intact and speech normal    Diagnostic Test Results:  none     ASSESSMENT/PLAN:     Problem List Items Addressed This Visit     Hyperlipidemia LDL goal <100 - Primary    Anemia    Benign essential hypertension      Other Visit Diagnoses     Elevated erythrocyte sedimentation rate               Assess for possible MM  Consider hematology assessment   Cont treatment   Keep diet, exercise, weight loss     Follow-Up:in 3 months     Tony Peter MD  [facility]

## 2019-02-12 LAB
ALBUMIN SERPL ELPH-MCNC: 3.7 G/DL (ref 3.7–5.1)
ALPHA1 GLOB SERPL ELPH-MCNC: 0.4 G/DL (ref 0.2–0.4)
ALPHA2 GLOB SERPL ELPH-MCNC: 0.9 G/DL (ref 0.5–0.9)
B-GLOBULIN SERPL ELPH-MCNC: 0.9 G/DL (ref 0.6–1)
GAMMA GLOB SERPL ELPH-MCNC: 1.8 G/DL (ref 0.7–1.6)
M PROTEIN SERPL ELPH-MCNC: 0 G/DL
PROT PATTERN SERPL ELPH-IMP: ABNORMAL

## 2019-02-13 DIAGNOSIS — I10 BENIGN ESSENTIAL HYPERTENSION: ICD-10-CM

## 2019-02-13 DIAGNOSIS — D64.9 ANEMIA, UNSPECIFIED TYPE: ICD-10-CM

## 2019-02-13 DIAGNOSIS — E78.5 HYPERLIPIDEMIA LDL GOAL <100: ICD-10-CM

## 2019-02-13 DIAGNOSIS — R70.0 ELEVATED ERYTHROCYTE SEDIMENTATION RATE: ICD-10-CM

## 2019-02-13 LAB
COLLECT DURATION TIME UR: 24 H
SPECIMEN VOL UR: 1550 ML

## 2019-02-13 PROCEDURE — 81050 URINALYSIS VOLUME MEASURE: CPT | Performed by: INTERNAL MEDICINE

## 2019-02-13 PROCEDURE — 84166 PROTEIN E-PHORESIS/URINE/CSF: CPT | Performed by: INTERNAL MEDICINE

## 2019-02-14 LAB
ALBUMIN MFR UR ELPH: 64.5 %
ALPHA1 GLOB MFR UR ELPH: 4.1 %
ALPHA2 GLOB MFR UR ELPH: 4.5 %
B-GLOBULIN MFR UR ELPH: 9.1 %
GAMMA GLOB MFR UR ELPH: 17.8 %
M PROTEIN MFR UR ELPH: 0 %
PROT PATTERN UR ELPH-IMP: ABNORMAL

## 2019-02-15 DIAGNOSIS — D64.9 ANEMIA, UNSPECIFIED TYPE: ICD-10-CM

## 2019-02-15 DIAGNOSIS — R80.9 PROTEINURIA, UNSPECIFIED TYPE: Primary | ICD-10-CM

## 2019-02-20 DIAGNOSIS — D64.9 ANEMIA, UNSPECIFIED TYPE: ICD-10-CM

## 2019-02-20 DIAGNOSIS — R80.9 PROTEINURIA, UNSPECIFIED TYPE: ICD-10-CM

## 2019-02-20 PROCEDURE — 82784 ASSAY IGA/IGD/IGG/IGM EACH: CPT | Performed by: INTERNAL MEDICINE

## 2019-02-20 PROCEDURE — 86335 IMMUNFIX E-PHORSIS/URINE/CSF: CPT | Performed by: INTERNAL MEDICINE

## 2019-02-20 PROCEDURE — 86334 IMMUNOFIX E-PHORESIS SERUM: CPT | Performed by: INTERNAL MEDICINE

## 2019-02-20 PROCEDURE — 36415 COLL VENOUS BLD VENIPUNCTURE: CPT | Performed by: INTERNAL MEDICINE

## 2019-02-21 LAB
IGA SERPL-MCNC: 358 MG/DL (ref 70–380)
IGG SERPL-MCNC: 1810 MG/DL (ref 695–1620)
IGM SERPL-MCNC: 31 MG/DL (ref 60–265)
PROT ELPH PNL UR ELPH: NORMAL
PROT PATTERN SERPL IFE-IMP: ABNORMAL

## 2019-02-26 NOTE — PROGRESS NOTES
HPI:  Angel Sanders is a 68 year old male who presents for after having a permanent pacemaker implanted on 11/26/18.  He is a patient of Dr. Kruse. Angel Sanders's medical history includes:    A.  Coronary artery disease with 3 JUANCARLOS to the RCA (2008).  At that time his LAD had 40% proximal, 30% mid and 50% distal stenoses.  Circumflex had 10%-20% proximal stenosis.  OM1 had 25% stenosis.   He had a negative stress test in 2012  B.  Paroxysmal complete AV heart block with syncope S/P [employer] dual-chamber permanent pacemaker.  C.  Left internal carotid artery stenting in 2009. Yearly ultrasounds  D.  Hypertension. controlled  E.  Obesity  F.  Anemia with stage III CKD     Diagnostics:  ECHO (11/26/18) reveals EF 60-70% with early diastolic dysfunction  Device check (1/23/2019) revealed 17% A-Paced, V-paced 1%, 12 seconds of NSVT at 150-180 bpm.  1 episode of SVT lasting 23 seconds at 160 bpm.   Dr. Kruse recommended a Lexiscan for risk stratification.    Today Angel Sanders presents feeling well.  He has not had any dizziness or lightheadedness since his pacemaker was placed.  He does admit that he has low exercise tolerance because he does not regular exercise and he is overweight.  However at this time he denies chest pain or pressure, dizziness, dyspnea at rest or with exertion, palpitations, orthopnea, PND, abdominal pain, abdominal edema or edema.        ASSESSMENT AND PLAN    Paroxysmal complete AV heart block with syncope S/P [employer] dual-chamber permanent pacemaker.  His last device check revealed nonsustained VT lasting 23 seconds at the rate of 180 bpm.  Dr. Kruse recommended a Lexiscan which is scheduled prior to seeing him in March 2018.    Coronary artery disease.  Patient had 3 JUANCARLOS to the RCA (2008) and he has known carotid artery disease.  He is asymptomatic however he did have an SVT as described above.  I did increase his metoprolol tartrate to 50 mg twice daily.  He will continue  on aspirin 81 mg and atorvastatin 80 mg daily and he has nitroglycerin sub-lingual.  He will proceed to stress test and we discussed rationale.     Hypertension.  His last 2 office visits his systolic blood pressure has been above 150 mmHg. I increased his metoprolol tartrate to 50 mg twice daily.  He may need to have amlodipine started.  His PCP is evaluating his kidney function at this time therefore did not increase his lisinopril.    Recommendations:    Increase metoprolol tartrate to 50 mg twice daily    Follow-up with a nuclear stress test as ordered by Dr. Kruse    Follow-up with a carotid ultrasound as ordered by Dr. Kruse    Follow-up with Dr. Kruse on 3/2019    Follow-up with device clinic as ordered by the home which is usually every 3 months    Patient expresses understanding and agreement with the plan.     I appreciate the chance to help with Angel Sanders Please let me know if you have any questions or concerns.    Penelope Potter, APRN, CNP    This note was completed in part using Dragon voice recognition software. Although reviewed after completion, some word and grammatical errors may occur.    No orders of the defined types were placed in this encounter.    Orders Placed This Encounter   Medications     metoprolol tartrate (LOPRESSOR) 50 MG tablet     Sig: Take 1 tablet (50 mg) by mouth 2 times daily     Dispense:  60 tablet     Refill:  11     Medications Discontinued During This Encounter   Medication Reason     metoprolol tartrate (LOPRESSOR) 25 MG tablet          Encounter Diagnoses   Name Primary?     Complete heart block (H)      Cardiac pacemaker in situ      Essential hypertension, benign      NSVT (nonsustained ventricular tachycardia) (H) Yes       CURRENT MEDICATIONS:  Current Outpatient Medications   Medication Sig Dispense Refill     ASPIRIN 81 MG OR TABS 1 TABLET DAILY       atorvastatin (LIPITOR) 80 MG tablet Take 1 tablet (80 mg) by mouth daily 90 tablet 4     Docusate Calcium (STOOL  SOFTENER PO) Take 100 mg by mouth daily        Ferrous Sulfate (IRON SUPPLEMENT PO) Take 325 mg by mouth daily (with breakfast)        folic acid-vit B6-vit B12 (FOLGARD) 0.8-10-0.115 MG TABS Take 1 tablet by mouth daily       lisinopril (PRINIVIL/ZESTRIL) 5 MG tablet Take 1 tablet (5 mg) by mouth daily 90 tablet 4     metoprolol tartrate (LOPRESSOR) 50 MG tablet Take 1 tablet (50 mg) by mouth 2 times daily 60 tablet 11     multivitamin, therapeutic with minerals (MULTI-VITAMIN) TABS tablet Take 1 tablet by mouth daily       nitroGLYcerin (NITROSTAT) 0.4 MG sublingual tablet Place 1 tablet (0.4 mg) under the tongue every 5 minutes as needed May repeat X 2 and if chest pain persists, call 911 25 tablet 2     indomethacin (INDOCIN) 50 MG capsule Take 1 capsule (50 mg) by mouth 3 times daily (with meals) (Patient not taking: Reported on 2/11/2019) 30 capsule 1       ALLERGIES     Allergies   Allergen Reactions     Adhesive Tape Rash       PAST MEDICAL HISTORY:  Past Medical History:   Diagnosis Date     Chronic renal insufficiency      Complete AV block (H)      Coronary atherosclerosis of unspecified type of vessel, native or graft 2008    Acute inf MI; RCA stent; followed by Cardiology     Diverticulosis      Herniated cervical disc      Hyperlipidaemia      Hypertension      Occlusion and stenosis of carotid artery without mention of cerebral infarction 2008    50-69% stenosis of left ICA       PAST SURGICAL HISTORY:  Past Surgical History:   Procedure Laterality Date     ARTHROTOMY SHOULDER, ROTATOR CUFF REPAIR, COMBINED  10/23/2013    Procedure: COMBINED ARTHROTOMY SHOULDER, ROTATOR CUFF REPAIR;  Left Shoulder Open Decompression, Distal Clavical Resection, Rotator Cuff Repair , Bicep Tenolysis, and Bicep Tenodesis   ;  Surgeon: Zhang Mattson MD;  Location: [facility] OR     C NONSPECIFIC PROCEDURE  2008    RCA stent; see [facility]     COLONOSCOPY N/A 5/15/2018    Procedure: COLONOSCOPY;  COLONOSCOPY ;  Surgeon: David  Cory GOLDSMITH MD;  Location: [facility]     PHACOEMULSIFICATION CLEAR CORNEA WITH STANDARD INTRAOCULAR LENS IMPLANT  2013    Procedure: PHACOEMULSIFICATION CLEAR CORNEA WITH STANDARD INTRAOCULAR LENS IMPLANT;  RIGHT PHACOEMULSIFICATION CLEAR CORNEA WITH STANDARD INTRAOCULAR LENS IMPLANT ;  Surgeon: Rikki Reddy MD;  Location: [facility]     PHACOEMULSIFICATION CLEAR CORNEA WITH STANDARD INTRAOCULAR LENS IMPLANT  12/3/2013    Procedure: PHACOEMULSIFICATION CLEAR CORNEA WITH STANDARD INTRAOCULAR LENS IMPLANT;  LEFT PHACOEMULSIFICATION CLEAR CORNEA WITH STANDARD INTRAOCULAR LENS IMPLANT;  Surgeon: Rikki Reddy MD;  Location: [facility]       FAMILY HISTORY:  Family History   Problem Relation Age of Onset     Breast Cancer Mother      Heart Disease Father 74         of heart failure     Colon Cancer No family hx of        SOCIAL HISTORY:  Social History     Socioeconomic History     Marital status:      Spouse name: None     Number of children: None     Years of education: None     Highest education level: None   Occupational History     None   Social Needs     Financial resource strain: None     Food insecurity:     Worry: None     Inability: None     Transportation needs:     Medical: None     Non-medical: None   Tobacco Use     Smoking status: Former Smoker     Packs/day: 1.00     Years: 40.00     Pack years: 40.00     Types: Cigarettes     Last attempt to quit: 2008     Years since quittin.1     Smokeless tobacco: Never Used   Substance and Sexual Activity     Alcohol use: No     Alcohol/week: 0.0 oz     Comment: Sober for 25 years     Drug use: No     Sexual activity: Yes     Partners: Female   Lifestyle     Physical activity:     Days per week: None     Minutes per session: None     Stress: None   Relationships     Social connections:     Talks on phone: None     Gets together: None     Attends Catholic service: None     Active member of club or organization: None     Attends meetings of  "clubs or organizations: None     Relationship status: None     Intimate partner violence:     Fear of current or ex partner: None     Emotionally abused: None     Physically abused: None     Forced sexual activity: None   Other Topics Concern     Parent/sibling w/ CABG, MI or angioplasty before 65F 55M? Not Asked      Service Not Asked     Blood Transfusions Not Asked     Caffeine Concern No     Occupational Exposure No     Hobby Hazards No     Sleep Concern No     Stress Concern No     Weight Concern Yes     Comment: overweight     Special Diet Yes     Comment: low salt and low cholesterol     Back Care No     Exercise Yes     Comment: 3-4 x week walking     Bike Helmet Not Asked     Seat Belt Yes     Self-Exams Not Asked   Social History Narrative     None       Review of Systems:  Skin:  Negative     Eyes:  Negative    ENT:  Negative    Respiratory:  Positive for dyspnea on exertion  Cardiovascular:  Negative    Gastroenterology: Negative    Genitourinary:       Musculoskeletal:  Positive for arthritis  Neurologic:  Negative    Psychiatric:  Negative    Heme/Lymph/Imm:  Negative    Endocrine:  Negative      Physical Exam:  Vitals: /80 (BP Location: Right arm, Patient Position: Sitting, Cuff Size: Adult Regular)   Pulse 64   Ht 1.651 m (5' 5\")   Wt 91.9 kg (202 lb 11.2 oz)   BMI 33.73 kg/m      Constitutional:  cooperative, alert and oriented, well developed, well nourished, in no acute distress obese      Skin:  warm and dry to the touch        Head:  normocephalic, no masses or lesions        Eyes:  pupils equal and round        ENT:  no pallor or cyanosis, dentition good        Neck:  JVP normal        Chest:  clear to auscultation        Cardiac: regular rhythm                  Abdomen:  BS normoactive;abdomen soft obese      Vascular: pulses full and equal                                      Extremities and Back:  no deformities, clubbing, cyanosis, erythema observed;no edema    "     Neurological:  no gross motor deficits          Recent Lab Results:  LIPID RESULTS:  Lab Results   Component Value Date    CHOL 95 11/14/2018    HDL 40 11/14/2018    LDL 42 11/14/2018    TRIG 66 11/14/2018    CHOLHDLRATIO 2.6 09/23/2015       LIVER ENZYME RESULTS:  Lab Results   Component Value Date    AST 38 11/14/2018    ALT 31 11/14/2018       CBC RESULTS:  Lab Results   Component Value Date    WBC 8.0 02/07/2019    RBC 3.85 (L) 02/07/2019    HGB 10.6 (L) 02/07/2019    HCT 33.4 (L) 02/07/2019    MCV 87 02/07/2019    MCH 27.5 02/07/2019    MCHC 31.7 02/07/2019    RDW 14.7 02/07/2019     02/07/2019       BMP RESULTS:  Lab Results   Component Value Date     (L) 02/07/2019    POTASSIUM 4.5 02/07/2019    CHLORIDE 103 02/07/2019    CO2 24 02/07/2019    ANIONGAP 5 02/07/2019     (H) 02/07/2019    BUN 22 02/07/2019    CR 1.23 02/07/2019    GFRESTIMATED 59 (L) 02/07/2019    GFRESTBLACK 69 02/07/2019    MARIA ALEJANDRA 8.9 02/07/2019        A1C RESULTS:  Lab Results   Component Value Date    A1C 5.5 11/26/2018       INR RESULTS:  Lab Results   Component Value Date    INR 0.98 11/26/2018    INR 0.90 09/02/2009           CC  Penelope Potter, APRN CNP  [address]  [place] [address]

## 2019-02-27 ENCOUNTER — OFFICE VISIT (OUTPATIENT)
Dept: CARDIOLOGY | Facility: CLINIC | Age: 69
End: 2019-02-27
Attending: NURSE PRACTITIONER
Payer: COMMERCIAL

## 2019-02-27 VITALS
WEIGHT: 202.7 LBS | SYSTOLIC BLOOD PRESSURE: 158 MMHG | BODY MASS INDEX: 33.77 KG/M2 | DIASTOLIC BLOOD PRESSURE: 80 MMHG | HEART RATE: 64 BPM | HEIGHT: 65 IN

## 2019-02-27 DIAGNOSIS — I10 ESSENTIAL HYPERTENSION, BENIGN: ICD-10-CM

## 2019-02-27 DIAGNOSIS — Z95.0 CARDIAC PACEMAKER IN SITU: ICD-10-CM

## 2019-02-27 DIAGNOSIS — I47.29 NSVT (NONSUSTAINED VENTRICULAR TACHYCARDIA) (H): ICD-10-CM

## 2019-02-27 DIAGNOSIS — I44.2 COMPLETE HEART BLOCK (H): Primary | ICD-10-CM

## 2019-02-27 PROCEDURE — 99214 OFFICE O/P EST MOD 30 MIN: CPT | Performed by: NURSE PRACTITIONER

## 2019-02-27 RX ORDER — METOPROLOL TARTRATE 50 MG
50 TABLET ORAL 2 TIMES DAILY
Qty: 60 TABLET | Refills: 11 | Status: SHIPPED | OUTPATIENT
Start: 2019-02-27 | End: 2019-03-26

## 2019-02-27 ASSESSMENT — MIFFLIN-ST. JEOR: SCORE: 1611.32

## 2019-02-27 NOTE — LETTER
2/27/2019    Tony Peter MD  [address] [facility] [address]    RE: Angel Sanders       Dear Colleague,    I had the pleasure of seeing Angel Sanders in the [facility].    HPI:  Angel Sanders is a 68 year old male who presents for after having a permanent pacemaker implanted on 11/26/18.  He is a patient of Dr. Kruse. Angel Sanders's medical history includes:    A.  Coronary artery disease with 3 JUANCARLOS to the RCA (2008).  At that time his LAD had 40% proximal, 30% mid and 50% distal stenoses.  Circumflex had 10%-20% proximal stenosis.  OM1 had 25% stenosis.   He had a negative stress test in 2012  B.  Paroxysmal complete AV heart block with syncope S/P [employer] dual-chamber permanent pacemaker.  C.  Left internal carotid artery stenting in 2009. Yearly ultrasounds  D.  Hypertension. controlled  E.  Obesity  F.  Anemia with stage III CKD     Diagnostics:  ECHO (11/26/18) reveals EF 60-70% with early diastolic dysfunction  Device check (1/23/2019) revealed 17% A-Paced, V-paced 1%, 12 seconds of NSVT at 150-180 bpm.  1 episode of SVT lasting 23 seconds at 160 bpm.   Dr. Kruse recommended a Lexiscan for risk stratification.    Today Angel Sanders presents feeling well.  He has not had any dizziness or lightheadedness since his pacemaker was placed.  He does admit that he has low exercise tolerance because he does not regular exercise and he is overweight.  However at this time he denies chest pain or pressure, dizziness, dyspnea at rest or with exertion, palpitations, orthopnea, PND, abdominal pain, abdominal edema or edema.        ASSESSMENT AND PLAN    Paroxysmal complete AV heart block with syncope S/P [employer] dual-chamber permanent pacemaker.  His last device check revealed nonsustained VT lasting 23 seconds at the rate of 180 bpm.  Dr. Kruse recommended a Lexiscan which is scheduled prior to seeing him in March 2018.    Coronary artery disease.   Patient had 3 JUANCARLOS to the RCA (2008) and he has known carotid artery disease.  He is asymptomatic however he did have an SVT as described above.  I did increase his metoprolol tartrate to 50 mg twice daily.  He will continue on aspirin 81 mg and atorvastatin 80 mg daily and he has nitroglycerin sub-lingual.  He will proceed to stress test and we discussed rationale.     Hypertension.  His last 2 office visits his systolic blood pressure has been above 150 mmHg. I increased his metoprolol tartrate to 50 mg twice daily.  He may need to have amlodipine started.  His PCP is evaluating his kidney function at this time therefore did not increase his lisinopril.    Recommendations:    Increase metoprolol tartrate to 50 mg twice daily    Follow-up with a nuclear stress test as ordered by Dr. Kruse    Follow-up with a carotid ultrasound as ordered by Dr. Kruse    Follow-up with Dr. Kruse on 3/2019    Follow-up with device clinic as ordered by the home which is usually every 3 months    Patient expresses understanding and agreement with the plan.     I appreciate the chance to help with Angel Sanders Please let me know if you have any questions or concerns.    Penelope Potter, KARLEY, CNP    This note was completed in part using Dragon voice recognition software. Although reviewed after completion, some word and grammatical errors may occur.    No orders of the defined types were placed in this encounter.    Orders Placed This Encounter   Medications     metoprolol tartrate (LOPRESSOR) 50 MG tablet     Sig: Take 1 tablet (50 mg) by mouth 2 times daily     Dispense:  60 tablet     Refill:  11     Medications Discontinued During This Encounter   Medication Reason     metoprolol tartrate (LOPRESSOR) 25 MG tablet          Encounter Diagnoses   Name Primary?     Complete heart block (H)      Cardiac pacemaker in situ      Essential hypertension, benign      NSVT (nonsustained ventricular tachycardia) (H) Yes       CURRENT  MEDICATIONS:  Current Outpatient Medications   Medication Sig Dispense Refill     ASPIRIN 81 MG OR TABS 1 TABLET DAILY       atorvastatin (LIPITOR) 80 MG tablet Take 1 tablet (80 mg) by mouth daily 90 tablet 4     Docusate Calcium (STOOL SOFTENER PO) Take 100 mg by mouth daily        Ferrous Sulfate (IRON SUPPLEMENT PO) Take 325 mg by mouth daily (with breakfast)        folic acid-vit B6-vit B12 (FOLGARD) 0.8-10-0.115 MG TABS Take 1 tablet by mouth daily       lisinopril (PRINIVIL/ZESTRIL) 5 MG tablet Take 1 tablet (5 mg) by mouth daily 90 tablet 4     metoprolol tartrate (LOPRESSOR) 50 MG tablet Take 1 tablet (50 mg) by mouth 2 times daily 60 tablet 11     multivitamin, therapeutic with minerals (MULTI-VITAMIN) TABS tablet Take 1 tablet by mouth daily       nitroGLYcerin (NITROSTAT) 0.4 MG sublingual tablet Place 1 tablet (0.4 mg) under the tongue every 5 minutes as needed May repeat X 2 and if chest pain persists, call 911 25 tablet 2     indomethacin (INDOCIN) 50 MG capsule Take 1 capsule (50 mg) by mouth 3 times daily (with meals) (Patient not taking: Reported on 2/11/2019) 30 capsule 1       ALLERGIES     Allergies   Allergen Reactions     Adhesive Tape Rash       PAST MEDICAL HISTORY:  Past Medical History:   Diagnosis Date     Chronic renal insufficiency      Complete AV block (H)      Coronary atherosclerosis of unspecified type of vessel, native or graft 2008    Acute inf MI; RCA stent; followed by Cardiology     Diverticulosis      Herniated cervical disc      Hyperlipidaemia      Hypertension      Occlusion and stenosis of carotid artery without mention of cerebral infarction 2008    50-69% stenosis of left ICA       PAST SURGICAL HISTORY:  Past Surgical History:   Procedure Laterality Date     ARTHROTOMY SHOULDER, ROTATOR CUFF REPAIR, COMBINED  10/23/2013    Procedure: COMBINED ARTHROTOMY SHOULDER, ROTATOR CUFF REPAIR;  Left Shoulder Open Decompression, Distal Clavical Resection, Rotator Cuff Repair ,  Bicep Tenolysis, and Bicep Tenodesis   ;  Surgeon: Zhang Mattson MD;  Location: [facility] OR     C NONSPECIFIC PROCEDURE  2008    RCA stent; see [facility]     COLONOSCOPY N/A 5/15/2018    Procedure: COLONOSCOPY;  COLONOSCOPY ;  Surgeon: Cory Hernandez MD;  Location:  [facility]     PHACOEMULSIFICATION CLEAR CORNEA WITH STANDARD INTRAOCULAR LENS IMPLANT  2013    Procedure: PHACOEMULSIFICATION CLEAR CORNEA WITH STANDARD INTRAOCULAR LENS IMPLANT;  RIGHT PHACOEMULSIFICATION CLEAR CORNEA WITH STANDARD INTRAOCULAR LENS IMPLANT ;  Surgeon: Rikki Reddy MD;  Location: [facility]     PHACOEMULSIFICATION CLEAR CORNEA WITH STANDARD INTRAOCULAR LENS IMPLANT  12/3/2013    Procedure: PHACOEMULSIFICATION CLEAR CORNEA WITH STANDARD INTRAOCULAR LENS IMPLANT;  LEFT PHACOEMULSIFICATION CLEAR CORNEA WITH STANDARD INTRAOCULAR LENS IMPLANT;  Surgeon: Rikki Reddy MD;  Location: [facility]       FAMILY HISTORY:  Family History   Problem Relation Age of Onset     Breast Cancer Mother      Heart Disease Father 74         of heart failure     Colon Cancer No family hx of        SOCIAL HISTORY:  Social History     Socioeconomic History     Marital status:      Spouse name: None     Number of children: None     Years of education: None     Highest education level: None   Occupational History     None   Social Needs     Financial resource strain: None     Food insecurity:     Worry: None     Inability: None     Transportation needs:     Medical: None     Non-medical: None   Tobacco Use     Smoking status: Former Smoker     Packs/day: 1.00     Years: 40.00     Pack years: 40.00     Types: Cigarettes     Last attempt to quit: 2008     Years since quittin.1     Smokeless tobacco: Never Used   Substance and Sexual Activity     Alcohol use: No     Alcohol/week: 0.0 oz     Comment: Sober for 25 years     Drug use: No     Sexual activity: Yes     Partners: Female   Lifestyle     Physical activity:     Days per week: None      "Minutes per session: None     Stress: None   Relationships     Social connections:     Talks on phone: None     Gets together: None     Attends Alevism service: None     Active member of club or organization: None     Attends meetings of clubs or organizations: None     Relationship status: None     Intimate partner violence:     Fear of current or ex partner: None     Emotionally abused: None     Physically abused: None     Forced sexual activity: None   Other Topics Concern     Parent/sibling w/ CABG, MI or angioplasty before 65F 55M? Not Asked      Service Not Asked     Blood Transfusions Not Asked     Caffeine Concern No     Occupational Exposure No     Hobby Hazards No     Sleep Concern No     Stress Concern No     Weight Concern Yes     Comment: overweight     Special Diet Yes     Comment: low salt and low cholesterol     Back Care No     Exercise Yes     Comment: 3-4 x week walking     Bike Helmet Not Asked     Seat Belt Yes     Self-Exams Not Asked   Social History Narrative     None       Review of Systems:  Skin:  Negative     Eyes:  Negative    ENT:  Negative    Respiratory:  Positive for dyspnea on exertion  Cardiovascular:  Negative    Gastroenterology: Negative    Genitourinary:       Musculoskeletal:  Positive for arthritis  Neurologic:  Negative    Psychiatric:  Negative    Heme/Lymph/Imm:  Negative    Endocrine:  Negative      Physical Exam:  Vitals: /80 (BP Location: Right arm, Patient Position: Sitting, Cuff Size: Adult Regular)   Pulse 64   Ht 1.651 m (5' 5\")   Wt 91.9 kg (202 lb 11.2 oz)   BMI 33.73 kg/m       Constitutional:  cooperative, alert and oriented, well developed, well nourished, in no acute distress obese      Skin:  warm and dry to the touch        Head:  normocephalic, no masses or lesions        Eyes:  pupils equal and round        ENT:  no pallor or cyanosis, dentition good        Neck:  JVP normal        Chest:  clear to auscultation        Cardiac: regular " rhythm                  Abdomen:  BS normoactive;abdomen soft obese      Vascular: pulses full and equal                                      Extremities and Back:  no deformities, clubbing, cyanosis, erythema observed;no edema        Neurological:  no gross motor deficits          Recent Lab Results:  LIPID RESULTS:  Lab Results   Component Value Date    CHOL 95 11/14/2018    HDL 40 11/14/2018    LDL 42 11/14/2018    TRIG 66 11/14/2018    CHOLHDLRATIO 2.6 09/23/2015       LIVER ENZYME RESULTS:  Lab Results   Component Value Date    AST 38 11/14/2018    ALT 31 11/14/2018       CBC RESULTS:  Lab Results   Component Value Date    WBC 8.0 02/07/2019    RBC 3.85 (L) 02/07/2019    HGB 10.6 (L) 02/07/2019    HCT 33.4 (L) 02/07/2019    MCV 87 02/07/2019    MCH 27.5 02/07/2019    MCHC 31.7 02/07/2019    RDW 14.7 02/07/2019     02/07/2019       BMP RESULTS:  Lab Results   Component Value Date     (L) 02/07/2019    POTASSIUM 4.5 02/07/2019    CHLORIDE 103 02/07/2019    CO2 24 02/07/2019    ANIONGAP 5 02/07/2019     (H) 02/07/2019    BUN 22 02/07/2019    CR 1.23 02/07/2019    GFRESTIMATED 59 (L) 02/07/2019    GFRESTBLACK 69 02/07/2019    MARIA ALEJANDRA 8.9 02/07/2019        A1C RESULTS:  Lab Results   Component Value Date    A1C 5.5 11/26/2018       INR RESULTS:  Lab Results   Component Value Date    INR 0.98 11/26/2018    INR 0.90 09/02/2009           Thank you for allowing me to participate in the care of your patient.    Sincerely,     KARLEY Simeon [facility]

## 2019-02-27 NOTE — LETTER
2/27/2019    Tony Peter MD  [address] [facility] [id number]    RE: Angel Sanders       Dear Colleague,    I had the pleasure of seeing Angel Sanders in the [facility].    HPI:  Angel Sanders is a 68 year old male who presents for after having a permanent pacemaker implanted on 11/26/18.  He is a patient of Dr. Kruse. Angel Sanders's medical history includes:    A.  Coronary artery disease with 3 JUANCARLOS to the RCA (2008).  At that time his LAD had 40% proximal, 30% mid and 50% distal stenoses.  Circumflex had 10%-20% proximal stenosis.  OM1 had 25% stenosis.   He had a negative stress test in 2012  B.  Paroxysmal complete AV heart block with syncope S/P [employer] dual-chamber permanent pacemaker.  C.  Left internal carotid artery stenting in 2009. Yearly ultrasounds  D.  Hypertension. controlled  E.  Obesity  F.  Anemia with stage III CKD     Diagnostics:  ECHO (11/26/18) reveals EF 60-70% with early diastolic dysfunction  Device check (1/23/2019) revealed 17% A-Paced, V-paced 1%, 12 seconds of NSVT at 150-180 bpm.  1 episode of SVT lasting 23 seconds at 160 bpm.   Dr. Kruse recommended a Lexiscan for risk stratification.    Today Angel Sanders presents feeling well.  He has not had any dizziness or lightheadedness since his pacemaker was placed.  He does admit that he has low exercise tolerance because he does not regular exercise and he is overweight.  However at this time he denies chest pain or pressure, dizziness, dyspnea at rest or with exertion, palpitations, orthopnea, PND, abdominal pain, abdominal edema or edema.        ASSESSMENT AND PLAN    Paroxysmal complete AV heart block with syncope S/P [employer] dual-chamber permanent pacemaker.  His last device check revealed nonsustained VT lasting 23 seconds at the rate of 180 bpm.  Dr. Kruse recommended a Lexiscan which is scheduled prior to seeing him in March 2018.    Coronary artery disease.   Patient had 3 JUANCARLOS to the RCA (2008) and he has known carotid artery disease.  He is asymptomatic however he did have an SVT as described above.  I did increase his metoprolol tartrate to 50 mg twice daily.  He will continue on aspirin 81 mg and atorvastatin 80 mg daily and he has nitroglycerin sub-lingual.  He will proceed to stress test and we discussed rationale.     Hypertension.  His last 2 office visits his systolic blood pressure has been above 150 mmHg. I increased his metoprolol tartrate to 50 mg twice daily.  He may need to have amlodipine started.  His PCP is evaluating his kidney function at this time therefore did not increase his lisinopril.    Recommendations:    Increase metoprolol tartrate to 50 mg twice daily    Follow-up with a nuclear stress test as ordered by Dr. Kruse    Follow-up with a carotid ultrasound as ordered by Dr. Kruse    Follow-up with Dr. Kruse on 3/2019    Follow-up with device clinic as ordered by the home which is usually every 3 months    Patient expresses understanding and agreement with the plan.     I appreciate the chance to help with Angel Sanders Please let me know if you have any questions or concerns.    Penelope Potter, KARLEY, CNP    This note was completed in part using Dragon voice recognition software. Although reviewed after completion, some word and grammatical errors may occur.    No orders of the defined types were placed in this encounter.    Orders Placed This Encounter   Medications     metoprolol tartrate (LOPRESSOR) 50 MG tablet     Sig: Take 1 tablet (50 mg) by mouth 2 times daily     Dispense:  60 tablet     Refill:  11     Medications Discontinued During This Encounter   Medication Reason     metoprolol tartrate (LOPRESSOR) 25 MG tablet          Encounter Diagnoses   Name Primary?     Complete heart block (H)      Cardiac pacemaker in situ      Essential hypertension, benign      NSVT (nonsustained ventricular tachycardia) (H) Yes       CURRENT  MEDICATIONS:  Current Outpatient Medications   Medication Sig Dispense Refill     ASPIRIN 81 MG OR TABS 1 TABLET DAILY       atorvastatin (LIPITOR) 80 MG tablet Take 1 tablet (80 mg) by mouth daily 90 tablet 4     Docusate Calcium (STOOL SOFTENER PO) Take 100 mg by mouth daily        Ferrous Sulfate (IRON SUPPLEMENT PO) Take 325 mg by mouth daily (with breakfast)        folic acid-vit B6-vit B12 (FOLGARD) 0.8-10-0.115 MG TABS Take 1 tablet by mouth daily       lisinopril (PRINIVIL/ZESTRIL) 5 MG tablet Take 1 tablet (5 mg) by mouth daily 90 tablet 4     metoprolol tartrate (LOPRESSOR) 50 MG tablet Take 1 tablet (50 mg) by mouth 2 times daily 60 tablet 11     multivitamin, therapeutic with minerals (MULTI-VITAMIN) TABS tablet Take 1 tablet by mouth daily       nitroGLYcerin (NITROSTAT) 0.4 MG sublingual tablet Place 1 tablet (0.4 mg) under the tongue every 5 minutes as needed May repeat X 2 and if chest pain persists, call 911 25 tablet 2     indomethacin (INDOCIN) 50 MG capsule Take 1 capsule (50 mg) by mouth 3 times daily (with meals) (Patient not taking: Reported on 2/11/2019) 30 capsule 1       ALLERGIES     Allergies   Allergen Reactions     Adhesive Tape Rash       PAST MEDICAL HISTORY:  Past Medical History:   Diagnosis Date     Chronic renal insufficiency      Complete AV block (H)      Coronary atherosclerosis of unspecified type of vessel, native or graft 2008    Acute inf MI; RCA stent; followed by Cardiology     Diverticulosis      Herniated cervical disc      Hyperlipidaemia      Hypertension      Occlusion and stenosis of carotid artery without mention of cerebral infarction 2008    50-69% stenosis of left ICA       PAST SURGICAL HISTORY:  Past Surgical History:   Procedure Laterality Date     ARTHROTOMY SHOULDER, ROTATOR CUFF REPAIR, COMBINED  10/23/2013    Procedure: COMBINED ARTHROTOMY SHOULDER, ROTATOR CUFF REPAIR;  Left Shoulder Open Decompression, Distal Clavical Resection, Rotator Cuff Repair ,  Bicep Tenolysis, and Bicep Tenodesis   ;  Surgeon: Zhang Mattson MD;  Location: [facility] OR     C NONSPECIFIC PROCEDURE  2008    RCA stent; see [facility]     COLONOSCOPY N/A 5/15/2018    Procedure: COLONOSCOPY;  COLONOSCOPY ;  Surgeon: Cory Hernandez MD;  Location:  GI     PHACOEMULSIFICATION CLEAR CORNEA WITH STANDARD INTRAOCULAR LENS IMPLANT  2013    Procedure: PHACOEMULSIFICATION CLEAR CORNEA WITH STANDARD INTRAOCULAR LENS IMPLANT;  RIGHT PHACOEMULSIFICATION CLEAR CORNEA WITH STANDARD INTRAOCULAR LENS IMPLANT ;  Surgeon: Rikki Reddy MD;  Location: [facility]     PHACOEMULSIFICATION CLEAR CORNEA WITH STANDARD INTRAOCULAR LENS IMPLANT  12/3/2013    Procedure: PHACOEMULSIFICATION CLEAR CORNEA WITH STANDARD INTRAOCULAR LENS IMPLANT;  LEFT PHACOEMULSIFICATION CLEAR CORNEA WITH STANDARD INTRAOCULAR LENS IMPLANT;  Surgeon: Rikki Reddy MD;  Location: [facility]       FAMILY HISTORY:  Family History   Problem Relation Age of Onset     Breast Cancer Mother      Heart Disease Father 74         of heart failure     Colon Cancer No family hx of        SOCIAL HISTORY:  Social History     Socioeconomic History     Marital status:      Spouse name: None     Number of children: None     Years of education: None     Highest education level: None   Occupational History     None   Social Needs     Financial resource strain: None     Food insecurity:     Worry: None     Inability: None     Transportation needs:     Medical: None     Non-medical: None   Tobacco Use     Smoking status: Former Smoker     Packs/day: 1.00     Years: 40.00     Pack years: 40.00     Types: Cigarettes     Last attempt to quit: 2008     Years since quittin.1     Smokeless tobacco: Never Used   Substance and Sexual Activity     Alcohol use: No     Alcohol/week: 0.0 oz     Comment: Sober for 25 years     Drug use: No     Sexual activity: Yes     Partners: Female   Lifestyle     Physical activity:     Days per week: None      "Minutes per session: None     Stress: None   Relationships     Social connections:     Talks on phone: None     Gets together: None     Attends Gnosticist service: None     Active member of club or organization: None     Attends meetings of clubs or organizations: None     Relationship status: None     Intimate partner violence:     Fear of current or ex partner: None     Emotionally abused: None     Physically abused: None     Forced sexual activity: None   Other Topics Concern     Parent/sibling w/ CABG, MI or angioplasty before 65F 55M? Not Asked      Service Not Asked     Blood Transfusions Not Asked     Caffeine Concern No     Occupational Exposure No     Hobby Hazards No     Sleep Concern No     Stress Concern No     Weight Concern Yes     Comment: overweight     Special Diet Yes     Comment: low salt and low cholesterol     Back Care No     Exercise Yes     Comment: 3-4 x week walking     Bike Helmet Not Asked     Seat Belt Yes     Self-Exams Not Asked   Social History Narrative     None       Review of Systems:  Skin:  Negative     Eyes:  Negative    ENT:  Negative    Respiratory:  Positive for dyspnea on exertion  Cardiovascular:  Negative    Gastroenterology: Negative    Genitourinary:       Musculoskeletal:  Positive for arthritis  Neurologic:  Negative    Psychiatric:  Negative    Heme/Lymph/Imm:  Negative    Endocrine:  Negative      Physical Exam:  Vitals: /80 (BP Location: Right arm, Patient Position: Sitting, Cuff Size: Adult Regular)   Pulse 64   Ht 1.651 m (5' 5\")   Wt 91.9 kg (202 lb 11.2 oz)   BMI 33.73 kg/m       Constitutional:  cooperative, alert and oriented, well developed, well nourished, in no acute distress obese      Skin:  warm and dry to the touch        Head:  normocephalic, no masses or lesions        Eyes:  pupils equal and round        ENT:  no pallor or cyanosis, dentition good        Neck:  JVP normal        Chest:  clear to auscultation        Cardiac: regular " rhythm                  Abdomen:  BS normoactive;abdomen soft obese      Vascular: pulses full and equal                                      Extremities and Back:  no deformities, clubbing, cyanosis, erythema observed;no edema        Neurological:  no gross motor deficits          Recent Lab Results:  LIPID RESULTS:  Lab Results   Component Value Date    CHOL 95 11/14/2018    HDL 40 11/14/2018    LDL 42 11/14/2018    TRIG 66 11/14/2018    CHOLHDLRATIO 2.6 09/23/2015       LIVER ENZYME RESULTS:  Lab Results   Component Value Date    AST 38 11/14/2018    ALT 31 11/14/2018       CBC RESULTS:  Lab Results   Component Value Date    WBC 8.0 02/07/2019    RBC 3.85 (L) 02/07/2019    HGB 10.6 (L) 02/07/2019    HCT 33.4 (L) 02/07/2019    MCV 87 02/07/2019    MCH 27.5 02/07/2019    MCHC 31.7 02/07/2019    RDW 14.7 02/07/2019     02/07/2019       BMP RESULTS:  Lab Results   Component Value Date     (L) 02/07/2019    POTASSIUM 4.5 02/07/2019    CHLORIDE 103 02/07/2019    CO2 24 02/07/2019    ANIONGAP 5 02/07/2019     (H) 02/07/2019    BUN 22 02/07/2019    CR 1.23 02/07/2019    GFRESTIMATED 59 (L) 02/07/2019    GFRESTBLACK 69 02/07/2019    MARIA ALEJANDRA 8.9 02/07/2019        A1C RESULTS:  Lab Results   Component Value Date    A1C 5.5 11/26/2018       INR RESULTS:  Lab Results   Component Value Date    INR 0.98 11/26/2018    INR 0.90 09/02/2009           CC  KARLEY Mckeon CNP  [address]  [place] [address]                  Thank you for allowing me to participate in the care of your patient.      Sincerely,     KARLEY Simeon CNP     [facility]    cc:   KARLEY Mckeon CNP  [address]  [place] [address]

## 2019-03-19 ENCOUNTER — HOSPITAL ENCOUNTER (OUTPATIENT)
Dept: CARDIOLOGY | Facility: CLINIC | Age: 69
End: 2019-03-19
Attending: INTERNAL MEDICINE
Payer: COMMERCIAL

## 2019-03-19 ENCOUNTER — HOSPITAL ENCOUNTER (OUTPATIENT)
Dept: NUCLEAR MEDICINE | Facility: CLINIC | Age: 69
Setting detail: NUCLEAR MEDICINE
Discharge: HOME | End: 2019-03-19
Attending: INTERNAL MEDICINE | Admitting: INTERNAL MEDICINE
Payer: COMMERCIAL

## 2019-03-19 DIAGNOSIS — I47.29 PAROXYSMAL VENTRICULAR TACHYCARDIA (H): ICD-10-CM

## 2019-03-19 DIAGNOSIS — I25.10 ATHEROSCLEROTIC HEART DISEASE OF NATIVE CORONARY ARTERY WITHOUT ANGINA PECTORIS: ICD-10-CM

## 2019-03-19 PROCEDURE — 93018 CV STRESS TEST I&R ONLY: CPT | Performed by: INTERNAL MEDICINE

## 2019-03-19 PROCEDURE — 34300033 ZZH RX 343: Performed by: INTERNAL MEDICINE

## 2019-03-19 PROCEDURE — 93017 CV STRESS TEST TRACING ONLY: CPT

## 2019-03-19 PROCEDURE — 93016 CV STRESS TEST SUPVJ ONLY: CPT | Performed by: INTERNAL MEDICINE

## 2019-03-19 PROCEDURE — 25000128 H RX IP 250 OP 636

## 2019-03-19 PROCEDURE — 78452 HT MUSCLE IMAGE SPECT MULT: CPT

## 2019-03-19 PROCEDURE — A9502 TC99M TETROFOSMIN: HCPCS | Performed by: INTERNAL MEDICINE

## 2019-03-19 PROCEDURE — 78452 HT MUSCLE IMAGE SPECT MULT: CPT | Mod: 26 | Performed by: INTERNAL MEDICINE

## 2019-03-19 RX ORDER — REGADENOSON 0.08 MG/ML
INJECTION, SOLUTION INTRAVENOUS
Status: COMPLETED
Start: 2019-03-19 | End: 2019-03-19

## 2019-03-19 RX ADMIN — REGADENOSON 0.4 MG: 0.08 INJECTION, SOLUTION INTRAVENOUS at 10:39

## 2019-03-19 RX ADMIN — TETROFOSMIN 10.2 MCI.: 1.38 INJECTION, POWDER, LYOPHILIZED, FOR SOLUTION INTRAVENOUS at 09:10

## 2019-03-19 RX ADMIN — TETROFOSMIN 30.6 MCI.: 1.38 INJECTION, POWDER, LYOPHILIZED, FOR SOLUTION INTRAVENOUS at 10:37

## 2019-03-19 NOTE — PROGRESS NOTES
Pre-procedure:  Are you having any pain or shortness of breath (prior to starting)? no  Initial vital signs: /78, HR 88, RR 16  Allergies reviewed: yes   Rhythm:   Medications taken within 48 hours of procedure:    Any nitrates within the last 48 hours:  Last Caffeine:   Lung sounds: CTA, no wheezing, crackles or rtx  Health History (COPD, Asthma, etc):            Procedure: Lexiscan  Reaction/symptoms after receiving Pam injection:   Intensity of Pain:   Rhythm:   1. Vital Signs:/72, , RR 16  2. Vital Signs:/62, HR 82, RR 14     Reversal agent:     Post:   Resolution of symptoms?: YES  Vital signs: /89, , RR 15  Vital signs: BP /, HR , RR   Rhythm:   Walk: NO  Comment:   Return to Radiology

## 2019-03-22 ENCOUNTER — HOSPITAL ENCOUNTER (OUTPATIENT)
Dept: CARDIOLOGY | Facility: CLINIC | Age: 69
Discharge: HOME | End: 2019-03-22
Attending: INTERNAL MEDICINE | Admitting: INTERNAL MEDICINE
Payer: COMMERCIAL

## 2019-03-22 DIAGNOSIS — I65.23 OCCLUSION AND STENOSIS OF BILATERAL CAROTID ARTERIES: ICD-10-CM

## 2019-03-22 DIAGNOSIS — I77.9 CAROTID ARTERY DISEASE (H): ICD-10-CM

## 2019-03-22 PROCEDURE — 93880 EXTRACRANIAL BILAT STUDY: CPT

## 2019-03-22 PROCEDURE — 93880 EXTRACRANIAL BILAT STUDY: CPT | Mod: 26 | Performed by: INTERNAL MEDICINE

## 2019-03-26 ENCOUNTER — OFFICE VISIT (OUTPATIENT)
Dept: CARDIOLOGY | Facility: CLINIC | Age: 69
End: 2019-03-26
Payer: COMMERCIAL

## 2019-03-26 VITALS
DIASTOLIC BLOOD PRESSURE: 78 MMHG | HEIGHT: 64 IN | BODY MASS INDEX: 34.85 KG/M2 | HEART RATE: 66 BPM | WEIGHT: 204.1 LBS | SYSTOLIC BLOOD PRESSURE: 134 MMHG

## 2019-03-26 DIAGNOSIS — I77.9 CAROTID ARTERY DISEASE, UNSPECIFIED LATERALITY (H): ICD-10-CM

## 2019-03-26 DIAGNOSIS — I10 ESSENTIAL HYPERTENSION, BENIGN: ICD-10-CM

## 2019-03-26 DIAGNOSIS — I65.21 STENOSIS OF RIGHT CAROTID ARTERY: Primary | ICD-10-CM

## 2019-03-26 DIAGNOSIS — I47.29 NSVT (NONSUSTAINED VENTRICULAR TACHYCARDIA) (H): ICD-10-CM

## 2019-03-26 DIAGNOSIS — I25.10 CORONARY ARTERY DISEASE INVOLVING NATIVE CORONARY ARTERY OF NATIVE HEART WITHOUT ANGINA PECTORIS: ICD-10-CM

## 2019-03-26 DIAGNOSIS — E78.00 HIGH CHOLESTEROL: ICD-10-CM

## 2019-03-26 PROCEDURE — 99214 OFFICE O/P EST MOD 30 MIN: CPT | Performed by: INTERNAL MEDICINE

## 2019-03-26 RX ORDER — ATORVASTATIN CALCIUM 80 MG/1
80 TABLET, FILM COATED ORAL DAILY
Qty: 90 TABLET | Refills: 4 | Status: SHIPPED | OUTPATIENT
Start: 2019-03-26 | End: 2020-01-01

## 2019-03-26 RX ORDER — LISINOPRIL 5 MG/1
5 TABLET ORAL DAILY
Qty: 90 TABLET | Refills: 4 | Status: SHIPPED | OUTPATIENT
Start: 2019-03-26 | End: 2020-01-01

## 2019-03-26 RX ORDER — UBIDECARENONE 75 MG
100 CAPSULE ORAL DAILY
COMMUNITY

## 2019-03-26 RX ORDER — METOPROLOL TARTRATE 50 MG
50 TABLET ORAL 2 TIMES DAILY
Qty: 180 TABLET | Refills: 3 | Status: SHIPPED | OUTPATIENT
Start: 2019-03-26 | End: 2020-01-01

## 2019-03-26 ASSESSMENT — MIFFLIN-ST. JEOR: SCORE: 1601.79

## 2019-03-26 NOTE — PROGRESS NOTES
HPI and Plan:   See dictation(#[id number])    Orders Placed This Encounter   Procedures     US Carotid Bilateral     Follow-Up with Cardiologist       Orders Placed This Encounter   Medications     cyanocobalamin (VITAMIN B-12) 100 MCG tablet     Sig: Take 100 mcg by mouth daily     lisinopril (PRINIVIL/ZESTRIL) 5 MG tablet     Sig: Take 1 tablet (5 mg) by mouth daily     Dispense:  90 tablet     Refill:  4     atorvastatin (LIPITOR) 80 MG tablet     Sig: Take 1 tablet (80 mg) by mouth daily     Dispense:  90 tablet     Refill:  4     metoprolol tartrate (LOPRESSOR) 50 MG tablet     Sig: Take 1 tablet (50 mg) by mouth 2 times daily     Dispense:  180 tablet     Refill:  3       Medications Discontinued During This Encounter   Medication Reason     lisinopril (PRINIVIL/ZESTRIL) 5 MG tablet Reorder     atorvastatin (LIPITOR) 80 MG tablet Reorder     metoprolol tartrate (LOPRESSOR) 50 MG tablet Reorder         Encounter Diagnoses   Name Primary?     Coronary artery disease involving native coronary artery of native heart without angina pectoris      Carotid artery disease, unspecified laterality (H)      Essential hypertension, benign      High cholesterol      NSVT (nonsustained ventricular tachycardia) (H)      Stenosis of right carotid artery Yes       CURRENT MEDICATIONS:  Current Outpatient Medications   Medication Sig Dispense Refill     ASPIRIN 81 MG OR TABS 1 TABLET DAILY       atorvastatin (LIPITOR) 80 MG tablet Take 1 tablet (80 mg) by mouth daily 90 tablet 4     cyanocobalamin (VITAMIN B-12) 100 MCG tablet Take 100 mcg by mouth daily       Docusate Calcium (STOOL SOFTENER PO) Take 100 mg by mouth daily        Ferrous Sulfate (IRON SUPPLEMENT PO) Take 325 mg by mouth daily (with breakfast)        lisinopril (PRINIVIL/ZESTRIL) 5 MG tablet Take 1 tablet (5 mg) by mouth daily 90 tablet 4     metoprolol tartrate (LOPRESSOR) 50 MG tablet Take 1 tablet (50 mg) by mouth 2 times daily 180 tablet 3     multivitamin,  therapeutic with minerals (MULTI-VITAMIN) TABS tablet Take 1 tablet by mouth daily       nitroGLYcerin (NITROSTAT) 0.4 MG sublingual tablet Place 1 tablet (0.4 mg) under the tongue every 5 minutes as needed May repeat X 2 and if chest pain persists, call 911 25 tablet 2     folic acid-vit B6-vit B12 (FOLGARD) 0.8-10-0.115 MG TABS Take 1 tablet by mouth daily       indomethacin (INDOCIN) 50 MG capsule Take 1 capsule (50 mg) by mouth 3 times daily (with meals) (Patient not taking: Reported on 2/11/2019) 30 capsule 1       ALLERGIES     Allergies   Allergen Reactions     Adhesive Tape Rash       PAST MEDICAL HISTORY:  Past Medical History:   Diagnosis Date     Chronic renal insufficiency      Complete AV block (H)      Coronary atherosclerosis of unspecified type of vessel, native or graft 2008    Acute inf MI; RCA stent; followed by Cardiology     Diverticulosis      Herniated cervical disc      Hyperlipidaemia      Hypertension      Occlusion and stenosis of carotid artery without mention of cerebral infarction 2008    50-69% stenosis of left ICA       PAST SURGICAL HISTORY:  Past Surgical History:   Procedure Laterality Date     ARTHROTOMY SHOULDER, ROTATOR CUFF REPAIR, COMBINED  10/23/2013    Procedure: COMBINED ARTHROTOMY SHOULDER, ROTATOR CUFF REPAIR;  Left Shoulder Open Decompression, Distal Clavical Resection, Rotator Cuff Repair , Bicep Tenolysis, and Bicep Tenodesis   ;  Surgeon: Zhang Mattson MD;  Location: [facility] OR     C NONSPECIFIC PROCEDURE  2008    RCA stent; see [facility]     COLONOSCOPY N/A 5/15/2018    Procedure: COLONOSCOPY;  COLONOSCOPY ;  Surgeon: Cory Hernandez MD;  Location:  GI     PHACOEMULSIFICATION CLEAR CORNEA WITH STANDARD INTRAOCULAR LENS IMPLANT  11/20/2013    Procedure: PHACOEMULSIFICATION CLEAR CORNEA WITH STANDARD INTRAOCULAR LENS IMPLANT;  RIGHT PHACOEMULSIFICATION CLEAR CORNEA WITH STANDARD INTRAOCULAR LENS IMPLANT ;  Surgeon: Rikki Reddy MD;  Location: [facility]      PHACOEMULSIFICATION CLEAR CORNEA WITH STANDARD INTRAOCULAR LENS IMPLANT  12/3/2013    Procedure: PHACOEMULSIFICATION CLEAR CORNEA WITH STANDARD INTRAOCULAR LENS IMPLANT;  LEFT PHACOEMULSIFICATION CLEAR CORNEA WITH STANDARD INTRAOCULAR LENS IMPLANT;  Surgeon: Rikki Reddy MD;  Location[facility]       FAMILY HISTORY:  Family History   Problem Relation Age of Onset     Breast Cancer Mother      Heart Disease Father 74         of heart failure     Colon Cancer No family hx of        SOCIAL HISTORY:  Social History     Socioeconomic History     Marital status:      Spouse name: None     Number of children: None     Years of education: None     Highest education level: None   Occupational History     None   Social Needs     Financial resource strain: None     Food insecurity:     Worry: None     Inability: None     Transportation needs:     Medical: None     Non-medical: None   Tobacco Use     Smoking status: Former Smoker     Packs/day: 1.00     Years: 40.00     Pack years: 40.00     Types: Cigarettes     Start date:      Last attempt to quit: 2008     Years since quittin.2     Smokeless tobacco: Never Used   Substance and Sexual Activity     Alcohol use: No     Alcohol/week: 0.0 oz     Comment: Sober for 25 years     Drug use: No     Sexual activity: Yes     Partners: Female   Lifestyle     Physical activity:     Days per week: None     Minutes per session: None     Stress: None   Relationships     Social connections:     Talks on phone: None     Gets together: None     Attends Church service: None     Active member of club or organization: None     Attends meetings of clubs or organizations: None     Relationship status: None     Intimate partner violence:     Fear of current or ex partner: None     Emotionally abused: None     Physically abused: None     Forced sexual activity: None   Other Topics Concern     Parent/sibling w/ CABG, MI or angioplasty before 65F 55M? Not Asked      " Service Not Asked     Blood Transfusions Not Asked     Caffeine Concern No     Occupational Exposure No     Hobby Hazards No     Sleep Concern No     Stress Concern No     Weight Concern Yes     Comment: overweight     Special Diet Yes     Comment: low salt and low cholesterol     Back Care No     Exercise Yes     Comment: 3-4 x week walking     Bike Helmet Not Asked     Seat Belt Yes     Self-Exams Not Asked   Social History Narrative     None       Review of Systems:  Skin:  Positive for bruising     Eyes:  Negative      ENT:  Negative      Respiratory:  Positive for dyspnea on exertion     Cardiovascular:  Negative;palpitations;chest pain;lightheadedness;dizziness;syncope or near-syncope;cyanosis;fatigue Positive for    Gastroenterology: Negative      Genitourinary:  Negative      Musculoskeletal:  Negative      Neurologic:  Negative      Psychiatric:  Negative      Heme/Lymph/Imm:  Negative      Endocrine:  Negative        Physical Exam:  Vitals: /78 (BP Location: Right arm, Cuff Size: Adult Large)   Pulse 66   Ht 1.626 m (5' 4\")   Wt 92.6 kg (204 lb 1.6 oz)   BMI 35.03 kg/m      Constitutional:           Skin:             Head:           Eyes:           Lymph:      ENT:           Neck:           Respiratory:            Cardiac:                                                           GI:           Extremities and Muscular Skeletal:                 Neurological:           Psych:             CC  Dre Kruse MD  [address]  [place] [address]                  "

## 2019-03-26 NOTE — PROGRESS NOTES
Service Date: 03/26/2019      REASON FOR CARDIOLOGY VISIT:  Followup CAD.      HISTORY OF PRESENT ILLNESS:  Mr. Sanders is a very pleasant 68-year-old gentleman with history of CAD with history of inferior STEMI in 2008, status post 3 x 28 mm Cypher drug-eluting stent to the RCA.  He was noted to have 40%-50% narrowing at the distal RCA distal to the stent, 40% proximal LAD, 30% mid and 50% distal stenosis, circumflex had 10%-20% stenosis, OM1 had 25% stenosis.  Nuclear stress in 2012 showed inferior infarct and no ischemia.  Echocardiogram in 2014 showed normal LV function.  He also has history of left internal carotid artery stenting in 2009 and other comorbidities of hypertension, dyslipidemia and obesity.  Ultrasound carotid last year showed less than 50% internal carotid stenosis with patent left internal carotid artery stent.  In November last year, patient had paroxysmal complete AV block leading to syncope, and he underwent a [employer] dual-chamber permanent pacemaker implantation by EP.  Echocardiogram at that time showed normal LV function.  On subsequent device check he was noted to have about 12-beat runs of nonsustained ventricular tachycardia, asymptomatic.  This prompted a Lexiscan stress test which was done a few days ago and it showed a small fixed basal and mid inferior wall defect consistent with infarct, similar to previous stress test in 2012, no ischemia was noted in present study.  The patient is coming for a followup.  He has no specific complaints.  No chest discomfort or shortness of breath at rest or with physical activity.  No syncope, presyncope or dizziness.  He had ultrasound carotid done that showed a patent left internal carotid artery stent.  There seemed to be some progression in the right internal carotid artery stenosis, normal LV velocity criteria, 50%-69% stenosis.  The patient is on baby aspirin.  He is on Lipitor 80 mg daily.  LDL is well controlled at 42.  He does not  use any tobacco.  He has quit more than 11 years ago.  He is on metoprolol tartrate 50 mg b.i.d. and lisinopril 5 mg daily.      PHYSICAL EXAMINATION:   VITAL SIGNS:  Blood pressure 134/78.  To be noted, his initial blood pressure recording was high with systolic in the 150s, heart rate 66 regular, weight 204 pounds, BMI 35.03.   GENERAL:  The patient appears pleasant, comfortable.   NECK:  Normal JVP, no bruit.   CARDIOVASCULAR SYSTEM:  S1, S2 normal, no murmur, rub or gallop.   RESPIRATORY SYSTEM:  Clear to auscultation bilaterally.   GASTROINTESTINAL SYSTEM:  Abdomen soft, nontender.   EXTREMITIES:  No pitting pedal edema.   NEUROLOGICAL:  Alert, oriented x3.   PSYCHIATRIC:  Normal affect.   SKIN:  No obvious rash.   HEENT:  No pallor or icterus.      IMPRESSION AND PLAN:  A very pleasant 68-year-old gentleman with CAD as noted above, recent paroxysmal AV block leading to syncope status post dual-chamber permanent pacer implantation, carotid artery disease with history of left ventricle at stenting, stent appears patent on recent ultrasound carotid with moderate disease on the right side, other comorbidities of hypertension and dyslipidemia.  Overall, cardiac status-wise he is doing quite well.  He does not have any symptoms of angina or congestive heart failure.  Recent stress test showed only inferior infarct, similar to 2012 stress test, no ischemia.  Clinically, also, he does not have any anginal symptoms.  He is on appropriate CAD medication therapy of aspirin, high-intensity statin, beta blocker and ACE inhibitor.  If he continues to feel stable cardiac status-wise, we can see him back in 1 year with a repeat ultrasound carotid, sooner if he notes any change in clinical status, especially if any exertion-related symptoms.         PAL BOWSER MD             D: 2019   T: 2019   MT: ENOCH      Name:     TIMBO RICH   MRN:      [id number]        Account:      [id number]   :      1950            Service Date: 03/26/2019      Document: [id number]

## 2019-03-26 NOTE — LETTER
3/26/2019    Tony Peter MD  [address] [facility] [address]    RE: Angel Sanders       Dear Colleague,    I had the pleasure of seeing Angel Sanders in the [facility].    HPI and Plan:   See dictation(#[id number])    Orders Placed This Encounter   Procedures     US Carotid Bilateral     Follow-Up with Cardiologist       Orders Placed This Encounter   Medications     cyanocobalamin (VITAMIN B-12) 100 MCG tablet     Sig: Take 100 mcg by mouth daily     lisinopril (PRINIVIL/ZESTRIL) 5 MG tablet     Sig: Take 1 tablet (5 mg) by mouth daily     Dispense:  90 tablet     Refill:  4     atorvastatin (LIPITOR) 80 MG tablet     Sig: Take 1 tablet (80 mg) by mouth daily     Dispense:  90 tablet     Refill:  4     metoprolol tartrate (LOPRESSOR) 50 MG tablet     Sig: Take 1 tablet (50 mg) by mouth 2 times daily     Dispense:  180 tablet     Refill:  3       Medications Discontinued During This Encounter   Medication Reason     lisinopril (PRINIVIL/ZESTRIL) 5 MG tablet Reorder     atorvastatin (LIPITOR) 80 MG tablet Reorder     metoprolol tartrate (LOPRESSOR) 50 MG tablet Reorder         Encounter Diagnoses   Name Primary?     Coronary artery disease involving native coronary artery of native heart without angina pectoris      Carotid artery disease, unspecified laterality (H)      Essential hypertension, benign      High cholesterol      NSVT (nonsustained ventricular tachycardia) (H)      Stenosis of right carotid artery Yes       CURRENT MEDICATIONS:  Current Outpatient Medications   Medication Sig Dispense Refill     ASPIRIN 81 MG OR TABS 1 TABLET DAILY       atorvastatin (LIPITOR) 80 MG tablet Take 1 tablet (80 mg) by mouth daily 90 tablet 4     cyanocobalamin (VITAMIN B-12) 100 MCG tablet Take 100 mcg by mouth daily       Docusate Calcium (STOOL SOFTENER PO) Take 100 mg by mouth daily        Ferrous Sulfate (IRON SUPPLEMENT PO) Take 325 mg by mouth daily (with breakfast)         lisinopril (PRINIVIL/ZESTRIL) 5 MG tablet Take 1 tablet (5 mg) by mouth daily 90 tablet 4     metoprolol tartrate (LOPRESSOR) 50 MG tablet Take 1 tablet (50 mg) by mouth 2 times daily 180 tablet 3     multivitamin, therapeutic with minerals (MULTI-VITAMIN) TABS tablet Take 1 tablet by mouth daily       nitroGLYcerin (NITROSTAT) 0.4 MG sublingual tablet Place 1 tablet (0.4 mg) under the tongue every 5 minutes as needed May repeat X 2 and if chest pain persists, call 911 25 tablet 2     folic acid-vit B6-vit B12 (FOLGARD) 0.8-10-0.115 MG TABS Take 1 tablet by mouth daily       indomethacin (INDOCIN) 50 MG capsule Take 1 capsule (50 mg) by mouth 3 times daily (with meals) (Patient not taking: Reported on 2/11/2019) 30 capsule 1       ALLERGIES     Allergies   Allergen Reactions     Adhesive Tape Rash       PAST MEDICAL HISTORY:  Past Medical History:   Diagnosis Date     Chronic renal insufficiency      Complete AV block (H)      Coronary atherosclerosis of unspecified type of vessel, native or graft 2008    Acute inf MI; RCA stent; followed by Cardiology     Diverticulosis      Herniated cervical disc      Hyperlipidaemia      Hypertension      Occlusion and stenosis of carotid artery without mention of cerebral infarction 2008    50-69% stenosis of left ICA       PAST SURGICAL HISTORY:  Past Surgical History:   Procedure Laterality Date     ARTHROTOMY SHOULDER, ROTATOR CUFF REPAIR, COMBINED  10/23/2013    Procedure: COMBINED ARTHROTOMY SHOULDER, ROTATOR CUFF REPAIR;  Left Shoulder Open Decompression, Distal Clavical Resection, Rotator Cuff Repair , Bicep Tenolysis, and Bicep Tenodesis   ;  Surgeon: Zhang Mattson MD;  Location: [facility] OR     C NONSPECIFIC PROCEDURE  2008    RCA stent; see [facility]     COLONOSCOPY N/A 5/15/2018    Procedure: COLONOSCOPY;  COLONOSCOPY ;  Surgeon: Cory Hernandez MD;  Location:  GI     PHACOEMULSIFICATION CLEAR CORNEA WITH STANDARD INTRAOCULAR LENS IMPLANT  11/20/2013    Procedure:  PHACOEMULSIFICATION CLEAR CORNEA WITH STANDARD INTRAOCULAR LENS IMPLANT;  RIGHT PHACOEMULSIFICATION CLEAR CORNEA WITH STANDARD INTRAOCULAR LENS IMPLANT ;  Surgeon: Rikki Reddy MD;  Location: [facility]     PHACOEMULSIFICATION CLEAR CORNEA WITH STANDARD INTRAOCULAR LENS IMPLANT  12/3/2013    Procedure: PHACOEMULSIFICATION CLEAR CORNEA WITH STANDARD INTRAOCULAR LENS IMPLANT;  LEFT PHACOEMULSIFICATION CLEAR CORNEA WITH STANDARD INTRAOCULAR LENS IMPLANT;  Surgeon: Rikki Reddy MD;  Location: [facility]       FAMILY HISTORY:  Family History   Problem Relation Age of Onset     Breast Cancer Mother      Heart Disease Father 74         of heart failure     Colon Cancer No family hx of        SOCIAL HISTORY:  Social History     Socioeconomic History     Marital status:      Spouse name: None     Number of children: None     Years of education: None     Highest education level: None   Occupational History     None   Social Needs     Financial resource strain: None     Food insecurity:     Worry: None     Inability: None     Transportation needs:     Medical: None     Non-medical: None   Tobacco Use     Smoking status: Former Smoker     Packs/day: 1.00     Years: 40.00     Pack years: 40.00     Types: Cigarettes     Start date:      Last attempt to quit: 2008     Years since quittin.2     Smokeless tobacco: Never Used   Substance and Sexual Activity     Alcohol use: No     Alcohol/week: 0.0 oz     Comment: Sober for 25 years     Drug use: No     Sexual activity: Yes     Partners: Female   Lifestyle     Physical activity:     Days per week: None     Minutes per session: None     Stress: None   Relationships     Social connections:     Talks on phone: None     Gets together: None     Attends Protestant service: None     Active member of club or organization: None     Attends meetings of clubs or organizations: None     Relationship status: None     Intimate partner violence:     Fear of current or  "ex partner: None     Emotionally abused: None     Physically abused: None     Forced sexual activity: None   Other Topics Concern     Parent/sibling w/ CABG, MI or angioplasty before 65F 55M? Not Asked      Service Not Asked     Blood Transfusions Not Asked     Caffeine Concern No     Occupational Exposure No     Hobby Hazards No     Sleep Concern No     Stress Concern No     Weight Concern Yes     Comment: overweight     Special Diet Yes     Comment: low salt and low cholesterol     Back Care No     Exercise Yes     Comment: 3-4 x week walking     Bike Helmet Not Asked     Seat Belt Yes     Self-Exams Not Asked   Social History Narrative     None       Review of Systems:  Skin:  Positive for bruising     Eyes:  Negative      ENT:  Negative      Respiratory:  Positive for dyspnea on exertion     Cardiovascular:  Negative;palpitations;chest pain;lightheadedness;dizziness;syncope or near-syncope;cyanosis;fatigue Positive for    Gastroenterology: Negative      Genitourinary:  Negative      Musculoskeletal:  Negative      Neurologic:  Negative      Psychiatric:  Negative      Heme/Lymph/Imm:  Negative      Endocrine:  Negative        Physical Exam:  Vitals: /78 (BP Location: Right arm, Cuff Size: Adult Large)   Pulse 66   Ht 1.626 m (5' 4\")   Wt 92.6 kg (204 lb 1.6 oz)   BMI 35.03 kg/m       Constitutional:           Skin:             Head:           Eyes:           Lymph:      ENT:           Neck:           Respiratory:            Cardiac:                                                           GI:           Extremities and Muscular Skeletal:                 Neurological:           Psych:             CC  Dre Kruse MD  [address]  [place] [address]                    Thank you for allowing me to participate in the care of your patient.      Sincerely,     Dre Kruse MD     [facility]    cc:   Dre Kruse MD  [address]  [place], " [place] [address]

## 2019-03-26 NOTE — LETTER
3/26/2019      Tony Peter MD  [address] [facility] [address]      RE: Angel Sanders       Dear Colleague,    I had the pleasure of seeing Angel Sanders in the [facility].    Service Date: 03/26/2019      REASON FOR CARDIOLOGY VISIT:  Followup CAD.      HISTORY OF PRESENT ILLNESS:  Mr. Sanders is a very pleasant 68-year-old gentleman with history of CAD with history of inferior STEMI in 2008, status post 3 x 28 mm Cypher drug-eluting stent to the RCA.  He was noted to have 40%-50% narrowing at the distal RCA distal to the stent, 40% proximal LAD, 30% mid and 50% distal stenosis, circumflex had 10%-20% stenosis, OM1 had 25% stenosis.  Nuclear stress in 2012 showed inferior infarct and no ischemia.  Echocardiogram in 2014 showed normal LV function.  He also has history of left internal carotid artery stenting in 2009 and other comorbidities of hypertension, dyslipidemia and obesity.  Ultrasound carotid last year showed less than 50% internal carotid stenosis with patent left internal carotid artery stent.  In November last year, patient had paroxysmal complete AV block leading to syncope, and he underwent a [employer] dual-chamber permanent pacemaker implantation by EP.  Echocardiogram at that time showed normal LV function.  On subsequent device check he was noted to have about 12-beat runs of nonsustained ventricular tachycardia, asymptomatic.  This prompted a Lexiscan stress test which was done a few days ago and it showed a small fixed basal and mid inferior wall defect consistent with infarct, similar to previous stress test in 2012, no ischemia was noted in present study.  The patient is coming for a followup.  He has no specific complaints.  No chest discomfort or shortness of breath at rest or with physical activity.  No syncope, presyncope or dizziness.  He had ultrasound carotid done that showed a patent left internal carotid artery stent.  There seemed  to be some progression in the right internal carotid artery stenosis, normal LV velocity criteria, 50%-69% stenosis.  The patient is on baby aspirin.  He is on Lipitor 80 mg daily.  LDL is well controlled at 42.  He does not use any tobacco.  He has quit more than 11 years ago.  He is on metoprolol tartrate 50 mg b.i.d. and lisinopril 5 mg daily.      PHYSICAL EXAMINATION:   VITAL SIGNS:  Blood pressure 134/78.  To be noted, his initial blood pressure recording was high with systolic in the 150s, heart rate 66 regular, weight 204 pounds, BMI 35.03.   GENERAL:  The patient appears pleasant, comfortable.   NECK:  Normal JVP, no bruit.   CARDIOVASCULAR SYSTEM:  S1, S2 normal, no murmur, rub or gallop.   RESPIRATORY SYSTEM:  Clear to auscultation bilaterally.   GASTROINTESTINAL SYSTEM:  Abdomen soft, nontender.   EXTREMITIES:  No pitting pedal edema.   NEUROLOGICAL:  Alert, oriented x3.   PSYCHIATRIC:  Normal affect.   SKIN:  No obvious rash.   HEENT:  No pallor or icterus.      IMPRESSION AND PLAN:  A very pleasant 68-year-old gentleman with CAD as noted above, recent paroxysmal AV block leading to syncope status post dual-chamber permanent pacer implantation, carotid artery disease with history of left ventricle at stenting, stent appears patent on recent ultrasound carotid with moderate disease on the right side, other comorbidities of hypertension and dyslipidemia.  Overall, cardiac status-wise he is doing quite well.  He does not have any symptoms of angina or congestive heart failure.  Recent stress test showed only inferior infarct, similar to 2012 stress test, no ischemia.  Clinically, also, he does not have any anginal symptoms.  He is on appropriate CAD medication therapy of aspirin, high-intensity statin, beta blocker and ACE inhibitor.  If he continues to feel stable cardiac status-wise, we can see him back in 1 year with a repeat ultrasound carotid, sooner if he notes any change in clinical status,  especially if any exertion-related symptoms.         PAL BOWSER MD             D: 2019   T: 2019   MT: ENOCH      Name:     TIMBO RICH   MRN:      [id number]        Account:      [id number]   :      1950           Service Date: 2019      Document: [id number]         Outpatient Encounter Medications as of 3/26/2019   Medication Sig Dispense Refill     ASPIRIN 81 MG OR TABS 1 TABLET DAILY       atorvastatin (LIPITOR) 80 MG tablet Take 1 tablet (80 mg) by mouth daily 90 tablet 4     cyanocobalamin (VITAMIN B-12) 100 MCG tablet Take 100 mcg by mouth daily       Docusate Calcium (STOOL SOFTENER PO) Take 100 mg by mouth daily        Ferrous Sulfate (IRON SUPPLEMENT PO) Take 325 mg by mouth daily (with breakfast)        lisinopril (PRINIVIL/ZESTRIL) 5 MG tablet Take 1 tablet (5 mg) by mouth daily 90 tablet 4     metoprolol tartrate (LOPRESSOR) 50 MG tablet Take 1 tablet (50 mg) by mouth 2 times daily 180 tablet 3     multivitamin, therapeutic with minerals (MULTI-VITAMIN) TABS tablet Take 1 tablet by mouth daily       nitroGLYcerin (NITROSTAT) 0.4 MG sublingual tablet Place 1 tablet (0.4 mg) under the tongue every 5 minutes as needed May repeat X 2 and if chest pain persists, call 911 25 tablet 2     folic acid-vit B6-vit B12 (FOLGARD) 0.8-10-0.115 MG TABS Take 1 tablet by mouth daily       indomethacin (INDOCIN) 50 MG capsule Take 1 capsule (50 mg) by mouth 3 times daily (with meals) (Patient not taking: Reported on 2019) 30 capsule 1     [DISCONTINUED] atorvastatin (LIPITOR) 80 MG tablet Take 1 tablet (80 mg) by mouth daily 90 tablet 4     [DISCONTINUED] lisinopril (PRINIVIL/ZESTRIL) 5 MG tablet Take 1 tablet (5 mg) by mouth daily 90 tablet 4     [DISCONTINUED] metoprolol tartrate (LOPRESSOR) 50 MG tablet Take 1 tablet (50 mg) by mouth 2 times daily 60 tablet 11     No facility-administered encounter medications on file as of 3/26/2019.        Again, thank you for allowing me to  participate in the care of your patient.      Sincerely,    Dre Kruse MD     [facility]

## 2019-04-05 ENCOUNTER — TELEPHONE (OUTPATIENT)
Dept: CARDIOLOGY | Facility: CLINIC | Age: 69
End: 2019-04-05

## 2019-04-05 NOTE — TELEPHONE ENCOUNTER
Patient called with concerns with symptoms he has noted since increase in metoprolol at OV on 2/27.  Patient reports SOB with exertion.  Asked patient if he discussed this at his OV on 3/26 with Dr rKuse and he indicated he did not.  Reviewed note from OV on 2/27 and metoprolol was increased to 50mg po bid d/t HTN.  Patient admits to not monitoring his BP readings however did check it today.  Patient did three readings--147/80, 142/82 and 156/80 and HR 70.  Denies any other symptoms at this time.   Instructed patient to start to monitor BP readings diaily going forward and keep diary.   Will update Penelope Potter, JANETTE to see what her recommendations are.  COCO Yi

## 2019-04-05 NOTE — TELEPHONE ENCOUNTER
I would ask him to come in and either see me or an JAENTTE who work with Dr. Kruse.   He did just have a stress test completed which showed only inferior infarct, similar to 2012 stress test, no ischemia (per Dr. Kruse note).       Thank you,     Penelope

## 2019-04-08 NOTE — TELEPHONE ENCOUNTER
Spoke to patient with recommendation per Penelope Potter for follow up visit with either her or Dr Kruse.  He opted to see her and preferred Drain location.  Scheduled patient to see her on 4/10 at 3:10pm.  COCO Yi

## 2019-04-10 ENCOUNTER — OFFICE VISIT (OUTPATIENT)
Dept: CARDIOLOGY | Facility: CLINIC | Age: 69
End: 2019-04-10
Payer: COMMERCIAL

## 2019-04-10 VITALS
BODY MASS INDEX: 34.47 KG/M2 | DIASTOLIC BLOOD PRESSURE: 78 MMHG | WEIGHT: 201.9 LBS | HEIGHT: 64 IN | HEART RATE: 68 BPM | SYSTOLIC BLOOD PRESSURE: 146 MMHG

## 2019-04-10 DIAGNOSIS — E66.01 MORBID OBESITY (H): ICD-10-CM

## 2019-04-10 DIAGNOSIS — I25.10 CORONARY ARTERY DISEASE INVOLVING NATIVE CORONARY ARTERY OF NATIVE HEART WITHOUT ANGINA PECTORIS: Primary | ICD-10-CM

## 2019-04-10 PROCEDURE — 99214 OFFICE O/P EST MOD 30 MIN: CPT | Performed by: NURSE PRACTITIONER

## 2019-04-10 ASSESSMENT — MIFFLIN-ST. JEOR: SCORE: 1591.81

## 2019-04-10 NOTE — LETTER
4/10/2019    Tony Peter MD  [address] [facility] [address]    RE: Angel Sanders       Dear Colleague,    I had the pleasure of seeing Angel Sanders in the [facility].    HPI:  Angel Sanders is a 68 year old male who presents due to complaints of shortness of breath with exertion.   He is a patient of Dr. Kruse.  His medical history includes     A.  Coronary artery disease with hx of STEMI s/p 3 JUANCARLOS to the RCA (2008).  At that time his LAD had 40% proximal, 30% mid and 50% distal stenoses.  Circumflex had 10%-20% proximal stenosis.  OM1 had 25% stenosis.   He had a negative stress test in 2012 and 2019  B.  Paroxysmal complete AV heart block with syncope S/P [employer] dual-chamber permanent pacemaker.  C.  Left internal carotid artery stenting in 2009. Yearly ultrasounds  D.  Hypertension.    E.  Obesity .  F.  Anemia with stage III CKD  G. NSVT.   In 11/2018 had a 12 beat run on device check   H.  Tobacco user.  40 pack years; quit in 2008     Diagnostics:  ECHO (11/26/18) reveals EF 60-70% with early diastolic dysfunction  Device check (1/23/2019) revealed 17% A-Paced, 1% V-paced, 12 seconds of NSVT at 150-180 bpm.  1 episode of SVT lasting 23 seconds at 160 bpm.   Dr. Kruse recommended a Lexiscan for risk stratification.  Nuclear Stress test (3/2019) showed a small fixed basal and mid inferior wall defect consistent with infarct, similar to previous stress test in 2012, no ischemia was noted in present study.      Today he presents stating he has shortness of breath particularly when he shovels snow, brushes snow off his car, goes to the mailbox and then comes up the stairs in his house.  This shortness of breath is not associated with any chest pain, dizziness, lightheadedness, or any other radiating pain.  He does not have shortness of breath at rest, during the night, or while lying down.  Patient states he has not been regularly exercising and he has a  "40-year history of smoking.  He is taking his blood pressure regularly at home and it varies from 116-162/65-85 mmHg. He is unsure what time he takes his blood pressure during the day and he also consumes 2-5 cups of caffeine per day.      ASSESSMENT AND PLAN    Paroxysmal complete AV heart block with syncope S/P [employer] dual-chamber permanent pacemaker.  Device check revealed NSVT 12 beats at the rate of 180 bpm and a nuclear test was completed.  Normal device function   Follow with device clinic quarterly     Coronary artery disease.  Patient had 3 JUANCARLOS to the RCA (2008) and he has known carotid artery disease.     Does have some shortness of breath but does not sound cardiac as he recently had a nuclear stress test which was negative for ischemia.    Currently taking a BB, ACE-I, baby aspirin and atorvastatin, and has nitroglycerin sub-lingual prn        Hypertension.    Continues to be slightly hypertensive.  Today we had a long conversation regarding when to take his blood pressure how to take his blood pressure.  I did send written materials home.  I was going to increase his blood pressure medicine however he would prefer continue to monitor his blood pressure the \"correct\" method and then follow with Dr. Peter in May 2019.    Currently taking metoprolol tartrate to 50 mg twice daily and lisinopril 5 mg daily       NSVT  Noted on pacemaker in 2019  Metoprolol tartrate 50 mg twice daily.    Dyspnea on exertion.   This sounds multifactorial.  Patient has recently had a decrease in activity as he is not currently walking as he normally did and has now become deconditioned.  He has a history of smoking and has no diagnosis of COPD or treatment.  He is obese.  At this time, I do not believe the WALKER is cardiac related as he does not have any other symptoms associated with the shortness of breath and he recently had a stress test which revealed no ischemia.  I did encourage him to follow-up with his PCP " in May 2019 to further discuss.    Recommendations:    No medication changes today    Follow up with PCP regarding blood pressure and WALKER    Slowly increase activity    Written material provider on blood pressure.    Call if problems arise or question arise    Follow up with Dr Kruse in 1 year.     Patient expresses understanding and agreement with the plan.     I appreciate the chance to help with Angel TSATON Tommy Please let me know if you have any questions or concerns.    Penelope Potter, APRN, CNP    This note was completed in part using Dragon voice recognition software. Although reviewed after completion, some word and grammatical errors may occur.    No orders of the defined types were placed in this encounter.    No orders of the defined types were placed in this encounter.    There are no discontinued medications.      No diagnosis found.    CURRENT MEDICATIONS:  Current Outpatient Medications   Medication Sig Dispense Refill     ASPIRIN 81 MG OR TABS 1 TABLET DAILY       atorvastatin (LIPITOR) 80 MG tablet Take 1 tablet (80 mg) by mouth daily 90 tablet 4     cyanocobalamin (VITAMIN B-12) 100 MCG tablet Take 100 mcg by mouth daily       Docusate Calcium (STOOL SOFTENER PO) Take 100 mg by mouth daily        Ferrous Sulfate (IRON SUPPLEMENT PO) Take 325 mg by mouth daily (with breakfast)        folic acid-vit B6-vit B12 (FOLGARD) 0.8-10-0.115 MG TABS Take 1 tablet by mouth daily       lisinopril (PRINIVIL/ZESTRIL) 5 MG tablet Take 1 tablet (5 mg) by mouth daily 90 tablet 4     metoprolol tartrate (LOPRESSOR) 50 MG tablet Take 1 tablet (50 mg) by mouth 2 times daily 180 tablet 3     multivitamin, therapeutic with minerals (MULTI-VITAMIN) TABS tablet Take 1 tablet by mouth daily       nitroGLYcerin (NITROSTAT) 0.4 MG sublingual tablet Place 1 tablet (0.4 mg) under the tongue every 5 minutes as needed May repeat X 2 and if chest pain persists, call 911 25 tablet 2     indomethacin (INDOCIN) 50 MG capsule Take 1  capsule (50 mg) by mouth 3 times daily (with meals) (Patient not taking: Reported on 2/11/2019) 30 capsule 1       ALLERGIES     Allergies   Allergen Reactions     Adhesive Tape Rash       PAST MEDICAL HISTORY:  Past Medical History:   Diagnosis Date     Chronic renal insufficiency      Complete AV block (H)      Coronary atherosclerosis of unspecified type of vessel, native or graft 2008    Acute inf MI; RCA stent; followed by Cardiology     Diverticulosis      Herniated cervical disc      Hyperlipidaemia      Hypertension      Occlusion and stenosis of carotid artery without mention of cerebral infarction 2008    50-69% stenosis of left ICA       PAST SURGICAL HISTORY:  Past Surgical History:   Procedure Laterality Date     ARTHROTOMY SHOULDER, ROTATOR CUFF REPAIR, COMBINED  10/23/2013    Procedure: COMBINED ARTHROTOMY SHOULDER, ROTATOR CUFF REPAIR;  Left Shoulder Open Decompression, Distal Clavical Resection, Rotator Cuff Repair , Bicep Tenolysis, and Bicep Tenodesis   ;  Surgeon: Zhang Mattson MD;  Location: [facility] OR     C NONSPECIFIC PROCEDURE  2008    RCA stent; see [facility]     COLONOSCOPY N/A 5/15/2018    Procedure: COLONOSCOPY;  COLONOSCOPY ;  Surgeon: Cory Hernandez MD;  Location:  GI     PHACOEMULSIFICATION CLEAR CORNEA WITH STANDARD INTRAOCULAR LENS IMPLANT  11/20/2013    Procedure: PHACOEMULSIFICATION CLEAR CORNEA WITH STANDARD INTRAOCULAR LENS IMPLANT;  RIGHT PHACOEMULSIFICATION CLEAR CORNEA WITH STANDARD INTRAOCULAR LENS IMPLANT ;  Surgeon: Rikki Reddy MD;  Location: [facility]     PHACOEMULSIFICATION CLEAR CORNEA WITH STANDARD INTRAOCULAR LENS IMPLANT  12/3/2013    Procedure: PHACOEMULSIFICATION CLEAR CORNEA WITH STANDARD INTRAOCULAR LENS IMPLANT;  LEFT PHACOEMULSIFICATION CLEAR CORNEA WITH STANDARD INTRAOCULAR LENS IMPLANT;  Surgeon: Rikki Reddy MD;  Location: [facility]       FAMILY HISTORY:  Family History   Problem Relation Age of Onset     Breast Cancer Mother      Heart Disease Father  74         of heart failure     Colon Cancer No family hx of        SOCIAL HISTORY:  Social History     Socioeconomic History     Marital status:      Spouse name: None     Number of children: None     Years of education: None     Highest education level: None   Occupational History     None   Social Needs     Financial resource strain: None     Food insecurity:     Worry: None     Inability: None     Transportation needs:     Medical: None     Non-medical: None   Tobacco Use     Smoking status: Former Smoker     Packs/day: 1.00     Years: 40.00     Pack years: 40.00     Types: Cigarettes     Start date:      Last attempt to quit: 2008     Years since quittin.2     Smokeless tobacco: Never Used   Substance and Sexual Activity     Alcohol use: No     Alcohol/week: 0.0 oz     Comment: Sober for 25 years     Drug use: No     Sexual activity: Yes     Partners: Female   Lifestyle     Physical activity:     Days per week: None     Minutes per session: None     Stress: None   Relationships     Social connections:     Talks on phone: None     Gets together: None     Attends Mu-ism service: None     Active member of club or organization: None     Attends meetings of clubs or organizations: None     Relationship status: None     Intimate partner violence:     Fear of current or ex partner: None     Emotionally abused: None     Physically abused: None     Forced sexual activity: None   Other Topics Concern     Parent/sibling w/ CABG, MI or angioplasty before 65F 55M? Not Asked      Service Not Asked     Blood Transfusions Not Asked     Caffeine Concern No     Occupational Exposure No     Hobby Hazards No     Sleep Concern No     Stress Concern No     Weight Concern Yes     Comment: overweight     Special Diet Yes     Comment: low salt and low cholesterol     Back Care No     Exercise Yes     Comment: 3-4 x week walking     Bike Helmet Not Asked     Seat Belt Yes     Self-Exams Not Asked  "  Social History Narrative     None       Review of Systems:  Skin:  Positive for bruising   Eyes:  Negative    ENT:  Negative    Respiratory:  Positive for dyspnea on exertion  Cardiovascular:  Negative    Gastroenterology: Negative    Genitourinary:  Negative    Musculoskeletal:  Positive for arthritis  Neurologic:  Negative    Psychiatric:  Negative    Heme/Lymph/Imm:  Negative    Endocrine:  Negative      Physical Exam:  Vitals: /78 (BP Location: Right arm, Patient Position: Sitting, Cuff Size: Adult Regular)   Pulse 68   Ht 1.626 m (5' 4\")   Wt 91.6 kg (201 lb 14.4 oz)   BMI 34.66 kg/m       Constitutional:  cooperative, alert and oriented, well developed, well nourished, in no acute distress obese      Skin:  warm and dry to the touch        Head:  normocephalic, no masses or lesions        Eyes:  pupils equal and round        ENT:  no pallor or cyanosis, dentition good        Neck:  JVP normal        Chest:  clear to auscultation        Cardiac: regular rhythm             murmur    Abdomen:  abdomen soft obese      Vascular: pulses full and equal                                      Extremities and Back:  no deformities, clubbing, cyanosis, erythema observed;no edema        Neurological:  no gross motor deficits          Recent Lab Results:  LIPID RESULTS:  Lab Results   Component Value Date    CHOL 95 11/14/2018    HDL 40 11/14/2018    LDL 42 11/14/2018    TRIG 66 11/14/2018    CHOLHDLRATIO 2.6 09/23/2015       LIVER ENZYME RESULTS:  Lab Results   Component Value Date    AST 38 11/14/2018    ALT 31 11/14/2018       CBC RESULTS:  Lab Results   Component Value Date    WBC 8.0 02/07/2019    RBC 3.85 (L) 02/07/2019    HGB 10.6 (L) 02/07/2019    HCT 33.4 (L) 02/07/2019    MCV 87 02/07/2019    MCH 27.5 02/07/2019    MCHC 31.7 02/07/2019    RDW 14.7 02/07/2019     02/07/2019       BMP RESULTS:  Lab Results   Component Value Date     (L) 02/07/2019    POTASSIUM 4.5 02/07/2019    CHLORIDE 103 " 02/07/2019    CO2 24 02/07/2019    ANIONGAP 5 02/07/2019     (H) 02/07/2019    BUN 22 02/07/2019    CR 1.23 02/07/2019    GFRESTIMATED 59 (L) 02/07/2019    GFRESTBLACK 69 02/07/2019    MARIA ALEJANDRA 8.9 02/07/2019        A1C RESULTS:  Lab Results   Component Value Date    A1C 5.5 11/26/2018       INR RESULTS:  Lab Results   Component Value Date    INR 0.98 11/26/2018    INR 0.90 09/02/2009           CC  No referring provider defined for this encounter.                  Thank you for allowing me to participate in the care of your patient.      Sincerely,     KARLEY Simeon [facility]    cc:   No referring provider defined for this encounter.

## 2019-04-10 NOTE — PATIENT INSTRUCTIONS
Please start slowly increasing your exercise  Talk to Dr. Peter about your shortness of breath and your blood pressure      Patient Education     Taking Your Blood Pressure  Blood pressure is the force of blood against the artery wall as it moves from the heart through the blood vessels. You can take your own blood pressure reading using a digital monitor. Take your readings the same each time, using the same arm. Take readings as often as your healthcare provider instructs.  About blood pressure monitors  Blood pressure monitors are designed for certain ages and cases. You can find monitors for older adults, for pregnant women, and for children. Make sure the one you choose is the right one for your age and situation.  The American Heart Association recommends an automatic cuff monitor that fits on your upper arm (bicep). The cuff should fit your arm size. A cuff that s too large or too small will not give an accurate reading. Measure around your upper arm to find your size.  Monitors that attach to your finger or wrist are not as accurate as monitors for your upper arm.  Ask your healthcare provider for help in choosing a monitor. Bring your monitor to your next provider visit if you need help in using it the correct way.  The steps below are general instructions for using an automatic digital monitor.  Step 1. Relax      Take your blood pressure at the same time every day, such as in the morning or evening, or at the time your healthcare provider recommends.    Wait at least a half-hour after smoking, eating, or exercising. Don't drink coffee, tea, soda, or other caffeinated beverages before checking your blood pressure.    Sit comfortably at a table with both feet on the floor. Do not cross your legs or feet. Place the monitor near you.    Rest for a few minutes before you begin.  Step 2. Wrap the cuff      Place your arm on the table, palm up. Your arm should be at the level of your heart. Wrap the cuff  around your upper arm, just above your elbow. It s best done on bare skin, not over clothing. Most cuffs will indicate where the brachial artery (the blood vessel in the middle of the arm at the inner side of the elbow) should line up with the cuff. Look in your monitor's instruction booklet for an illustration. You can also bring your cuff to your healthcare provider and have them show you how to correctly place the cuff.  Step 3. Inflate the cuff      Push the button that starts the pump.    The cuff will tighten, then loosen.    The numbers will change. When they stop changing, your blood pressure reading will appear.    Take 2 or 3 readings one minute apart.  Step 4. Write down the results of each reading      Write down your blood pressure numbers for each reading. Note the date and time. Keep your results in one place, such as a notebook. Even if your monitor has a built-in memory, keep a hard copy of the readings.    Remove the cuff from your arm. Turn off the machine.    Bring your blood pressure records with your healthcare providers at each visit.    If you start a new blood pressure medicine, note the day you started the new medicine. Also note the day if you change the dose of your medicine. This information goes on your blood pressure recording sheet. This will help your healthcare provider monitor how well the medicine changes are working.    Ask your healthcare provider what numbers should prompt you to call him or her. Also ask what numbers should prompt you to get help right away.  Date Last Reviewed: 11/1/2016 2000-2018 [employer]. [address] [place] [address], [place] [address]. All rights reserved. This information is not intended as a substitute for professional medical care. Always follow your healthcare professional's instructions.

## 2019-04-10 NOTE — PROGRESS NOTES
HPI:  Angel Sanders is a 68 year old male who presents due to complaints of shortness of breath with exertion.   He is a patient of Dr. Kruse.  His medical history includes     A.  Coronary artery disease with hx of STEMI s/p 3 JUANCARLOS to the RCA (2008).  At that time his LAD had 40% proximal, 30% mid and 50% distal stenoses.  Circumflex had 10%-20% proximal stenosis.  OM1 had 25% stenosis.   He had a negative stress test in 2012 and 2019  B.  Paroxysmal complete AV heart block with syncope S/P [employer] dual-chamber permanent pacemaker.  C.  Left internal carotid artery stenting in 2009. Yearly ultrasounds  D.  Hypertension.    E.  Obesity.  F.  Anemia with stage III CKD  G. NSVT.   In 11/2018 had a 12 beat run on device check   H.  Tobacco user.  40 pack years; quit in 2008     Diagnostics:  ECHO (11/26/18) reveals EF 60-70% with early diastolic dysfunction  Device check (1/23/2019) revealed 17% A-Paced, 1% V-paced, 12 seconds of NSVT at 150-180 bpm.  1 episode of SVT lasting 23 seconds at 160 bpm.   Dr. Kruse recommended a Lexiscan for risk stratification.  Nuclear Stress test (3/2019) showed a small fixed basal and mid inferior wall defect consistent with infarct, similar to previous stress test in 2012, no ischemia was noted in present study.      Today he presents stating he has shortness of breath particularly when he shovels snow, brushes snow off his car, goes to the mailbox and then comes up the stairs in his house.  This shortness of breath is not associated with any chest pain, dizziness, lightheadedness, or any other radiating pain.  He does not have shortness of breath at rest, during the night, or while lying down.  Patient states he has not been regularly exercising and he has a 40-year history of smoking.  He is taking his blood pressure regularly at home and it varies from 116-162/65-85 mmHg. He is unsure what time he takes his blood pressure during the day and he also consumes 2-5 cups of  "caffeine per day.      ASSESSMENT AND PLAN    Paroxysmal complete AV heart block with syncope S/P [employer] dual-chamber permanent pacemaker.  Device check revealed NSVT 12 beats at the rate of 180 bpm and a nuclear test was completed.  Normal device function   Follow with device clinic quarterly     Coronary artery disease.  Patient had 3 JUANCARLOS to the RCA (2008) and he has known carotid artery disease.    Does have some shortness of breath but does not sound cardiac as he recently had a nuclear stress test which was negative for ischemia.    Currently taking a BB, ACE-I, baby aspirin and atorvastatin, and has nitroglycerin sub-lingual prn        Hypertension.    Continues to be slightly hypertensive.  Today we had a long conversation regarding when to take his blood pressure how to take his blood pressure.  I did send written materials home.  I was going to increase his blood pressure medicine however he would prefer continue to monitor his blood pressure the \"correct\" method and then follow with Dr. Peter in May 2019.    Currently taking metoprolol tartrate to 50 mg twice daily and lisinopril 5 mg daily       NSVT  Noted on pacemaker in 2019  Metoprolol tartrate 50 mg twice daily.    Dyspnea on exertion.   This sounds multifactorial.  Patient has recently had a decrease in activity as he is not currently walking as he normally did and has now become deconditioned.  He has a history of smoking and has no diagnosis of COPD or treatment.  He is obese.  At this time, I do not believe the WALKER is cardiac related as he does not have any other symptoms associated with the shortness of breath and he recently had a stress test which revealed no ischemia.  I did encourage him to follow-up with his PCP in May 2019 to further discuss.    Recommendations:    No medication changes today    Follow up with PCP regarding blood pressure and WALKER    Slowly increase activity    Written material provider on blood " pressure.    Call if problems arise or question arise    Follow up with Dr Kruse in 1 year.     Patient expresses understanding and agreement with the plan.     I appreciate the chance to help with Angel Sanders Please let me know if you have any questions or concerns.    KARLEY Rodriguez, CNP    This note was completed in part using Dragon voice recognition software. Although reviewed after completion, some word and grammatical errors may occur.    No orders of the defined types were placed in this encounter.    No orders of the defined types were placed in this encounter.    There are no discontinued medications.      No diagnosis found.    CURRENT MEDICATIONS:  Current Outpatient Medications   Medication Sig Dispense Refill     ASPIRIN 81 MG OR TABS 1 TABLET DAILY       atorvastatin (LIPITOR) 80 MG tablet Take 1 tablet (80 mg) by mouth daily 90 tablet 4     cyanocobalamin (VITAMIN B-12) 100 MCG tablet Take 100 mcg by mouth daily       Docusate Calcium (STOOL SOFTENER PO) Take 100 mg by mouth daily        Ferrous Sulfate (IRON SUPPLEMENT PO) Take 325 mg by mouth daily (with breakfast)        folic acid-vit B6-vit B12 (FOLGARD) 0.8-10-0.115 MG TABS Take 1 tablet by mouth daily       lisinopril (PRINIVIL/ZESTRIL) 5 MG tablet Take 1 tablet (5 mg) by mouth daily 90 tablet 4     metoprolol tartrate (LOPRESSOR) 50 MG tablet Take 1 tablet (50 mg) by mouth 2 times daily 180 tablet 3     multivitamin, therapeutic with minerals (MULTI-VITAMIN) TABS tablet Take 1 tablet by mouth daily       nitroGLYcerin (NITROSTAT) 0.4 MG sublingual tablet Place 1 tablet (0.4 mg) under the tongue every 5 minutes as needed May repeat X 2 and if chest pain persists, call 911 25 tablet 2     indomethacin (INDOCIN) 50 MG capsule Take 1 capsule (50 mg) by mouth 3 times daily (with meals) (Patient not taking: Reported on 2/11/2019) 30 capsule 1       ALLERGIES     Allergies   Allergen Reactions     Adhesive Tape Rash       PAST MEDICAL  HISTORY:  Past Medical History:   Diagnosis Date     Chronic renal insufficiency      Complete AV block (H)      Coronary atherosclerosis of unspecified type of vessel, native or graft     Acute inf MI; RCA stent; followed by Cardiology     Diverticulosis      Herniated cervical disc      Hyperlipidaemia      Hypertension      Occlusion and stenosis of carotid artery without mention of cerebral infarction     50-69% stenosis of left ICA       PAST SURGICAL HISTORY:  Past Surgical History:   Procedure Laterality Date     ARTHROTOMY SHOULDER, ROTATOR CUFF REPAIR, COMBINED  10/23/2013    Procedure: COMBINED ARTHROTOMY SHOULDER, ROTATOR CUFF REPAIR;  Left Shoulder Open Decompression, Distal Clavical Resection, Rotator Cuff Repair , Bicep Tenolysis, and Bicep Tenodesis   ;  Surgeon: Zhang Mattson MD;  Location: [facility] OR     C NONSPECIFIC PROCEDURE      RCA stent; see [facility]     COLONOSCOPY N/A 5/15/2018    Procedure: COLONOSCOPY;  COLONOSCOPY ;  Surgeon: Cory Hernandez MD;  Location:  GI     PHACOEMULSIFICATION CLEAR CORNEA WITH STANDARD INTRAOCULAR LENS IMPLANT  2013    Procedure: PHACOEMULSIFICATION CLEAR CORNEA WITH STANDARD INTRAOCULAR LENS IMPLANT;  RIGHT PHACOEMULSIFICATION CLEAR CORNEA WITH STANDARD INTRAOCULAR LENS IMPLANT ;  Surgeon: Rikki Reddy MD;  Location: [facility]     PHACOEMULSIFICATION CLEAR CORNEA WITH STANDARD INTRAOCULAR LENS IMPLANT  12/3/2013    Procedure: PHACOEMULSIFICATION CLEAR CORNEA WITH STANDARD INTRAOCULAR LENS IMPLANT;  LEFT PHACOEMULSIFICATION CLEAR CORNEA WITH STANDARD INTRAOCULAR LENS IMPLANT;  Surgeon: Rikki Reddy MD;  Location: [facility]       FAMILY HISTORY:  Family History   Problem Relation Age of Onset     Breast Cancer Mother      Heart Disease Father 74         of heart failure     Colon Cancer No family hx of        SOCIAL HISTORY:  Social History     Socioeconomic History     Marital status:      Spouse name: None     Number of  children: None     Years of education: None     Highest education level: None   Occupational History     None   Social Needs     Financial resource strain: None     Food insecurity:     Worry: None     Inability: None     Transportation needs:     Medical: None     Non-medical: None   Tobacco Use     Smoking status: Former Smoker     Packs/day: 1.00     Years: 40.00     Pack years: 40.00     Types: Cigarettes     Start date:      Last attempt to quit: 2008     Years since quittin.2     Smokeless tobacco: Never Used   Substance and Sexual Activity     Alcohol use: No     Alcohol/week: 0.0 oz     Comment: Sober for 25 years     Drug use: No     Sexual activity: Yes     Partners: Female   Lifestyle     Physical activity:     Days per week: None     Minutes per session: None     Stress: None   Relationships     Social connections:     Talks on phone: None     Gets together: None     Attends [employer] witness service: None     Active member of club or organization: None     Attends meetings of clubs or organizations: None     Relationship status: None     Intimate partner violence:     Fear of current or ex partner: None     Emotionally abused: None     Physically abused: None     Forced sexual activity: None   Other Topics Concern     Parent/sibling w/ CABG, MI or angioplasty before 65F 55M? Not Asked      Service Not Asked     Blood Transfusions Not Asked     Caffeine Concern No     Occupational Exposure No     Hobby Hazards No     Sleep Concern No     Stress Concern No     Weight Concern Yes     Comment: overweight     Special Diet Yes     Comment: low salt and low cholesterol     Back Care No     Exercise Yes     Comment: 3-4 x week walking     Bike Helmet Not Asked     Seat Belt Yes     Self-Exams Not Asked   Social History Narrative     None       Review of Systems:  Skin:  Positive for bruising   Eyes:  Negative    ENT:  Negative    Respiratory:  Positive for dyspnea on exertion  Cardiovascular:   "Negative    Gastroenterology: Negative    Genitourinary:  Negative    Musculoskeletal:  Positive for arthritis  Neurologic:  Negative    Psychiatric:  Negative    Heme/Lymph/Imm:  Negative    Endocrine:  Negative      Physical Exam:  Vitals: /78 (BP Location: Right arm, Patient Position: Sitting, Cuff Size: Adult Regular)   Pulse 68   Ht 1.626 m (5' 4\")   Wt 91.6 kg (201 lb 14.4 oz)   BMI 34.66 kg/m      Constitutional:  cooperative, alert and oriented, well developed, well nourished, in no acute distress obese      Skin:  warm and dry to the touch        Head:  normocephalic, no masses or lesions        Eyes:  pupils equal and round        ENT:  no pallor or cyanosis, dentition good        Neck:  JVP normal        Chest:  clear to auscultation        Cardiac: regular rhythm             murmur    Abdomen:  abdomen soft obese      Vascular: pulses full and equal                                      Extremities and Back:  no deformities, clubbing, cyanosis, erythema observed;no edema        Neurological:  no gross motor deficits          Recent Lab Results:  LIPID RESULTS:  Lab Results   Component Value Date    CHOL 95 11/14/2018    HDL 40 11/14/2018    LDL 42 11/14/2018    TRIG 66 11/14/2018    CHOLHDLRATIO 2.6 09/23/2015       LIVER ENZYME RESULTS:  Lab Results   Component Value Date    AST 38 11/14/2018    ALT 31 11/14/2018       CBC RESULTS:  Lab Results   Component Value Date    WBC 8.0 02/07/2019    RBC 3.85 (L) 02/07/2019    HGB 10.6 (L) 02/07/2019    HCT 33.4 (L) 02/07/2019    MCV 87 02/07/2019    MCH 27.5 02/07/2019    MCHC 31.7 02/07/2019    RDW 14.7 02/07/2019     02/07/2019       BMP RESULTS:  Lab Results   Component Value Date     (L) 02/07/2019    POTASSIUM 4.5 02/07/2019    CHLORIDE 103 02/07/2019    CO2 24 02/07/2019    ANIONGAP 5 02/07/2019     (H) 02/07/2019    BUN 22 02/07/2019    CR 1.23 02/07/2019    GFRESTIMATED 59 (L) 02/07/2019    GFRESTBLACK 69 02/07/2019    MARIA ALEJANDRA " 8.9 02/07/2019        A1C RESULTS:  Lab Results   Component Value Date    A1C 5.5 11/26/2018       INR RESULTS:  Lab Results   Component Value Date    INR 0.98 11/26/2018    INR 0.90 09/02/2009           CC  No referring provider defined for this encounter.

## 2019-04-10 NOTE — LETTER
4/10/2019    Tony Peter MD  [address] [facility] [address]    RE: Angel Sanders       Dear Colleague,    I had the pleasure of seeing Angel Sanders in the [facility].    HPI:  Angel Sanders is a 68 year old male who presents due to complaints of shortness of breath with exertion.   He is a patient of Dr. Kruse.  His medical history includes     A.  Coronary artery disease with hx of STEMI s/p 3 JUANCARLOS to the RCA (2008).  At that time his LAD had 40% proximal, 30% mid and 50% distal stenoses.  Circumflex had 10%-20% proximal stenosis.  OM1 had 25% stenosis.   He had a negative stress test in 2012 and 2019  B.  Paroxysmal complete AV heart block with syncope S/P [employer] dual-chamber permanent pacemaker.  C.  Left internal carotid artery stenting in 2009. Yearly ultrasounds  D.  Hypertension.    E.  Obesity .  F.  Anemia with stage III CKD  G. NSVT.   In 11/2018 had a 12 beat run on device check   H.  Tobacco user.  40 pack years; quit in 2008     Diagnostics:  ECHO (11/26/18) reveals EF 60-70% with early diastolic dysfunction  Device check (1/23/2019) revealed 17% A-Paced, 1% V-paced, 12 seconds of NSVT at 150-180 bpm.  1 episode of SVT lasting 23 seconds at 160 bpm.   Dr. Kruse recommended a Lexiscan for risk stratification.  Nuclear Stress test (3/2019) showed a small fixed basal and mid inferior wall defect consistent with infarct, similar to previous stress test in 2012, no ischemia was noted in present study.      Today he presents stating he has shortness of breath particularly when he shovels snow, brushes snow off his car, goes to the mailbox and then comes up the stairs in his house.  This shortness of breath is not associated with any chest pain, dizziness, lightheadedness, or any other radiating pain.  He does not have shortness of breath at rest, during the night, or while lying down.  Patient states he has not been regularly exercising and he has a  "40-year history of smoking.  He is taking his blood pressure regularly at home and it varies from 116-162/65-85 mmHg. He is unsure what time he takes his blood pressure during the day and he also consumes 2-5 cups of caffeine per day.      ASSESSMENT AND PLAN    Paroxysmal complete AV heart block with syncope S/P [employer] dual-chamber permanent pacemaker.  Device check revealed NSVT 12 beats at the rate of 180 bpm and a nuclear test was completed.  Normal device function   Follow with device clinic quarterly     Coronary artery disease.  Patient had 3 JUANCARLOS to the RCA (2008) and he has known carotid artery disease.     Does have some shortness of breath but does not sound cardiac as he recently had a nuclear stress test which was negative for ischemia.    Currently taking a BB, ACE-I, baby aspirin and atorvastatin, and has nitroglycerin sub-lingual prn        Hypertension.    Continues to be slightly hypertensive.  Today we had a long conversation regarding when to take his blood pressure how to take his blood pressure.  I did send written materials home.  I was going to increase his blood pressure medicine however he would prefer continue to monitor his blood pressure the \"correct\" method and then follow with Dr. Peter in May 2019.    Currently taking metoprolol tartrate to 50 mg twice daily and lisinopril 5 mg daily       NSVT  Noted on pacemaker in 2019  Metoprolol tartrate 50 mg twice daily.    Dyspnea on exertion.   This sounds multifactorial.  Patient has recently had a decrease in activity as he is not currently walking as he normally did and has now become deconditioned.  He has a history of smoking and has no diagnosis of COPD or treatment.  He is obese.  At this time, I do not believe the WALKER is cardiac related as he does not have any other symptoms associated with the shortness of breath and he recently had a stress test which revealed no ischemia.  I did encourage him to follow-up with his PCP " in May 2019 to further discuss.    Recommendations:    No medication changes today    Follow up with PCP regarding blood pressure and WALKER    Slowly increase activity    Written material provider on blood pressure.    Call if problems arise or question arise    Follow up with Dr Kruse in 1 year.     Patient expresses understanding and agreement with the plan.     I appreciate the chance to help with Angel STATON Tommy Please let me know if you have any questions or concerns.    Penelope Potter, APRN, CNP    This note was completed in part using Dragon voice recognition software. Although reviewed after completion, some word and grammatical errors may occur.    No orders of the defined types were placed in this encounter.    No orders of the defined types were placed in this encounter.    There are no discontinued medications.      No diagnosis found.    CURRENT MEDICATIONS:  Current Outpatient Medications   Medication Sig Dispense Refill     ASPIRIN 81 MG OR TABS 1 TABLET DAILY       atorvastatin (LIPITOR) 80 MG tablet Take 1 tablet (80 mg) by mouth daily 90 tablet 4     cyanocobalamin (VITAMIN B-12) 100 MCG tablet Take 100 mcg by mouth daily       Docusate Calcium (STOOL SOFTENER PO) Take 100 mg by mouth daily        Ferrous Sulfate (IRON SUPPLEMENT PO) Take 325 mg by mouth daily (with breakfast)        folic acid-vit B6-vit B12 (FOLGARD) 0.8-10-0.115 MG TABS Take 1 tablet by mouth daily       lisinopril (PRINIVIL/ZESTRIL) 5 MG tablet Take 1 tablet (5 mg) by mouth daily 90 tablet 4     metoprolol tartrate (LOPRESSOR) 50 MG tablet Take 1 tablet (50 mg) by mouth 2 times daily 180 tablet 3     multivitamin, therapeutic with minerals (MULTI-VITAMIN) TABS tablet Take 1 tablet by mouth daily       nitroGLYcerin (NITROSTAT) 0.4 MG sublingual tablet Place 1 tablet (0.4 mg) under the tongue every 5 minutes as needed May repeat X 2 and if chest pain persists, call 911 25 tablet 2     indomethacin (INDOCIN) 50 MG capsule Take 1  capsule (50 mg) by mouth 3 times daily (with meals) (Patient not taking: Reported on 2/11/2019) 30 capsule 1       ALLERGIES     Allergies   Allergen Reactions     Adhesive Tape Rash       PAST MEDICAL HISTORY:  Past Medical History:   Diagnosis Date     Chronic renal insufficiency      Complete AV block (H)      Coronary atherosclerosis of unspecified type of vessel, native or graft 2008    Acute inf MI; RCA stent; followed by Cardiology     Diverticulosis      Herniated cervical disc      Hyperlipidaemia      Hypertension      Occlusion and stenosis of carotid artery without mention of cerebral infarction 2008    50-69% stenosis of left ICA       PAST SURGICAL HISTORY:  Past Surgical History:   Procedure Laterality Date     ARTHROTOMY SHOULDER, ROTATOR CUFF REPAIR, COMBINED  10/23/2013    Procedure: COMBINED ARTHROTOMY SHOULDER, ROTATOR CUFF REPAIR;  Left Shoulder Open Decompression, Distal Clavical Resection, Rotator Cuff Repair , Bicep Tenolysis, and Bicep Tenodesis   ;  Surgeon: Zhang Mattson MD;  Location: [facility] OR     C NONSPECIFIC PROCEDURE  2008    RCA stent; see [facility]     COLONOSCOPY N/A 5/15/2018    Procedure: COLONOSCOPY;  COLONOSCOPY ;  Surgeon: Cory Hernandez MD;  Location:  GI     PHACOEMULSIFICATION CLEAR CORNEA WITH STANDARD INTRAOCULAR LENS IMPLANT  11/20/2013    Procedure: PHACOEMULSIFICATION CLEAR CORNEA WITH STANDARD INTRAOCULAR LENS IMPLANT;  RIGHT PHACOEMULSIFICATION CLEAR CORNEA WITH STANDARD INTRAOCULAR LENS IMPLANT ;  Surgeon: Rikki Reddy MD;  Location: [facility]     PHACOEMULSIFICATION CLEAR CORNEA WITH STANDARD INTRAOCULAR LENS IMPLANT  12/3/2013    Procedure: PHACOEMULSIFICATION CLEAR CORNEA WITH STANDARD INTRAOCULAR LENS IMPLANT;  LEFT PHACOEMULSIFICATION CLEAR CORNEA WITH STANDARD INTRAOCULAR LENS IMPLANT;  Surgeon: Rikki Reddy MD;  Location: [facility]       FAMILY HISTORY:  Family History   Problem Relation Age of Onset     Breast Cancer Mother      Heart Disease Father  74         of heart failure     Colon Cancer No family hx of        SOCIAL HISTORY:  Social History     Socioeconomic History     Marital status:      Spouse name: None     Number of children: None     Years of education: None     Highest education level: None   Occupational History     None   Social Needs     Financial resource strain: None     Food insecurity:     Worry: None     Inability: None     Transportation needs:     Medical: None     Non-medical: None   Tobacco Use     Smoking status: Former Smoker     Packs/day: 1.00     Years: 40.00     Pack years: 40.00     Types: Cigarettes     Start date:      Last attempt to quit: 2008     Years since quittin.2     Smokeless tobacco: Never Used   Substance and Sexual Activity     Alcohol use: No     Alcohol/week: 0.0 oz     Comment: Sober for 25 years     Drug use: No     Sexual activity: Yes     Partners: Female   Lifestyle     Physical activity:     Days per week: None     Minutes per session: None     Stress: None   Relationships     Social connections:     Talks on phone: None     Gets together: None     Attends [employer] service: None     Active member of club or organization: None     Attends meetings of clubs or organizations: None     Relationship status: None     Intimate partner violence:     Fear of current or ex partner: None     Emotionally abused: None     Physically abused: None     Forced sexual activity: None   Other Topics Concern     Parent/sibling w/ CABG, MI or angioplasty before 65F 55M? Not Asked      Service Not Asked     Blood Transfusions Not Asked     Caffeine Concern No     Occupational Exposure No     Hobby Hazards No     Sleep Concern No     Stress Concern No     Weight Concern Yes     Comment: overweight     Special Diet Yes     Comment: low salt and low cholesterol     Back Care No     Exercise Yes     Comment: 3-4 x week walking     Bike Helmet Not Asked     Seat Belt Yes     Self-Exams Not Asked  "  Social History Narrative     None       Review of Systems:  Skin:  Positive for bruising   Eyes:  Negative    ENT:  Negative    Respiratory:  Positive for dyspnea on exertion  Cardiovascular:  Negative    Gastroenterology: Negative    Genitourinary:  Negative    Musculoskeletal:  Positive for arthritis  Neurologic:  Negative    Psychiatric:  Negative    Heme/Lymph/Imm:  Negative    Endocrine:  Negative      Physical Exam:  Vitals: /78 (BP Location: Right arm, Patient Position: Sitting, Cuff Size: Adult Regular)   Pulse 68   Ht 1.626 m (5' 4\")   Wt 91.6 kg (201 lb 14.4 oz)   BMI 34.66 kg/m       Constitutional:  cooperative, alert and oriented, well developed, well nourished, in no acute distress obese      Skin:  warm and dry to the touch        Head:  normocephalic, no masses or lesions        Eyes:  pupils equal and round        ENT:  no pallor or cyanosis, dentition good        Neck:  JVP normal        Chest:  clear to auscultation        Cardiac: regular rhythm             murmur    Abdomen:  abdomen soft obese      Vascular: pulses full and equal                                      Extremities and Back:  no deformities, clubbing, cyanosis, erythema observed;no edema        Neurological:  no gross motor deficits          Recent Lab Results:  LIPID RESULTS:  Lab Results   Component Value Date    CHOL 95 11/14/2018    HDL 40 11/14/2018    LDL 42 11/14/2018    TRIG 66 11/14/2018    CHOLHDLRATIO 2.6 09/23/2015       LIVER ENZYME RESULTS:  Lab Results   Component Value Date    AST 38 11/14/2018    ALT 31 11/14/2018       CBC RESULTS:  Lab Results   Component Value Date    WBC 8.0 02/07/2019    RBC 3.85 (L) 02/07/2019    HGB 10.6 (L) 02/07/2019    HCT 33.4 (L) 02/07/2019    MCV 87 02/07/2019    MCH 27.5 02/07/2019    MCHC 31.7 02/07/2019    RDW 14.7 02/07/2019     02/07/2019       BMP RESULTS:  Lab Results   Component Value Date     (L) 02/07/2019    POTASSIUM 4.5 02/07/2019    CHLORIDE 103 " 02/07/2019    CO2 24 02/07/2019    ANIONGAP 5 02/07/2019     (H) 02/07/2019    BUN 22 02/07/2019    CR 1.23 02/07/2019    GFRESTIMATED 59 (L) 02/07/2019    GFRESTBLACK 69 02/07/2019    MARIA ALEJANDRA 8.9 02/07/2019        A1C RESULTS:  Lab Results   Component Value Date    A1C 5.5 11/26/2018       INR RESULTS:  Lab Results   Component Value Date    INR 0.98 11/26/2018    INR 0.90 09/02/2009         Thank you for allowing me to participate in the care of your patient.    Sincerely,     KARLEY Simeon [facility]

## 2019-05-01 ENCOUNTER — ANCILLARY PROCEDURE (OUTPATIENT)
Dept: CARDIOLOGY | Facility: CLINIC | Age: 69
End: 2019-05-01
Payer: COMMERCIAL

## 2019-05-01 DIAGNOSIS — Z95.0 PACEMAKER: ICD-10-CM

## 2019-05-01 PROCEDURE — 93294 REM INTERROG EVL PM/LDLS PM: CPT | Performed by: INTERNAL MEDICINE

## 2019-05-01 PROCEDURE — 93296 REM INTERROG EVL PM/IDS: CPT | Performed by: INTERNAL MEDICINE

## 2019-05-06 ENCOUNTER — OFFICE VISIT (OUTPATIENT)
Dept: INTERNAL MEDICINE | Facility: CLINIC | Age: 69
End: 2019-05-06
Payer: COMMERCIAL

## 2019-05-06 VITALS
SYSTOLIC BLOOD PRESSURE: 138 MMHG | RESPIRATION RATE: 16 BRPM | HEIGHT: 64 IN | TEMPERATURE: 97.7 F | BODY MASS INDEX: 34.15 KG/M2 | DIASTOLIC BLOOD PRESSURE: 80 MMHG | WEIGHT: 200 LBS | OXYGEN SATURATION: 95 % | HEART RATE: 78 BPM

## 2019-05-06 DIAGNOSIS — Z87.891 PERSONAL HISTORY OF TOBACCO USE, PRESENTING HAZARDS TO HEALTH: ICD-10-CM

## 2019-05-06 DIAGNOSIS — I25.10 ATHEROSCLEROSIS OF NATIVE CORONARY ARTERY OF NATIVE HEART WITHOUT ANGINA PECTORIS: ICD-10-CM

## 2019-05-06 DIAGNOSIS — R06.02 SOB (SHORTNESS OF BREATH): Primary | ICD-10-CM

## 2019-05-06 DIAGNOSIS — I10 BENIGN ESSENTIAL HYPERTENSION: ICD-10-CM

## 2019-05-06 DIAGNOSIS — R70.0 ELEVATED ERYTHROCYTE SEDIMENTATION RATE: ICD-10-CM

## 2019-05-06 LAB
ALBUMIN SERPL-MCNC: 3.4 G/DL (ref 3.4–5)
ALP SERPL-CCNC: 113 U/L (ref 40–150)
ALT SERPL W P-5'-P-CCNC: 26 U/L (ref 0–70)
ANION GAP SERPL CALCULATED.3IONS-SCNC: 4 MMOL/L (ref 3–14)
AST SERPL W P-5'-P-CCNC: 27 U/L (ref 0–45)
BASOPHILS # BLD AUTO: 0 10E9/L (ref 0–0.2)
BASOPHILS NFR BLD AUTO: 0.4 %
BILIRUB SERPL-MCNC: 0.4 MG/DL (ref 0.2–1.3)
BUN SERPL-MCNC: 14 MG/DL (ref 7–30)
CALCIUM SERPL-MCNC: 9.2 MG/DL (ref 8.5–10.1)
CHLORIDE SERPL-SCNC: 105 MMOL/L (ref 94–109)
CO2 SERPL-SCNC: 27 MMOL/L (ref 20–32)
CREAT SERPL-MCNC: 1.26 MG/DL (ref 0.66–1.25)
CRP SERPL-MCNC: 3.7 MG/L (ref 0–8)
DIFFERENTIAL METHOD BLD: ABNORMAL
EOSINOPHIL # BLD AUTO: 0.1 10E9/L (ref 0–0.7)
EOSINOPHIL NFR BLD AUTO: 1 %
ERYTHROCYTE [DISTWIDTH] IN BLOOD BY AUTOMATED COUNT: 15.4 % (ref 10–15)
ERYTHROCYTE [SEDIMENTATION RATE] IN BLOOD BY WESTERGREN METHOD: 48 MM/H (ref 0–20)
GFR SERPL CREATININE-BSD FRML MDRD: 57 ML/MIN/{1.73_M2}
GLUCOSE SERPL-MCNC: 107 MG/DL (ref 70–99)
HCT VFR BLD AUTO: 37.7 % (ref 40–53)
HGB BLD-MCNC: 11.9 G/DL (ref 13.3–17.7)
LYMPHOCYTES # BLD AUTO: 2.2 10E9/L (ref 0.8–5.3)
LYMPHOCYTES NFR BLD AUTO: 29.6 %
MCH RBC QN AUTO: 27.8 PG (ref 26.5–33)
MCHC RBC AUTO-ENTMCNC: 31.6 G/DL (ref 31.5–36.5)
MCV RBC AUTO: 88 FL (ref 78–100)
MONOCYTES # BLD AUTO: 1 10E9/L (ref 0–1.3)
MONOCYTES NFR BLD AUTO: 13.7 %
NEUTROPHILS # BLD AUTO: 4.1 10E9/L (ref 1.6–8.3)
NEUTROPHILS NFR BLD AUTO: 55.3 %
PLATELET # BLD AUTO: 229 10E9/L (ref 150–450)
POTASSIUM SERPL-SCNC: 4.7 MMOL/L (ref 3.4–5.3)
PROT SERPL-MCNC: 8.2 G/DL (ref 6.8–8.8)
RBC # BLD AUTO: 4.28 10E12/L (ref 4.4–5.9)
SODIUM SERPL-SCNC: 136 MMOL/L (ref 133–144)
WBC # BLD AUTO: 7.4 10E9/L (ref 4–11)

## 2019-05-06 PROCEDURE — 85652 RBC SED RATE AUTOMATED: CPT | Performed by: INTERNAL MEDICINE

## 2019-05-06 PROCEDURE — 36415 COLL VENOUS BLD VENIPUNCTURE: CPT | Performed by: INTERNAL MEDICINE

## 2019-05-06 PROCEDURE — 99214 OFFICE O/P EST MOD 30 MIN: CPT | Performed by: INTERNAL MEDICINE

## 2019-05-06 PROCEDURE — 80053 COMPREHEN METABOLIC PANEL: CPT | Performed by: INTERNAL MEDICINE

## 2019-05-06 PROCEDURE — 85025 COMPLETE CBC W/AUTO DIFF WBC: CPT | Performed by: INTERNAL MEDICINE

## 2019-05-06 PROCEDURE — 86140 C-REACTIVE PROTEIN: CPT | Performed by: INTERNAL MEDICINE

## 2019-05-06 ASSESSMENT — MIFFLIN-ST. JEOR: SCORE: 1578.19

## 2019-05-06 NOTE — NURSING NOTE
"Vital signs:  Temp: 97.7  F (36.5  C) Temp src: Oral BP: 160/76 Pulse: 78   Resp: 16 SpO2: 95 %     Height: 162.6 cm (5' 4\") Weight: 90.7 kg (200 lb)  Estimated body mass index is 34.33 kg/m  as calculated from the following:    Height as of this encounter: 1.626 m (5' 4\").    Weight as of this encounter: 90.7 kg (200 lb).          "

## 2019-05-06 NOTE — LETTER
May 8, 2019      Angel Sanders  [address] [place] [address]        Dear ,    Your lab results came back with borderline decreased kidney function and mild anemia. The inflammation test is still slightly elevated but have improved. We will follow up on the labs in 6 months.     Resulted Orders   Erythrocyte sedimentation rate auto   Result Value Ref Range    Sed Rate 48 (H) 0 - 20 mm/h   CRP inflammation   Result Value Ref Range    CRP Inflammation 3.7 0.0 - 8.0 mg/L   CBC with platelets differential   Result Value Ref Range    WBC 7.4 4.0 - 11.0 10e9/L    RBC Count 4.28 (L) 4.4 - 5.9 10e12/L    Hemoglobin 11.9 (L) 13.3 - 17.7 g/dL    Hematocrit 37.7 (L) 40.0 - 53.0 %    MCV 88 78 - 100 fl    MCH 27.8 26.5 - 33.0 pg    MCHC 31.6 31.5 - 36.5 g/dL    RDW 15.4 (H) 10.0 - 15.0 %    Platelet Count 229 150 - 450 10e9/L    % Neutrophils 55.3 %    % Lymphocytes 29.6 %    % Monocytes 13.7 %    % Eosinophils 1.0 %    % Basophils 0.4 %    Absolute Neutrophil 4.1 1.6 - 8.3 10e9/L    Absolute Lymphocytes 2.2 0.8 - 5.3 10e9/L    Absolute Monocytes 1.0 0.0 - 1.3 10e9/L    Absolute Eosinophils 0.1 0.0 - 0.7 10e9/L    Absolute Basophils 0.0 0.0 - 0.2 10e9/L    Diff Method Automated Method    Comprehensive metabolic panel   Result Value Ref Range    Sodium 136 133 - 144 mmol/L    Potassium 4.7 3.4 - 5.3 mmol/L    Chloride 105 94 - 109 mmol/L    Carbon Dioxide 27 20 - 32 mmol/L    Anion Gap 4 3 - 14 mmol/L    Glucose 107 (H) 70 - 99 mg/dL      Comment:      Non Fasting    Urea Nitrogen 14 7 - 30 mg/dL    Creatinine 1.26 (H) 0.66 - 1.25 mg/dL    GFR Estimate 57 (L) >60 mL/min/[1.73_m2]      Comment:      Non  GFR Calc  Starting 12/18/2018, serum creatinine based estimated GFR (eGFR) will be   calculated using the Chronic Kidney Disease Epidemiology Collaboration   (CKD-EPI) equation.      GFR Estimate If Black 67 >60 mL/min/[1.73_m2]      Comment:       GFR Calc  Starting 12/18/2018,  serum creatinine based estimated GFR (eGFR) will be   calculated using the Chronic Kidney Disease Epidemiology Collaboration   (CKD-EPI) equation.      Calcium 9.2 8.5 - 10.1 mg/dL    Bilirubin Total 0.4 0.2 - 1.3 mg/dL    Albumin 3.4 3.4 - 5.0 g/dL    Protein Total 8.2 6.8 - 8.8 g/dL    Alkaline Phosphatase 113 40 - 150 U/L    ALT 26 0 - 70 U/L    AST 27 0 - 45 U/L       If you have any questions or concerns, please call the clinic at the number listed above.       Sincerely,        Tony Peter MD

## 2019-05-06 NOTE — PROGRESS NOTES
SUBJECTIVE:   Angel Sanders is a 69 year old male who presents to clinic today for the following   health issues:    Patient is seen for a follow up visit.  No acute complaints, no medication change or new medical conditions.      Hyperlipidemia Follow-Up  Has H/O hyperlipidemia. On medical treatment and diet. No side effects. No muscle weakness, myalgias or upset stomach.       Rate your low fat/cholesterol diet?: fair    Taking statin?  Yes, no muscle aches from statin    Other lipid medications/supplements?:  none    Hypertension Follow-up  Has h/o HTN. on medical treatment. BP has been controlled. No side effects from medications. No CP, HA, dizziness. good compliance with medications and low salt diet.  Had increased dose of Metoprolol on last cardiology visit.     Outpatient blood pressures are being checked at home.  Results are normal, occasional elevated readings.    Low Salt Diet: no added salt      Amount of exercise or physical activity: rarely    Problems taking medications regularly: No    Medication side effects: none    Diet: low salt        PROBLEMS TO ADD ON...  Has h/o ischemic heart disease, asymptomatic regarding chest pains, SOB,palpitations. Has good compliance with treatment, diet and exercise.  Has had elevated ESR, SPEP with polyclonal gammopathy. No M spike.       Additional history: as documented    Reviewed  and updated as needed this visit by clinical staff  Allergies  Meds         Reviewed and updated as needed this visit by Provider         Patient Active Problem List   Diagnosis     Coronary atherosclerosis     Cerebral infarction due to occlusion or stenosis of carotid artery     CARDIOVASCULAR SCREENING; LDL GOAL LESS THAN 100     Heel pain     Hyperlipidemia LDL goal <100     Anemia     Benign essential hypertension     Obesity (BMI 35.0-39.9) with comorbidity (H)     Complete heart block (H)     Past Surgical History:   Procedure Laterality Date     ARTHROTOMY SHOULDER,  ROTATOR CUFF REPAIR, COMBINED  10/23/2013    Procedure: COMBINED ARTHROTOMY SHOULDER, ROTATOR CUFF REPAIR;  Left Shoulder Open Decompression, Distal Clavical Resection, Rotator Cuff Repair , Bicep Tenolysis, and Bicep Tenodesis   ;  Surgeon: Zhang Mattson MD;  Location: [facility] OR     C NONSPECIFIC PROCEDURE  2008    RCA stent; see [facility]     COLONOSCOPY N/A 5/15/2018    Procedure: COLONOSCOPY;  COLONOSCOPY ;  Surgeon: Cory Hernandez MD;  Location:  GI     PHACOEMULSIFICATION CLEAR CORNEA WITH STANDARD INTRAOCULAR LENS IMPLANT  2013    Procedure: PHACOEMULSIFICATION CLEAR CORNEA WITH STANDARD INTRAOCULAR LENS IMPLANT;  RIGHT PHACOEMULSIFICATION CLEAR CORNEA WITH STANDARD INTRAOCULAR LENS IMPLANT ;  Surgeon: Rikki Reddy MD;  Location: [facility]     PHACOEMULSIFICATION CLEAR CORNEA WITH STANDARD INTRAOCULAR LENS IMPLANT  12/3/2013    Procedure: PHACOEMULSIFICATION CLEAR CORNEA WITH STANDARD INTRAOCULAR LENS IMPLANT;  LEFT PHACOEMULSIFICATION CLEAR CORNEA WITH STANDARD INTRAOCULAR LENS IMPLANT;  Surgeon: Rikki Reddy MD;  Location: [facility]       Social History     Tobacco Use     Smoking status: Former Smoker     Packs/day: 1.00     Years: 40.00     Pack years: 40.00     Types: Cigarettes     Start date:      Last attempt to quit: 2008     Years since quittin.3     Smokeless tobacco: Never Used   Substance Use Topics     Alcohol use: No     Alcohol/week: 0.0 oz     Comment: Sober for 25 years     Family History   Problem Relation Age of Onset     Breast Cancer Mother      Heart Disease Father 74         of heart failure     Colon Cancer No family hx of          Current Outpatient Medications   Medication Sig Dispense Refill     ASPIRIN 81 MG OR TABS 1 TABLET DAILY       atorvastatin (LIPITOR) 80 MG tablet Take 1 tablet (80 mg) by mouth daily 90 tablet 4     cyanocobalamin (VITAMIN B-12) 100 MCG tablet Take 100 mcg by mouth daily       Docusate Calcium (STOOL SOFTENER PO) Take 100 mg  "by mouth daily        Ferrous Sulfate (IRON SUPPLEMENT PO) Take 325 mg by mouth daily (with breakfast)        lisinopril (PRINIVIL/ZESTRIL) 5 MG tablet Take 1 tablet (5 mg) by mouth daily 90 tablet 4     metoprolol tartrate (LOPRESSOR) 50 MG tablet Take 1 tablet (50 mg) by mouth 2 times daily 180 tablet 3     multivitamin, therapeutic with minerals (MULTI-VITAMIN) TABS tablet Take 1 tablet by mouth daily       nitroGLYcerin (NITROSTAT) 0.4 MG sublingual tablet Place 1 tablet (0.4 mg) under the tongue every 5 minutes as needed May repeat X 2 and if chest pain persists, call 911 25 tablet 2       ROS:  Constitutional, HEENT, cardiovascular, pulmonary, gi and gu systems are negative, except as otherwise noted.    OBJECTIVE:     /80   Pulse 78   Temp 97.7  F (36.5  C) (Oral)   Resp 16   Ht 1.626 m (5' 4\")   Wt 90.7 kg (200 lb)   SpO2 95%   BMI 34.33 kg/m    Body mass index is 34.33 kg/m .   GENERAL: obese, alert and no distress  NECK: no adenopathy, no asymmetry, masses, or scars and thyroid normal to palpation  RESP: lungs clear to auscultation - no rales, rhonchi or wheezes  CV: regular rate and rhythm, normal S1 S2, no S3 or S4, 3/6 systolic murmur, no click or rub, no peripheral edema and peripheral pulses strong  ABDOMEN: soft,obese , nontender, no hepatosplenomegaly, no masses and bowel sounds normal  MS: no gross musculoskeletal defects noted, no edema    Diagnostic Test Results:  none     ASSESSMENT/PLAN:     Problem List Items Addressed This Visit     Coronary atherosclerosis    Benign essential hypertension    Relevant Orders    CBC with platelets differential    Comprehensive metabolic panel      Other Visit Diagnoses     SOB (shortness of breath)    -  Primary    Relevant Orders    General PFT Lab (Please always keep checked)    Pulmonary Function Test    Personal history of tobacco use, presenting hazards to health        Relevant Orders    CT Chest Lung Cancer Scrn Low Dose wo    Elevated " erythrocyte sedimentation rate        Relevant Orders    Erythrocyte sedimentation rate auto    CRP inflammation           Assess lab   Cont treatment   Monitor BP  Consider hematology assessment        Follow-Up:in 6 months     Tony Peter MD  [facility]

## 2019-05-07 LAB
MDC_IDC_EPISODE_DTM: NORMAL
MDC_IDC_EPISODE_ID: NORMAL
MDC_IDC_EPISODE_TYPE: NORMAL
MDC_IDC_LEAD_IMPLANT_DT: NORMAL
MDC_IDC_LEAD_IMPLANT_DT: NORMAL
MDC_IDC_LEAD_LOCATION: NORMAL
MDC_IDC_LEAD_LOCATION: NORMAL
MDC_IDC_LEAD_LOCATION_DETAIL_1: NORMAL
MDC_IDC_LEAD_LOCATION_DETAIL_1: NORMAL
MDC_IDC_LEAD_MFG: NORMAL
MDC_IDC_LEAD_MFG: NORMAL
MDC_IDC_LEAD_MODEL: NORMAL
MDC_IDC_LEAD_MODEL: NORMAL
MDC_IDC_LEAD_POLARITY_TYPE: NORMAL
MDC_IDC_LEAD_POLARITY_TYPE: NORMAL
MDC_IDC_LEAD_SERIAL: NORMAL
MDC_IDC_LEAD_SERIAL: NORMAL
MDC_IDC_MSMT_BATTERY_DTM: NORMAL
MDC_IDC_MSMT_BATTERY_REMAINING_LONGEVITY: 168 MO
MDC_IDC_MSMT_BATTERY_REMAINING_PERCENTAGE: 100 %
MDC_IDC_MSMT_BATTERY_STATUS: NORMAL
MDC_IDC_MSMT_LEADCHNL_RA_IMPEDANCE_VALUE: 665 OHM
MDC_IDC_MSMT_LEADCHNL_RA_PACING_THRESHOLD_AMPLITUDE: 0.6 V
MDC_IDC_MSMT_LEADCHNL_RA_PACING_THRESHOLD_PULSEWIDTH: 0.4 MS
MDC_IDC_MSMT_LEADCHNL_RV_IMPEDANCE_VALUE: 839 OHM
MDC_IDC_MSMT_LEADCHNL_RV_PACING_THRESHOLD_AMPLITUDE: 1 V
MDC_IDC_MSMT_LEADCHNL_RV_PACING_THRESHOLD_PULSEWIDTH: 0.4 MS
MDC_IDC_PG_IMPLANT_DTM: NORMAL
MDC_IDC_PG_MFG: NORMAL
MDC_IDC_PG_MODEL: NORMAL
MDC_IDC_PG_SERIAL: NORMAL
MDC_IDC_PG_TYPE: NORMAL
MDC_IDC_SESS_CLINIC_NAME: NORMAL
MDC_IDC_SESS_DTM: NORMAL
MDC_IDC_SESS_TYPE: NORMAL
MDC_IDC_SET_BRADY_AT_MODE_SWITCH_MODE: NORMAL
MDC_IDC_SET_BRADY_AT_MODE_SWITCH_RATE: 170 {BEATS}/MIN
MDC_IDC_SET_BRADY_LOWRATE: 60 {BEATS}/MIN
MDC_IDC_SET_BRADY_MAX_SENSOR_RATE: 130 {BEATS}/MIN
MDC_IDC_SET_BRADY_MAX_TRACKING_RATE: 130 {BEATS}/MIN
MDC_IDC_SET_BRADY_MODE: NORMAL
MDC_IDC_SET_BRADY_PAV_DELAY_HIGH: 150 MS
MDC_IDC_SET_BRADY_PAV_DELAY_LOW: 200 MS
MDC_IDC_SET_BRADY_SAV_DELAY_HIGH: 150 MS
MDC_IDC_SET_BRADY_SAV_DELAY_LOW: 200 MS
MDC_IDC_SET_LEADCHNL_RA_PACING_AMPLITUDE: 2 V
MDC_IDC_SET_LEADCHNL_RA_PACING_CAPTURE_MODE: NORMAL
MDC_IDC_SET_LEADCHNL_RA_PACING_POLARITY: NORMAL
MDC_IDC_SET_LEADCHNL_RA_PACING_PULSEWIDTH: 0.4 MS
MDC_IDC_SET_LEADCHNL_RA_SENSING_ADAPTATION_MODE: NORMAL
MDC_IDC_SET_LEADCHNL_RA_SENSING_POLARITY: NORMAL
MDC_IDC_SET_LEADCHNL_RA_SENSING_SENSITIVITY: 0.25 MV
MDC_IDC_SET_LEADCHNL_RV_PACING_AMPLITUDE: 1.5 V
MDC_IDC_SET_LEADCHNL_RV_PACING_CAPTURE_MODE: NORMAL
MDC_IDC_SET_LEADCHNL_RV_PACING_POLARITY: NORMAL
MDC_IDC_SET_LEADCHNL_RV_PACING_PULSEWIDTH: 0.4 MS
MDC_IDC_SET_LEADCHNL_RV_SENSING_ADAPTATION_MODE: NORMAL
MDC_IDC_SET_LEADCHNL_RV_SENSING_POLARITY: NORMAL
MDC_IDC_SET_LEADCHNL_RV_SENSING_SENSITIVITY: 1.5 MV
MDC_IDC_SET_ZONE_DETECTION_INTERVAL: 375 MS
MDC_IDC_SET_ZONE_TYPE: NORMAL
MDC_IDC_SET_ZONE_VENDOR_TYPE: NORMAL
MDC_IDC_STAT_AT_BURDEN_PERCENT: 0 %
MDC_IDC_STAT_AT_DTM_END: NORMAL
MDC_IDC_STAT_AT_DTM_START: NORMAL
MDC_IDC_STAT_BRADY_DTM_END: NORMAL
MDC_IDC_STAT_BRADY_DTM_START: NORMAL
MDC_IDC_STAT_BRADY_RA_PERCENT_PACED: 26 %
MDC_IDC_STAT_BRADY_RV_PERCENT_PACED: 51 %
MDC_IDC_STAT_EPISODE_RECENT_COUNT: 0
MDC_IDC_STAT_EPISODE_RECENT_COUNT_DTM_END: NORMAL
MDC_IDC_STAT_EPISODE_RECENT_COUNT_DTM_START: NORMAL
MDC_IDC_STAT_EPISODE_TYPE: NORMAL
MDC_IDC_STAT_EPISODE_VENDOR_TYPE: NORMAL

## 2019-05-08 ENCOUNTER — HOSPITAL ENCOUNTER (OUTPATIENT)
Dept: CT IMAGING | Facility: CLINIC | Age: 69
Discharge: HOME | End: 2019-05-08
Attending: INTERNAL MEDICINE | Admitting: INTERNAL MEDICINE
Payer: COMMERCIAL

## 2019-05-08 DIAGNOSIS — Z87.891 PERSONAL HISTORY OF TOBACCO USE, PRESENTING HAZARDS TO HEALTH: ICD-10-CM

## 2019-05-08 PROCEDURE — G0297 LDCT FOR LUNG CA SCREEN: HCPCS

## 2019-05-13 ENCOUNTER — HOSPITAL ENCOUNTER (OUTPATIENT)
Dept: RESPIRATORY THERAPY | Facility: CLINIC | Age: 69
Discharge: HOME | End: 2019-05-13
Attending: INTERNAL MEDICINE | Admitting: INTERNAL MEDICINE
Payer: COMMERCIAL

## 2019-05-13 DIAGNOSIS — R06.02 SOB (SHORTNESS OF BREATH): ICD-10-CM

## 2019-05-13 LAB
DLCOCOR-%PRED-PRE: 76 %
DLCOCOR-PRE: 16.25 ML/MIN/MMHG
DLCOUNC-%PRED-PRE: 69 %
DLCOUNC-PRE: 14.86 ML/MIN/MMHG
DLCOUNC-PRED: 21.38 ML/MIN/MMHG
ERV-%PRED-PRE: 116 %
ERV-PRE: 0.37 L
ERV-PRED: 0.32 L
EXPTIME-PRE: 10.71 SEC
FEF2575-%PRED-PRE: 48 %
FEF2575-PRE: 1.02 L/SEC
FEF2575-PRED: 2.11 L/SEC
FEFMAX-%PRED-PRE: 92 %
FEFMAX-PRE: 6.53 L/SEC
FEFMAX-PRED: 7.06 L/SEC
FEV1-%PRED-PRE: 86 %
FEV1-PRE: 2.27 L
FEV1FEV6-PRE: 74 %
FEV1FEV6-PRED: 78 %
FEV1FVC-PRE: 70 %
FEV1FVC-PRED: 77 %
FEV1SVC-PRE: 66 %
FEV1SVC-PRED: 71 %
FIFMAX-PRE: 1.99 L/SEC
FRCPLETH-%PRED-PRE: 84 %
FRCPLETH-PRE: 2.83 L
FRCPLETH-PRED: 3.34 L
FVC-%PRED-PRE: 94 %
FVC-PRE: 3.25 L
FVC-PRED: 3.42 L
IC-%PRED-PRE: 90 %
IC-PRE: 3.07 L
IC-PRED: 3.39 L
RVPLETH-%PRED-PRE: 100 %
RVPLETH-PRE: 2.46 L
RVPLETH-PRED: 2.44 L
TLCPLETH-%PRED-PRE: 99 %
TLCPLETH-PRE: 5.9 L
TLCPLETH-PRED: 5.91 L
VA-%PRED-PRE: 97 %
VA-PRE: 5.02 L
VC-%PRED-PRE: 92 %
VC-PRE: 3.44 L
VC-PRED: 3.71 L

## 2019-05-13 PROCEDURE — 94726 PLETHYSMOGRAPHY LUNG VOLUMES: CPT

## 2019-05-13 PROCEDURE — 40000275 ZZH STATISTIC RCP TIME EA 10 MIN

## 2019-05-13 PROCEDURE — 94010 BREATHING CAPACITY TEST: CPT

## 2019-05-13 PROCEDURE — 94729 DIFFUSING CAPACITY: CPT

## 2019-05-13 NOTE — PROGRESS NOTES
PFT Note:     Pt completed pulmonary function testing with DLCO.  Good Pt effort and cooperation.     Spirometry  Meets all ATS-ERS recommendations.    Plethysmography  All plethysmographic measurements meet ATS-ERS recommendations.    DLCO  Meets all ATS-ERS recommendations.  DLCO is an average of 2 maneuvers.  Predicted DLCO is corrected for a Hgb of 11.9 drawn on 5/6/2019.     May 13, 2019.9:31 AM  Paco Cain

## 2019-05-16 NOTE — PROCEDURES
Procedure Date: 2019      Please see medical chart for graphs and statistics related to this report.       REFERRING PHYSICIAN:   Tony Peter                                       TECHNICIAN:   Paco Cain      DIAGNOSIS:   SOB                                                                 HEIGHT:   64.00 inches                                                                    WEIGHT:   199.54 Lbs.        DYSPNEA:   Walking less than 100 yards                                                                COUGH:   Productive   WHEEZE:   No wheeze                                                                      TOBACCO PROD:   Cigarette   YEARS SMOKED:   40.0                                                     PKS/DAY:   1.0   YEARS QUIT:    11.0                                                              MEDICATIONS:   Lisinopril       POST-TEST COMMENTS:   Patient completed pulmonary function testing with DLCO.  Good patient effort and cooperation.  All testing meets ATS-ERS recommendations.  DLCO is an average of 2 maneuvers.  Predicted DLCO is corrected for a Hgb of 11.9 drawn on 2019.        INTERPRETATION:      1.  Very mild obstruction   2.  Normal volumes   3.  Impaired gas transfer   4.  Correlate clinically         MIKAELA SALDAÑA MD             D: 2019   T: 2019   MT: ROXANA      Name:     TIMBO RICH   MRN:      [id number]        Account:        [id number]   :      1950           Procedure Date: 2019      Document: [id number]       cc: Tony Peter MD

## 2019-05-20 ENCOUNTER — TELEPHONE (OUTPATIENT)
Dept: INTERNAL MEDICINE | Facility: CLINIC | Age: 69
End: 2019-05-20

## 2019-05-20 DIAGNOSIS — R06.2 WHEEZING: Primary | ICD-10-CM

## 2019-05-20 RX ORDER — TIOTROPIUM BROMIDE 18 UG/1
18 CAPSULE ORAL; RESPIRATORY (INHALATION) DAILY
Qty: 30 CAPSULE | Refills: 3 | Status: SHIPPED | OUTPATIENT
Start: 2019-05-20 | End: 2019-05-31

## 2019-05-21 NOTE — TELEPHONE ENCOUNTER
Call to pt and advised. He picked up the inhaler and started using it. He would like to use 3 month mail order. He will call in a few weeks to change to mail order, this is cost saving for him.  He will report over the phone, if he is feeling better, he will only schedule follow up, if still has SOB.

## 2019-05-31 DIAGNOSIS — R06.2 WHEEZING: ICD-10-CM

## 2019-05-31 RX ORDER — TIOTROPIUM BROMIDE 18 UG/1
18 CAPSULE ORAL; RESPIRATORY (INHALATION) DAILY
Qty: 90 CAPSULE | Refills: 3 | Status: SHIPPED | OUTPATIENT
Start: 2019-05-31 | End: 2020-01-01

## 2019-07-03 ENCOUNTER — TELEPHONE (OUTPATIENT)
Dept: INTERNAL MEDICINE | Facility: CLINIC | Age: 69
End: 2019-07-03

## 2019-07-03 NOTE — TELEPHONE ENCOUNTER
Patient calling wanting to know if it is OK for him to have his teeth cleaned with a Cavitron at his dental appointment next Monday. His dentist suggested that he check with his Dr due to the fact that he has a pacemaker. Pt can be reached at [phone]. OK to leave a detailed message. Please advise. Thanks.

## 2019-07-05 NOTE — TELEPHONE ENCOUNTER
Call to Pt and left detailed message that dentist should know and to call Cardiology if they have further questions.

## 2019-08-07 ENCOUNTER — ANCILLARY PROCEDURE (OUTPATIENT)
Dept: CARDIOLOGY | Facility: CLINIC | Age: 69
End: 2019-08-07
Attending: INTERNAL MEDICINE
Payer: COMMERCIAL

## 2019-08-07 DIAGNOSIS — Z95.0 PACEMAKER: ICD-10-CM

## 2019-08-07 PROCEDURE — 93294 REM INTERROG EVL PM/LDLS PM: CPT | Performed by: INTERNAL MEDICINE

## 2019-08-07 PROCEDURE — 93296 REM INTERROG EVL PM/IDS: CPT | Performed by: INTERNAL MEDICINE

## 2019-08-24 LAB
MDC_IDC_EPISODE_DTM: NORMAL
MDC_IDC_EPISODE_ID: NORMAL
MDC_IDC_EPISODE_TYPE: NORMAL
MDC_IDC_LEAD_IMPLANT_DT: NORMAL
MDC_IDC_LEAD_IMPLANT_DT: NORMAL
MDC_IDC_LEAD_LOCATION: NORMAL
MDC_IDC_LEAD_LOCATION: NORMAL
MDC_IDC_LEAD_LOCATION_DETAIL_1: NORMAL
MDC_IDC_LEAD_LOCATION_DETAIL_1: NORMAL
MDC_IDC_LEAD_MFG: NORMAL
MDC_IDC_LEAD_MFG: NORMAL
MDC_IDC_LEAD_MODEL: NORMAL
MDC_IDC_LEAD_MODEL: NORMAL
MDC_IDC_LEAD_POLARITY_TYPE: NORMAL
MDC_IDC_LEAD_POLARITY_TYPE: NORMAL
MDC_IDC_LEAD_SERIAL: NORMAL
MDC_IDC_LEAD_SERIAL: NORMAL
MDC_IDC_MSMT_BATTERY_DTM: NORMAL
MDC_IDC_MSMT_BATTERY_REMAINING_LONGEVITY: 162 MO
MDC_IDC_MSMT_BATTERY_REMAINING_PERCENTAGE: 100 %
MDC_IDC_MSMT_BATTERY_STATUS: NORMAL
MDC_IDC_MSMT_LEADCHNL_RA_IMPEDANCE_VALUE: 663 OHM
MDC_IDC_MSMT_LEADCHNL_RA_PACING_THRESHOLD_AMPLITUDE: 0.7 V
MDC_IDC_MSMT_LEADCHNL_RA_PACING_THRESHOLD_PULSEWIDTH: 0.4 MS
MDC_IDC_MSMT_LEADCHNL_RV_IMPEDANCE_VALUE: 851 OHM
MDC_IDC_MSMT_LEADCHNL_RV_PACING_THRESHOLD_AMPLITUDE: 1.1 V
MDC_IDC_MSMT_LEADCHNL_RV_PACING_THRESHOLD_PULSEWIDTH: 0.4 MS
MDC_IDC_PG_IMPLANT_DTM: NORMAL
MDC_IDC_PG_MFG: NORMAL
MDC_IDC_PG_MODEL: NORMAL
MDC_IDC_PG_SERIAL: NORMAL
MDC_IDC_PG_TYPE: NORMAL
MDC_IDC_SESS_CLINIC_NAME: NORMAL
MDC_IDC_SESS_DTM: NORMAL
MDC_IDC_SESS_TYPE: NORMAL
MDC_IDC_SET_BRADY_AT_MODE_SWITCH_MODE: NORMAL
MDC_IDC_SET_BRADY_AT_MODE_SWITCH_RATE: 170 {BEATS}/MIN
MDC_IDC_SET_BRADY_LOWRATE: 60 {BEATS}/MIN
MDC_IDC_SET_BRADY_MAX_SENSOR_RATE: 130 {BEATS}/MIN
MDC_IDC_SET_BRADY_MAX_TRACKING_RATE: 130 {BEATS}/MIN
MDC_IDC_SET_BRADY_MODE: NORMAL
MDC_IDC_SET_BRADY_PAV_DELAY_HIGH: 150 MS
MDC_IDC_SET_BRADY_PAV_DELAY_LOW: 200 MS
MDC_IDC_SET_BRADY_SAV_DELAY_HIGH: 150 MS
MDC_IDC_SET_BRADY_SAV_DELAY_LOW: 200 MS
MDC_IDC_SET_LEADCHNL_RA_PACING_AMPLITUDE: 2 V
MDC_IDC_SET_LEADCHNL_RA_PACING_CAPTURE_MODE: NORMAL
MDC_IDC_SET_LEADCHNL_RA_PACING_POLARITY: NORMAL
MDC_IDC_SET_LEADCHNL_RA_PACING_PULSEWIDTH: 0.4 MS
MDC_IDC_SET_LEADCHNL_RA_SENSING_ADAPTATION_MODE: NORMAL
MDC_IDC_SET_LEADCHNL_RA_SENSING_POLARITY: NORMAL
MDC_IDC_SET_LEADCHNL_RA_SENSING_SENSITIVITY: 0.25 MV
MDC_IDC_SET_LEADCHNL_RV_PACING_AMPLITUDE: 1.9 V
MDC_IDC_SET_LEADCHNL_RV_PACING_CAPTURE_MODE: NORMAL
MDC_IDC_SET_LEADCHNL_RV_PACING_POLARITY: NORMAL
MDC_IDC_SET_LEADCHNL_RV_PACING_PULSEWIDTH: 0.4 MS
MDC_IDC_SET_LEADCHNL_RV_SENSING_ADAPTATION_MODE: NORMAL
MDC_IDC_SET_LEADCHNL_RV_SENSING_POLARITY: NORMAL
MDC_IDC_SET_LEADCHNL_RV_SENSING_SENSITIVITY: 1.5 MV
MDC_IDC_SET_ZONE_DETECTION_INTERVAL: 375 MS
MDC_IDC_SET_ZONE_TYPE: NORMAL
MDC_IDC_SET_ZONE_VENDOR_TYPE: NORMAL
MDC_IDC_STAT_AT_BURDEN_PERCENT: 0 %
MDC_IDC_STAT_AT_DTM_END: NORMAL
MDC_IDC_STAT_AT_DTM_START: NORMAL
MDC_IDC_STAT_BRADY_DTM_END: NORMAL
MDC_IDC_STAT_BRADY_DTM_START: NORMAL
MDC_IDC_STAT_BRADY_RA_PERCENT_PACED: 29 %
MDC_IDC_STAT_BRADY_RV_PERCENT_PACED: 75 %
MDC_IDC_STAT_EPISODE_RECENT_COUNT: 0
MDC_IDC_STAT_EPISODE_RECENT_COUNT_DTM_END: NORMAL
MDC_IDC_STAT_EPISODE_RECENT_COUNT_DTM_START: NORMAL
MDC_IDC_STAT_EPISODE_TYPE: NORMAL
MDC_IDC_STAT_EPISODE_VENDOR_TYPE: NORMAL

## 2019-09-08 NOTE — PATIENT INSTRUCTIONS
You can reach your [facility] Team any time of the day by calling [phone].  This number will put you in touch with the 24 hour nurse line even if the clinic is closed.  The clinic hours for [facility] are:  Monday through Thursday 7am to 6pm   Friday 7am to 5pm   Saturday 8am to 12p for Pediatrics only.    To  contact your  please call [phone].  This is a direct number for your care team during clinic hours.    [facility] is now open for your convenience:  Monday through Friday 7:30am to 7pm  Saturday and Sunday 9am to 3pm  They are closed on all major holidays.    
[phone]

## 2019-10-07 PROBLEM — J41.0 SIMPLE CHRONIC BRONCHITIS (H): Status: ACTIVE | Noted: 2019-01-01

## 2019-10-07 NOTE — LETTER
[facility]  [address], [address]  [place] [address]  [phone]        October 9, 2019    Angel Sanders  [id number]  [facility] [address]            Dear Mr. Hickeyjohanny Sanders:      The results of your recent labs are normal.  If you have any further questions or problems, please contact our office.    Sincerely,        Tony Peter M.D.    Results for orders placed or performed in visit on 10/07/19   CBC with platelets   Result Value Ref Range    WBC 7.8 4.0 - 11.0 10e9/L    RBC Count 4.61 4.4 - 5.9 10e12/L    Hemoglobin 13.0 (L) 13.3 - 17.7 g/dL    Hematocrit 40.3 40.0 - 53.0 %    MCV 87 78 - 100 fl    MCH 28.2 26.5 - 33.0 pg    MCHC 32.3 31.5 - 36.5 g/dL    RDW 14.9 10.0 - 15.0 %    Platelet Count 221 150 - 450 10e9/L   Comprehensive metabolic panel   Result Value Ref Range    Sodium 135 133 - 144 mmol/L    Potassium 4.9 3.4 - 5.3 mmol/L    Chloride 102 94 - 109 mmol/L    Carbon Dioxide 24 20 - 32 mmol/L    Anion Gap 9 3 - 14 mmol/L    Glucose 96 70 - 99 mg/dL    Urea Nitrogen 21 7 - 30 mg/dL    Creatinine 1.20 0.66 - 1.25 mg/dL    GFR Estimate 61 >60 mL/min/[1.73_m2]    GFR Estimate If Black 70 >60 mL/min/[1.73_m2]    Calcium 9.4 8.5 - 10.1 mg/dL    Bilirubin Total 0.6 0.2 - 1.3 mg/dL    Albumin 3.9 3.4 - 5.0 g/dL    Protein Total 8.3 6.8 - 8.8 g/dL    Alkaline Phosphatase 122 40 - 150 U/L    ALT 29 0 - 70 U/L    AST 29 0 - 45 U/L   Lipid panel reflex to direct LDL Fasting   Result Value Ref Range    Cholesterol 110 <200 mg/dL    Triglycerides 69 <150 mg/dL    HDL Cholesterol 37 (L) >39 mg/dL    LDL Cholesterol Calculated 59 <100 mg/dL    Non HDL Cholesterol 73 <130 mg/dL   TSH with free T4 reflex   Result Value Ref Range    TSH 1.54 0.40 - 4.00 mU/L   Prostate spec antigen screen   Result Value Ref Range    PSA 1.64 0 - 4 ug/L   *UA reflex to Microscopic   Result Value Ref Range    Color Urine Yellow     Appearance Urine Clear     Glucose Urine Negative NEG^Negative mg/dL     Bilirubin Urine Negative NEG^Negative    Ketones Urine Negative NEG^Negative mg/dL    Specific Gravity Urine 1.015 1.003 - 1.035    Blood Urine Trace (A) NEG^Negative    pH Urine 7.0 5.0 - 7.0 pH    Protein Albumin Urine 100 (A) NEG^Negative mg/dL    Urobilinogen Urine 0.2 0.2 - 1.0 EU/dL    Nitrite Urine Negative NEG^Negative    Leukocyte Esterase Urine Negative NEG^Negative    Source Midstream Urine    Vitamin B12   Result Value Ref Range    Vitamin B12 1,198 (H) 193 - 986 pg/mL   Folate   Result Value Ref Range    Folate 36.9 >5.4 ng/mL   Iron and iron binding capacity   Result Value Ref Range    Iron 61 35 - 180 ug/dL    Iron Binding Cap 360 240 - 430 ug/dL    Iron Saturation Index 17 15 - 46 %   Urine Microscopic   Result Value Ref Range    WBC Urine 0 - 5 OTO5^0 - 5 /HPF    RBC Urine O - 2 OTO2^O - 2 /HPF    Mucous Urine Present (A) NEG^Negative /LPF

## 2019-10-07 NOTE — PROGRESS NOTES
"SUBJECTIVE:   Angel Sanders is a 69 year old male who presents for Preventive Visit.      Are you in the first 12 months of your Medicare coverage?  No    Healthy Habits:     In general, how would you rate your overall health?  Good    Frequency of exercise:  2-3 days/week    Duration of exercise:  Less than 15 minutes    Do you usually eat at least 4 servings of fruit and vegetables a day, include whole grains    & fiber and avoid regularly eating high fat or \"junk\" foods?  No    Taking medications regularly:  Yes    Medication side effects:  None    Ability to successfully perform activities of daily living:  No assistance needed    Home Safety:  No safety concerns identified    Hearing Impairment:  No hearing concerns    In the past 6 months, have you been bothered by leaking of urine?  No    In general, how would you rate your overall mental or emotional health?  Good      PHQ-2 Total Score: 0    Additional concerns today:  No    Do you feel safe in your environment? Yes    Do you have a Health Care Directive? No: Advance care planning reviewed with patient; information given to patient to review.      Fall risk  Fallen 2 or more times in the past year?: Yes  Any fall with injury in the past year?: Yes    Cognitive Screening   1) Repeat 3 items (Leader, Season, Table)    2) Clock draw: NORMAL  3) 3 item recall: Recalls 3 objects  Results: 3 items recalled: COGNITIVE IMPAIRMENT LESS LIKELY    Mini-CogTM Copyright YNES Oconnor. Licensed by the author for use in [facility]; reprinted with permission (nataly@.[email or web address]). All rights reserved.      Do you have sleep apnea, excessive snoring or daytime drowsiness?: no    Reviewed and updated as needed this visit by clinical staff         Reviewed and updated as needed this visit by Provider        Social History     Tobacco Use     Smoking status: Former Smoker     Packs/day: 1.00     Years: 40.00     Pack years: 40.00     Types: Cigarettes     Start date: 1968    "  Last attempt to quit: 2008     Years since quittin.7     Smokeless tobacco: Never Used   Substance Use Topics     Alcohol use: No     Alcohol/week: 0.0 standard drinks     Comment: Sober for 25 years     If you drink alcohol do you typically have >3 drinks per day or >7 drinks per week? No    Alcohol Use 10/7/2019   Prescreen: >3 drinks/day or >7 drinks/week? Not Applicable   Prescreen: >3 drinks/day or >7 drinks/week? -           PROBLEMS TO ADD ON...    Has h/o HTN. on medical treatment. BP has been controlled. No side effects from medications. No CP, HA, dizziness. good compliance with medications and low salt diet.  Has H/O hyperlipidemia. On medical treatment and diet. No side effects. No muscle weakness, myalgias or upset stomach.   Has h/o ischemic heart disease, asymptomatic regarding chest pains, SOB,palpitations. Has good compliance with treatment, diet and exercise.  Has h/o COPD, quit smoking 11 years ago after 40 years smoking. On Spiriva. Has SOB on exertion. Able to walk for 5-10 minutes. Not on oxygen. No wheezing. Has cough in the morning. Lungs CT was negative.       Current providers sharing in care for this patient include:   Patient Care Team:  Tony Peter MD as PCP - General  Tony Peter MD as Assigned PCP    The following health maintenance items are reviewed in Epic and correct as of today:  Health Maintenance   Topic Date Due     HEPATITIS C SCREENING  1950     ZOSTER IMMUNIZATION (2 of 3) 10/11/2011     AORTIC ANEURYSM SCREENING (SYSTEM ASSIGNED)  2014     FALL RISK ASSESSMENT  2018     PHQ-2  2019     INFLUENZA VACCINE (1) 2019     MEDICARE ANNUAL WELLNESS VISIT  2019     LUNG CANCER SCREENING ANNUAL  2020     LIPID  2023     ADVANCE CARE PLANNING  2023     DTAP/TDAP/TD IMMUNIZATION (3 - Td) 2028     COLONOSCOPY  05/15/2028     PNEUMOCOCCAL IMMUNIZATION 65+ LOW/MEDIUM RISK  Completed     IPV IMMUNIZATION   "Aged Out     MENINGITIS IMMUNIZATION  Aged Out     Lab work is in process  Labs reviewed in EPIC      Review of Systems   Constitutional: Negative for chills and fever.   HENT: Positive for hearing loss. Negative for congestion, ear pain and sore throat.    Eyes: Negative for pain and visual disturbance.   Respiratory: Positive for cough and shortness of breath.    Cardiovascular: Negative for chest pain, palpitations and peripheral edema.   Gastrointestinal: Negative for abdominal pain, constipation, diarrhea, heartburn, hematochezia and nausea.   Genitourinary: Negative for discharge, dysuria, frequency, genital sores, hematuria, impotence and urgency.   Musculoskeletal: Negative for arthralgias, joint swelling and myalgias.   Skin: Negative for rash.   Neurological: Negative for dizziness, weakness, headaches and paresthesias.   Psychiatric/Behavioral: Negative for mood changes. The patient is not nervous/anxious.          OBJECTIVE:   There were no vitals taken for this visit. Estimated body mass index is 34.33 kg/m  as calculated from the following:    Height as of 5/6/19: 1.626 m (5' 4\").    Weight as of 5/6/19: 90.7 kg (200 lb).  Physical Exam  GENERAL: obese, alert and no distress  EYES: Eyes grossly normal to inspection, PERRL and conjunctivae and sclerae normal  HENT: ear canals and TM's normal, nose and mouth without ulcers or lesions  NECK: no adenopathy, no asymmetry, masses, or scars and thyroid normal to palpation  RESP: lungs clear to auscultation - no rales, rhonchi or wheezes  CV: regular rate and rhythm, normal S1 S2, no S3 or S4, 3/6 systolic murmur, no click or rub, no peripheral edema and peripheral pulses strong  ABDOMEN: soft,obese, nontender, no hepatosplenomegaly, no masses and bowel sounds normal  MS: no gross musculoskeletal defects noted, no edema  SKIN: no suspicious lesions or rashes  NEURO: Normal strength and tone, mentation intact and speech normal  PSYCH: mentation appears normal, " "affect normal/bright    Diagnostic Test Results:  Labs reviewed in Epic    ASSESSMENT / PLAN:       ICD-10-CM    1. Encounter for preventative adult health care examination Z00.00 CBC with platelets     Comprehensive metabolic panel     Lipid panel reflex to direct LDL Fasting     TSH with free T4 reflex     Prostate spec antigen screen     *UA reflex to Microscopic   2. Simple chronic bronchitis (H) J41.0 PULMONARY MEDICINE REFERRAL     OFFICE/OUTPT VISIT,EST,LEVL III   3. Hyperlipidemia LDL goal <100 E78.5 OFFICE/OUTPT VISIT,EST,LEVL III   4. Benign essential hypertension I10 CBC with platelets     Comprehensive metabolic panel     Lipid panel reflex to direct LDL Fasting     TSH with free T4 reflex     *UA reflex to Microscopic     OFFICE/OUTPT VISIT,EST,LEVL III   5. Obesity (BMI 35.0-39.9) with comorbidity (H) E66.01 OFFICE/OUTPT VISIT,EST,LEVL III   6. Anemia, unspecified type D64.9 Vitamin B12     Folate     Iron and iron binding capacity     OFFICE/OUTPT VISIT,EST,LEVL III   7. Encounter for screening for malignant neoplasm of prostate  Z12.5 Prostate spec antigen screen     Assess lab work  Cont treatment   Refer to pulmonary       End of Life Planning:  Patient currently has an advanced directive: Yes.  Practitioner is supportive of decision.    COUNSELING:  Reviewed preventive health counseling, as reflected in patient instructions       Regular exercise       Healthy diet/nutrition       Vision screening       Hearing screening       Colon cancer screening       Prostate cancer screening    Estimated body mass index is 34.33 kg/m  as calculated from the following:    Height as of 5/6/19: 1.626 m (5' 4\").    Weight as of 5/6/19: 90.7 kg (200 lb).    Weight management plan: Discussed healthy diet and exercise guidelines     reports that he quit smoking about 11 years ago. His smoking use included cigarettes. He started smoking about 51 years ago. He has a 40.00 pack-year smoking history. He has never used " smokeless tobacco.      Appropriate preventive services were discussed with this patient, including applicable screening as appropriate for cardiovascular disease, diabetes, osteopenia/osteoporosis, and glaucoma.  As appropriate for age/gender, discussed screening for colorectal cancer, prostate cancer, breast cancer, and cervical cancer. Checklist reviewing preventive services available has been given to the patient.    Reviewed patients plan of care and provided an AVS. The Intermediate Care Plan ( asthma action plan, low back pain action plan, and migraine action plan) for [place] meets the Care Plan requirement. This Care Plan has been established and reviewed with the patient.    Counseling Resources:  ATP IV Guidelines  Pooled Cohorts Equation Calculator  Breast Cancer Risk Calculator  FRAX Risk Assessment  ICSI Preventive Guidelines  Dietary Guidelines for Americans, 2010  USDA's MyPlate  ASA Prophylaxis  Lung CA Screening    Tony Peter MD  [facility]    Identified Health Risks:

## 2019-10-07 NOTE — TELEPHONE ENCOUNTER
[facility] requests that writer fax referral that was placed today by primary care provider to [phone]. Done.    [facility] also wanted to verify patient's insurance number, verified per protocol.

## 2019-10-07 NOTE — NURSING NOTE
"Vital signs:  Temp: 98.2  F (36.8  C) Temp src: Oral BP: (!) 142/78 Pulse: 87   Resp: 16 SpO2: 96 %     Height: 162.6 cm (5' 4\") Weight: 92.5 kg (204 lb)  Estimated body mass index is 35.02 kg/m  as calculated from the following:    Height as of this encounter: 1.626 m (5' 4\").    Weight as of this encounter: 92.5 kg (204 lb).        "

## 2019-10-14 NOTE — TELEPHONE ENCOUNTER
[facility] calls requesting we refax patient's referral, along with demographic sheet with insurance card. Fax #[phone]. Done.

## 2019-10-28 NOTE — TELEPHONE ENCOUNTER
[facility] called again requesting demographics and insurance info about this patient. Advised that demographic info is found on the referral itself. Re-faxed referral, and insurance cards to [phone]

## 2020-01-01 ENCOUNTER — VIRTUAL VISIT (OUTPATIENT)
Dept: CARDIOLOGY | Facility: CLINIC | Age: 70
End: 2020-01-01
Attending: INTERNAL MEDICINE
Payer: COMMERCIAL

## 2020-01-01 ENCOUNTER — DOCUMENTATION ONLY (OUTPATIENT)
Dept: CARDIOLOGY | Facility: CLINIC | Age: 70
End: 2020-01-01

## 2020-01-01 ENCOUNTER — APPOINTMENT (OUTPATIENT)
Dept: GENERAL RADIOLOGY | Facility: CLINIC | Age: 70
End: 2020-01-01
Attending: EMERGENCY MEDICINE
Payer: COMMERCIAL

## 2020-01-01 ENCOUNTER — TRANSFERRED RECORDS (OUTPATIENT)
Dept: HEALTH INFORMATION MANAGEMENT | Facility: CLINIC | Age: 70
End: 2020-01-01

## 2020-01-01 ENCOUNTER — DOCUMENTATION ONLY (OUTPATIENT)
Dept: PHARMACY | Facility: CLINIC | Age: 70
End: 2020-01-01

## 2020-01-01 ENCOUNTER — HOSPITAL ENCOUNTER (OUTPATIENT)
Dept: ULTRASOUND IMAGING | Facility: CLINIC | Age: 70
Discharge: HOME | End: 2020-04-23
Attending: INTERNAL MEDICINE | Admitting: INTERNAL MEDICINE
Payer: COMMERCIAL

## 2020-01-01 ENCOUNTER — HOSPITAL ENCOUNTER (OUTPATIENT)
Dept: CT IMAGING | Facility: CLINIC | Age: 70
Discharge: HOME | End: 2020-06-03
Attending: INTERNAL MEDICINE | Admitting: INTERNAL MEDICINE
Payer: COMMERCIAL

## 2020-01-01 ENCOUNTER — VIRTUAL VISIT (OUTPATIENT)
Dept: CARDIOLOGY | Facility: CLINIC | Age: 70
End: 2020-01-01
Payer: COMMERCIAL

## 2020-01-01 ENCOUNTER — TELEPHONE (OUTPATIENT)
Dept: CARDIOLOGY | Facility: CLINIC | Age: 70
End: 2020-01-01

## 2020-01-01 ENCOUNTER — HOSPITAL ENCOUNTER (EMERGENCY)
Facility: CLINIC | Age: 70
Discharge: ANOTHER HOSPITAL | End: 2020-08-30
Attending: EMERGENCY MEDICINE | Admitting: EMERGENCY MEDICINE
Payer: COMMERCIAL

## 2020-01-01 ENCOUNTER — HOSPITAL ENCOUNTER (OUTPATIENT)
Dept: CARDIOLOGY | Facility: CLINIC | Age: 70
Discharge: HOME | End: 2020-06-01
Attending: INTERNAL MEDICINE | Admitting: INTERNAL MEDICINE
Payer: COMMERCIAL

## 2020-01-01 ENCOUNTER — APPOINTMENT (OUTPATIENT)
Dept: GENERAL RADIOLOGY | Facility: CLINIC | Age: 70
DRG: 834 | End: 2020-01-01
Attending: PHYSICIAN ASSISTANT
Payer: COMMERCIAL

## 2020-01-01 ENCOUNTER — HOSPITAL ENCOUNTER (OUTPATIENT)
Dept: CARDIOLOGY | Facility: CLINIC | Age: 70
Discharge: HOME | End: 2020-06-09
Attending: INTERNAL MEDICINE | Admitting: INTERNAL MEDICINE
Payer: COMMERCIAL

## 2020-01-01 ENCOUNTER — ANESTHESIA (OUTPATIENT)
Dept: INTENSIVE CARE | Facility: CLINIC | Age: 70
DRG: 834 | End: 2020-01-01
Payer: COMMERCIAL

## 2020-01-01 ENCOUNTER — HOSPITAL ENCOUNTER (INPATIENT)
Facility: CLINIC | Age: 70
LOS: 3 days | DRG: 834 | End: 2020-09-02
Attending: INTERNAL MEDICINE | Admitting: INTERNAL MEDICINE
Payer: COMMERCIAL

## 2020-01-01 ENCOUNTER — APPOINTMENT (OUTPATIENT)
Dept: ULTRASOUND IMAGING | Facility: CLINIC | Age: 70
DRG: 834 | End: 2020-01-01
Attending: PHYSICIAN ASSISTANT
Payer: COMMERCIAL

## 2020-01-01 ENCOUNTER — APPOINTMENT (OUTPATIENT)
Dept: INTERVENTIONAL RADIOLOGY/VASCULAR | Facility: CLINIC | Age: 70
DRG: 834 | End: 2020-01-01
Attending: PHYSICIAN ASSISTANT
Payer: COMMERCIAL

## 2020-01-01 ENCOUNTER — APPOINTMENT (OUTPATIENT)
Dept: ULTRASOUND IMAGING | Facility: CLINIC | Age: 70
DRG: 834 | End: 2020-01-01
Attending: INTERNAL MEDICINE
Payer: COMMERCIAL

## 2020-01-01 ENCOUNTER — ANCILLARY PROCEDURE (OUTPATIENT)
Dept: CARDIOLOGY | Facility: CLINIC | Age: 70
DRG: 834 | End: 2020-01-01
Attending: STUDENT IN AN ORGANIZED HEALTH CARE EDUCATION/TRAINING PROGRAM
Payer: COMMERCIAL

## 2020-01-01 ENCOUNTER — VIRTUAL VISIT (OUTPATIENT)
Dept: INTERNAL MEDICINE | Facility: CLINIC | Age: 70
End: 2020-01-01
Payer: COMMERCIAL

## 2020-01-01 ENCOUNTER — ANESTHESIA EVENT (OUTPATIENT)
Dept: INTENSIVE CARE | Facility: CLINIC | Age: 70
DRG: 834 | End: 2020-01-01
Payer: COMMERCIAL

## 2020-01-01 VITALS
OXYGEN SATURATION: 95 % | HEART RATE: 68 BPM | DIASTOLIC BLOOD PRESSURE: 80 MMHG | WEIGHT: 197.31 LBS | SYSTOLIC BLOOD PRESSURE: 141 MMHG | HEIGHT: 64 IN | BODY MASS INDEX: 33.69 KG/M2 | RESPIRATION RATE: 26 BRPM | TEMPERATURE: 98 F

## 2020-01-01 VITALS
WEIGHT: 200.8 LBS | HEIGHT: 64 IN | SYSTOLIC BLOOD PRESSURE: 148 MMHG | OXYGEN SATURATION: 95 % | DIASTOLIC BLOOD PRESSURE: 84 MMHG | HEART RATE: 73 BPM | BODY MASS INDEX: 34.28 KG/M2

## 2020-01-01 VITALS
OXYGEN SATURATION: 94 % | SYSTOLIC BLOOD PRESSURE: 144 MMHG | RESPIRATION RATE: 40 BRPM | HEIGHT: 64 IN | TEMPERATURE: 99.8 F | HEART RATE: 60 BPM | DIASTOLIC BLOOD PRESSURE: 91 MMHG | WEIGHT: 194.22 LBS | BODY MASS INDEX: 33.16 KG/M2

## 2020-01-01 VITALS — BODY MASS INDEX: 35.02 KG/M2 | HEIGHT: 64 IN

## 2020-01-01 VITALS
BODY MASS INDEX: 33.47 KG/M2 | SYSTOLIC BLOOD PRESSURE: 147 MMHG | WEIGHT: 195 LBS | DIASTOLIC BLOOD PRESSURE: 81 MMHG | HEART RATE: 61 BPM

## 2020-01-01 DIAGNOSIS — E78.00 HIGH CHOLESTEROL: ICD-10-CM

## 2020-01-01 DIAGNOSIS — I25.10 CORONARY ARTERY DISEASE INVOLVING NATIVE CORONARY ARTERY OF NATIVE HEART WITHOUT ANGINA PECTORIS: ICD-10-CM

## 2020-01-01 DIAGNOSIS — I10 BENIGN ESSENTIAL HYPERTENSION: ICD-10-CM

## 2020-01-01 DIAGNOSIS — I47.29 NSVT (NONSUSTAINED VENTRICULAR TACHYCARDIA) (H): ICD-10-CM

## 2020-01-01 DIAGNOSIS — Z95.0 CARDIAC PACEMAKER IN SITU: Primary | ICD-10-CM

## 2020-01-01 DIAGNOSIS — R06.09 DOE (DYSPNEA ON EXERTION): ICD-10-CM

## 2020-01-01 DIAGNOSIS — R06.09 DOE (DYSPNEA ON EXERTION): Primary | ICD-10-CM

## 2020-01-01 DIAGNOSIS — Z87.891 PERSONAL HISTORY OF TOBACCO USE, PRESENTING HAZARDS TO HEALTH: ICD-10-CM

## 2020-01-01 DIAGNOSIS — Z87.891 PERSONAL HISTORY OF TOBACCO USE, PRESENTING HAZARDS TO HEALTH: Primary | ICD-10-CM

## 2020-01-01 DIAGNOSIS — I65.21 CAROTID ARTERY STENOSIS WITHOUT CEREBRAL INFARCTION, RIGHT: Primary | ICD-10-CM

## 2020-01-01 DIAGNOSIS — Z95.0 PACEMAKER: ICD-10-CM

## 2020-01-01 DIAGNOSIS — I65.21 CAROTID ARTERY STENOSIS WITHOUT CEREBRAL INFARCTION, RIGHT: ICD-10-CM

## 2020-01-01 DIAGNOSIS — I51.89 MILD LEFT VENTRICULAR SYSTOLIC DYSFUNCTION: ICD-10-CM

## 2020-01-01 DIAGNOSIS — C95.00 ACUTE LEUKEMIA NOT HAVING ACHIEVED REMISSION (H): ICD-10-CM

## 2020-01-01 DIAGNOSIS — J41.0 SIMPLE CHRONIC BRONCHITIS (H): ICD-10-CM

## 2020-01-01 DIAGNOSIS — I10 ESSENTIAL HYPERTENSION, BENIGN: ICD-10-CM

## 2020-01-01 DIAGNOSIS — I48.0 PAROXYSMAL ATRIAL FIBRILLATION (H): ICD-10-CM

## 2020-01-01 LAB
ABO + RH BLD: NORMAL
ALBUMIN SERPL-MCNC: 2.5 G/DL (ref 3.4–5)
ALBUMIN SERPL-MCNC: 2.6 G/DL (ref 3.4–5)
ALBUMIN SERPL-MCNC: 3.2 G/DL (ref 3.4–5)
ALBUMIN SERPL-MCNC: 3.3 G/DL (ref 3.4–5)
ALBUMIN UR-MCNC: 30 MG/DL
ALP SERPL-CCNC: 127 U/L (ref 40–150)
ALP SERPL-CCNC: 132 U/L (ref 40–150)
ALP SERPL-CCNC: 146 U/L (ref 40–150)
ALP SERPL-CCNC: 99 U/L (ref 40–150)
ALT SERPL W P-5'-P-CCNC: 191 U/L (ref 0–70)
ALT SERPL W P-5'-P-CCNC: 241 U/L (ref 0–70)
ALT SERPL W P-5'-P-CCNC: 51 U/L (ref 0–70)
ALT SERPL W P-5'-P-CCNC: 67 U/L (ref 0–70)
ANION GAP SERPL CALCULATED.3IONS-SCNC: 10 MMOL/L (ref 3–14)
ANION GAP SERPL CALCULATED.3IONS-SCNC: 10 MMOL/L (ref 3–14)
ANION GAP SERPL CALCULATED.3IONS-SCNC: 11 MMOL/L (ref 3–14)
ANION GAP SERPL CALCULATED.3IONS-SCNC: 20 MMOL/L (ref 3–14)
ANION GAP SERPL CALCULATED.3IONS-SCNC: 9 MMOL/L (ref 3–14)
ANISOCYTOSIS BLD QL SMEAR: ABNORMAL
ANISOCYTOSIS BLD QL SMEAR: SLIGHT
APPEARANCE UR: CLEAR
APTT PPP: 38 SEC (ref 22–37)
APTT PPP: 39 SEC (ref 22–37)
APTT PPP: 39 SEC (ref 22–37)
APTT PPP: 40 SEC (ref 22–37)
APTT PPP: 41 SEC (ref 22–37)
APTT PPP: 41 SEC (ref 22–37)
APTT PPP: 42 SEC (ref 22–37)
APTT PPP: 42 SEC (ref 22–37)
APTT PPP: NORMAL SEC (ref 22–37)
AST SERPL W P-5'-P-CCNC: 116 U/L (ref 0–45)
AST SERPL W P-5'-P-CCNC: 143 U/L (ref 0–45)
AST SERPL W P-5'-P-CCNC: 287 U/L (ref 0–45)
AST SERPL W P-5'-P-CCNC: 476 U/L (ref 0–45)
BASE DEFICIT BLDV-SCNC: 13.8 MMOL/L
BASE DEFICIT BLDV-SCNC: 7.1 MMOL/L
BASOPHILS # BLD AUTO: 0 10E9/L (ref 0–0.2)
BASOPHILS NFR BLD AUTO: 0 %
BILIRUB DIRECT SERPL-MCNC: 0.3 MG/DL (ref 0–0.2)
BILIRUB SERPL-MCNC: 0.9 MG/DL (ref 0.2–1.3)
BILIRUB SERPL-MCNC: 1 MG/DL (ref 0.2–1.3)
BILIRUB SERPL-MCNC: 1.1 MG/DL (ref 0.2–1.3)
BILIRUB SERPL-MCNC: 1.4 MG/DL (ref 0.2–1.3)
BILIRUB UR QL STRIP: NEGATIVE
BLASTS # BLD: 102 10E9/L
BLASTS # BLD: 109 10E9/L
BLASTS # BLD: 126.2 10E9/L
BLASTS # BLD: 148.6 10E9/L
BLASTS # BLD: 151.6 10E9/L
BLASTS # BLD: 40.2 10E9/L
BLASTS # BLD: 45.4 10E9/L
BLASTS BLD QL SMEAR: 51.5 %
BLASTS BLD QL SMEAR: 52 %
BLASTS BLD QL SMEAR: 69 %
BLASTS BLD QL SMEAR: 71.4 %
BLASTS BLD QL SMEAR: 72.7 %
BLASTS BLD QL SMEAR: 79 %
BLASTS BLD QL SMEAR: 84 %
BLD GP AB SCN SERPL QL: NORMAL
BLD GP AB SCN SERPL QL: NORMAL
BLD PROD TYP BPU: NORMAL
BLD UNIT ID BPU: 0
BLOOD BANK CMNT PATIENT-IMP: NORMAL
BLOOD BANK CMNT PATIENT-IMP: NORMAL
BLOOD PRODUCT CODE: NORMAL
BPU ID: NORMAL
BUN SERPL-MCNC: 35 MG/DL (ref 7–30)
BUN SERPL-MCNC: 38 MG/DL (ref 7–30)
BUN SERPL-MCNC: 40 MG/DL (ref 7–30)
BUN SERPL-MCNC: 51 MG/DL (ref 7–30)
BUN SERPL-MCNC: 78 MG/DL (ref 7–30)
BURR CELLS BLD QL SMEAR: ABNORMAL
BURR CELLS BLD QL SMEAR: ABNORMAL
BURR CELLS BLD QL SMEAR: SLIGHT
CA-I BLD-MCNC: 4.9 MG/DL (ref 4.4–5.2)
CALCIUM SERPL-MCNC: 7.4 MG/DL (ref 8.5–10.1)
CALCIUM SERPL-MCNC: 7.4 MG/DL (ref 8.5–10.1)
CALCIUM SERPL-MCNC: 8.3 MG/DL (ref 8.5–10.1)
CALCIUM SERPL-MCNC: 8.3 MG/DL (ref 8.5–10.1)
CALCIUM SERPL-MCNC: 8.6 MG/DL (ref 8.5–10.1)
CALCIUM SERPL-MCNC: 8.7 MG/DL (ref 8.5–10.1)
CALCIUM SERPL-MCNC: 8.8 MG/DL (ref 8.5–10.1)
CALCIUM SERPL-MCNC: 8.9 MG/DL (ref 8.5–10.1)
CALCIUM SERPL-MCNC: 9.1 MG/DL (ref 8.5–10.1)
CHLORIDE SERPL-SCNC: 106 MMOL/L (ref 94–109)
CHLORIDE SERPL-SCNC: 106 MMOL/L (ref 94–109)
CHLORIDE SERPL-SCNC: 107 MMOL/L (ref 94–109)
CHLORIDE SERPL-SCNC: 107 MMOL/L (ref 94–109)
CHLORIDE SERPL-SCNC: 116 MMOL/L (ref 94–109)
CMV IGG SERPL QL IA: <0.2 AI (ref 0–0.8)
CO2 SERPL-SCNC: 14 MMOL/L (ref 20–32)
CO2 SERPL-SCNC: 14 MMOL/L (ref 20–32)
CO2 SERPL-SCNC: 17 MMOL/L (ref 20–32)
CO2 SERPL-SCNC: 18 MMOL/L (ref 20–32)
CO2 SERPL-SCNC: 20 MMOL/L (ref 20–32)
COLOR UR AUTO: YELLOW
COPATH REPORT: NORMAL
CREAT SERPL-MCNC: 1.45 MG/DL (ref 0.66–1.25)
CREAT SERPL-MCNC: 1.67 MG/DL (ref 0.66–1.25)
CREAT SERPL-MCNC: 1.74 MG/DL (ref 0.66–1.25)
CREAT SERPL-MCNC: 1.76 MG/DL (ref 0.66–1.25)
CREAT SERPL-MCNC: 2.36 MG/DL (ref 0.66–1.25)
CV STRESS MAX HR HE: 75
D DIMER PPP FEU-MCNC: >20 UG/ML FEU (ref 0–0.5)
DIFFERENTIAL METHOD BLD: ABNORMAL
EBV VCA IGG SER QL IA: 0.4 AI (ref 0–0.8)
EOSINOPHIL # BLD AUTO: 0 10E9/L (ref 0–0.7)
EOSINOPHIL NFR BLD AUTO: 0 %
ERYTHROCYTE [DISTWIDTH] IN BLOOD BY AUTOMATED COUNT: 19.7 % (ref 10–15)
ERYTHROCYTE [DISTWIDTH] IN BLOOD BY AUTOMATED COUNT: 19.9 % (ref 10–15)
ERYTHROCYTE [DISTWIDTH] IN BLOOD BY AUTOMATED COUNT: 20.2 % (ref 10–15)
ERYTHROCYTE [DISTWIDTH] IN BLOOD BY AUTOMATED COUNT: 20.8 % (ref 10–15)
ERYTHROCYTE [DISTWIDTH] IN BLOOD BY AUTOMATED COUNT: 21.2 % (ref 10–15)
ERYTHROCYTE [DISTWIDTH] IN BLOOD BY AUTOMATED COUNT: 21.6 % (ref 10–15)
ERYTHROCYTE [DISTWIDTH] IN BLOOD BY AUTOMATED COUNT: 22.3 % (ref 10–15)
FIBRINOGEN PPP-MCNC: 173 MG/DL (ref 200–420)
FIBRINOGEN PPP-MCNC: 244 MG/DL (ref 200–420)
FIBRINOGEN PPP-MCNC: 246 MG/DL (ref 200–420)
FIBRINOGEN PPP-MCNC: 269 MG/DL (ref 200–420)
FIBRINOGEN PPP-MCNC: 271 MG/DL (ref 200–420)
FIBRINOGEN PPP-MCNC: 334 MG/DL (ref 200–420)
FIBRINOGEN PPP-MCNC: 372 MG/DL (ref 200–420)
FIBRINOGEN PPP-MCNC: 433 MG/DL (ref 200–420)
FIBRINOGEN PPP-MCNC: 442 MG/DL (ref 200–420)
FIBRINOGEN PPP-MCNC: NORMAL MG/DL (ref 200–420)
GFR SERPL CREATININE-BSD FRML MDRD: 27 ML/MIN/{1.73_M2}
GFR SERPL CREATININE-BSD FRML MDRD: 38 ML/MIN/{1.73_M2}
GFR SERPL CREATININE-BSD FRML MDRD: 38 ML/MIN/{1.73_M2}
GFR SERPL CREATININE-BSD FRML MDRD: 40 ML/MIN/{1.73_M2}
GFR SERPL CREATININE-BSD FRML MDRD: 48 ML/MIN/{1.73_M2}
GLUCOSE BLDC GLUCOMTR-MCNC: 104 MG/DL (ref 70–99)
GLUCOSE SERPL-MCNC: 107 MG/DL (ref 70–99)
GLUCOSE SERPL-MCNC: 112 MG/DL (ref 70–99)
GLUCOSE SERPL-MCNC: 132 MG/DL (ref 70–99)
GLUCOSE SERPL-MCNC: 134 MG/DL (ref 70–99)
GLUCOSE SERPL-MCNC: 82 MG/DL (ref 70–99)
GLUCOSE UR STRIP-MCNC: NEGATIVE MG/DL
HBA1C MFR BLD: NORMAL % (ref 0–5.6)
HBV CORE AB SERPL QL IA: NONREACTIVE
HBV SURFACE AB SERPL IA-ACNC: 0 M[IU]/ML
HBV SURFACE AG SERPL QL IA: NONREACTIVE
HCO3 BLDV-SCNC: 14 MMOL/L (ref 21–28)
HCO3 BLDV-SCNC: 18 MMOL/L (ref 21–28)
HCT VFR BLD AUTO: 19.1 % (ref 40–53)
HCT VFR BLD AUTO: 20.3 % (ref 40–53)
HCT VFR BLD AUTO: 22.4 % (ref 40–53)
HCT VFR BLD AUTO: 22.5 % (ref 40–53)
HCT VFR BLD AUTO: 22.6 % (ref 40–53)
HCT VFR BLD AUTO: 23.4 % (ref 40–53)
HCT VFR BLD AUTO: 24.4 % (ref 40–53)
HCV AB SERPL QL IA: NONREACTIVE
HGB BLD-MCNC: 5.8 G/DL (ref 13.3–17.7)
HGB BLD-MCNC: 5.9 G/DL (ref 13.3–17.7)
HGB BLD-MCNC: 6.7 G/DL (ref 13.3–17.7)
HGB BLD-MCNC: 6.9 G/DL (ref 13.3–17.7)
HGB BLD-MCNC: 7.1 G/DL (ref 13.3–17.7)
HGB BLD-MCNC: 7.4 G/DL (ref 13.3–17.7)
HGB BLD-MCNC: 7.7 G/DL (ref 13.3–17.7)
HGB UR QL STRIP: ABNORMAL
HIV 1+2 AB+HIV1 P24 AG SERPL QL IA: NONREACTIVE
HSV1 DNA SPEC QL NAA+PROBE: NEGATIVE
HSV2 DNA SPEC QL NAA+PROBE: NEGATIVE
HYALINE CASTS #/AREA URNS LPF: 3 /LPF (ref 0–2)
INR PPP: 1.29 (ref 0.86–1.14)
INR PPP: 1.46 (ref 0.86–1.14)
INR PPP: 1.54 (ref 0.86–1.14)
INR PPP: 1.54 (ref 0.86–1.14)
INR PPP: 1.91 (ref 0.86–1.14)
INR PPP: 2.05 (ref 0.86–1.14)
INR PPP: 2.06 (ref 0.86–1.14)
INR PPP: 2.09 (ref 0.86–1.14)
INR PPP: 2.21 (ref 0.86–1.14)
INR PPP: 2.86 (ref 0.86–1.14)
INR PPP: NORMAL (ref 0.86–1.14)
INTERPRETATION ECG - MUSE: NORMAL
INTERPRETATION ECG - MUSE: NORMAL
KETONES UR STRIP-MCNC: NEGATIVE MG/DL
LABORATORY COMMENT REPORT: NORMAL
LACTATE BLD-SCNC: 10 MMOL/L (ref 0.7–2)
LACTATE BLD-SCNC: 3.4 MMOL/L (ref 0.7–2)
LACTATE BLD-SCNC: 4.1 MMOL/L (ref 0.7–2)
LACTATE BLD-SCNC: 6.3 MMOL/L (ref 0.7–2)
LDH SERPL L TO P-CCNC: 1300 U/L (ref 85–227)
LDH SERPL L TO P-CCNC: 1301 U/L (ref 85–227)
LDH SERPL L TO P-CCNC: 1495 U/L (ref 85–227)
LDH SERPL L TO P-CCNC: 1612 U/L (ref 85–227)
LDH SERPL L TO P-CCNC: 3157 U/L (ref 85–227)
LEUKOCYTE ESTERASE UR QL STRIP: NEGATIVE
LYMPHOCYTES # BLD AUTO: 12.8 10E9/L (ref 0.8–5.3)
LYMPHOCYTES # BLD AUTO: 16.2 10E9/L (ref 0.8–5.3)
LYMPHOCYTES # BLD AUTO: 16.6 10E9/L (ref 0.8–5.3)
LYMPHOCYTES # BLD AUTO: 17.7 10E9/L (ref 0.8–5.3)
LYMPHOCYTES # BLD AUTO: 18.2 10E9/L (ref 0.8–5.3)
LYMPHOCYTES # BLD AUTO: 30.1 10E9/L (ref 0.8–5.3)
LYMPHOCYTES # BLD AUTO: 34 10E9/L (ref 0.8–5.3)
LYMPHOCYTES NFR BLD AUTO: 11.8 %
LYMPHOCYTES NFR BLD AUTO: 16.4 %
LYMPHOCYTES NFR BLD AUTO: 17 %
LYMPHOCYTES NFR BLD AUTO: 19 %
LYMPHOCYTES NFR BLD AUTO: 23 %
LYMPHOCYTES NFR BLD AUTO: 9 %
LYMPHOCYTES NFR BLD AUTO: 9.7 %
MACROCYTES BLD QL SMEAR: PRESENT
MACROCYTES BLD QL SMEAR: PRESENT
MAGNESIUM SERPL-MCNC: 2.2 MG/DL (ref 1.6–2.3)
MAGNESIUM SERPL-MCNC: 2.2 MG/DL (ref 1.6–2.3)
MAGNESIUM SERPL-MCNC: 2.4 MG/DL (ref 1.6–2.3)
MCH RBC QN AUTO: 29.3 PG (ref 26.5–33)
MCH RBC QN AUTO: 29.4 PG (ref 26.5–33)
MCH RBC QN AUTO: 29.6 PG (ref 26.5–33)
MCH RBC QN AUTO: 29.9 PG (ref 26.5–33)
MCH RBC QN AUTO: 30 PG (ref 26.5–33)
MCH RBC QN AUTO: 30.3 PG (ref 26.5–33)
MCH RBC QN AUTO: 30.3 PG (ref 26.5–33)
MCHC RBC AUTO-ENTMCNC: 29.1 G/DL (ref 31.5–36.5)
MCHC RBC AUTO-ENTMCNC: 29.8 G/DL (ref 31.5–36.5)
MCHC RBC AUTO-ENTMCNC: 30.4 G/DL (ref 31.5–36.5)
MCHC RBC AUTO-ENTMCNC: 30.8 G/DL (ref 31.5–36.5)
MCHC RBC AUTO-ENTMCNC: 31.4 G/DL (ref 31.5–36.5)
MCHC RBC AUTO-ENTMCNC: 31.6 G/DL (ref 31.5–36.5)
MCHC RBC AUTO-ENTMCNC: 31.6 G/DL (ref 31.5–36.5)
MCV RBC AUTO: 101 FL (ref 78–100)
MCV RBC AUTO: 103 FL (ref 78–100)
MCV RBC AUTO: 93 FL (ref 78–100)
MCV RBC AUTO: 94 FL (ref 78–100)
MCV RBC AUTO: 96 FL (ref 78–100)
MCV RBC AUTO: 97 FL (ref 78–100)
MCV RBC AUTO: 98 FL (ref 78–100)
MDC_IDC_EPISODE_DTM: NORMAL
MDC_IDC_EPISODE_DURATION: 7 S
MDC_IDC_EPISODE_DURATION: 732 S
MDC_IDC_EPISODE_ID: NORMAL
MDC_IDC_EPISODE_TYPE: NORMAL
MDC_IDC_LEAD_IMPLANT_DT: NORMAL
MDC_IDC_LEAD_LOCATION: NORMAL
MDC_IDC_LEAD_LOCATION_DETAIL_1: NORMAL
MDC_IDC_LEAD_MFG: NORMAL
MDC_IDC_LEAD_MODEL: NORMAL
MDC_IDC_LEAD_POLARITY_TYPE: NORMAL
MDC_IDC_LEAD_SERIAL: NORMAL
MDC_IDC_MSMT_BATTERY_DTM: NORMAL
MDC_IDC_MSMT_BATTERY_DTM: NORMAL
MDC_IDC_MSMT_BATTERY_REMAINING_LONGEVITY: 114 MO
MDC_IDC_MSMT_BATTERY_REMAINING_LONGEVITY: 138 MO
MDC_IDC_MSMT_BATTERY_REMAINING_LONGEVITY: NORMAL
MDC_IDC_MSMT_BATTERY_REMAINING_PERCENTAGE: 100 %
MDC_IDC_MSMT_BATTERY_STATUS: NORMAL
MDC_IDC_MSMT_LEADCHNL_RA_IMPEDANCE_VALUE: 595 OHM
MDC_IDC_MSMT_LEADCHNL_RA_IMPEDANCE_VALUE: 663 OHM
MDC_IDC_MSMT_LEADCHNL_RA_IMPEDANCE_VALUE: 699 OHM
MDC_IDC_MSMT_LEADCHNL_RA_PACING_THRESHOLD_AMPLITUDE: 0.7 V
MDC_IDC_MSMT_LEADCHNL_RA_PACING_THRESHOLD_AMPLITUDE: 0.9 V
MDC_IDC_MSMT_LEADCHNL_RA_PACING_THRESHOLD_AMPLITUDE: 1 V
MDC_IDC_MSMT_LEADCHNL_RA_PACING_THRESHOLD_PULSEWIDTH: 0.4 MS
MDC_IDC_MSMT_LEADCHNL_RA_SENSING_INTR_AMPL: 0.5 MV
MDC_IDC_MSMT_LEADCHNL_RA_SENSING_INTR_AMPL: 2.9 MV
MDC_IDC_MSMT_LEADCHNL_RV_IMPEDANCE_VALUE: 635 OHM
MDC_IDC_MSMT_LEADCHNL_RV_IMPEDANCE_VALUE: 637 OHM
MDC_IDC_MSMT_LEADCHNL_RV_IMPEDANCE_VALUE: 654 OHM
MDC_IDC_MSMT_LEADCHNL_RV_PACING_THRESHOLD_AMPLITUDE: 1.6 V
MDC_IDC_MSMT_LEADCHNL_RV_PACING_THRESHOLD_AMPLITUDE: 1.7 V
MDC_IDC_MSMT_LEADCHNL_RV_PACING_THRESHOLD_AMPLITUDE: 2.5 V
MDC_IDC_MSMT_LEADCHNL_RV_PACING_THRESHOLD_PULSEWIDTH: 0.4 MS
MDC_IDC_MSMT_LEADCHNL_RV_SENSING_INTR_AMPL: NORMAL
MDC_IDC_MSMT_LEADCHNL_RV_SENSING_INTR_AMPL: NORMAL
MDC_IDC_PG_IMPLANT_DTM: NORMAL
MDC_IDC_PG_MFG: NORMAL
MDC_IDC_PG_MODEL: NORMAL
MDC_IDC_PG_SERIAL: NORMAL
MDC_IDC_PG_TYPE: NORMAL
MDC_IDC_SESS_CLINIC_NAME: NORMAL
MDC_IDC_SESS_DTM: NORMAL
MDC_IDC_SESS_TYPE: NORMAL
MDC_IDC_SET_BRADY_AT_MODE_SWITCH_MODE: NORMAL
MDC_IDC_SET_BRADY_AT_MODE_SWITCH_RATE: 170 {BEATS}/MIN
MDC_IDC_SET_BRADY_HYSTRATE: NORMAL
MDC_IDC_SET_BRADY_HYSTRATE: NORMAL
MDC_IDC_SET_BRADY_LOWRATE: 60 {BEATS}/MIN
MDC_IDC_SET_BRADY_MAX_SENSOR_RATE: 130 {BEATS}/MIN
MDC_IDC_SET_BRADY_MAX_TRACKING_RATE: 130 {BEATS}/MIN
MDC_IDC_SET_BRADY_MODE: NORMAL
MDC_IDC_SET_BRADY_PAV_DELAY_HIGH: 150 MS
MDC_IDC_SET_BRADY_PAV_DELAY_LOW: 200 MS
MDC_IDC_SET_BRADY_SAV_DELAY_HIGH: 150 MS
MDC_IDC_SET_BRADY_SAV_DELAY_LOW: 200 MS
MDC_IDC_SET_LEADCHNL_RA_PACING_AMPLITUDE: 2 V
MDC_IDC_SET_LEADCHNL_RA_PACING_AMPLITUDE: 2 V
MDC_IDC_SET_LEADCHNL_RA_PACING_AMPLITUDE: 5 V
MDC_IDC_SET_LEADCHNL_RA_PACING_ANODE_ELECTRODE_1: NORMAL
MDC_IDC_SET_LEADCHNL_RA_PACING_ANODE_LOCATION_1: NORMAL
MDC_IDC_SET_LEADCHNL_RA_PACING_CAPTURE_MODE: NORMAL
MDC_IDC_SET_LEADCHNL_RA_PACING_CATHODE_ELECTRODE_1: NORMAL
MDC_IDC_SET_LEADCHNL_RA_PACING_CATHODE_LOCATION_1: NORMAL
MDC_IDC_SET_LEADCHNL_RA_PACING_POLARITY: NORMAL
MDC_IDC_SET_LEADCHNL_RA_PACING_PULSEWIDTH: 0.4 MS
MDC_IDC_SET_LEADCHNL_RA_SENSING_ADAPTATION_MODE: NORMAL
MDC_IDC_SET_LEADCHNL_RA_SENSING_ANODE_ELECTRODE_1: NORMAL
MDC_IDC_SET_LEADCHNL_RA_SENSING_ANODE_LOCATION_1: NORMAL
MDC_IDC_SET_LEADCHNL_RA_SENSING_CATHODE_ELECTRODE_1: NORMAL
MDC_IDC_SET_LEADCHNL_RA_SENSING_CATHODE_LOCATION_1: NORMAL
MDC_IDC_SET_LEADCHNL_RA_SENSING_POLARITY: NORMAL
MDC_IDC_SET_LEADCHNL_RA_SENSING_SENSITIVITY: 0.2 MV
MDC_IDC_SET_LEADCHNL_RA_SENSING_SENSITIVITY: 0.25 MV
MDC_IDC_SET_LEADCHNL_RA_SENSING_SENSITIVITY: 0.25 MV
MDC_IDC_SET_LEADCHNL_RV_PACING_AMPLITUDE: 2.2 V
MDC_IDC_SET_LEADCHNL_RV_PACING_AMPLITUDE: 3 V
MDC_IDC_SET_LEADCHNL_RV_PACING_AMPLITUDE: 3.5 V
MDC_IDC_SET_LEADCHNL_RV_PACING_ANODE_ELECTRODE_1: NORMAL
MDC_IDC_SET_LEADCHNL_RV_PACING_ANODE_LOCATION_1: NORMAL
MDC_IDC_SET_LEADCHNL_RV_PACING_CAPTURE_MODE: NORMAL
MDC_IDC_SET_LEADCHNL_RV_PACING_CATHODE_ELECTRODE_1: NORMAL
MDC_IDC_SET_LEADCHNL_RV_PACING_CATHODE_LOCATION_1: NORMAL
MDC_IDC_SET_LEADCHNL_RV_PACING_POLARITY: NORMAL
MDC_IDC_SET_LEADCHNL_RV_PACING_PULSEWIDTH: 0.4 MS
MDC_IDC_SET_LEADCHNL_RV_SENSING_ADAPTATION_MODE: NORMAL
MDC_IDC_SET_LEADCHNL_RV_SENSING_ANODE_ELECTRODE_1: NORMAL
MDC_IDC_SET_LEADCHNL_RV_SENSING_ANODE_LOCATION_1: NORMAL
MDC_IDC_SET_LEADCHNL_RV_SENSING_CATHODE_ELECTRODE_1: NORMAL
MDC_IDC_SET_LEADCHNL_RV_SENSING_CATHODE_LOCATION_1: NORMAL
MDC_IDC_SET_LEADCHNL_RV_SENSING_POLARITY: NORMAL
MDC_IDC_SET_LEADCHNL_RV_SENSING_SENSITIVITY: 1.5 MV
MDC_IDC_SET_ZONE_DETECTION_INTERVAL: 375 MS
MDC_IDC_SET_ZONE_TYPE: NORMAL
MDC_IDC_SET_ZONE_VENDOR_TYPE: NORMAL
MDC_IDC_STAT_AT_BURDEN_PERCENT: 1 %
MDC_IDC_STAT_AT_DTM_END: NORMAL
MDC_IDC_STAT_AT_DTM_START: NORMAL
MDC_IDC_STAT_BRADY_DTM_END: NORMAL
MDC_IDC_STAT_BRADY_DTM_END: NORMAL
MDC_IDC_STAT_BRADY_DTM_START: NORMAL
MDC_IDC_STAT_BRADY_DTM_START: NORMAL
MDC_IDC_STAT_BRADY_RA_PERCENT_PACED: 26 %
MDC_IDC_STAT_BRADY_RA_PERCENT_PACED: 28 %
MDC_IDC_STAT_BRADY_RA_PERCENT_PACED: 29 %
MDC_IDC_STAT_BRADY_RV_PERCENT_PACED: 100 %
MDC_IDC_STAT_BRADY_RV_PERCENT_PACED: 100 %
MDC_IDC_STAT_BRADY_RV_PERCENT_PACED: 87 %
MDC_IDC_STAT_EPISODE_RECENT_COUNT: 0
MDC_IDC_STAT_EPISODE_RECENT_COUNT: 2
MDC_IDC_STAT_EPISODE_RECENT_COUNT_DTM_END: NORMAL
MDC_IDC_STAT_EPISODE_RECENT_COUNT_DTM_START: NORMAL
MDC_IDC_STAT_EPISODE_TOTAL_COUNT: 3
MDC_IDC_STAT_EPISODE_TOTAL_COUNT: 3
MDC_IDC_STAT_EPISODE_TOTAL_COUNT_DTM_END: NORMAL
MDC_IDC_STAT_EPISODE_TOTAL_COUNT_DTM_END: NORMAL
MDC_IDC_STAT_EPISODE_TYPE: NORMAL
MDC_IDC_STAT_EPISODE_VENDOR_TYPE: NORMAL
METAMYELOCYTES # BLD: 1.2 10E9/L
METAMYELOCYTES # BLD: 3.6 10E9/L
METAMYELOCYTES NFR BLD MANUAL: 0.7 %
METAMYELOCYTES NFR BLD MANUAL: 2 %
MICROCYTES BLD QL SMEAR: PRESENT
MONOCYTES # BLD AUTO: 0 10E9/L (ref 0–1.3)
MONOCYTES # BLD AUTO: 5.2 10E9/L (ref 0–1.3)
MONOCYTES # BLD AUTO: 5.5 10E9/L (ref 0–1.3)
MONOCYTES NFR BLD AUTO: 0 %
MONOCYTES NFR BLD AUTO: 3.7 %
MONOCYTES NFR BLD AUTO: 6.7 %
MUCOUS THREADS #/AREA URNS LPF: PRESENT /LPF
MYELOCYTES # BLD: 1.7 10E9/L
MYELOCYTES # BLD: 10.8 10E9/L
MYELOCYTES # BLD: 12.2 10E9/L
MYELOCYTES # BLD: 4.4 10E9/L
MYELOCYTES # BLD: 7.2 10E9/L
MYELOCYTES NFR BLD MANUAL: 2.2 %
MYELOCYTES NFR BLD MANUAL: 4 %
MYELOCYTES NFR BLD MANUAL: 5 %
MYELOCYTES NFR BLD MANUAL: 6.1 %
MYELOCYTES NFR BLD MANUAL: 6.5 %
NEUTROPHILS # BLD AUTO: 1.8 10E9/L (ref 1.6–8.3)
NEUTROPHILS # BLD AUTO: 11.8 10E9/L (ref 1.6–8.3)
NEUTROPHILS # BLD AUTO: 15.2 10E9/L (ref 1.6–8.3)
NEUTROPHILS # BLD AUTO: 16.8 10E9/L (ref 1.6–8.3)
NEUTROPHILS # BLD AUTO: 17.5 10E9/L (ref 1.6–8.3)
NEUTROPHILS # BLD AUTO: 7.2 10E9/L (ref 1.6–8.3)
NEUTROPHILS # BLD AUTO: 7.5 10E9/L (ref 1.6–8.3)
NEUTROPHILS NFR BLD AUTO: 1 %
NEUTROPHILS NFR BLD AUTO: 11.2 %
NEUTROPHILS NFR BLD AUTO: 19.5 %
NEUTROPHILS NFR BLD AUTO: 20 %
NEUTROPHILS NFR BLD AUTO: 4 %
NEUTROPHILS NFR BLD AUTO: 4.1 %
NEUTROPHILS NFR BLD AUTO: 8 %
NITRATE UR QL: NEGATIVE
NRBC # BLD AUTO: 0.9 10*3/UL
NRBC # BLD AUTO: 1.5 10*3/UL
NRBC # BLD AUTO: 1.7 10*3/UL
NRBC # BLD AUTO: 3.5 10*3/UL
NRBC # BLD AUTO: 6 10*3/UL
NRBC BLD AUTO-RTO: 1 /100
NRBC BLD AUTO-RTO: 1 /100
NRBC BLD AUTO-RTO: 2 /100
NRBC BLD AUTO-RTO: 3 /100
NRBC BLD AUTO-RTO: 4 /100
NT-PROBNP SERPL-MCNC: 5867 PG/ML (ref 0–900)
NUM BPU REQUESTED: 1
NUM BPU REQUESTED: 2
O2/TOTAL GAS SETTING VFR VENT: 100 %
O2/TOTAL GAS SETTING VFR VENT: ABNORMAL %
OVALOCYTES BLD QL SMEAR: SLIGHT
OXYHGB MFR BLDV: 10 %
PCO2 BLDV: 33 MM HG (ref 40–50)
PCO2 BLDV: 38 MM HG (ref 40–50)
PH BLDV: 7.17 PH (ref 7.32–7.43)
PH BLDV: 7.34 PH (ref 7.32–7.43)
PH UR STRIP: 5.5 PH (ref 5–7)
PHOSPHATE SERPL-MCNC: 3 MG/DL (ref 2.5–4.5)
PHOSPHATE SERPL-MCNC: 3.1 MG/DL (ref 2.5–4.5)
PHOSPHATE SERPL-MCNC: 3.2 MG/DL (ref 2.5–4.5)
PHOSPHATE SERPL-MCNC: 3.5 MG/DL (ref 2.5–4.5)
PHOSPHATE SERPL-MCNC: 3.7 MG/DL (ref 2.5–4.5)
PHOSPHATE SERPL-MCNC: 4.9 MG/DL (ref 2.5–4.5)
PHOSPHATE SERPL-MCNC: 5.6 MG/DL (ref 2.5–4.5)
PHOSPHATE SERPL-MCNC: 8.7 MG/DL (ref 2.5–4.5)
PLATELET # BLD AUTO: 15 10E9/L (ref 150–450)
PLATELET # BLD AUTO: 21 10E9/L (ref 150–450)
PLATELET # BLD AUTO: 22 10E9/L (ref 150–450)
PLATELET # BLD AUTO: 26 10E9/L (ref 150–450)
PLATELET # BLD AUTO: 29 10E9/L (ref 150–450)
PLATELET # BLD AUTO: 36 10E9/L (ref 150–450)
PLATELET # BLD AUTO: 40 10E9/L (ref 150–450)
PLATELET # BLD AUTO: 62 10E9/L (ref 150–450)
PLATELET # BLD EST: ABNORMAL 10*3/UL
PO2 BLDV: 15 MM HG (ref 25–47)
PO2 BLDV: 17 MM HG (ref 25–47)
POIKILOCYTOSIS BLD QL SMEAR: ABNORMAL
POIKILOCYTOSIS BLD QL SMEAR: SLIGHT
POIKILOCYTOSIS BLD QL SMEAR: SLIGHT
POLYCHROMASIA BLD QL SMEAR: ABNORMAL
POLYCHROMASIA BLD QL SMEAR: SLIGHT
POTASSIUM SERPL-SCNC: 3.9 MMOL/L (ref 3.4–5.3)
POTASSIUM SERPL-SCNC: 4 MMOL/L (ref 3.4–5.3)
POTASSIUM SERPL-SCNC: 4 MMOL/L (ref 3.4–5.3)
POTASSIUM SERPL-SCNC: 4.2 MMOL/L (ref 3.4–5.3)
POTASSIUM SERPL-SCNC: 4.5 MMOL/L (ref 3.4–5.3)
POTASSIUM SERPL-SCNC: 4.6 MMOL/L (ref 3.4–5.3)
POTASSIUM SERPL-SCNC: 4.9 MMOL/L (ref 3.4–5.3)
PROMYELOCYTES # BLD MANUAL: 0.9 10E9/L
PROMYELOCYTES # BLD MANUAL: 1.2 10E9/L
PROMYELOCYTES # BLD MANUAL: 1.5 10E9/L
PROMYELOCYTES # BLD MANUAL: 2.9 10E9/L
PROMYELOCYTES # BLD MANUAL: 3.5 10E9/L
PROMYELOCYTES NFR BLD MANUAL: 0.6 %
PROMYELOCYTES NFR BLD MANUAL: 0.7 %
PROMYELOCYTES NFR BLD MANUAL: 0.8 %
PROMYELOCYTES NFR BLD MANUAL: 3.7 %
PROMYELOCYTES NFR BLD MANUAL: 4 %
PROT SERPL-MCNC: 6.2 G/DL (ref 6.8–8.8)
PROT SERPL-MCNC: 6.2 G/DL (ref 6.8–8.8)
PROT SERPL-MCNC: 7.4 G/DL (ref 6.8–8.8)
PROT SERPL-MCNC: 7.8 G/DL (ref 6.8–8.8)
PT IMM NP PPP: 1.32 S (ref 0.86–1.14)
PT IMM NP PPP: 16.1 S
PT IMM NP PPP: 22.9 S
RATE PRESSURE PRODUCT: 9750
RBC # BLD AUTO: 1.97 10E12/L (ref 4.4–5.9)
RBC # BLD AUTO: 1.97 10E12/L (ref 4.4–5.9)
RBC # BLD AUTO: 2.23 10E12/L (ref 4.4–5.9)
RBC # BLD AUTO: 2.28 10E12/L (ref 4.4–5.9)
RBC # BLD AUTO: 2.42 10E12/L (ref 4.4–5.9)
RBC # BLD AUTO: 2.5 10E12/L (ref 4.4–5.9)
RBC # BLD AUTO: 2.54 10E12/L (ref 4.4–5.9)
RBC #/AREA URNS AUTO: <1 /HPF (ref 0–2)
RETICS # AUTO: 53.1 10E9/L (ref 25–95)
RETICS/RBC NFR AUTO: 2.4 % (ref 0.5–2)
SARS-COV-2 RNA SPEC QL NAA+PROBE: NEGATIVE
SARS-COV-2 RNA SPEC QL NAA+PROBE: NORMAL
SODIUM SERPL-SCNC: 133 MMOL/L (ref 133–144)
SODIUM SERPL-SCNC: 134 MMOL/L (ref 133–144)
SODIUM SERPL-SCNC: 135 MMOL/L (ref 133–144)
SODIUM SERPL-SCNC: 140 MMOL/L (ref 133–144)
SODIUM SERPL-SCNC: 140 MMOL/L (ref 133–144)
SOURCE: ABNORMAL
SP GR UR STRIP: 1.01 (ref 1–1.03)
SPECIMEN EXP DATE BLD: NORMAL
SPECIMEN EXP DATE BLD: NORMAL
SPECIMEN SOURCE: NORMAL
SQUAMOUS #/AREA URNS AUTO: <1 /HPF (ref 0–1)
STRESS ECHO BASELINE DIASTOLIC HE: 84
STRESS ECHO BASELINE HR: 71
STRESS ECHO BASELINE SYSTOLIC BP: 148
STRESS ECHO CALCULATED PERCENT HR: 50 %
STRESS ECHO LAST STRESS DIASTOLIC BP: 76
STRESS ECHO LAST STRESS SYSTOLIC BP: 130
STRESS ECHO TARGET HR: 149
TRANS CELLS #/AREA URNS HPF: <1 /HPF (ref 0–1)
TRANSFUSION STATUS PATIENT QL: NORMAL
TROPONIN I SERPL-MCNC: 0.59 UG/L (ref 0–0.04)
TROPONIN I SERPL-MCNC: 0.85 UG/L (ref 0–0.04)
TROPONIN I SERPL-MCNC: 1.01 UG/L (ref 0–0.04)
TROPONIN I SERPL-MCNC: 1.1 UG/L (ref 0–0.04)
TROPONIN I SERPL-MCNC: 10.01 UG/L (ref 0–0.04)
TROPONIN I SERPL-MCNC: 5.08 UG/L (ref 0–0.04)
TROPONIN I SERPL-MCNC: 8.74 UG/L (ref 0–0.04)
URATE SERPL-MCNC: 1.2 MG/DL (ref 3.5–7.2)
URATE SERPL-MCNC: 1.4 MG/DL (ref 3.5–7.2)
URATE SERPL-MCNC: 1.4 MG/DL (ref 3.5–7.2)
URATE SERPL-MCNC: 1.5 MG/DL (ref 3.5–7.2)
URATE SERPL-MCNC: 1.8 MG/DL (ref 3.5–7.2)
URATE SERPL-MCNC: 11.4 MG/DL (ref 3.5–7.2)
URATE SERPL-MCNC: 11.5 MG/DL (ref 3.5–7.2)
URATE SERPL-MCNC: 3 MG/DL (ref 3.5–7.2)
URATE SERPL-MCNC: 7 MG/DL (ref 3.5–7.2)
UROBILINOGEN UR STRIP-MCNC: NORMAL MG/DL (ref 0–2)
WBC # BLD AUTO: 147.8 10E9/L (ref 4–11)
WBC # BLD AUTO: 149.9 10E9/L (ref 4–11)
WBC # BLD AUTO: 160.8 10E9/L (ref 4–11)
WBC # BLD AUTO: 176.8 10E9/L (ref 4–11)
WBC # BLD AUTO: 180.5 10E9/L (ref 4–11)
WBC # BLD AUTO: 188.1 10E9/L (ref 4–11)
WBC # BLD AUTO: 78.1 10E9/L (ref 4–11)
WBC # BLD AUTO: 87.4 10E9/L (ref 4–11)
WBC #/AREA URNS AUTO: 0 /HPF (ref 0–5)

## 2020-01-01 PROCEDURE — 86850 RBC ANTIBODY SCREEN: CPT | Performed by: INTERNAL MEDICINE

## 2020-01-01 PROCEDURE — 25000128 H RX IP 250 OP 636: Performed by: NURSE ANESTHETIST, CERTIFIED REGISTERED

## 2020-01-01 PROCEDURE — 88161 CYTOPATH SMEAR OTHER SOURCE: CPT | Performed by: PHYSICIAN ASSISTANT

## 2020-01-01 PROCEDURE — 93280 PM DEVICE PROGR EVAL DUAL: CPT

## 2020-01-01 PROCEDURE — 99214 OFFICE O/P EST MOD 30 MIN: CPT | Mod: 95 | Performed by: NURSE PRACTITIONER

## 2020-01-01 PROCEDURE — 36430 TRANSFUSION BLD/BLD COMPNT: CPT

## 2020-01-01 PROCEDURE — 85610 PROTHROMBIN TIME: CPT | Performed by: EMERGENCY MEDICINE

## 2020-01-01 PROCEDURE — 25000132 ZZH RX MED GY IP 250 OP 250 PS 637: Performed by: PHYSICIAN ASSISTANT

## 2020-01-01 PROCEDURE — 88237 TISSUE CULTURE BONE MARROW: CPT | Performed by: PHYSICIAN ASSISTANT

## 2020-01-01 PROCEDURE — 88311 DECALCIFY TISSUE: CPT | Performed by: PHYSICIAN ASSISTANT

## 2020-01-01 PROCEDURE — 20000004 ZZH R&B ICU UMMC

## 2020-01-01 PROCEDURE — 27210908 ZZH NEEDLE CR4

## 2020-01-01 PROCEDURE — 78452 HT MUSCLE IMAGE SPECT MULT: CPT | Mod: 26 | Performed by: INTERNAL MEDICINE

## 2020-01-01 PROCEDURE — G0297 LDCT FOR LUNG CA SCREEN: HCPCS

## 2020-01-01 PROCEDURE — 86901 BLOOD TYPING SEROLOGIC RH(D): CPT | Performed by: EMERGENCY MEDICINE

## 2020-01-01 PROCEDURE — 94644 CONT INHLJ TX 1ST HOUR: CPT

## 2020-01-01 PROCEDURE — 25000128 H RX IP 250 OP 636: Performed by: PHYSICIAN ASSISTANT

## 2020-01-01 PROCEDURE — 87040 BLOOD CULTURE FOR BACTERIA: CPT | Performed by: PHYSICIAN ASSISTANT

## 2020-01-01 PROCEDURE — 25000128 H RX IP 250 OP 636: Performed by: SURGERY

## 2020-01-01 PROCEDURE — 84484 ASSAY OF TROPONIN QUANT: CPT | Performed by: INTERNAL MEDICINE

## 2020-01-01 PROCEDURE — 83735 ASSAY OF MAGNESIUM: CPT | Performed by: INTERNAL MEDICINE

## 2020-01-01 PROCEDURE — 85730 THROMBOPLASTIN TIME PARTIAL: CPT | Performed by: PHYSICIAN ASSISTANT

## 2020-01-01 PROCEDURE — 25000125 ZZHC RX 250

## 2020-01-01 PROCEDURE — 40000424 ZZHCL STATISTIC BONE MARROW CORE PERF TC 38221: Performed by: PHYSICIAN ASSISTANT

## 2020-01-01 PROCEDURE — 84550 ASSAY OF BLOOD/URIC ACID: CPT | Performed by: PHYSICIAN ASSISTANT

## 2020-01-01 PROCEDURE — 85610 PROTHROMBIN TIME: CPT | Performed by: PHYSICIAN ASSISTANT

## 2020-01-01 PROCEDURE — 82805 BLOOD GASES W/O2 SATURATION: CPT | Performed by: SURGERY

## 2020-01-01 PROCEDURE — 40000275 ZZH STATISTIC RCP TIME EA 10 MIN

## 2020-01-01 PROCEDURE — 25000125 ZZHC RX 250: Performed by: NURSE ANESTHETIST, CERTIFIED REGISTERED

## 2020-01-01 PROCEDURE — 93005 ELECTROCARDIOGRAM TRACING: CPT

## 2020-01-01 PROCEDURE — 88230 TISSUE CULTURE LYMPHOCYTE: CPT | Performed by: PHYSICIAN ASSISTANT

## 2020-01-01 PROCEDURE — 36415 COLL VENOUS BLD VENIPUNCTURE: CPT | Performed by: PHYSICIAN ASSISTANT

## 2020-01-01 PROCEDURE — 25000128 H RX IP 250 OP 636: Performed by: STUDENT IN AN ORGANIZED HEALTH CARE EDUCATION/TRAINING PROGRAM

## 2020-01-01 PROCEDURE — 25800030 ZZH RX IP 258 OP 636: Performed by: PHYSICIAN ASSISTANT

## 2020-01-01 PROCEDURE — 84484 ASSAY OF TROPONIN QUANT: CPT | Performed by: PHYSICIAN ASSISTANT

## 2020-01-01 PROCEDURE — 85025 COMPLETE CBC W/AUTO DIFF WBC: CPT | Performed by: PATHOLOGY

## 2020-01-01 PROCEDURE — 40000611 ZZHCL STATISTIC MORPHOLOGY W/INTERP HEMEPATH TC 85060: Performed by: PHYSICIAN ASSISTANT

## 2020-01-01 PROCEDURE — 00000161 ZZHCL STATISTIC H-SPHEME PROCESS B/S: Performed by: PHYSICIAN ASSISTANT

## 2020-01-01 PROCEDURE — P9040 RBC LEUKOREDUCED IRRADIATED: HCPCS | Performed by: INTERNAL MEDICINE

## 2020-01-01 PROCEDURE — 84100 ASSAY OF PHOSPHORUS: CPT | Performed by: INTERNAL MEDICINE

## 2020-01-01 PROCEDURE — 25000128 H RX IP 250 OP 636: Performed by: INTERNAL MEDICINE

## 2020-01-01 PROCEDURE — 86850 RBC ANTIBODY SCREEN: CPT | Performed by: EMERGENCY MEDICINE

## 2020-01-01 PROCEDURE — 25000125 ZZHC RX 250: Performed by: STUDENT IN AN ORGANIZED HEALTH CARE EDUCATION/TRAINING PROGRAM

## 2020-01-01 PROCEDURE — 85049 AUTOMATED PLATELET COUNT: CPT | Performed by: PHYSICIAN ASSISTANT

## 2020-01-01 PROCEDURE — 81370 HLA I & II TYPING LR: CPT | Performed by: INTERNAL MEDICINE

## 2020-01-01 PROCEDURE — 85730 THROMBOPLASTIN TIME PARTIAL: CPT | Performed by: INTERNAL MEDICINE

## 2020-01-01 PROCEDURE — 27210300 ZZH CANNULA HIGH FLOW, ADULT

## 2020-01-01 PROCEDURE — 25800030 ZZH RX IP 258 OP 636: Performed by: INTERNAL MEDICINE

## 2020-01-01 PROCEDURE — 96361 HYDRATE IV INFUSION ADD-ON: CPT

## 2020-01-01 PROCEDURE — 84484 ASSAY OF TROPONIN QUANT: CPT | Performed by: EMERGENCY MEDICINE

## 2020-01-01 PROCEDURE — 85610 PROTHROMBIN TIME: CPT | Performed by: INTERNAL MEDICINE

## 2020-01-01 PROCEDURE — 71045 X-RAY EXAM CHEST 1 VIEW: CPT

## 2020-01-01 PROCEDURE — 99213 OFFICE O/P EST LOW 20 MIN: CPT | Mod: TEL | Performed by: INTERNAL MEDICINE

## 2020-01-01 PROCEDURE — 85045 AUTOMATED RETICULOCYTE COUNT: CPT | Performed by: INTERNAL MEDICINE

## 2020-01-01 PROCEDURE — 25500064 ZZH RX 255 OP 636: Performed by: INTERNAL MEDICINE

## 2020-01-01 PROCEDURE — 93016 CV STRESS TEST SUPVJ ONLY: CPT | Performed by: INTERNAL MEDICINE

## 2020-01-01 PROCEDURE — 88341 IMHCHEM/IMCYTCHM EA ADD ANTB: CPT | Performed by: PHYSICIAN ASSISTANT

## 2020-01-01 PROCEDURE — 85048 AUTOMATED LEUKOCYTE COUNT: CPT | Performed by: PHYSICIAN ASSISTANT

## 2020-01-01 PROCEDURE — 00000146 ZZHCL STATISTIC GLUCOSE BY METER IP

## 2020-01-01 PROCEDURE — 80048 BASIC METABOLIC PNL TOTAL CA: CPT | Performed by: INTERNAL MEDICINE

## 2020-01-01 PROCEDURE — 96360 HYDRATION IV INFUSION INIT: CPT

## 2020-01-01 PROCEDURE — 77001 FLUOROGUIDE FOR VEIN DEVICE: CPT

## 2020-01-01 PROCEDURE — 88271 CYTOGENETICS DNA PROBE: CPT | Performed by: PHYSICIAN ASSISTANT

## 2020-01-01 PROCEDURE — 86923 COMPATIBILITY TEST ELECTRIC: CPT | Performed by: EMERGENCY MEDICINE

## 2020-01-01 PROCEDURE — 85384 FIBRINOGEN ACTIVITY: CPT | Performed by: INTERNAL MEDICINE

## 2020-01-01 PROCEDURE — 83615 LACTATE (LD) (LDH) ENZYME: CPT | Performed by: INTERNAL MEDICINE

## 2020-01-01 PROCEDURE — 83880 ASSAY OF NATRIURETIC PEPTIDE: CPT | Performed by: EMERGENCY MEDICINE

## 2020-01-01 PROCEDURE — 86900 BLOOD TYPING SEROLOGIC ABO: CPT | Performed by: EMERGENCY MEDICINE

## 2020-01-01 PROCEDURE — 84550 ASSAY OF BLOOD/URIC ACID: CPT | Performed by: INTERNAL MEDICINE

## 2020-01-01 PROCEDURE — 85379 FIBRIN DEGRADATION QUANT: CPT | Performed by: EMERGENCY MEDICINE

## 2020-01-01 PROCEDURE — 84132 ASSAY OF SERUM POTASSIUM: CPT | Performed by: PHYSICIAN ASSISTANT

## 2020-01-01 PROCEDURE — 93306 TTE W/DOPPLER COMPLETE: CPT | Mod: 26 | Performed by: INTERNAL MEDICINE

## 2020-01-01 PROCEDURE — 25000132 ZZH RX MED GY IP 250 OP 250 PS 637: Performed by: INTERNAL MEDICINE

## 2020-01-01 PROCEDURE — 81315 PML/RARALPHA COM BREAKPOINTS: CPT | Performed by: PHYSICIAN ASSISTANT

## 2020-01-01 PROCEDURE — 88319 ENZYME HISTOCHEMISTRY: CPT | Performed by: PHYSICIAN ASSISTANT

## 2020-01-01 PROCEDURE — 88185 FLOWCYTOMETRY/TC ADD-ON: CPT | Performed by: PHYSICIAN ASSISTANT

## 2020-01-01 PROCEDURE — 85025 COMPLETE CBC W/AUTO DIFF WBC: CPT | Performed by: INTERNAL MEDICINE

## 2020-01-01 PROCEDURE — 85384 FIBRINOGEN ACTIVITY: CPT | Performed by: EMERGENCY MEDICINE

## 2020-01-01 PROCEDURE — 20600000 ZZH R&B BMT

## 2020-01-01 PROCEDURE — 40000556 ZZH STATISTIC PERIPHERAL IV START W US GUIDANCE

## 2020-01-01 PROCEDURE — 02H633Z INSERTION OF INFUSION DEVICE INTO RIGHT ATRIUM, PERCUTANEOUS APPROACH: ICD-10-PCS | Performed by: SURGERY

## 2020-01-01 PROCEDURE — U0003 INFECTIOUS AGENT DETECTION BY NUCLEIC ACID (DNA OR RNA); SEVERE ACUTE RESPIRATORY SYNDROME CORONAVIRUS 2 (SARS-COV-2) (CORONAVIRUS DISEASE [COVID-19]), AMPLIFIED PROBE TECHNIQUE, MAKING USE OF HIGH THROUGHPUT TECHNOLOGIES AS DESCRIBED BY CMS-2020-01-R: HCPCS | Performed by: EMERGENCY MEDICINE

## 2020-01-01 PROCEDURE — 99291 CRITICAL CARE FIRST HOUR: CPT | Mod: 24 | Performed by: SURGERY

## 2020-01-01 PROCEDURE — 88275 CYTOGENETICS 100-300: CPT | Performed by: PHYSICIAN ASSISTANT

## 2020-01-01 PROCEDURE — 36415 COLL VENOUS BLD VENIPUNCTURE: CPT | Performed by: INTERNAL MEDICINE

## 2020-01-01 PROCEDURE — 86665 EPSTEIN-BARR CAPSID VCA: CPT | Performed by: INTERNAL MEDICINE

## 2020-01-01 PROCEDURE — 88184 FLOWCYTOMETRY/ TC 1 MARKER: CPT | Performed by: PHYSICIAN ASSISTANT

## 2020-01-01 PROCEDURE — 99233 SBSQ HOSP IP/OBS HIGH 50: CPT | Performed by: INTERNAL MEDICINE

## 2020-01-01 PROCEDURE — 25000125 ZZHC RX 250: Performed by: PATHOLOGY

## 2020-01-01 PROCEDURE — 99233 SBSQ HOSP IP/OBS HIGH 50: CPT | Mod: 25 | Performed by: INTERNAL MEDICINE

## 2020-01-01 PROCEDURE — 83605 ASSAY OF LACTIC ACID: CPT | Performed by: SURGERY

## 2020-01-01 PROCEDURE — 85025 COMPLETE CBC W/AUTO DIFF WBC: CPT | Performed by: EMERGENCY MEDICINE

## 2020-01-01 PROCEDURE — 88280 CHROMOSOME KARYOTYPE STUDY: CPT | Performed by: PHYSICIAN ASSISTANT

## 2020-01-01 PROCEDURE — 84550 ASSAY OF BLOOD/URIC ACID: CPT | Performed by: EMERGENCY MEDICINE

## 2020-01-01 PROCEDURE — P9016 RBC LEUKOCYTES REDUCED: HCPCS | Performed by: EMERGENCY MEDICINE

## 2020-01-01 PROCEDURE — 76770 US EXAM ABDO BACK WALL COMP: CPT

## 2020-01-01 PROCEDURE — 40000264 ECHOCARDIOGRAM COMPLETE

## 2020-01-01 PROCEDURE — 99292 CRITICAL CARE ADDL 30 MIN: CPT | Mod: 24 | Performed by: SURGERY

## 2020-01-01 PROCEDURE — 36511 APHERESIS WBC: CPT

## 2020-01-01 PROCEDURE — 27210738 ZZH ACCESSORY CR2

## 2020-01-01 PROCEDURE — 93880 EXTRACRANIAL BILAT STUDY: CPT

## 2020-01-01 PROCEDURE — 80048 BASIC METABOLIC PNL TOTAL CA: CPT | Performed by: EMERGENCY MEDICINE

## 2020-01-01 PROCEDURE — 25000125 ZZHC RX 250: Performed by: PHYSICIAN ASSISTANT

## 2020-01-01 PROCEDURE — 40001005 ZZHCL STATISTIC FLOW >15 ABY TC 88189: Performed by: PHYSICIAN ASSISTANT

## 2020-01-01 PROCEDURE — 93280 PM DEVICE PROGR EVAL DUAL: CPT | Mod: 26 | Performed by: INTERNAL MEDICINE

## 2020-01-01 PROCEDURE — 40000281 ZZH STATISTIC TRANSPORT TIME EA 15 MIN

## 2020-01-01 PROCEDURE — 92950 HEART/LUNG RESUSCITATION CPR: CPT | Mod: GC | Performed by: SURGERY

## 2020-01-01 PROCEDURE — 80076 HEPATIC FUNCTION PANEL: CPT | Performed by: EMERGENCY MEDICINE

## 2020-01-01 PROCEDURE — 85025 COMPLETE CBC W/AUTO DIFF WBC: CPT | Performed by: PHYSICIAN ASSISTANT

## 2020-01-01 PROCEDURE — 82310 ASSAY OF CALCIUM: CPT | Performed by: INTERNAL MEDICINE

## 2020-01-01 PROCEDURE — 82310 ASSAY OF CALCIUM: CPT | Performed by: PHYSICIAN ASSISTANT

## 2020-01-01 PROCEDURE — 99285 EMERGENCY DEPT VISIT HI MDM: CPT | Mod: 25

## 2020-01-01 PROCEDURE — 84100 ASSAY OF PHOSPHORUS: CPT | Performed by: PHYSICIAN ASSISTANT

## 2020-01-01 PROCEDURE — 83605 ASSAY OF LACTIC ACID: CPT | Performed by: INTERNAL MEDICINE

## 2020-01-01 PROCEDURE — P9037 PLATE PHERES LEUKOREDU IRRAD: HCPCS | Performed by: INTERNAL MEDICINE

## 2020-01-01 PROCEDURE — 85384 FIBRINOGEN ACTIVITY: CPT | Performed by: PHYSICIAN ASSISTANT

## 2020-01-01 PROCEDURE — 99223 1ST HOSP IP/OBS HIGH 75: CPT | Mod: AI | Performed by: INTERNAL MEDICINE

## 2020-01-01 PROCEDURE — 86900 BLOOD TYPING SEROLOGIC ABO: CPT | Performed by: INTERNAL MEDICINE

## 2020-01-01 PROCEDURE — 93294 REM INTERROG EVL PM/LDLS PM: CPT | Performed by: INTERNAL MEDICINE

## 2020-01-01 PROCEDURE — 83735 ASSAY OF MAGNESIUM: CPT | Performed by: PHYSICIAN ASSISTANT

## 2020-01-01 PROCEDURE — 81450 HL NEO GSAP 5-50DNA/DNA&RNA: CPT | Performed by: PHYSICIAN ASSISTANT

## 2020-01-01 PROCEDURE — 86644 CMV ANTIBODY: CPT | Performed by: INTERNAL MEDICINE

## 2020-01-01 PROCEDURE — 80053 COMPREHEN METABOLIC PANEL: CPT | Performed by: INTERNAL MEDICINE

## 2020-01-01 PROCEDURE — 93010 ELECTROCARDIOGRAM REPORT: CPT | Performed by: INTERNAL MEDICINE

## 2020-01-01 PROCEDURE — 88305 TISSUE EXAM BY PATHOLOGIST: CPT | Performed by: PHYSICIAN ASSISTANT

## 2020-01-01 PROCEDURE — 99291 CRITICAL CARE FIRST HOUR: CPT | Performed by: STUDENT IN AN ORGANIZED HEALTH CARE EDUCATION/TRAINING PROGRAM

## 2020-01-01 PROCEDURE — 84484 ASSAY OF TROPONIN QUANT: CPT | Performed by: STUDENT IN AN ORGANIZED HEALTH CARE EDUCATION/TRAINING PROGRAM

## 2020-01-01 PROCEDURE — C9803 HOPD COVID-19 SPEC COLLECT: HCPCS

## 2020-01-01 PROCEDURE — 88342 IMHCHEM/IMCYTCHM 1ST ANTB: CPT | Performed by: PHYSICIAN ASSISTANT

## 2020-01-01 PROCEDURE — 40000983 ZZH STATISTIC HFNC ADULT NON-CPAP

## 2020-01-01 PROCEDURE — 83615 LACTATE (LD) (LDH) ENZYME: CPT | Performed by: EMERGENCY MEDICINE

## 2020-01-01 PROCEDURE — 99233 SBSQ HOSP IP/OBS HIGH 50: CPT | Mod: GC | Performed by: INTERNAL MEDICINE

## 2020-01-01 PROCEDURE — 82803 BLOOD GASES ANY COMBINATION: CPT | Performed by: PHYSICIAN ASSISTANT

## 2020-01-01 PROCEDURE — 87389 HIV-1 AG W/HIV-1&-2 AB AG IA: CPT | Performed by: INTERNAL MEDICINE

## 2020-01-01 PROCEDURE — A9502 TC99M TETROFOSMIN: HCPCS | Performed by: INTERNAL MEDICINE

## 2020-01-01 PROCEDURE — 88264 CHROMOSOME ANALYSIS 20-25: CPT | Performed by: PHYSICIAN ASSISTANT

## 2020-01-01 PROCEDURE — 81001 URINALYSIS AUTO W/SCOPE: CPT | Performed by: INTERNAL MEDICINE

## 2020-01-01 PROCEDURE — C1769 GUIDE WIRE: HCPCS

## 2020-01-01 PROCEDURE — 94640 AIRWAY INHALATION TREATMENT: CPT

## 2020-01-01 PROCEDURE — 94660 CPAP INITIATION&MGMT: CPT

## 2020-01-01 PROCEDURE — 40000256 ZZH STATISTIC CARDIOPULM RESUSCITATION

## 2020-01-01 PROCEDURE — 40000671 ZZH STATISTIC ANESTHESIA CASE

## 2020-01-01 PROCEDURE — 94640 AIRWAY INHALATION TREATMENT: CPT | Mod: 76

## 2020-01-01 PROCEDURE — 93971 EXTREMITY STUDY: CPT | Mod: RT

## 2020-01-01 PROCEDURE — 86706 HEP B SURFACE ANTIBODY: CPT | Performed by: PHYSICIAN ASSISTANT

## 2020-01-01 PROCEDURE — 99291 CRITICAL CARE FIRST HOUR: CPT | Performed by: PHYSICIAN ASSISTANT

## 2020-01-01 PROCEDURE — 85025 COMPLETE CBC W/AUTO DIFF WBC: CPT | Performed by: STUDENT IN AN ORGANIZED HEALTH CARE EDUCATION/TRAINING PROGRAM

## 2020-01-01 PROCEDURE — 27210732 ZZH ACCESSORY CR1

## 2020-01-01 PROCEDURE — 93296 REM INTERROG EVL PM/IDS: CPT | Performed by: INTERNAL MEDICINE

## 2020-01-01 PROCEDURE — 87340 HEPATITIS B SURFACE AG IA: CPT | Performed by: PHYSICIAN ASSISTANT

## 2020-01-01 PROCEDURE — 93018 CV STRESS TEST I&R ONLY: CPT | Performed by: INTERNAL MEDICINE

## 2020-01-01 PROCEDURE — 83615 LACTATE (LD) (LDH) ENZYME: CPT | Performed by: PHYSICIAN ASSISTANT

## 2020-01-01 PROCEDURE — 86923 COMPATIBILITY TEST ELECTRIC: CPT | Performed by: INTERNAL MEDICINE

## 2020-01-01 PROCEDURE — 93017 CV STRESS TEST TRACING ONLY: CPT

## 2020-01-01 PROCEDURE — 86901 BLOOD TYPING SEROLOGIC RH(D): CPT | Performed by: INTERNAL MEDICINE

## 2020-01-01 PROCEDURE — 25800030 ZZH RX IP 258 OP 636: Performed by: EMERGENCY MEDICINE

## 2020-01-01 PROCEDURE — 34300033 ZZH RX 343: Performed by: INTERNAL MEDICINE

## 2020-01-01 PROCEDURE — 85730 THROMBOPLASTIN TIME PARTIAL: CPT | Performed by: EMERGENCY MEDICINE

## 2020-01-01 PROCEDURE — 99239 HOSP IP/OBS DSCHRG MGMT >30: CPT | Mod: GC | Performed by: SURGERY

## 2020-01-01 PROCEDURE — 81376 HLA II TYPING 1 LOCUS LR: CPT | Performed by: INTERNAL MEDICINE

## 2020-01-01 PROCEDURE — 25000128 H RX IP 250 OP 636: Performed by: PATHOLOGY

## 2020-01-01 PROCEDURE — 99213 OFFICE O/P EST LOW 20 MIN: CPT | Mod: 95 | Performed by: INTERNAL MEDICINE

## 2020-01-01 PROCEDURE — 07DR3ZX EXTRACTION OF ILIAC BONE MARROW, PERCUTANEOUS APPROACH, DIAGNOSTIC: ICD-10-PCS | Performed by: PHYSICIAN ASSISTANT

## 2020-01-01 PROCEDURE — 86704 HEP B CORE ANTIBODY TOTAL: CPT | Performed by: PHYSICIAN ASSISTANT

## 2020-01-01 PROCEDURE — 86803 HEPATITIS C AB TEST: CPT | Performed by: PHYSICIAN ASSISTANT

## 2020-01-01 PROCEDURE — 93280 PM DEVICE PROGR EVAL DUAL: CPT | Performed by: INTERNAL MEDICINE

## 2020-01-01 PROCEDURE — 82330 ASSAY OF CALCIUM: CPT | Performed by: SURGERY

## 2020-01-01 PROCEDURE — 25000125 ZZHC RX 250: Performed by: INTERNAL MEDICINE

## 2020-01-01 PROCEDURE — 80053 COMPREHEN METABOLIC PANEL: CPT | Performed by: PHYSICIAN ASSISTANT

## 2020-01-01 PROCEDURE — 87529 HSV DNA AMP PROBE: CPT | Performed by: PHYSICIAN ASSISTANT

## 2020-01-01 RX ORDER — ATORVASTATIN CALCIUM 80 MG/1
80 TABLET, FILM COATED ORAL DAILY
Qty: 90 TABLET | Refills: 3 | Status: SHIPPED | OUTPATIENT
Start: 2020-01-01

## 2020-01-01 RX ORDER — FUROSEMIDE 10 MG/ML
60 INJECTION INTRAMUSCULAR; INTRAVENOUS ONCE
Status: DISCONTINUED | OUTPATIENT
Start: 2020-01-01 | End: 2020-01-01

## 2020-01-01 RX ORDER — HEPARIN SODIUM (PORCINE) LOCK FLUSH IV SOLN 100 UNIT/ML 100 UNIT/ML
3 SOLUTION INTRAVENOUS ONCE
Status: COMPLETED | OUTPATIENT
Start: 2020-01-01 | End: 2020-01-01

## 2020-01-01 RX ORDER — FLUORIDE TOOTHPASTE
15 TOOTHPASTE DENTAL 4 TIMES DAILY PRN
Status: DISCONTINUED | OUTPATIENT
Start: 2020-01-01 | End: 2020-01-01 | Stop reason: HOSPADM

## 2020-01-01 RX ORDER — ONDANSETRON 2 MG/ML
4 INJECTION INTRAMUSCULAR; INTRAVENOUS EVERY 6 HOURS PRN
Status: CANCELLED | OUTPATIENT
Start: 2020-01-01

## 2020-01-01 RX ORDER — METOPROLOL TARTRATE 50 MG
50 TABLET ORAL 2 TIMES DAILY
Status: CANCELLED | OUTPATIENT
Start: 2020-01-01

## 2020-01-01 RX ORDER — POLYETHYLENE GLYCOL 3350 17 G/17G
17 POWDER, FOR SOLUTION ORAL DAILY PRN
Status: CANCELLED | OUTPATIENT
Start: 2020-01-01

## 2020-01-01 RX ORDER — ACETAMINOPHEN 325 MG/1
650 TABLET ORAL EVERY 4 HOURS PRN
Status: CANCELLED | OUTPATIENT
Start: 2020-01-01

## 2020-01-01 RX ORDER — PROPOFOL 10 MG/ML
INJECTION, EMULSION INTRAVENOUS PRN
Status: DISCONTINUED | OUTPATIENT
Start: 2020-01-01 | End: 2020-01-01

## 2020-01-01 RX ORDER — CARBOXYMETHYLCELLULOSE SODIUM 5 MG/ML
1 SOLUTION/ DROPS OPHTHALMIC
Status: DISCONTINUED | OUTPATIENT
Start: 2020-01-01 | End: 2020-01-01 | Stop reason: HOSPADM

## 2020-01-01 RX ORDER — PROCHLORPERAZINE MALEATE 5 MG
5 TABLET ORAL EVERY 6 HOURS PRN
Status: CANCELLED | OUTPATIENT
Start: 2020-01-01

## 2020-01-01 RX ORDER — TRETINOIN 10 MG/1
45 CAPSULE ORAL 2 TIMES DAILY WITH MEALS
Status: DISCONTINUED | OUTPATIENT
Start: 2020-01-01 | End: 2020-01-01

## 2020-01-01 RX ORDER — METOPROLOL TARTRATE 50 MG
50 TABLET ORAL 2 TIMES DAILY
Qty: 180 TABLET | Refills: 3 | Status: SHIPPED | OUTPATIENT
Start: 2020-01-01

## 2020-01-01 RX ORDER — POTASSIUM CHLORIDE 7.45 MG/ML
10 INJECTION INTRAVENOUS
Status: DISCONTINUED | OUTPATIENT
Start: 2020-01-01 | End: 2020-01-01 | Stop reason: HOSPADM

## 2020-01-01 RX ORDER — PROCHLORPERAZINE MALEATE 5 MG
5 TABLET ORAL EVERY 6 HOURS PRN
Status: DISCONTINUED | OUTPATIENT
Start: 2020-01-01 | End: 2020-01-01 | Stop reason: HOSPADM

## 2020-01-01 RX ORDER — FUROSEMIDE 10 MG/ML
80 INJECTION INTRAMUSCULAR; INTRAVENOUS ONCE
Status: COMPLETED | OUTPATIENT
Start: 2020-01-01 | End: 2020-01-01

## 2020-01-01 RX ORDER — ATORVASTATIN CALCIUM 20 MG/1
80 TABLET, FILM COATED ORAL DAILY
Status: DISCONTINUED | OUTPATIENT
Start: 2020-01-01 | End: 2020-01-01

## 2020-01-01 RX ORDER — POTASSIUM CHLORIDE 1.5 G/1.58G
20-40 POWDER, FOR SOLUTION ORAL
Status: DISCONTINUED | OUTPATIENT
Start: 2020-01-01 | End: 2020-01-01 | Stop reason: HOSPADM

## 2020-01-01 RX ORDER — FUROSEMIDE 10 MG/ML
100 INJECTION INTRAMUSCULAR; INTRAVENOUS ONCE
Status: DISCONTINUED | OUTPATIENT
Start: 2020-01-01 | End: 2020-01-01

## 2020-01-01 RX ORDER — HYDRALAZINE HYDROCHLORIDE 20 MG/ML
10-20 INJECTION INTRAMUSCULAR; INTRAVENOUS EVERY 30 MIN PRN
Status: DISCONTINUED | OUTPATIENT
Start: 2020-01-01 | End: 2020-01-01 | Stop reason: HOSPADM

## 2020-01-01 RX ORDER — IPRATROPIUM BROMIDE AND ALBUTEROL SULFATE 2.5; .5 MG/3ML; MG/3ML
3 SOLUTION RESPIRATORY (INHALATION)
Status: DISCONTINUED | OUTPATIENT
Start: 2020-01-01 | End: 2020-01-01

## 2020-01-01 RX ORDER — UBIDECARENONE 75 MG
100 CAPSULE ORAL DAILY
Status: CANCELLED | OUTPATIENT
Start: 2020-01-01

## 2020-01-01 RX ORDER — DEXTROSE MONOHYDRATE 25 G/50ML
25-50 INJECTION, SOLUTION INTRAVENOUS
Status: CANCELLED | OUTPATIENT
Start: 2020-01-01

## 2020-01-01 RX ORDER — METOPROLOL TARTRATE 25 MG/1
50 TABLET, FILM COATED ORAL 2 TIMES DAILY
Status: DISCONTINUED | OUTPATIENT
Start: 2020-01-01 | End: 2020-01-01 | Stop reason: HOSPADM

## 2020-01-01 RX ORDER — ALBUTEROL SULFATE 90 UG/1
2 POWDER, METERED RESPIRATORY (INHALATION) EVERY 4 HOURS PRN
COMMUNITY
Start: 2020-01-01

## 2020-01-01 RX ORDER — SODIUM CHLORIDE 9 MG/ML
INJECTION, SOLUTION INTRAVENOUS CONTINUOUS
Status: DISCONTINUED | OUTPATIENT
Start: 2020-01-01 | End: 2020-01-01

## 2020-01-01 RX ORDER — FLUORIDE TOOTHPASTE
15 TOOTHPASTE DENTAL 4 TIMES DAILY PRN
Status: DISCONTINUED | OUTPATIENT
Start: 2020-01-01 | End: 2020-01-01

## 2020-01-01 RX ORDER — SENNOSIDES 8.6 MG
8.6 TABLET ORAL 2 TIMES DAILY PRN
Status: DISCONTINUED | OUTPATIENT
Start: 2020-01-01 | End: 2020-01-01 | Stop reason: HOSPADM

## 2020-01-01 RX ORDER — LIDOCAINE HYDROCHLORIDE 20 MG/ML
JELLY TOPICAL ONCE
Status: COMPLETED | OUTPATIENT
Start: 2020-01-01 | End: 2020-01-01

## 2020-01-01 RX ORDER — FUROSEMIDE 10 MG/ML
20 INJECTION INTRAMUSCULAR; INTRAVENOUS ONCE
Status: COMPLETED | OUTPATIENT
Start: 2020-01-01 | End: 2020-01-01

## 2020-01-01 RX ORDER — AMOXICILLIN 250 MG
1 CAPSULE ORAL
Status: CANCELLED | OUTPATIENT
Start: 2020-01-01

## 2020-01-01 RX ORDER — HYDROXYUREA 500 MG/1
2000 CAPSULE ORAL 3 TIMES DAILY
Status: DISCONTINUED | OUTPATIENT
Start: 2020-01-01 | End: 2020-01-01 | Stop reason: DRUGHIGH

## 2020-01-01 RX ORDER — HYDROXYUREA 500 MG/1
2000 CAPSULE ORAL 3 TIMES DAILY
Status: DISCONTINUED | OUTPATIENT
Start: 2020-01-01 | End: 2020-01-01 | Stop reason: HOSPADM

## 2020-01-01 RX ORDER — LEVOFLOXACIN 250 MG/1
250 TABLET, FILM COATED ORAL DAILY
Status: DISCONTINUED | OUTPATIENT
Start: 2020-01-01 | End: 2020-01-01 | Stop reason: HOSPADM

## 2020-01-01 RX ORDER — POTASSIUM CL/LIDO/0.9 % NACL 10MEQ/0.1L
10 INTRAVENOUS SOLUTION, PIGGYBACK (ML) INTRAVENOUS
Status: DISCONTINUED | OUTPATIENT
Start: 2020-01-01 | End: 2020-01-01 | Stop reason: HOSPADM

## 2020-01-01 RX ORDER — ALBUTEROL SULFATE 90 UG/1
2 AEROSOL, METERED RESPIRATORY (INHALATION) EVERY 4 HOURS PRN
Status: DISCONTINUED | OUTPATIENT
Start: 2020-01-01 | End: 2020-01-01 | Stop reason: HOSPADM

## 2020-01-01 RX ORDER — IPRATROPIUM BROMIDE AND ALBUTEROL SULFATE 2.5; .5 MG/3ML; MG/3ML
3 SOLUTION RESPIRATORY (INHALATION)
Status: DISCONTINUED | OUTPATIENT
Start: 2020-01-01 | End: 2020-01-01 | Stop reason: HOSPADM

## 2020-01-01 RX ORDER — LISINOPRIL 5 MG/1
5 TABLET ORAL DAILY
Qty: 90 TABLET | Refills: 3 | Status: SHIPPED | OUTPATIENT
Start: 2020-01-01

## 2020-01-01 RX ORDER — LIDOCAINE HYDROCHLORIDE 20 MG/ML
JELLY TOPICAL
Status: COMPLETED
Start: 2020-01-01 | End: 2020-01-01

## 2020-01-01 RX ORDER — DIPHENHYDRAMINE HYDROCHLORIDE 50 MG/ML
50 INJECTION INTRAMUSCULAR; INTRAVENOUS
Status: CANCELLED | OUTPATIENT
Start: 2020-01-01

## 2020-01-01 RX ORDER — SALIVA STIMULANT COMB. NO.3
1 SPRAY, NON-AEROSOL (ML) MUCOUS MEMBRANE
Status: DISCONTINUED | OUTPATIENT
Start: 2020-01-01 | End: 2020-01-01 | Stop reason: HOSPADM

## 2020-01-01 RX ORDER — REGADENOSON 0.08 MG/ML
0.4 INJECTION, SOLUTION INTRAVENOUS ONCE
Status: COMPLETED | OUTPATIENT
Start: 2020-01-01 | End: 2020-01-01

## 2020-01-01 RX ORDER — LORAZEPAM 0.5 MG/1
0.5 TABLET ORAL ONCE
Status: DISCONTINUED | OUTPATIENT
Start: 2020-01-01 | End: 2020-01-01

## 2020-01-01 RX ORDER — FLUORIDE TOOTHPASTE
15 TOOTHPASTE DENTAL 4 TIMES DAILY
Status: DISCONTINUED | OUTPATIENT
Start: 2020-01-01 | End: 2020-01-01

## 2020-01-01 RX ORDER — POTASSIUM CHLORIDE 750 MG/1
20-40 TABLET, EXTENDED RELEASE ORAL
Status: DISCONTINUED | OUTPATIENT
Start: 2020-01-01 | End: 2020-01-01 | Stop reason: HOSPADM

## 2020-01-01 RX ORDER — POLYETHYLENE GLYCOL 3350 17 G/17G
17 POWDER, FOR SOLUTION ORAL 2 TIMES DAILY PRN
Status: DISCONTINUED | OUTPATIENT
Start: 2020-01-01 | End: 2020-01-01 | Stop reason: HOSPADM

## 2020-01-01 RX ORDER — FUROSEMIDE 10 MG/ML
60 INJECTION INTRAMUSCULAR; INTRAVENOUS ONCE
Status: COMPLETED | OUTPATIENT
Start: 2020-01-01 | End: 2020-01-01

## 2020-01-01 RX ORDER — ALLOPURINOL 300 MG/1
300 TABLET ORAL DAILY
Status: DISCONTINUED | OUTPATIENT
Start: 2020-01-01 | End: 2020-01-01 | Stop reason: HOSPADM

## 2020-01-01 RX ORDER — IPRATROPIUM BROMIDE AND ALBUTEROL SULFATE 2.5; .5 MG/3ML; MG/3ML
3 SOLUTION RESPIRATORY (INHALATION) EVERY 4 HOURS PRN
Status: DISCONTINUED | OUTPATIENT
Start: 2020-01-01 | End: 2020-01-01 | Stop reason: HOSPADM

## 2020-01-01 RX ORDER — LORAZEPAM 0.5 MG/1
0.25 TABLET ORAL 2 TIMES DAILY PRN
Status: DISCONTINUED | OUTPATIENT
Start: 2020-01-01 | End: 2020-01-01 | Stop reason: HOSPADM

## 2020-01-01 RX ORDER — UBIDECARENONE 75 MG
100 CAPSULE ORAL DAILY
Status: DISCONTINUED | OUTPATIENT
Start: 2020-01-01 | End: 2020-01-01 | Stop reason: HOSPADM

## 2020-01-01 RX ORDER — HEPARIN SODIUM,PORCINE 10 UNIT/ML
2-5 VIAL (ML) INTRAVENOUS
Status: DISCONTINUED | OUTPATIENT
Start: 2020-01-01 | End: 2020-01-01

## 2020-01-01 RX ORDER — LEVOFLOXACIN 250 MG/1
250 TABLET, FILM COATED ORAL EVERY 24 HOURS
Status: DISCONTINUED | OUTPATIENT
Start: 2020-01-01 | End: 2020-01-01

## 2020-01-01 RX ORDER — ACYCLOVIR 200 MG/1
0-1 CAPSULE ORAL
Status: DISCONTINUED | OUTPATIENT
Start: 2020-01-01 | End: 2020-01-01 | Stop reason: HOSPADM

## 2020-01-01 RX ORDER — HYDROXYUREA 500 MG/1
2000 CAPSULE ORAL ONCE
Status: COMPLETED | OUTPATIENT
Start: 2020-01-01 | End: 2020-01-01

## 2020-01-01 RX ORDER — MAGNESIUM SULFATE HEPTAHYDRATE 40 MG/ML
4 INJECTION, SOLUTION INTRAVENOUS EVERY 4 HOURS PRN
Status: DISCONTINUED | OUTPATIENT
Start: 2020-01-01 | End: 2020-01-01 | Stop reason: HOSPADM

## 2020-01-01 RX ORDER — FUROSEMIDE 10 MG/ML
40 INJECTION INTRAMUSCULAR; INTRAVENOUS ONCE
Status: COMPLETED | OUTPATIENT
Start: 2020-01-01 | End: 2020-01-01

## 2020-01-01 RX ORDER — HEPARIN SODIUM (PORCINE) LOCK FLUSH IV SOLN 100 UNIT/ML 100 UNIT/ML
3 SOLUTION INTRAVENOUS
Status: CANCELLED | OUTPATIENT
Start: 2020-01-01

## 2020-01-01 RX ORDER — ALBUTEROL SULFATE 90 UG/1
2 AEROSOL, METERED RESPIRATORY (INHALATION) EVERY 5 MIN PRN
Status: DISCONTINUED | OUTPATIENT
Start: 2020-01-01 | End: 2020-01-01 | Stop reason: HOSPADM

## 2020-01-01 RX ORDER — ATORVASTATIN CALCIUM 40 MG/1
80 TABLET, FILM COATED ORAL DAILY
Status: CANCELLED | OUTPATIENT
Start: 2020-01-01

## 2020-01-01 RX ORDER — FUROSEMIDE 10 MG/ML
100 INJECTION INTRAMUSCULAR; INTRAVENOUS ONCE
Status: COMPLETED | OUTPATIENT
Start: 2020-01-01 | End: 2020-01-01

## 2020-01-01 RX ORDER — LIDOCAINE 40 MG/G
CREAM TOPICAL
Status: DISCONTINUED | OUTPATIENT
Start: 2020-01-01 | End: 2020-01-01

## 2020-01-01 RX ORDER — ONDANSETRON 4 MG/1
4 TABLET, FILM COATED ORAL EVERY 6 HOURS PRN
Status: DISCONTINUED | OUTPATIENT
Start: 2020-01-01 | End: 2020-01-01 | Stop reason: HOSPADM

## 2020-01-01 RX ORDER — ALLOPURINOL 300 MG/1
600 TABLET ORAL ONCE
Status: COMPLETED | OUTPATIENT
Start: 2020-01-01 | End: 2020-01-01

## 2020-01-01 RX ORDER — LISINOPRIL 5 MG/1
5 TABLET ORAL DAILY
Status: DISCONTINUED | OUTPATIENT
Start: 2020-01-01 | End: 2020-01-01

## 2020-01-01 RX ORDER — POTASSIUM CHLORIDE 29.8 MG/ML
20 INJECTION INTRAVENOUS
Status: DISCONTINUED | OUTPATIENT
Start: 2020-01-01 | End: 2020-01-01 | Stop reason: HOSPADM

## 2020-01-01 RX ORDER — LORAZEPAM 2 MG/ML
0.5 INJECTION INTRAMUSCULAR ONCE
Status: COMPLETED | OUTPATIENT
Start: 2020-01-01 | End: 2020-01-01

## 2020-01-01 RX ORDER — LISINOPRIL 2.5 MG/1
5 TABLET ORAL DAILY
Status: CANCELLED | OUTPATIENT
Start: 2020-01-01

## 2020-01-01 RX ORDER — HEPARIN SODIUM (PORCINE) LOCK FLUSH IV SOLN 100 UNIT/ML 100 UNIT/ML
200 SOLUTION INTRAVENOUS
Status: COMPLETED | OUTPATIENT
Start: 2020-01-01 | End: 2020-01-01

## 2020-01-01 RX ORDER — AMINOPHYLLINE 25 MG/ML
50-100 INJECTION, SOLUTION INTRAVENOUS
Status: DISCONTINUED | OUTPATIENT
Start: 2020-01-01 | End: 2020-01-01 | Stop reason: HOSPADM

## 2020-01-01 RX ORDER — NICOTINE POLACRILEX 4 MG
15-30 LOZENGE BUCCAL
Status: CANCELLED | OUTPATIENT
Start: 2020-01-01

## 2020-01-01 RX ORDER — HEPARIN SODIUM (PORCINE) LOCK FLUSH IV SOLN 100 UNIT/ML 100 UNIT/ML
3 SOLUTION INTRAVENOUS EVERY 24 HOURS
Status: CANCELLED | OUTPATIENT
Start: 2020-01-01

## 2020-01-01 RX ADMIN — FLUTICASONE FUROATE AND VILANTEROL TRIFENATATE 1 PUFF: 200; 25 POWDER RESPIRATORY (INHALATION) at 07:55

## 2020-01-01 RX ADMIN — Medication 200 UNITS: at 16:32

## 2020-01-01 RX ADMIN — IPRATROPIUM BROMIDE AND ALBUTEROL SULFATE 3 ML: .5; 3 SOLUTION RESPIRATORY (INHALATION) at 12:28

## 2020-01-01 RX ADMIN — FUROSEMIDE 80 MG: 10 INJECTION, SOLUTION INTRAVENOUS at 02:59

## 2020-01-01 RX ADMIN — MICAFUNGIN SODIUM 50 MG: 10 INJECTION, POWDER, LYOPHILIZED, FOR SOLUTION INTRAVENOUS at 13:40

## 2020-01-01 RX ADMIN — UMECLIDINIUM 1 PUFF: 62.5 AEROSOL, POWDER ORAL at 07:44

## 2020-01-01 RX ADMIN — LIDOCAINE HYDROCHLORIDE: 20 JELLY TOPICAL at 13:33

## 2020-01-01 RX ADMIN — LEVOFLOXACIN 250 MG: 250 TABLET, FILM COATED ORAL at 12:32

## 2020-01-01 RX ADMIN — HYDROXYUREA 2000 MG: 500 CAPSULE ORAL at 14:50

## 2020-01-01 RX ADMIN — Medication 100 MCG: at 07:45

## 2020-01-01 RX ADMIN — HUMAN ALBUMIN MICROSPHERES AND PERFLUTREN 3 ML: 10; .22 INJECTION, SOLUTION INTRAVENOUS at 09:04

## 2020-01-01 RX ADMIN — SODIUM CHLORIDE 6 MG: 9 INJECTION, SOLUTION INTRAVENOUS at 23:17

## 2020-01-01 RX ADMIN — METOPROLOL TARTRATE 50 MG: 50 TABLET, FILM COATED ORAL at 20:14

## 2020-01-01 RX ADMIN — HYDROXYUREA 2000 MG: 500 CAPSULE ORAL at 15:09

## 2020-01-01 RX ADMIN — PROPOFOL 50 MG: 10 INJECTION, EMULSION INTRAVENOUS at 23:42

## 2020-01-01 RX ADMIN — FUROSEMIDE 20 MG: 10 INJECTION, SOLUTION INTRAVENOUS at 00:10

## 2020-01-01 RX ADMIN — TRETINOIN 40 MG: 10 CAPSULE ORAL at 01:06

## 2020-01-01 RX ADMIN — SODIUM CHLORIDE, PRESERVATIVE FREE 3 ML: 5 INJECTION INTRAVENOUS at 21:10

## 2020-01-01 RX ADMIN — METOPROLOL TARTRATE 50 MG: 50 TABLET, FILM COATED ORAL at 20:41

## 2020-01-01 RX ADMIN — Medication 200 UNITS: at 16:33

## 2020-01-01 RX ADMIN — HYDROXYUREA 2000 MG: 500 CAPSULE ORAL at 20:41

## 2020-01-01 RX ADMIN — LISINOPRIL 5 MG: 5 TABLET ORAL at 08:18

## 2020-01-01 RX ADMIN — SODIUM CHLORIDE: 9 INJECTION, SOLUTION INTRAVENOUS at 00:10

## 2020-01-01 RX ADMIN — ALBUTEROL SULFATE 2 PUFF: 108 AEROSOL, METERED RESPIRATORY (INHALATION) at 00:46

## 2020-01-01 RX ADMIN — MIDAZOLAM 2 MG: 1 INJECTION INTRAMUSCULAR; INTRAVENOUS at 13:06

## 2020-01-01 RX ADMIN — LORAZEPAM 0.5 MG: 2 INJECTION INTRAMUSCULAR; INTRAVENOUS at 22:15

## 2020-01-01 RX ADMIN — ALBUTEROL SULFATE 2 PUFF: 108 AEROSOL, METERED RESPIRATORY (INHALATION) at 06:00

## 2020-01-01 RX ADMIN — IPRATROPIUM BROMIDE AND ALBUTEROL SULFATE 3 ML: .5; 3 SOLUTION RESPIRATORY (INHALATION) at 02:33

## 2020-01-01 RX ADMIN — SODIUM CHLORIDE 1000 ML: 9 INJECTION, SOLUTION INTRAVENOUS at 17:39

## 2020-01-01 RX ADMIN — HYDROXYUREA 2000 MG: 500 CAPSULE ORAL at 01:04

## 2020-01-01 RX ADMIN — HYDROXYUREA 2000 MG: 500 CAPSULE ORAL at 07:49

## 2020-01-01 RX ADMIN — Medication 0.25 MG: at 20:05

## 2020-01-01 RX ADMIN — FUROSEMIDE 60 MG: 10 INJECTION, SOLUTION INTRAVENOUS at 09:47

## 2020-01-01 RX ADMIN — FUROSEMIDE 60 MG: 10 INJECTION, SOLUTION INTRAVENOUS at 12:56

## 2020-01-01 RX ADMIN — LIDOCAINE HYDROCHLORIDE 5 ML: 10 INJECTION, SOLUTION EPIDURAL; INFILTRATION; INTRACAUDAL; PERINEURAL at 16:32

## 2020-01-01 RX ADMIN — MICAFUNGIN SODIUM 50 MG: 10 INJECTION, POWDER, LYOPHILIZED, FOR SOLUTION INTRAVENOUS at 14:50

## 2020-01-01 RX ADMIN — HYDROXYUREA 2000 MG: 500 CAPSULE ORAL at 20:14

## 2020-01-01 RX ADMIN — Medication 20 NG/KG/MIN: at 00:10

## 2020-01-01 RX ADMIN — ROCURONIUM BROMIDE 100 MG: 10 INJECTION INTRAVENOUS at 23:42

## 2020-01-01 RX ADMIN — LIDOCAINE HYDROCHLORIDE: 20 JELLY TOPICAL at 13:30

## 2020-01-01 RX ADMIN — HYDRALAZINE HYDROCHLORIDE 10 MG: 20 INJECTION INTRAMUSCULAR; INTRAVENOUS at 02:07

## 2020-01-01 RX ADMIN — TRETINOIN 40 MG: 10 CAPSULE ORAL at 19:13

## 2020-01-01 RX ADMIN — ALLOPURINOL 300 MG: 300 TABLET ORAL at 08:18

## 2020-01-01 RX ADMIN — FUROSEMIDE 100 MG: 10 INJECTION, SOLUTION INTRAVENOUS at 22:34

## 2020-01-01 RX ADMIN — TETROFOSMIN 18.46 MCI.: 1.38 INJECTION, POWDER, LYOPHILIZED, FOR SOLUTION INTRAVENOUS at 10:05

## 2020-01-01 RX ADMIN — IPRATROPIUM BROMIDE AND ALBUTEROL SULFATE 3 ML: .5; 3 SOLUTION RESPIRATORY (INHALATION) at 10:06

## 2020-01-01 RX ADMIN — SODIUM CHLORIDE: 9 INJECTION, SOLUTION INTRAVENOUS at 00:48

## 2020-01-01 RX ADMIN — ALLOPURINOL 600 MG: 300 TABLET ORAL at 23:17

## 2020-01-01 RX ADMIN — Medication 15 ML: at 15:08

## 2020-01-01 RX ADMIN — ANTICOAGULANT CITRATE DEXTROSE SOLUTION FORMULA A 1071 ML: 12.25; 11; 3.65 SOLUTION INTRAVENOUS at 19:00

## 2020-01-01 RX ADMIN — TETROFOSMIN 6.2 MCI.: 1.38 INJECTION, POWDER, LYOPHILIZED, FOR SOLUTION INTRAVENOUS at 08:48

## 2020-01-01 RX ADMIN — FUROSEMIDE 100 MG: 10 INJECTION, SOLUTION INTRAVENOUS at 16:59

## 2020-01-01 RX ADMIN — REGADENOSON 0.4 MG: 0.08 INJECTION, SOLUTION INTRAVENOUS at 10:02

## 2020-01-01 RX ADMIN — HUMAN ALBUMIN MICROSPHERES AND PERFLUTREN 6 ML: 10; .22 INJECTION, SOLUTION INTRAVENOUS at 12:00

## 2020-01-01 RX ADMIN — CEFEPIME HYDROCHLORIDE 2 G: 2 INJECTION, POWDER, FOR SOLUTION INTRAVENOUS at 05:54

## 2020-01-01 RX ADMIN — ALBUTEROL SULFATE 2 PUFF: 108 AEROSOL, METERED RESPIRATORY (INHALATION) at 13:30

## 2020-01-01 RX ADMIN — Medication 100 MCG: at 08:18

## 2020-01-01 RX ADMIN — METOPROLOL TARTRATE 50 MG: 50 TABLET, FILM COATED ORAL at 22:01

## 2020-01-01 RX ADMIN — IPRATROPIUM BROMIDE AND ALBUTEROL SULFATE 3 ML: .5; 3 SOLUTION RESPIRATORY (INHALATION) at 19:24

## 2020-01-01 RX ADMIN — ALLOPURINOL 300 MG: 300 TABLET ORAL at 07:45

## 2020-01-01 RX ADMIN — TRETINOIN 40 MG: 10 CAPSULE ORAL at 08:21

## 2020-01-01 RX ADMIN — METOPROLOL TARTRATE 50 MG: 50 TABLET, FILM COATED ORAL at 07:45

## 2020-01-01 RX ADMIN — Medication 0.25 MG: at 03:43

## 2020-01-01 RX ADMIN — IPRATROPIUM BROMIDE AND ALBUTEROL SULFATE 3 ML: .5; 3 SOLUTION RESPIRATORY (INHALATION) at 06:29

## 2020-01-01 RX ADMIN — FUROSEMIDE 40 MG: 10 INJECTION, SOLUTION INTRAVENOUS at 18:49

## 2020-01-01 RX ADMIN — CEFEPIME HYDROCHLORIDE 2 G: 2 INJECTION, POWDER, FOR SOLUTION INTRAVENOUS at 17:13

## 2020-01-01 RX ADMIN — METOPROLOL TARTRATE 50 MG: 50 TABLET, FILM COATED ORAL at 08:18

## 2020-01-01 RX ADMIN — IPRATROPIUM BROMIDE AND ALBUTEROL SULFATE 3 ML: .5; 3 SOLUTION RESPIRATORY (INHALATION) at 21:58

## 2020-01-01 ASSESSMENT — ACTIVITIES OF DAILY LIVING (ADL)
COGNITION: 0 - NO COGNITION ISSUES REPORTED
BATHING: 0-->INDEPENDENT
ADLS_ACUITY_SCORE: 15
ADLS_ACUITY_SCORE: 14
ADLS_ACUITY_SCORE: 16
ADLS_ACUITY_SCORE: 12
SWALLOWING: 0-->SWALLOWS FOODS/LIQUIDS WITHOUT DIFFICULTY
ADLS_ACUITY_SCORE: 17
ADLS_ACUITY_SCORE: 16
DRESS: 0-->INDEPENDENT
RETIRED_COMMUNICATION: 0-->UNDERSTANDS/COMMUNICATES WITHOUT DIFFICULTY
RETIRED_EATING: 0-->INDEPENDENT
ADLS_ACUITY_SCORE: 18
ADLS_ACUITY_SCORE: 18
ADLS_ACUITY_SCORE: 16
TOILETING: 0-->INDEPENDENT
ADLS_ACUITY_SCORE: 14
TRANSFERRING: 0-->INDEPENDENT
ADLS_ACUITY_SCORE: 16
FALL_HISTORY_WITHIN_LAST_SIX_MONTHS: NO
ADLS_ACUITY_SCORE: 14
ADLS_ACUITY_SCORE: 14
AMBULATION: 0-->INDEPENDENT
ADLS_ACUITY_SCORE: 18

## 2020-01-01 ASSESSMENT — ENCOUNTER SYMPTOMS
GASTROINTESTINAL NEGATIVE: 1
FEVER: 0
COUGH: 0
PALPITATIONS: 0
CHOKING: 0
WEAKNESS: 1
CHEST TIGHTNESS: 0
WHEEZING: 0
SHORTNESS OF BREATH: 1

## 2020-01-01 ASSESSMENT — MIFFLIN-ST. JEOR
SCORE: 1520.58
SCORE: 1547.25
SCORE: 1546.89
SCORE: 1561
SCORE: 1576.82

## 2020-02-19 NOTE — TELEPHONE ENCOUNTER
Discussed with Dr. Jang. Auto-capture is on, and we do get threshold results on remote checks. Pt can continue remote checks and does not need in-clinic check q 3 months. We should keep an eye on thresholds for now, and pt may need lead revision in the future.

## 2020-02-19 NOTE — TELEPHONE ENCOUNTER
"The gradual increase in V-threshold (without impedance changes) makes me wonder about progressive scar around the V lead tip causing \"exit block\".   We should watch closely.  Needs thresholds q 3 months.   Is autocapture \"on\"?    DI    "

## 2020-02-19 NOTE — TELEPHONE ENCOUNTER
Pt has dual chamber [employer] PPM, implanted in Nov 2018 for paroxysmal AVB and syncope. Pt was in the device clinic for annual threshold today. He A paces 28% and V paces 87%. His underlying rhythm today was SB 50's with CHB, he is dependent. Battery has 11.5 yrs estimated longevity.     Noted increasing V lead threshold measurements today. V lead is [employer] Ingevity 7741, amplitude is set at auto.     In-clinic check 1/2019, V threshold 0.7V @ 0.4ms, impedance 792 ohms  Remote 5/2019, V threshold 1.0V @ 0.4ms, impedance 839 ohms  Remote 8/2019, V threshold 1.1V @ 0.4ms, impedance 851 ohms  Remote 11/2019, V threshold 2.1V @ 0.4ms, impedance 658 ohms  In-clinic check today , V threshold 2.5V @ 0.4ms, impedance 637 ohms    Pt has OV with Dr. Krsue coming up in March. Dr. Jang did his PPM implant. Will send update to Dr. Jang.

## 2020-03-18 NOTE — TELEPHONE ENCOUNTER
Patient called to say that he saw Dr. Corrigan his pulmonologist yesterday. His dyspnea has been getting worse since he first saw her last October. She did a PFT and evidently was told that it he has minimal COPD. She told patient that his dyspnea could be cardiac related and that Dr. Kruse may want to order tests before he sees patient on 3/30 in [place]. I told patient that we will be reviewing with Dr. Kruse next week whether his visit should be a phone visit due to him being high risk for COVID-19. He is also scheduled next Monday for a carotid ultrasound in [facility].  I asked him what bring about his dyspnea. He tells me that he walks down 8 steps out the from door and 40' to his mailbox and back and he is struggling to get his breath.  I told patient we will reach out to him sometime next week about his appointments.

## 2020-03-30 NOTE — PROGRESS NOTES
"Angel Sanders is a 69 year old male who is being evaluated via a billable telephone visit.      The patient has been notified of following:     \"This telephone visit will be conducted via a call between you and your physician/provider. We have found that certain health care needs can be provided without the need for a physical exam.  This service lets us provide the care you need with a short phone conversation.  If a prescription is necessary we can send it directly to your pharmacy.  If lab work is needed we can place an order for that and you can then stop by our lab to have the test done at a later time.    If during the course of the call the physician/provider feels a telephone visit is not appropriate, you will not be charged for this service.\"         Angel Sanders complains of    Chief Complaint   Patient presents with     Coronary Artery Disease       I have reviewed and updated the patient's Past Medical History, Social History, Family History and Medication List.    ALLERGIES  Adhesive tape    /75  P 75  WT 195LB  HT 5 4    Татьяна Turner LPN    Assessment/Plan:      "

## 2020-03-30 NOTE — PROGRESS NOTES
"Angel Sanders is a 69 year old male who is being evaluated via a billable telephone visit.      The patient has been notified of following:     \"This telephone visit will be conducted via a call between you and your physician/provider. We have found that certain health care needs can be provided without the need for a physical exam.  This service lets us provide the care you need with a short phone conversation.  If a prescription is necessary we can send it directly to your pharmacy.  If lab work is needed we can place an order for that and you can then stop by our lab to have the test done at a later time.    If during the course of the call the physician/provider feels a telephone visit is not appropriate, you will not be charged for this service.\"     Physician has received verbal consent for a Telephone Visit from the patient? YES    Angel Sanders complains of        I have reviewed and updated the patient's Past Medical History, Social History, Family History and Medication List.    ALLERGIES    Additional provider notes:     Mr. Sanders is a very pleasant 69-year-old gentleman with history of CAD with history of inferior STEMI in 2008, status post 3 x 28 mm Cypher drug-eluting stent to the RCA.  He was noted to have 40%-50% narrowing at the distal RCA distal to the stent, 40% proximal LAD, 30% mid and 50% distal stenosis, circumflex had 10%-20% stenosis, OM1 had 25% stenosis.  Nuclear stress in 2012 showed inferior infarct and no ischemia.  Echocardiogram in 2014 showed normal LV function.  He also has history of left internal carotid artery stenting in 2009 and other comorbidities of hypertension, dyslipidemia and obesity.  Ultrasound carotid last year showed 50-69% right  internal carotid stenosis with patent left internal carotid artery stent.  In November 2018, patient had paroxysmal complete AV block leading to syncope, and he underwent a [employer] dual-chamber permanent pacemaker implantation by " JENY.  Echocardiogram at that time showed normal LV function.  On subsequent device check he was noted to have about 12-beat runs of nonsustained ventricular tachycardia, asymptomatic.  This prompted a Lexiscan stress test which was done almost a year ago and it showed a small fixed basal and mid inferior wall defect consistent with infarct, similar to previous stress test in 2012, no ischemia was noted in present study.    This is routine follow-up visit.  This has been converted into phone visit due to ongoing coronavirus pandemic.  Patient tells me healthwise he feels stable.  He does have longstanding dyspnea on exertion which he tells me is going on for at least a year.  He has been recently seen by pulmonology and has history of COPD and in fact PFT shows improvement in lung functions.  Patient tells me that he has noticed his dyspnea on exertion is better over the last week to 10 days.  He would usually get winded walking from his house to the mailbox and coming back which is overall close to 100 feet distance.  No orthopnea no chest discomfort or tightness.  He was supposed to get ultrasound of his carotid arteries for follow-up of the stent but this has been now been changed to next month.  Patient has also been recommended for sleep apnea evaluation by pulmonology.    Assessment/Plan:  A very pleasant 69-year gentleman with history of CAD, COPD, history of AV block with history of syncope status post pacemaker implantation with recent device check showing 28% atrial pacing and 87% ventricular pacing with a brief less than 10-second duration of atrial arrhythmia.  Patient also has history of obesity but his weights are stable at around 195 pounds at home weighing scale.  He has longstanding dyspnea exertion which fortunately is stable in fact better over last 7 to 10 days according to patient.  Recent PFT has shown improvement in lung function test.  At this time I am not sure about the etiology of dyspnea on  exertion this could very well be multifactorial from underlying obesity and physical deconditioning.  I do recommend evaluation for inducible ischemia I recommend doing an echocardiogram to make sure LV function is normal and do a nuclear stress test.  I am anticipating this test to be done in the next 1 to 2 months.  I also recommend patient to follow-up with us subsequent to those test in about 2 to 3 months.  My office is going to update him with the results of these test.    Phone call duration: 16 minutes    Dre Kruse MD

## 2020-04-23 NOTE — TELEPHONE ENCOUNTER
Patient notified of Carotid US less than 50% stenosis bilaterally.  He will call or have an Echo and lexiscan in a couple months prefers to have done in [place].  Will calll sooner if WALKER is worse.  Patient verbalized understanding and agreed to plan of care.

## 2020-04-23 NOTE — TELEPHONE ENCOUNTER
Carotid US results post OV 3- WALKER Lexiscan and Echo not scheduled postponed until June  IMPRESSION: Per sonographic criteria, there are less than 50% stenoses  in the internal carotid arteries bilaterally.     Degree of stenosis measured relative to the diameter of the normal  internal carotid artery per NASCET or NASCET type criteria     ARNEL CAZARES MD

## 2020-05-18 NOTE — PROGRESS NOTES
"Angel Sanders is a 70 year old male who is being evaluated via a billable telephone visit.      The patient has been notified of following:     \"This telephone visit will be conducted via a call between you and your physician/provider. We have found that certain health care needs can be provided without the need for a physical exam.  This service lets us provide the care you need with a short phone conversation.  If a prescription is necessary we can send it directly to your pharmacy.  If lab work is needed we can place an order for that and you can then stop by our lab to have the test done at a later time.    Telephone visits are billed at different rates depending on your insurance coverage. During this emergency period, for some insurers they may be billed the same as an in-person visit.  Please reach out to your insurance provider with any questions.    If during the course of the call the physician/provider feels a telephone visit is not appropriate, you will not be charged for this service.\"    Patient has given verbal consent for Telephone visit?  Yes    What phone number would you like to be contacted at? [phone]    How would you like to obtain your AVS? Mail a copy    Subjective     Angel Sanders is a 70 year old male who presents via phone visit today for the following health issues:    HPI  Needs to follow up for Lungs / referral for CT     Patient has h/o smoking over 30 years ppd, has quit 12 years ago, has had CT for lung cancer screen.   Has h/o chronic bronchitis. Follows with pulmonary. On inhaled bronchodilators.   Has mild chronic cough and SOB on exertion.  Has h/o HTN. on medical treatment. BP has been controlled. No side effects from medications. No CP, HA, dizziness. good compliance with medications and low salt diet.         Patient Active Problem List   Diagnosis     Coronary atherosclerosis     Cerebral infarction due to occlusion or stenosis of carotid artery     CARDIOVASCULAR " SCREENING; LDL GOAL LESS THAN 100     Heel pain     Hyperlipidemia LDL goal <100     Anemia     Benign essential hypertension     Obesity (BMI 35.0-39.9) with comorbidity (H)     Complete heart block (H)     Simple chronic bronchitis (H)     Past Surgical History:   Procedure Laterality Date     ARTHROTOMY SHOULDER, ROTATOR CUFF REPAIR, COMBINED  10/23/2013    Procedure: COMBINED ARTHROTOMY SHOULDER, ROTATOR CUFF REPAIR;  Left Shoulder Open Decompression, Distal Clavical Resection, Rotator Cuff Repair , Bicep Tenolysis, and Bicep Tenodesis   ;  Surgeon: Zhang Mattson MD;  Location: [facility] OR     C NONSPECIFIC PROCEDURE  2008    RCA stent; see [facility]     COLONOSCOPY N/A 5/15/2018    Procedure: COLONOSCOPY;  COLONOSCOPY ;  Surgeon: Cory Hernandez MD;  Location:  GI     PHACOEMULSIFICATION CLEAR CORNEA WITH STANDARD INTRAOCULAR LENS IMPLANT  2013    Procedure: PHACOEMULSIFICATION CLEAR CORNEA WITH STANDARD INTRAOCULAR LENS IMPLANT;  RIGHT PHACOEMULSIFICATION CLEAR CORNEA WITH STANDARD INTRAOCULAR LENS IMPLANT ;  Surgeon: Rikki Reddy MD;  Location: [facility]     PHACOEMULSIFICATION CLEAR CORNEA WITH STANDARD INTRAOCULAR LENS IMPLANT  12/3/2013    Procedure: PHACOEMULSIFICATION CLEAR CORNEA WITH STANDARD INTRAOCULAR LENS IMPLANT;  LEFT PHACOEMULSIFICATION CLEAR CORNEA WITH STANDARD INTRAOCULAR LENS IMPLANT;  Surgeon: Rikki Reddy MD;  Location: [facility]       Social History     Tobacco Use     Smoking status: Former Smoker     Packs/day: 1.00     Years: 40.00     Pack years: 40.00     Types: Cigarettes     Start date:      Last attempt to quit: 2008     Years since quittin.3     Smokeless tobacco: Never Used   Substance Use Topics     Alcohol use: No     Alcohol/week: 0.0 standard drinks     Comment: Sober for 25 years     Family History   Problem Relation Age of Onset     Breast Cancer Mother      Heart Disease Father 74         of heart failure     Colon Cancer No family hx of           Current Outpatient Medications   Medication Sig Dispense Refill     ASPIRIN 81 MG OR TABS 1 TABLET DAILY       atorvastatin (LIPITOR) 80 MG tablet Take 1 tablet (80 mg) by mouth daily 90 tablet 3     cyanocobalamin (VITAMIN B-12) 100 MCG tablet Take 100 mcg by mouth daily       Docusate Calcium (STOOL SOFTENER PO) Take 100 mg by mouth daily        Ferrous Sulfate (IRON SUPPLEMENT PO) Take 325 mg by mouth daily (with breakfast)        lisinopril (PRINIVIL/ZESTRIL) 5 MG tablet Take 1 tablet (5 mg) by mouth daily 90 tablet 3     metoprolol tartrate (LOPRESSOR) 50 MG tablet Take 1 tablet (50 mg) by mouth 2 times daily 180 tablet 3     multivitamin, therapeutic with minerals (MULTI-VITAMIN) TABS tablet Take 1 tablet by mouth daily       nitroGLYcerin (NITROSTAT) 0.4 MG sublingual tablet Place 1 tablet (0.4 mg) under the tongue every 5 minutes as needed May repeat X 2 and if chest pain persists, call 911 25 tablet 2     PROAIR RESPICLICK 108 (90 Base) MCG/ACT inhaler        TRELEGY ELLIPTA 100-62.5-25 MCG/INH oral inhaler          Reviewed and updated as needed this visit by Provider         Review of Systems   Constitutional, HEENT, cardiovascular, pulmonary, gi and gu systems are negative, except as otherwise noted.       Objective   Normal speech, able to talk in full sentences  Remainder of exam unable to be completed due to telephone visits    Diagnostic Test Results:  Labs reviewed in Epic        Assessment/Plan:  1. Personal history of tobacco use, presenting hazards to health    - CT Chest Lung Cancer Scrn Low Dose wo; Future    2. Simple chronic bronchitis (H)      3. Benign essential hypertension        Assess chest CT  Cont treatment       Phone call duration:  6 minutes    Tony Peter MD

## 2020-06-01 NOTE — PROGRESS NOTES
Echo ready for review. Ordered post Office visit done with Dr. Kruse on 3/30/20.    Will message Dr. Kruse to review and advise.  ------------------------------------------------------------------------------  Echo       Interpretation Summary     The left ventricle is normal in size. Proximal septal thickening is noted.  Left ventricular systolic function is mildly reduced. The visual ejection  fraction is estimated at 45-50%. LVEF 50% based on biplane 2D tracing. Left  ventricular diastolic function is indeterminate. Septal wall motion  abnormality may reflect pacemaker activation. There is no thrombus seen in the  left ventricle.  The right ventricle is normal size. There is a catheter/pacemaker lead seen in  the right ventricle. The right ventricular systolic function is normal.  Mild left atrial enlargement.  Mild mitral and tricuspid regurgitation.  Mild valvular aortic stenosis.  In direct comparison to the previous study dated 11/26/2018, the patient is  now status post pacemaker implantation and there has been a mild interval  deterioration in left ventricular systolic function.  _____________________________________________________________________________  __        Left Ventricle  The left ventricle is normal in size. Proximal septal thickening is noted.  Left ventricular systolic function is mildly reduced. The visual ejection  fraction is estimated at 45-50%. LVEF 50% based on biplane 2D tracing. Left  ventricular diastolic function is indeterminate. Septal wall motion  abnormality may reflect pacemaker activation. There is no thrombus seen in the  left ventricle.     Right Ventricle  The right ventricle is normal size. There is a catheter/pacemaker lead seen in  the right ventricle. The right ventricular systolic function is normal.     Atria  The left atrium is mildly dilated. Right atrial size is normal. There is no  color Doppler evidence of an atrial shunt.     Mitral Valve  There is mild (1+) mitral  regurgitation.        Tricuspid Valve  There is mild (1+) tricuspid regurgitation. The right ventricular systolic  pressure is approximated at 28.4 mmHg plus the right atrial pressure.     Aortic Valve  There is moderate trileaflet aortic sclerosis. No aortic regurgitation is  present. Mild valvular aortic stenosis. The peak AoV pressure gradient is 33.0  mmHg. The mean AoV pressure gradient is 18.4 mmHg. The calculated aortic valve  are is 1.4 cm^2.     Pulmonic Valve  There is trace pulmonic valvular regurgitation. There is no pulmonic valvular  stenosis.     Vessels  The aortic root is normal size. Normal size ascending aorta. The inferior vena  cava is normal.     Pericardium  There is no pericardial effusion.        Rhythm  The rhythm was paced.  _____________________________________________________________________________  __  MMode/2D Measurements & Calculations  IVSd: 1.2 cm     LVIDd: 5.5 cm  LVIDs: 4.5 cm  LVPWd: 0.80 cm  FS: 18.7 %  LV mass(C)d: 216.2 grams  LV mass(C)dI: 109.6 grams/m2  Ao root diam: 3.4 cm  LA dimension: 4.6 cm  asc Aorta Diam: 3.6 cm  LA/Ao: 1.4  LVOT diam: 2.1 cm  LVOT area: 3.4 cm2  LA Volume (BP): 75.3 ml  LA Volume Index (BP): 38.2 ml/m2  RWT: 0.29           Doppler Measurements & Calculations  MV E max ankit: 130.4 cm/sec  MV max P.2 mmHg  MV mean P.0 mmHg  MV V2 VTI: 36.9 cm  MVA(VTI): 2.5 cm2  MV P1/2t max ankit: 177.4 cm/sec  MV P1/2t: 70.6 msec  MVA(P1/2t): 3.1 cm2  MV dec slope: 735.7 cm/sec2  MV dec time: 0.14 sec  Ao V2 max: 288.4 cm/sec  Ao max P.0 mmHg  Ao V2 mean: 203.3 cm/sec  Ao mean P.4 mmHg  Ao V2 VTI: 68.7 cm  NYLA(I,D): 1.4 cm2  NYLA(V,D): 1.3 cm2  LV V1 max P.1 mmHg  LV V1 max: 112.5 cm/sec  LV V1 VTI: 26.9 cm  CO(LVOT): 6.3 l/min  CI(LVOT): 3.2 l/min/m2  SV(LVOT): 92.9 ml  SI(LVOT): 47.1 ml/m2  PA acc time: 0.10 sec  TR max ankit: 266.6 cm/sec  TR max P.4 mmHg  AV Ankit Ratio (DI): 0.39  NYLA Index (cm2/m2): 0.69  E/E' avg: 10.4  Lateral E/e':  8.1  [facility] E/e': 12.6              _____________________________________________________________________________  __        Report approved by: Ivis Sanchez 06/01/2020 10:13 AM

## 2020-06-01 NOTE — PROGRESS NOTES
Mildly decreased LVEF noted- this could be due to RV pacing (87% RV pacing).  I also recommended lexiscan stress test to evaluate for inducible ischemia.  I recommend patient to complete stress test and f/u following stress test as recommended when I saw him in 03/2020- f/u could be with me or an JANETTE. If no ischemia noted on stress test I recommend repeat limited echo in 4 months to re evaluate LVEF- if there is any further decline in LVEF I recommend review with EP for consideration of Bi V upgrade.    Thanks  Dre

## 2020-06-03 NOTE — TELEPHONE ENCOUNTER
Dr. Kruse,    LEODAN: Your patient had an episode of AFib on today's remote transmission. This appears to be a new finding. Episode lasted 12 minutes, ventricular rates controlled. Taking ASA only. Not on AC. Any changes?        [employer] Mobile Interaction Accolade (D) Remote PPM Device Check  AP: 29%  : 100%  Mode: DDD  Presenting Rhythm: AP/  Heart Rate: Adequate heart rates per histogram   Sensing: stable    Pacing Threshold: stable   Impedance: stable   Battery Status: 11.5 years remaining   Atrial Arrhythmia: 2 mode switch episodes comprising <1% of the time. EGMs show PAF, longest lasting 12 minutes, ventricular rates controlled. Not on AC. Will notify Dr. Kruse of findings.   Ventricular Arrhythmia: none     Care Plan: F/U PPM [place] q 3 months. LM with results. JESUS Marcus

## 2020-06-04 NOTE — TELEPHONE ENCOUNTER
Episodes of paroxysmal atrial fibrillation noted.  [id number] score is elevated atleast 3, possibly 5 (not sure if he had any h/o stroke/TIA- if so then score is 5 otherwise 3). Oral anticoagulation is reasonable for stroke prophylaxis in that setting.  One option is to have a virtual visit appointment with him (with me or an JANETTE) and discuss these findings and option and risks/benefits of oral anticoagulation. If he is agreeable can use a NOAC like apixaban 5 mg bid.    Thanks  Dre

## 2020-06-09 NOTE — PROGRESS NOTES
Lexiscan ready for review. Noted as ABNORMAL.    Will message Dr. Kruse to review and advise.   Return OV scheduled for f/u with Ilya Onofre, JANETTE on 6/17/20.    Lexiscan  Done 6/9/20          The nuclear stress test is abnormal.     There is a small area of nontransmural infarction in the distal apical and anteroseptal segment(s) of the left ventricle.     There is nontransmural infarction in the mid to basal inferior and inferoseptal segment(s) of the left ventricle.     Left ventricular function is mildly reduced.     Mild left ventricular enlargement is noted.     A prior study was conducted on 3/19/2019.  This study has changes noted when compared with the prior study.

## 2020-06-09 NOTE — TELEPHONE ENCOUNTER
Episodes of paroxysmal atrial fibrillation noted.  [id number] score is elevated atleast 3, possibly 5 (not sure if he had any h/o stroke/TIA- if so then score is 5 otherwise 3). Oral anticoagulation is reasonable for stroke prophylaxis in that setting.  One option is to have a virtual visit appointment with him (with me or an JANETTE) and discuss these findings and option and risks/benefits of oral anticoagulation. If he is agreeable can use a NOAC like apixaban 5 mg bid.     Thanks  Dre    Patient notified of device ck findings and recommendations.  Patient having Lexiscan today will make a Virtual visit with Dr. Kruse or JANETTE to discuss options of an anticoagulation.

## 2020-06-10 NOTE — PROGRESS NOTES
Infarcts noted, small apical and anteroseptal infarct appears new, 2019 study showed inferior infarct as well.. No ischemia reported in present study.   Recommend continuing medical therapy for CAD for now.Keep clinic follow up as planned.  if symptoms stable continue medical therapy for CAD, if symptoms worsening can consider coronary angiogram. Also as noted in my note 06/01/2020, recommend repeat echo in 4 months.    Thanks  Dre

## 2020-06-17 NOTE — PATIENT INSTRUCTIONS
Thanks for your video visit with the [facility] today.    Today's plan:   1. Start taking apixaban (Eliquis) 5 mg by mouth twice daily to help prevent stroke with A.Fib.  2. Continue the rest of your current medications.  3. Follow up with Dr. Kruse in about 4 months with a repeat echocardiogram beforehand.    If you have questions or concerns, please call my nurse at [phone].    Scheduling phone number: [phone]    Reminder: Please bring in all current medications, any over the counter supplements, and any vitamin bottles to your next appointment.    It was a pleasure speaking with you today!     Ilya Onofre, Nurse Practitioner  06/17/2020  _____________________________________________________    Patient Education     Understanding Atrial Fibrillation    An arrhythmia is any problem with the speed or pattern of the heartbeat. Atrial fibrillation (AFib) is the most common type of arrhythmia. It causes fast, chaotic electrical signals in the atria. This makes it hard for the heart to work as it should. It also affects how much blood your heart can pump out to the body.  AFib may occur once in a while and go away on its own. Or it may continue for longer periods and need treatment.  AFib can lead to serious problems, such as stroke. Your healthcare provider will need to monitor and manage it.  What happens during atrial fibrillation?   The heart has an electrical system that sends signals to control the heartbeat. As signals move through the heart, they tell the heart s upper chambers (atria) and lower chambers (ventricles) when to squeeze (contract) and relax. This lets blood move through the heart and out to the body and lungs.  With AFib, the atria receive abnormal signals. This causes them to contract in a fast and irregular way, and out of sync with the ventricles. When this happens, the atria also have a harder time moving blood into the ventricles. Blood may then pool in the  atria. This increases the risk for blood clots and stroke. The ventricles also may contract too quickly and irregularly. As a result, they may not pump blood to the body and lungs as well as they should. This can weaken the heart muscle over time and cause heart failure.  What causes atrial fibrillation?  AFib is more common in older adults. It has many possible causes:    Coronary artery disease    Heart valve disease    Heart attack    Heart surgery    High blood pressure    Thyroid disease    Diabetes    Lung disease    Sleep apnea    Heavy alcohol use  In some cases of AFib, doctors don't know the cause.  What are the symptoms of atrial fibrillation?  AFib may not cause symptoms. If symptoms do occur, they may include:    A fast, pounding, irregular heartbeat    Shortness of breath    Tiredness    Dizziness or fainting    Chest pain  How is atrial fibrillation treated?  Treatments for AFib can include any of the options below.    Medicines. You may be prescribed:  ? Heart rate medicines to help slow down the heartbeat  ? Heart rhythm medicines to help the heart beat more regularly  ? Blood thinners or anti-clotting medicines to help reduce the risk for blood clots and stroke.    Left atrial appendage closure. Your healthcare provider may advise this device to prevent stroke. You may need if you are at high risk for stroke but have problems taking blood-thinner (anticoagulant) medicines. The device is placed in the part of the heart where most clots form. This area is called the left atrial appendage (THALIA). It's a pouch-like structure in the muscle wall of the left atrium. The device closes off the THALIA to prevent clots moving from the heart to the brain and causing a stroke.    Electrical cardioversion. Your healthcare provider uses special pads or paddles to send one or more brief electrical shocks to the heart. This can help reset the heartbeat to normal.    Ablation. Long, thin tubes (catheters) are threaded  through a blood vessel to the heart. There, the catheters send out hot or cold energy to the areas causing the abnormal signals. This energy destroys the problem tissue or cells. This improves the chances that your heart will stay in normal rhythm without using medicines. If your heart rate and rhythm can t be controlled, you may need ablation and a pacemaker. These will help control the heart rate and regularity of the heartbeat.    Surgery. During surgery, your healthcare provider may use different methods to create scar tissue in the areas of the heart causing the abnormal signals. The scar tissue disrupts the abnormal signals and may stop AFib from occurring.    Hybrid surgical-catheter ablation for AFib. This treatment is used for people with AFib that continues or is hard to treat.. It combines surgery with a catheter ablation. During the surgery, the surgeon makes small cuts (incisions) between the ribs in the chest or in the abdomen near the sternum. The surgeon puts a scope through the incisions to get to the backside of the heart. The catheter portion of the procedure is done by putting a catheter into a vein in the groin. The catheter is guided to the inside of the heart. Using the catheter, radiofrequency ablation is done to destroy the tissue inside the heart that is causing the AFib. Using both of these approaches may work better to block the abnormal electrical signals and be a more permanent treatment for persistent AFib.  What are possible complications of atrial fibrillation?  Complications can include:    Blood clots    Stroke    Heart failure. This problem occurs when the heart muscle weakens so much that it can no longer pump blood well.  When should I call my healthcare provider?  Call your healthcare provider right away if you have any of these:    Symptoms that don t get better with treatment, or get worse    New symptoms  Date Last Reviewed: 5/1/2016 2000-2019 [employer]  [address] [place] [address]. All rights reserved. This information is not intended as a substitute for professional medical care. Always follow your healthcare professional's instructions.

## 2020-06-17 NOTE — PROGRESS NOTES
"  Cardiology Video Visit Progress Note    Service Date: June 17, 2020    PRIMARY CARDIOLOGIST: Dr. Dre Kruse      REASON FOR VISIT: Follow-up of stress test results    Mr. Angel Sanders is a 70 year old male who is being evaluated via a billable video visit to minimize risk of exposure with the current COVID-19 pandemic in accordance with current CDC guidelines.     The patient has been notified of following:     \"This video visit will be conducted via a call between you and your physician/provider. We have found that certain health care needs can be provided without the need for an in-person physical exam.  This service lets us provide the care you need with a video conversation.  If a prescription is necessary we can send it directly to your pharmacy.  If lab work is needed we can place an order for that and you can then stop by our lab to have the test done at a later time.    Video visits are billed at different rates depending on your insurance coverage.  Please reach out to your insurance provider with any questions.    If during the course of the call the physician/provider feels a video visit is not appropriate, you will not be charged for this service.\"    Patient has given verbal consent for Video visit? Yes    How would you like to obtain your AVS? Mail a copy    Patient would like the video invitation sent by: Text to cell phone:  [phone]        I had the pleasure of speaking with Angel Sanders via video visit today for follow up of his recent test results. He is a very pleasant 70 year old male with a past medical history of hypertension, dyslipidemia, obesity, CAD, COPD, history of AV block with syncope status post pacemaker implantation. He presented with an inferior STEMI in 2008 and underwent successful PCI with a drug-eluting stent placed to the RCA. He was noted to have 40-50% narrowing at the distal RCA distal to the stent, 40% proximal LAD, 30% mid and 50% distal stenosis, circumflex " had 10%-20% stenosis, and OM1 had 25% stenosis at that time.      He also has history of left internal carotid artery stenting in 2009. Carotid ultrasound last year showed 50-69% right  internal carotid stenosis with patent left internal carotid artery stent. In November 2018, patient had paroxysmal complete AV block leading to syncope, and he underwent a [employer] dual-chamber permanent pacemaker implantation by electrophysiology. Echocardiogram at that time showed normal LV function.      He was recently evaluated by Dr. Kruse for a phone visit for routine follow-up.  At that time, Mr. Sanders noted a longstanding history of dyspnea on exertion for at least about the past year. He was recently seen by pulmonology due to a history of COPD and in fact PFT shows improvement in lung functions. An echocardiogram was recommended. This showed mildly decreased LVEF of 45-50% suspected possibly due to RV pacing (87% RV pacing). A Lexiscan nuclear stress test was subsequently recommended. This was completed on 6/9/2020 showing a small area of nontransmural infarction in the distal, apical, and anteroseptal segments of the left ventricle. As well as nontransmural infarction in the mid to basal inferior and inferoseptal segments of the left ventricle. The small apical and anteroseptal infarct appears new, the prior study in 2019 showed inferior infarct as well. No ischemia was reported.     It was also noted on the patient's most recent device check that he was having periods of paroxysmal atrial fibrillation, which is a new diagnosis for him. This was reviewed with Dr. Kruse who recommended discussing the indication for anticoagulation given the patient's [id number] Score of 3 (age, HTN, prior MI).     Today, he tells me that he has been feeling since his visit with Dr. Kruse. He completed a sleep study and was diagnosed with obstructive sleep apnea . He was reportedly started on a CPAP machine which he has been using  faithfully. He feels that his breathing has improved somewhat following this and that he has a bit more energy now. He still does get some mild dyspnea on exertion at times when he is more active, but again feels this may be improved slightly of late.  He denies symptoms of chest pain, palpitations, dizziness, presyncope, syncope, orthopnea, PND, or lower extremity edema.  We reviewed his stress test, echocardiogram, and recent device check findings.    The indication for anticoagulation for stroke prophylaxis with newly found paroxysmal atrial fibrillation was discussed with the patient and his wife in detail. The pros and cons of using warfarin versus newer anticoagulants was discussed including the need for INR monitoring with warfarin, dietary restrictions with warfarin and low cost of warfarin versus high co-pay for newer anticoagulants. The risk of bleeding including major bleeding and intracranial bleeding was discussed with all these agents and the data of efficacy and safety of these agents versus warfarin was communicated. After detailed discussion, patient was agreeable to starting on apixaban 5 mg twice daily.    ASSESSMENT AND PLAN:  1.  Coronary artery disease  - History of STEMI on 2008 status post PCI with a single JUANCARLOS to the RCA.  Moderate residual disease elsewhere.  - Seems to be stable without any obvious anginal symptoms.    - Lexiscan nuclear stress test earlier this month appears stable with no ischemia noted.  - Continue with current medical management with aspirin 81 mg once daily, high intensity statin, and beta-blocker.    2.  Hypertension  - Adequately controlled on lisinopril 5 mg daily and metoprolol tartrate 50 mg twice daily    3.  Hyperlipidemia  - Treated on atorvastatin 80 mg once daily.    4. Paroxysmal atrial fibrillation   - Patient is asymptomatic with this. On metoprolol tartrate 50 mg twice daily for rate control.  - [id number] Score of 3 with plan to start apixaban 5 mg  twice daily as noted above.    5. High degree AV block with syncopal episode, status post permanent pacemaker implant    6. Mild left ventricular systolic dysfunction  - LVEF of 45 to 50% suspected possibly due to RV pacing (87% RV pacing).  - His weight is stable and actually trending downward and he is free of symptoms to suggest acute CHF.  - Plan for a repeat echocardiogram in about 4 months for reassessment. If any further decline in LVEF is noted at that time, will plan for an electrophysiology consultation to discuss consideration for a BiV upgrade.    Video-Visit Details  Type of service: Video Visit    Video Start Time: 7:47 AM  Video End Time (time video stopped): 8:08 AM    Originating Location (pt. Location): Home  Distant Location (provider location): Home    Mode of Communication: Video Conference via [employer]    Thank you for the opportunity to participate in this pleasant patient's care. He will see Dr. Kruse in follow up in 4 months with a repeat echocardiogram beforehand. I encouraged him to call with any concerns in the meantime and we would be happy to arrange sooner follow up if needed.    KARLEY Gamino, CNP  Text Page  (8am - 5pm, M-F)    Orders this Visit:  Orders Placed This Encounter   Procedures     Follow-Up with Cardiologist     Echocardiogram Complete     Orders Placed This Encounter   Medications     apixaban ANTICOAGULANT (ELIQUIS) 5 MG tablet     Sig: Take 1 tablet (5 mg) by mouth 2 times daily     Dispense:  180 tablet     Refill:  3     There are no discontinued medications.  Encounter Diagnoses   Name Primary?     Cardiac pacemaker in situ Yes     WALKER (dyspnea on exertion)      High cholesterol      Paroxysmal atrial fibrillation (H)      Mild left ventricular systolic dysfunction      Coronary artery disease involving native coronary artery of native heart without angina pectoris      CURRENT MEDICATIONS:  Current Outpatient Medications   Medication Sig Dispense Refill      apixaban ANTICOAGULANT (ELIQUIS) 5 MG tablet Take 1 tablet (5 mg) by mouth 2 times daily 180 tablet 3     ASPIRIN 81 MG OR TABS 1 TABLET DAILY       atorvastatin (LIPITOR) 80 MG tablet Take 1 tablet (80 mg) by mouth daily 90 tablet 3     cyanocobalamin (VITAMIN B-12) 100 MCG tablet Take 100 mcg by mouth daily       Docusate Calcium (STOOL SOFTENER PO) Take 100 mg by mouth daily        Ferrous Sulfate (IRON SUPPLEMENT PO) Take 325 mg by mouth daily (with breakfast)        lisinopril (PRINIVIL/ZESTRIL) 5 MG tablet Take 1 tablet (5 mg) by mouth daily 90 tablet 3     metoprolol tartrate (LOPRESSOR) 50 MG tablet Take 1 tablet (50 mg) by mouth 2 times daily 180 tablet 3     multivitamin, therapeutic with minerals (MULTI-VITAMIN) TABS tablet Take 1 tablet by mouth daily       nitroGLYcerin (NITROSTAT) 0.4 MG sublingual tablet Place 1 tablet (0.4 mg) under the tongue every 5 minutes as needed May repeat X 2 and if chest pain persists, call 911 25 tablet 2     PROAIR RESPICLICK 108 (90 Base) MCG/ACT inhaler 2 puffs every 4 hours as needed        TRELEGY ELLIPTA 100-62.5-25 MCG/INH oral inhaler 1 puff daily        ALLERGIES  Allergies   Allergen Reactions     Adhesive Tape Rash     PAST MEDICAL, SURGICAL, FAMILY HISTORY:  History was reviewed and updated as needed, see medical record.    SOCIAL HISTORY:  Social History     Socioeconomic History     Marital status:      Spouse name: Not on file     Number of children: Not on file     Years of education: Not on file     Highest education level: Not on file   Occupational History     Not on file   Social Needs     Financial resource strain: Not on file     Food insecurity     Worry: Not on file     Inability: Not on file     Transportation needs     Medical: Not on file     Non-medical: Not on file   Tobacco Use     Smoking status: Former Smoker     Packs/day: 1.00     Years: 40.00     Pack years: 40.00     Types: Cigarettes     Start date: 1968     Last attempt to quit:  2008     Years since quittin.4     Smokeless tobacco: Never Used   Substance and Sexual Activity     Alcohol use: No     Alcohol/week: 0.0 standard drinks     Comment: Sober for 25 years     Drug use: No     Sexual activity: Yes     Partners: Female   Lifestyle     Physical activity     Days per week: Not on file     Minutes per session: Not on file     Stress: Not on file   Relationships     Social connections     Talks on phone: Not on file     Gets together: Not on file     Attends Worship service: Not on file     Active member of club or organization: Not on file     Attends meetings of clubs or organizations: Not on file     Relationship status: Not on file     Intimate partner violence     Fear of current or ex partner: Not on file     Emotionally abused: Not on file     Physically abused: Not on file     Forced sexual activity: Not on file   Other Topics Concern     Parent/sibling w/ CABG, MI or angioplasty before 65F 55M? Not Asked      Service Not Asked     Blood Transfusions Not Asked     Caffeine Concern No     Occupational Exposure No     Hobby Hazards No     Sleep Concern No     Stress Concern No     Weight Concern Yes     Comment: overweight     Special Diet Yes     Comment: low salt and low cholesterol     Back Care No     Exercise Yes     Comment: 3-4 x week walking     Bike Helmet Not Asked     Seat Belt Yes     Self-Exams Not Asked   Social History Narrative     Not on file     Review of Systems:  Skin: NEGATIVE  Eyes:Ears/Nose/Throat: NEGATIVE  Respiratory: SOB due COPD, sleep apnea on c-pap machine, new  Cardiovascular:Remote device check 6/3 showed a-fib, patient unaware  Gastrointestinal: NEGATIVE  Genitourinary:negative   Musculoskeletal: NEGATIVE  Neurologic: NEGATIVE  Psychiatric: NEGATIVE  Hematologic/Lymphatic/Immunologic: adhesive allergy  Endocrine:  NEGATIVE    PHYSICAL EXAM:  Patient Reported Vitals:  B/P 147/81, P 61 bpm, and weight 195 lb.    BP (!) 147/81   Pulse  61   Wt 88.5 kg (195 lb)   BMI 33.47 kg/m     Wt Readings from Last 4 Encounters:   06/17/20 88.5 kg (195 lb)   06/09/20 91.1 kg (200 lb 12.8 oz)   10/07/19 92.5 kg (204 lb)   05/06/19 90.7 kg (200 lb)     General Appearance:  No distress, normal body habitus, upright.  ENT/Mouth:  Membranes moist, no nasal discharge or bleeding gums. Normal head shape, no evidence of injury or laceration.  Eyes:  No scleral icterus, normal conjunctivae.  Neck:  No evidence of thyromegaly.  Chest/Lungs:  No audible wheezing equal chest wall expansion. Non labored breathing. No cough.  Cardiovascular:  No evidence of elevated jugular venous pressure.  Abdomen:  No evidence of abdominal distention. No observed jaundice.  Extremities:  No cyanosis or clubbing noted.  Skin:  No xanthelasma, normal skin color. No evidence of facial lacerations.  Neurologic:  Normal arm motion bilateral, no tremors. No evidence of focal defect.  Psychiatric:  Alert and oriented x3, calm.    Recent Lab Results:  LIPID RESULTS:  Lab Results   Component Value Date    CHOL 110 10/07/2019    HDL 37 (L) 10/07/2019    LDL 59 10/07/2019    TRIG 69 10/07/2019    CHOLHDLRATIO 2.6 09/23/2015     LIVER ENZYME RESULTS:  Lab Results   Component Value Date    AST 29 10/07/2019    ALT 29 10/07/2019     CBC RESULTS:  Lab Results   Component Value Date    WBC 7.8 10/07/2019    RBC 4.61 10/07/2019    HGB 13.0 (L) 10/07/2019    HCT 40.3 10/07/2019    MCV 87 10/07/2019    MCH 28.2 10/07/2019    MCHC 32.3 10/07/2019    RDW 14.9 10/07/2019     10/07/2019     BMP RESULTS:  Lab Results   Component Value Date     10/07/2019    POTASSIUM 4.9 10/07/2019    CHLORIDE 102 10/07/2019    CO2 24 10/07/2019    ANIONGAP 9 10/07/2019    GLC 96 10/07/2019    BUN 21 10/07/2019    CR 1.20 10/07/2019    GFRESTIMATED 61 10/07/2019    GFRESTBLACK 70 10/07/2019    MARIA ALEJANDRA 9.4 10/07/2019      A1C RESULTS:  Lab Results   Component Value Date    A1C 5.5 11/26/2018     INR RESULTS:  Lab  Results   Component Value Date    INR 0.98 11/26/2018    INR 0.90 09/02/2009     CC  Dre Kruse MD  [id number]  [place] [id number]    This note was completed in part using Dragon voice recognition software. Although reviewed after completion, some word and grammatical errors may occur.

## 2020-06-17 NOTE — LETTER
"6/17/2020    Tony Peter MD  [address] [facility] [address]    RE: Angel Sanders       Dear Colleague,    I had the pleasure of seeing Angel Sanders in the [facility].      Cardiology Video Visit Progress Note    Service Date: June 17, 2020    PRIMARY CARDIOLOGIST: Dr. Dre Kruse      REASON FOR VISIT: Follow-up of stress test results    Mr. Angel Sanders is a 70 year old male who is being evaluated via a billable video visit to minimize risk of exposure with the current COVID-19 pandemic in accordance with current CDC guidelines.     The patient has been notified of following:     \"This video visit will be conducted via a call between you and your physician/provider. We have found that certain health care needs can be provided without the need for an in-person physical exam.  This service lets us provide the care you need with a video conversation.  If a prescription is necessary we can send it directly to your pharmacy.  If lab work is needed we can place an order for that and you can then stop by our lab to have the test done at a later time.    Video visits are billed at different rates depending on your insurance coverage.  Please reach out to your insurance provider with any questions.    If during the course of the call the physician/provider feels a video visit is not appropriate, you will not be charged for this service.\"    Patient has given verbal consent for Video visit? Yes    How would you like to obtain your AVS? Mail a copy    Patient would like the video invitation sent by: Text to cell phone:  [phone]        I had the pleasure of speaking with Angel Sanders via video visit today for follow up of his recent test results. He is a very pleasant 70 year old male with a past medical history of hypertension, dyslipidemia, obesity, CAD, COPD, history of AV block with syncope status post pacemaker implantation. He presented with an inferior STEMI " in 2008 and underwent successful PCI with a drug-eluting stent placed to the RCA. He was noted to have 40-50% narrowing at the distal RCA distal to the stent, 40% proximal LAD, 30% mid and 50% distal stenosis, circumflex had 10%-20% stenosis, and OM1 had 25% stenosis at that time.      He also has history of left internal carotid artery stenting in 2009. Carotid ultrasound last year showed 50-69% right  internal carotid stenosis with patent left internal carotid artery stent. In November 2018, patient had paroxysmal complete AV block leading to syncope, and he underwent a [employer] dual-chamber permanent pacemaker implantation by electrophysiology. Echocardiogram at that time showed normal LV function.      He was recently evaluated by Dr. Kruse for a phone visit for routine follow-up.  At that time, Mr. Sanders noted a longstanding history of dyspnea on exertion for at least about the past year. He was recently seen by pulmonology due to a history of COPD and in fact PFT shows improvement in lung functions. An echocardiogram was recommended. This showed mildly decreased LVEF of 45-50% suspected possibly due to RV pacing (87% RV pacing). A Lexiscan nuclear stress test was subsequently recommended. This was completed on 6/9/2020 showing a small area of nontransmural infarction in the distal, apical, and anteroseptal segments of the left ventricle. As well as nontransmural infarction in the mid to basal inferior and inferoseptal segments of the left ventricle. The small apical and anteroseptal infarct appears new, the prior study in 2019 showed inferior infarct as well. No ischemia was reported.     It was also noted on the patient's most recent device check that he was having periods of paroxysmal atrial fibrillation, which is a new diagnosis for him. This was reviewed with Dr. Kruse who recommended discussing the indication for anticoagulation given the patient's [id number] Score of 3 (age, HTN, prior MI).      Today, he tells me that he has been feeling since his visit with Dr. Kruse. He completed a sleep study and was diagnosed with obstructive sleep apnea . He was reportedly started on a CPAP machine which he has been using faithfully. He feels that his breathing has improved somewhat following this and that he has a bit more energy now. He still does get some mild dyspnea on exertion at times when he is more active, but again feels this may be improved slightly of late.  He denies symptoms of chest pain, palpitations, dizziness, presyncope, syncope, orthopnea, PND, or lower extremity edema.  We reviewed his stress test, echocardiogram, and recent device check findings.    The indication for anticoagulation for stroke prophylaxis with newly found paroxysmal atrial fibrillation was discussed with the patient and his wife in detail. The pros and cons of using warfarin versus newer anticoagulants was discussed including the need for INR monitoring with warfarin, dietary restrictions with warfarin and low cost of warfarin versus high co-pay for newer anticoagulants. The risk of bleeding including major bleeding and intracranial bleeding was discussed with all these agents and the data of efficacy and safety of these agents versus warfarin was communicated. After detailed discussion, patient was agreeable to starting on apixaban 5 mg twice daily.    ASSESSMENT AND PLAN:  1.  Coronary artery disease  - History of STEMI on 2008 status post PCI with a single JUANCARLOS to the RCA.  Moderate residual disease elsewhere.  - Seems to be stable without any obvious anginal symptoms.    - Lexiscan nuclear stress test earlier this month appears stable with no ischemia noted.  - Continue with current medical management with aspirin 81 mg once daily, high intensity statin, and beta-blocker.    2.  Hypertension  - Adequately controlled on lisinopril 5 mg daily and metoprolol tartrate 50 mg twice daily    3.  Hyperlipidemia  - Treated on  atorvastatin 80 mg once daily.    4. Paroxysmal atrial fibrillation   - Patient is asymptomatic with this. On metoprolol tartrate 50 mg twice daily for rate control.  - [id number] Score of 3 with plan to start apixaban 5 mg twice daily as noted above.    5. High degree AV block with syncopal episode, status post permanent pacemaker implant    6. Mild left ventricular systolic dysfunction  - LVEF of 45 to 50% suspected possibly due to RV pacing (87% RV pacing).  - His weight is stable and actually trending downward and he is free of symptoms to suggest acute CHF.  - Plan for a repeat echocardiogram in about 4 months for reassessment. If any further decline in LVEF is noted at that time, will plan for an electrophysiology consultation to discuss consideration for a BiV upgrade.    Video-Visit Details  Type of service: Video Visit    Video Start Time: 7:47 AM  Video End Time (time video stopped): 8:08 AM    Originating Location (pt. Location): Home  Distant Location (provider location): Home    Mode of Communication: Video Conference via Snipdimity    Thank you for the opportunity to participate in this pleasant patient's care. He will see Dr. Kruse in follow up in 4 months with a repeat echocardiogram beforehand. I encouraged him to call with any concerns in the meantime and we would be happy to arrange sooner follow up if needed.    KARLEY Gamino, CNP  Text Page  (8am - 5pm, M-F)    Orders this Visit:  Orders Placed This Encounter   Procedures     Follow-Up with Cardiologist     Echocardiogram Complete     Orders Placed This Encounter   Medications     apixaban ANTICOAGULANT (ELIQUIS) 5 MG tablet     Sig: Take 1 tablet (5 mg) by mouth 2 times daily     Dispense:  180 tablet     Refill:  3     There are no discontinued medications.  Encounter Diagnoses   Name Primary?     Cardiac pacemaker in situ Yes     WALKER (dyspnea on exertion)      High cholesterol      Paroxysmal atrial fibrillation (H)      Mild left  ventricular systolic dysfunction      Coronary artery disease involving native coronary artery of native heart without angina pectoris      CURRENT MEDICATIONS:  Current Outpatient Medications   Medication Sig Dispense Refill     apixaban ANTICOAGULANT (ELIQUIS) 5 MG tablet Take 1 tablet (5 mg) by mouth 2 times daily 180 tablet 3     ASPIRIN 81 MG OR TABS 1 TABLET DAILY       atorvastatin (LIPITOR) 80 MG tablet Take 1 tablet (80 mg) by mouth daily 90 tablet 3     cyanocobalamin (VITAMIN B-12) 100 MCG tablet Take 100 mcg by mouth daily       Docusate Calcium (STOOL SOFTENER PO) Take 100 mg by mouth daily        Ferrous Sulfate (IRON SUPPLEMENT PO) Take 325 mg by mouth daily (with breakfast)        lisinopril (PRINIVIL/ZESTRIL) 5 MG tablet Take 1 tablet (5 mg) by mouth daily 90 tablet 3     metoprolol tartrate (LOPRESSOR) 50 MG tablet Take 1 tablet (50 mg) by mouth 2 times daily 180 tablet 3     multivitamin, therapeutic with minerals (MULTI-VITAMIN) TABS tablet Take 1 tablet by mouth daily       nitroGLYcerin (NITROSTAT) 0.4 MG sublingual tablet Place 1 tablet (0.4 mg) under the tongue every 5 minutes as needed May repeat X 2 and if chest pain persists, call 911 25 tablet 2     PROAIR RESPICLICK 108 (90 Base) MCG/ACT inhaler 2 puffs every 4 hours as needed        TRELEGY ELLIPTA 100-62.5-25 MCG/INH oral inhaler 1 puff daily        ALLERGIES  Allergies   Allergen Reactions     Adhesive Tape Rash     PAST MEDICAL, SURGICAL, FAMILY HISTORY:  History was reviewed and updated as needed, see medical record.    SOCIAL HISTORY:  Social History     Socioeconomic History     Marital status:      Spouse name: Not on file     Number of children: Not on file     Years of education: Not on file     Highest education level: Not on file   Occupational History     Not on file   Social Needs     Financial resource strain: Not on file     Food insecurity     Worry: Not on file     Inability: Not on file     Transportation needs      Medical: Not on file     Non-medical: Not on file   Tobacco Use     Smoking status: Former Smoker     Packs/day: 1.00     Years: 40.00     Pack years: 40.00     Types: Cigarettes     Start date:      Last attempt to quit: 2008     Years since quittin.4     Smokeless tobacco: Never Used   Substance and Sexual Activity     Alcohol use: No     Alcohol/week: 0.0 standard drinks     Comment: Sober for 25 years     Drug use: No     Sexual activity: Yes     Partners: Female   Lifestyle     Physical activity     Days per week: Not on file     Minutes per session: Not on file     Stress: Not on file   Relationships     Social connections     Talks on phone: Not on file     Gets together: Not on file     Attends Protestant service: Not on file     Active member of club or organization: Not on file     Attends meetings of clubs or organizations: Not on file     Relationship status: Not on file     Intimate partner violence     Fear of current or ex partner: Not on file     Emotionally abused: Not on file     Physically abused: Not on file     Forced sexual activity: Not on file   Other Topics Concern     Parent/sibling w/ CABG, MI or angioplasty before 65F 55M? Not Asked      Service Not Asked     Blood Transfusions Not Asked     Caffeine Concern No     Occupational Exposure No     Hobby Hazards No     Sleep Concern No     Stress Concern No     Weight Concern Yes     Comment: overweight     Special Diet Yes     Comment: low salt and low cholesterol     Back Care No     Exercise Yes     Comment: 3-4 x week walking     Bike Helmet Not Asked     Seat Belt Yes     Self-Exams Not Asked   Social History Narrative     Not on file     Review of Systems:  Skin: NEGATIVE  Eyes:Ears/Nose/Throat: NEGATIVE  Respiratory: SOB due COPD, sleep apnea on c-pap machine, new  Cardiovascular:Remote device check 6/3 showed a-fib, patient unaware  Gastrointestinal: NEGATIVE  Genitourinary:negative   Musculoskeletal:  NEGATIVE  Neurologic: NEGATIVE  Psychiatric: NEGATIVE  Hematologic/Lymphatic/Immunologic: adhesive allergy  Endocrine:  NEGATIVE    PHYSICAL EXAM:  Patient Reported Vitals:  B/P 147/81, P 61 bpm, and weight 195 lb.    BP (!) 147/81   Pulse 61   Wt 88.5 kg (195 lb)   BMI 33.47 kg/m     Wt Readings from Last 4 Encounters:   06/17/20 88.5 kg (195 lb)   06/09/20 91.1 kg (200 lb 12.8 oz)   10/07/19 92.5 kg (204 lb)   05/06/19 90.7 kg (200 lb)     General Appearance:  No distress, normal body habitus, upright.  ENT/Mouth:  Membranes moist, no nasal discharge or bleeding gums. Normal head shape, no evidence of injury or laceration.  Eyes:  No scleral icterus, normal conjunctivae.  Neck:  No evidence of thyromegaly.  Chest/Lungs:  No audible wheezing equal chest wall expansion. Non labored breathing. No cough.  Cardiovascular:  No evidence of elevated jugular venous pressure.  Abdomen:  No evidence of abdominal distention. No observed jaundice.  Extremities:  No cyanosis or clubbing noted.  Skin:  No xanthelasma, normal skin color. No evidence of facial lacerations.  Neurologic:  Normal arm motion bilateral, no tremors. No evidence of focal defect.  Psychiatric:  Alert and oriented x3, calm.    Recent Lab Results:  LIPID RESULTS:  Lab Results   Component Value Date    CHOL 110 10/07/2019    HDL 37 (L) 10/07/2019    LDL 59 10/07/2019    TRIG 69 10/07/2019    CHOLHDLRATIO 2.6 09/23/2015     LIVER ENZYME RESULTS:  Lab Results   Component Value Date    AST 29 10/07/2019    ALT 29 10/07/2019     CBC RESULTS:  Lab Results   Component Value Date    WBC 7.8 10/07/2019    RBC 4.61 10/07/2019    HGB 13.0 (L) 10/07/2019    HCT 40.3 10/07/2019    MCV 87 10/07/2019    MCH 28.2 10/07/2019    MCHC 32.3 10/07/2019    RDW 14.9 10/07/2019     10/07/2019     BMP RESULTS:  Lab Results   Component Value Date     10/07/2019    POTASSIUM 4.9 10/07/2019    CHLORIDE 102 10/07/2019    CO2 24 10/07/2019    ANIONGAP 9 10/07/2019    GLC  96 10/07/2019    BUN 21 10/07/2019    CR 1.20 10/07/2019    GFRESTIMATED 61 10/07/2019    GFRESTBLACK 70 10/07/2019    MARIA ALEJANDRA 9.4 10/07/2019      A1C RESULTS:  Lab Results   Component Value Date    A1C 5.5 11/26/2018     INR RESULTS:  Lab Results   Component Value Date    INR 0.98 11/26/2018    INR 0.90 09/02/2009       This note was completed in part using Dragon voice recognition software. Although reviewed after completion, some word and grammatical errors may occur.       Thank you for allowing me to participate in the care of your patient.    Sincerely,     Stanley Onofre NP     [facility]

## 2020-08-27 NOTE — TELEPHONE ENCOUNTER
Patient has a HX of COPD and sees Dr. Mei at  last visit they talked about an increase in swelling in both lower legs and WALKER was worse also.  Recommended he contact Cardiology may need a diuretic.  Number given for scheduling to See Ilya next week if possible. Patient verbalized understanding and agreed to plan of care.

## 2020-08-30 PROBLEM — C95.90 LEUKEMIA (H): Status: ACTIVE | Noted: 2020-01-01

## 2020-08-30 NOTE — PROGRESS NOTES
Hematology oncology.   Received a phone call from the ER, I spoke to Dr. Bhanu Evans.     Patient is a 70-year-old gentleman with past medical history of COPD and CAD.  He presented with a few week history of shortness of breath and exertional dyspnea.     Work-up in the ER was remarkable for a CBC with hemoglobin 5.9 platelets 29 white blood cell count 180,500.  Prior to that patient had relatively normal CBC on 10/7/2019.  Differential count was not available in chart however I called the lab and spoke to the .  She reported that she is seeing approximately 80% of abnormal cells, likely blasts.     Pathology review of the slide is not immediately available.     Discussed the plan with Dr. Evans, presentation is concerning for acute leukemia, given patient's very high blood white blood cell count he is at risk for leukostasis.  We discussed that it may be appropriate that patient be transferred to [facility] as he may need leukapheresis tonight.       Samy Leung  [place] [facility].   [phone]

## 2020-08-30 NOTE — ED TRIAGE NOTES
Pt has hx of COPD and has shortness of breath for past 2 years.  He has feet and ankle swelling for the past 2 weeks.  Shortness of breath has gotten worse in past 2 weeks.  He wears CPAP at night.

## 2020-08-30 NOTE — ED NOTES
[facility]  ED Nurse Handoff Report    Angel Sanders is a 70 year old male   ED Chief complaint: Shortness of Breath and Fatigue  . ED Diagnosis:   Final diagnoses:   Acute on chronic congestive heart failure, unspecified heart failure type (H)     Allergies:   Allergies   Allergen Reactions     Adhesive Tape Rash       Code Status: Full Code  Activity level - Baseline/Home:  Independent. Activity Level - Current:   Stand by Assist. Lift room needed: No. Bariatric: No   Needed: No   Isolation: No. Infection: Not Applicable.     Vital Signs:   Vitals:    08/30/20 1430 08/30/20 1445 08/30/20 1500 08/30/20 1515   BP: 134/69 (!) 144/54 130/78 125/67   Pulse: 88 89 83 82   Resp:       Temp:       TempSrc:       SpO2: 96% 92% 90% 90%   Weight:       Height:           Cardiac Rhythm:  ,      Pain level: 0-10 Pain Scale: 0  Patient confused: No. Patient Falls Risk: Yes.   Elimination Status: denies need to void   Patient Report - Initial Complaint: SOB. Focused Assessment: Angel Sanders is a 70 year old male who presents with a history of coronary artery disease, pacemaker secondary to AV block,  and COPD who has presented with complaints of worsening shortness of breath and dyspnea on exertion.  Reporting that his symptoms seem to have been getting worse over the past 2 years but over the past several weeks noticeably so.  He is also describing a couple week history of bilateral lower leg swelling.  He has had no recent fevers, cough, or chest discomfort.  He does not wear home oxygen.  He stopped smoking in 2008 following a 40-year history.   Tests Performed: Labs, XR. Abnormal Results:   Abnormal Labs Reviewed   CBC WITH PLATELETS DIFFERENTIAL - Abnormal; Notable for the following components:       Result Value    .5 (*)     RBC Count 1.97 (*)     Hemoglobin 5.9 (*)     Hematocrit 20.3 (*)      (*)     MCHC 29.1 (*)     RDW 22.3 (*)     Platelet Count 29 (*)     All other  components within normal limits   BASIC METABOLIC PANEL - Abnormal; Notable for the following components:    Glucose 134 (*)     Urea Nitrogen 35 (*)     Creatinine 1.76 (*)     GFR Estimate 38 (*)     GFR Estimate If Black 44 (*)     All other components within normal limits   TROPONIN I - Abnormal; Notable for the following components:    Troponin I ES 0.591 (*)     All other components within normal limits   NT PROBNP INPATIENT - Abnormal; Notable for the following components:    N-Terminal Pro BNP Inpatient 5,867 (*)     All other components within normal limits     Treatments provided: NA  Family Comments: NA  OBS brochure/video discussed/provided to patient:  N/A  ED Medications: Medications - No data to display  Drips infusing:  No  For the majority of the shift, the patient's behavior Green. Interventions performed were NA.    Sepsis treatment initiated: No       ED Nurse Name/Phone Number: Era Levi RN,   3:31 PM

## 2020-08-30 NOTE — ED PROVIDER NOTES
History     Chief Complaint:  Shortness of Breath and Fatigue    HPI   Angel Sanders is a 70 year old male who presents with a history of coronary artery disease, pacemaker secondary to AV block,  and COPD who has presented with complaints of worsening shortness of breath and dyspnea on exertion.  Reporting that his symptoms seem to have been getting worse over the past 2 years but over the past several weeks noticeably so.  He is also describing a couple week history of bilateral lower leg swelling.  He has had no recent fevers, cough, or chest discomfort.  He does not wear home oxygen.  He stopped smoking in 2008 following a 40-year history.    Allergies:  Adhesive Tape    Medications:    Eliquis  Proair inhaler  Lipitor  Lisinopril  Lopressor  Aspirin 81 mg    Past Medical History:    Chronic renal insufficiency  Complete AV block  Coronary atherosclerosis of unspecified type of vessel  Diverticulosis  Herniated cervical disc  Hyperlipidemia   Hypertension   Occlusion and stenosis of carotid artery  Anemia  Obesity  Simple chronic bronchitis    Past Surgical History:    Arthrotomy shoulder, rotator cuff repair  RCA stent  Phacoemulsification clear cornea with standard intraocular lens implant x2    Family History:    Mother- breast cancer  Father- heart disease    Social History:  Smoking status: Former, quit 2008  Alcohol use: No  Drug use: No  PCP: Tony Peter  Presents to the ED by himself  Marital Status:   [2]    Review of Systems   Constitutional: Negative for fever.   HENT: Negative.    Respiratory: Positive for shortness of breath. Negative for cough, choking, chest tightness and wheezing.    Cardiovascular: Positive for leg swelling. Negative for chest pain and palpitations.   Gastrointestinal: Negative.    Neurological: Positive for weakness.   All other systems reviewed and are negative.    Physical Exam     Patient Vitals for the past 24 hrs:   BP Temp Temp src Pulse Resp SpO2 Height  INPATIENT NEPHROLOGY PROGRESS NOTE  [facility] NEPHROLOGY  (late entry for 8/28)    João Aguirre  09/02/2018    Reason for consultation:         esrd    Chief Complaint:   Chief Complaint   Patient presents with    Abdominal Pain     with N/V; missed dialysis Saturday        History of Present Illness:          Patient is a 51-year-old male with a past medical history of end-stage renal disease on hemodialysis Tuesday Thursday Saturday, diabetes, chronic back pain on methadone, long-term anticoagulant use hypertension, prior pneumonia, prior stroke, and recurrent episodes of mid epigastric pain and vomiting but no formal diagnosis of gastroparesis presents to emergency department for evaluation of epigastric abdominal pain with inability to tolerate liquids or food for the past few days.  Patient was seen here in the emergency department yesterday where he underwent basic labs and was given antiemetics.  He was discharged home with a prescription for Reglan and Benadryl which she has not been able to fill secondary to being home vomiting. He presents today via EMS because the symptoms have not improved and are worsening.  He did not get dialysis yesterday.  He denies any shortness of breath but does have an exacerbation of his chronic back pain which is thought to be secondary to him vomiting. The patient is unable hold down his chronic pain medicine which includes methadone.  Patient does not have a medication list with him at this time.        8/27  Seen on dialysis.  No nausea currently.  No chest pain or sob    8/28  Seen on dialysis.  Sleeping  8/29  Sleeping  8/30  Transferred to ICU.  No n,v,chest pain, sob  9/1  HD TTS.  Seen today on dialysis.  Platelets low, holding heparin w/HD.  Denies pain.  9/2  2L UF yesterday, tolerated well.  Pressures high, resumed lisinopril and amlodipine today.  Platelets 37, heparin on hold.  States having abd cramping today.      Plan of Care:     Assessment:      esrd  Hypertension  anemia  Fever/bacteremia (GNR), sepsis  cellulitis    Plan:      seen on dialysis  Continue t,th,sat hd  uf as tolerated  reagan with hd  abx per primary    Thank you for allowing us to participate in this patient's care. We will continue to follow.    Medications:  No current facility-administered medications on file prior to encounter.      Current Outpatient Medications on File Prior to Encounter   Medication Sig Dispense Refill    albuterol (PROAIR HFA) 90 mcg/actuation inhaler INHALE TWO PUFFS EVERY 4 TO 6 HOURS AS NEEDED      amlodipine (NORVASC) 5 MG tablet Take 5 mg by mouth 2 (two) times daily.      aspirin 325 MG tablet Take 1 tablet (325 mg total) by mouth once daily. 30 tablet 1    calcium acetate (PHOSLO) 667 mg capsule Take 1 capsule (667 mg total) by mouth 3 (three) times daily with meals. 90 capsule 11    hydrALAZINE (APRESOLINE) 50 MG tablet Take 50 mg by mouth 3 (three) times daily.      levetiracetam (KEPPRA) 500 MG Tab Take 500 mg twice a day.  Take an extra tablet right after dialysis (making 3 tablets on your dialysis days) 70 tablet 2    lisinopril (PRINIVIL,ZESTRIL) 40 MG tablet Take 1 tablet (40 mg total) by mouth once daily. 30 tablet 1    methadone (DOLOPHINE) 10 MG tablet Take 9 tablets (90 mg total) by mouth once daily.  0    metoclopramide HCl (REGLAN) 10 MG tablet Take 1 tablet (10 mg total) by mouth 3 (three) times daily as needed (nausea/vomiting). 10 tablet 0    minoxidil (LONITEN) 2.5 MG tablet Take 3 tablets (7.5 mg total) by mouth 2 (two) times daily. 90 tablet 0     Scheduled Meds:   calcium acetate  667 mg Oral TID WM    collagenase   Topical (Top) Daily    levETIRAcetam  500 mg Oral BID    methadone  20 mg Oral Daily    pantoprazole  40 mg Oral BID    piperacillin-tazobactam (ZOSYN) IVPB  2.25 g Intravenous Q8H    sodium chloride 0.9%  3 mL Intravenous Q8H     Continuous Infusions:   dextrose 10 % in water (D10W) 25 mL/hr at 09/01/18 0808  "Weight   08/30/20 1515 125/67 -- -- 82 -- 90 % -- --   08/30/20 1500 130/78 -- -- 83 -- 90 % -- --   08/30/20 1445 (!) 144/54 -- -- 89 -- 92 % -- --   08/30/20 1430 134/69 -- -- 88 -- 96 % -- --   08/30/20 1411 -- -- -- -- -- -- -- 89.5 kg (197 lb 5 oz)   08/30/20 1410 134/71 98  F (36.7  C) Oral 90 22 96 % 1.626 m (5' 4\") --       Physical Exam  General: Patient is alert and cooperative.  HENT:  Normal nose, oropharynx. Moist oral mucosa.  Eyes: EOMI. Normal conjunctiva.  Neck:  Normal range of motion and appearance.   Cardiovascular:  Normal rate, regular rhythm and normal heart sounds.   Pulmonary/Chest:  Effort normal. No wheezing or crackles.  Abdominal: Soft. No distension or tenderness.     Musculoskeletal: Normal range of motion. Symmetric lower leg edema; no calf tenderness or asymmetry.   Neurological: oriented, normal strength, sensation, and coordination.   Skin: Warm and dry. No rash or bruising.   Psychiatric: Normal mood and affect. Normal behavior and judgement.    Emergency Department Course   ECG (14:16:15):  Rate 87 bpm. OR interval 216. QRS duration 158. QT/QTc 412/495. P-R-T axes 48 62 114. Atrial-sensed ventricular-paced rhythm with prolonged AV conduction. Abnormal ECG. Interpreted at 1435 by Bhanu Evans MD.     Imaging:  Radiographic findings were communicated with the patient who voiced understanding of the findings.  XR Chest Port 1 View  Findings are concerning for mild congestive heart failure.   This could also represent a mild atypical interstitial process.   Recommend clinical correlation.     As read by Radiology.    Laboratory:  CBC: .5 (H), HGB 5.9 (L), PLT 29 (L) o/w WNL   BMP: glucose 134 (H), bun 35 (H), creatinine 1.76 (H), GFR 38 (L) o/w WNL     Troponin I (Collected 1449): 0.591     D-dimer: >20.0 (H)     BNP: 5,867    ABO/Rh type and screen: O+ (Antibody Negative)    BNP: 5,867 (H)    Hepatic panel: bilirubin 0.3 (H), albumin 3.3 (L),  (H)    INR: 1.29 "     PRN Meds:.sodium chloride, sodium chloride, sodium chloride 0.9%, sodium chloride 0.9%, acetaminophen, dextrose 50%, dextrose 50%, glucose, glucose, hydrALAZINE, HYDROcodone-acetaminophen, magnesium sulfate IVPB, metoclopramide HCl, ondansetron, sodium chloride 0.9%    Allergies:  Antibiotic hc    Vital Signs:  Temp Readings from Last 3 Encounters:   09/02/18 99.4 °F (37.4 °C) (Oral)   08/25/18 97.8 °F (36.6 °C) (Oral)   10/19/17 97.6 °F (36.4 °C)       Pulse Readings from Last 3 Encounters:   09/02/18 66   08/25/18 75   10/19/17 (!) 59       BP Readings from Last 3 Encounters:   09/02/18 (!) 195/67   08/25/18 133/71   10/19/17 (!) 166/81       Weight:  Wt Readings from Last 3 Encounters:   08/30/18 92.9 kg (204 lb 12.9 oz)   08/25/18 94.8 kg (209 lb)   10/17/17 94.8 kg (209 lb 0 oz)       Review of Systems:  Review of Systems - All 14 systems reviewed and negative, except as noted in HPI    Physical Exam:    Constitutional: NAD  Neuro: No asterixis  Psych: Calm  EENT: NCAT, anicteric sclera   Neck:  supple  Cards: No rub  Lungs: clear to ausculation  GI:  Tender to palpation  MSK:  WNWD  Skin: no purpura, rashes       Results:  Lab Results   Component Value Date     (L) 09/02/2018    K 4.3 09/02/2018    CL 99 09/02/2018    CO2 22 (L) 09/02/2018    BUN 55 (H) 09/02/2018    CREATININE 4.6 (H) 09/02/2018    CALCIUM 8.0 (L) 09/02/2018    ANIONGAP 11 09/02/2018    ESTGFRAFRICA 16 (A) 09/02/2018    EGFRNONAA 14 (A) 09/02/2018       Lab Results   Component Value Date    CALCIUM 8.0 (L) 09/02/2018    PHOS 2.4 (L) 09/02/2018       Recent Labs   Lab  09/02/18   0440   WBC  10.40   RBC  3.16*   HGB  10.0*   HCT  30.2*   PLT  37*   MCV  96   MCH  31.8*   MCHC  33.2       I have personally reviewed pertinent radiological imaging and reports.           (H)    Lactate dehydrogenase: 1,301 (H)    Partial thromboplastin time: 41 (H)    Uric acid: 11.5 (H)    Symptomatic COVID-19 Virus (Coronavirus) by PCR: Pending     Interventions:  1739: NS 1L IV Bolus   1819: Red blood cells leukocyte reduced     Emergency Department Course:  Past medical records, nursing notes, and vitals reviewed.  1440: I performed an exam of the patient and obtained history, as documented above.     1447: EKG performed, results above.     1449: IV inserted and blood drawn.     1512: The patient was sent for a chest x-ray while in the emergency department, findings above.    1625: The patient's nose and throat were swabbed and this sample was sent for COVID testing, findings above.       1653: I spoke with Dr. Kerr of [facility].     Findings and plan explained to the Patient.    1724: Patient will be transferred to [facility] Emergency Department via EMS. Discussed the case with Dr. Chance, who will admit the patient to a monitored bed for further monitoring, evaluation, and treatment.       1819: The patient received a blood transfusion.     Impression & Plan    Medical Decision Makin-year-old male with a history of coronary artery disease and COPD has presented with complaints of worsening shortness of breath and dyspnea on exertion.  He is hemodynamically stable with normal oxygen saturations at rest on room air.  His evaluation included a newly abnormal CBC.  His white blood cell count is 180.5.  His hemoglobin is 5.9 and platelets are 29.  The differential is pending but the laboratory technician called me and reports seeing blasts.  Other notable labs include a troponin of 0.591, BNP 5867, BUN and creatinine 35 and 1.76 with a GFR of 38, uric acid is 11.5, lactate dehydrogenase 1301, fibrinogen level 442.  His liver function tests are fairly unremarkable.  INR is 1.29.  Chest x-ray suggest mild CHF.  Hematology oncology consultation was obtained.  I spoke with Dr. Leung  who reviewed patient's laboratory tests and advised transfer to [facility] to expedite care and work-up.  Given his profound leukocytosis he is at risk for leukostasis and may be a candidate for leukapheresis, service not available at [facility] nor is there a pathologist in-house at this time.  They have advised transfusing 1 unit of red blood cells given his significant anemia, underlying coronary artery disease, shortness of breath, and slightly elevated troponin.  The patient consented to this and it was initiated before transfer.  He has remained hemodynamically stable but is at risk for decompensation.  Presentation suggests a form of acute leukemia  Diagnosis:  1.  Acute leukemia  2.  CHF  3.  CAD, with elevated trop, probable demand ischemia  4.  COPD      Critical care time: 45 minutes    Disposition:  Transferred to [facility] Emergency Department.    Steff Fuentes  8/30/2020   [facility] EMERGENCY DEPARTMENT    Steff WATSON, she serving as a scribe at 2:48 PM on 8/30/2020 to document services personally performed by Bhanu Evans MD based on my observations and the provider's statements to me.         Bhanu Evans MD  08/30/20 [id number]

## 2020-08-30 NOTE — H&P
[facility]    History and Physical - Hospitalist Service, Gold night       Date of Admission: 8/30/2020    Assessment & Plan   Angel Sanders is a 70 year old male admitted on 8/30/2020. He has a history of COPD, obstructive sleep apnea, CHF with mildly reduced ejection fraction, CAD and afib on aspirin and apixiban (new in June 2020), history of AV block pacemaker dependent, presenting with SOB to [facility].  Initial workup in ED concerning for acute heme malignancy, with WBCs 180.5, hgb 5.9, plts 29.  In addition, at risk for tumor lysis     Plan:  Severe Leukocytosis, anemia, thrombocytopenia:  Presumed acute hematologic malignancy  High risk for hyperviscosity syndrome  High risk for tumor lysis syndrome  - WBCs 180,000.  Avoid fluid overload due to risk of hyperviscosity; however, has elevated troponin and history of CAD so onc requested unit of blood at OSH (if not provided, will provide here).   - Rasburicase: if not given at outside ED, give on arrival, 6 mg once  - DIC/TLS labs (PTT, INR, fibrinogen, LDH, potassium, uric acid, phos): Q 8  - Smear, flow cytometry (please call lab at *[phone] as soon as labs drawn, to pull slide.  Then call [facility], as they will come evaluate smear tonight.  - UA  - IV hydration: hold for now as getting blood  - alloporinol: 600 mg loading, 300 mg qam  - risk for hyperviscosity: consider plasmapheresis. Per discussion with Onc, will hold off for now, but would need IR to place plasmapheresis (mena access) line  -Consider hydrea pending smear evaluation.    # shortness of breath, CAD, troponin elevation:  See problem next for cards history.  No chest pain, suspect demand ischemia with very low hemoglobin.  Is taking aspirin and apixiban.    - trend troponin  -Cardiology consult   -Holding ASA and Plavix in a setting of thrombocytopenia     Paroxysmal atrial fibrillation  High degree AV block with syncopal episode, status post  permanent pacemaker implant  Mild left ventricular systolic dysfunction   LVEF of 45 to 50% suspected possibly due to RV pacing .  DZINZ-2-smds: 3  - On metoprolol tartrate 50 mg twice daily, aspirin, and recently started on apixaban 5 mg BID in June 2020/Holding ASA and apixiban  - per oncology, consult cards to maximize medical management with acute leukemia    COVID pending    #Anemia, Thrombocytopenia 2/2 underlying disease   - Transfuse for Hgb <7, plt <10  - Blood consent signed       ID  #Prophylaxis  #Leukocytosis, possible blast crisis  - Likely functionally neutropenic despite leukocytosis. Will discuss potential prophylaxis with oncology team    Acute kidney Failure:  Possibly pre-renal, but at risk for direct renal injury.    - trend creatinine         Diet: NPO  DVT Prophylaxis:   Miner Catheter: not present  Code Status: full  Rule Out COVID-19 Handoff:  Angel is a LOW SUSPICION PUI.  Follow these instructions:    If COVID test positive -> continue isolation precautions    If COVID test negative -> discontinue COVID-specific isolation precautions         Disposition Plan   Expected discharge: 4 - 7 days, recommended to prior living arrangement once diagnosis and treatment plans in place.  Entered: Paco Sim MD 08/30/2020, 5:44 PM     The patient's care was discussed with the Bedside Nurse, Patient and Patient's Family.    Lito Cheung MD  Hospitalist/nocturnist  [facility]    Departments of Medicine   Pager: [phone]      ______________________________________________________________________    Chief Complaint   SOB    History is obtained from the patient    History of Present Illness   Edjohanny Sanders is a 70 year old male with past medical history of COPD, CHF with mildly reduced ejection fraction, and CAD.  He presented with a few week history of shortness of breath and exertional dyspnea, to [facility] ED.  He is also describes a couple week  history of bilateral lower leg swelling.  He has had no recent fevers, cough, or chest discomfort.  He does not wear home oxygen.  He stopped smoking in 2008 following a 40-year history.     Work-up in the ER was remarkable for a CBC with hemoglobin 5.9 platelets 29 white blood cell count 180,500.  Prior to that patient had relatively normal CBC on 10/7/2019.  Initial workup in ED concerning for acute heme malignancy, with WBCs 180.5, hgb 5.9, plts 29.  In addition, at risk for tumor lysis, with uric acid 11.5, K 4.5, phos not drawn, Ca 8.6        Review of Systems    The 10 point Review of Systems is negative other than noted in the HPI or here.     Past Medical History    I have reviewed this patient's medical history and updated it with pertinent information if needed.   Last cardiac echo 6/2020, EF 45%    Past Medical History:   Diagnosis Date     Chronic renal insufficiency      Complete AV block (H)      Coronary atherosclerosis of unspecified type of vessel, native or graft 2008    Acute inf MI; RCA stent; followed by Cardiology     Diverticulosis      Herniated cervical disc      Hyperlipidaemia      Hypertension      Occlusion and stenosis of carotid artery without mention of cerebral infarction 2008    50-69% stenosis of left ICA   Past cardiac history:  # inferior STEMI in 2008 and underwent successful PCI with a drug-eluting stent placed to the RCA. He was noted to have 40-50% narrowing at the distal RCA distal to the stent, 40% proximal LAD, 30% mid and 50% distal   #history of left internal carotid artery stenting in 2009  #November 2018, patient had paroxysmal complete AV block leading to syncope, and he underwent a [employer] dual-chamber permanent pacemaker implantation   #paroxysmal atrial fibrillation,[id number] Score of 3 (age, HTN, prior MI).    Past Surgical History   I have reviewed this patient's surgical history and updated it with pertinent information if needed.  Past Surgical  History:   Procedure Laterality Date     ARTHROTOMY SHOULDER, ROTATOR CUFF REPAIR, COMBINED  10/23/2013    Procedure: COMBINED ARTHROTOMY SHOULDER, ROTATOR CUFF REPAIR;  Left Shoulder Open Decompression, Distal Clavical Resection, Rotator Cuff Repair , Bicep Tenolysis, and Bicep Tenodesis   ;  Surgeon: Zhang Mattson MD;  Location: [facility] OR     C NONSPECIFIC PROCEDURE  2008    RCA stent; see PMH     COLONOSCOPY N/A 5/15/2018    Procedure: COLONOSCOPY;  COLONOSCOPY ;  Surgeon: Cory Hernandez MD;  Location:  GI     PHACOEMULSIFICATION CLEAR CORNEA WITH STANDARD INTRAOCULAR LENS IMPLANT  2013    Procedure: PHACOEMULSIFICATION CLEAR CORNEA WITH STANDARD INTRAOCULAR LENS IMPLANT;  RIGHT PHACOEMULSIFICATION CLEAR CORNEA WITH STANDARD INTRAOCULAR LENS IMPLANT ;  Surgeon: Rikki Reddy MD;  Location: [facility]     PHACOEMULSIFICATION CLEAR CORNEA WITH STANDARD INTRAOCULAR LENS IMPLANT  12/3/2013    Procedure: PHACOEMULSIFICATION CLEAR CORNEA WITH STANDARD INTRAOCULAR LENS IMPLANT;  LEFT PHACOEMULSIFICATION CLEAR CORNEA WITH STANDARD INTRAOCULAR LENS IMPLANT;  Surgeon: Rikki Reddy MD;  Location: [facility]       Social History   I have reviewed this patient's social history and updated it with pertinent information if needed.  Social History     Tobacco Use     Smoking status: Former Smoker     Packs/day: 1.00     Years: 40.00     Pack years: 40.00     Types: Cigarettes     Start date:      Last attempt to quit: 2008     Years since quittin.6     Smokeless tobacco: Never Used   Substance Use Topics     Alcohol use: No     Alcohol/week: 0.0 standard drinks     Comment: Sober for 25 years     Drug use: No       Family History   I have reviewed this patient's family history and updated it with pertinent information if needed.  Family History   Problem Relation Age of Onset     Breast Cancer Mother      Heart Disease Father 74         of heart failure     Colon Cancer No family hx of   "      Prior to Admission Medications   Cannot display prior to admission medications because the patient has not been admitted in this contact.     Allergies   Allergies   Allergen Reactions     Adhesive Tape Rash       Physical Exam   BP (!) 151/82   Pulse 80   Temp 99.1  F (37.3  C)   Ht 1.626 m (5' 4\")   Wt 85.5 kg (188 lb 6.4 oz)   SpO2 95%   BMI 32.34 kg/m     General Appearance: Pleasant , NAD  HEENT: No jaundice, moist BM   Respiratory: CTAB  Cardiovascular: RRR, s1, s2 no m/g   GI: Soft, Non tender   Genitourinary: No CVAT   Extremities : trace  Edema   Neurologic: A&Ox3, moves all extremities equally     Data   Data  Reviewed on Epic  "

## 2020-08-31 NOTE — PLAN OF CARE
Pt admitted to  from [facility] ~ 2100 with SOB and concern for acute leukemia. Afebrile. OVSS on 2 L NC, cont pulse ox on. Pt denies N/V/D and pain. WALKER and SOB w/ activity, pt denies chest pain. LS clear. One unit RBC given on evenings. Pt will need plasma, INR 1.54. Hydrea PO given, tolerated well. Elitek x1 given. 2+ BLE edema, 20 mg Lasix x1 given. NS @ 100 mL/hr. Echo completed, repeat echo this AM. UA sent. Covid test pending. Declined Cpap overnight.  SBA, pt calls appropriately. Utilizing bedside urinal to conserve energy. NPO. Continue with POC.         Problem: Adult Inpatient Plan of Care  Goal: Plan of Care Review  Outcome: No Change     Problem: Adult Inpatient Plan of Care  Goal: Absence of Hospital-Acquired Illness or Injury  Outcome: No Change     Problem: Adult Inpatient Plan of Care  Goal: Optimal Comfort and Wellbeing  Outcome: No Change

## 2020-08-31 NOTE — PROCEDURES
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 The risks and benefits of the procedure were explained. The patient agreed to undergo the procedure. The consent form was signed and placed in the chart. The patient declined consent for tissue bank    PROCEDURE SUMMARY:  The patient's identification was positively verified by verbal identification. Following the administration of 1mg for comfort, the patient was laid in the right lateral decubitus position due to significant dyspnea while prone. The left posterior iliac crest was prepped and draped in a sterile manner. The skin, deeper tissues, and periosteum of the LPIC were anesthetized with approximately 10mL 1% lidocaine using a spinal needle. Following this, a 3mm incision was made. The core needle was advanced into the LPIC bone cavity and a 28mm core biopsy was taken with the longer jamshidi. Bone marrow aspirates were attempted twice, no aspirate obtained. Obtained 2 additional cores, measuring 10mm and 20mm from the LPIC bone cavity. Following the procedure, a sterile pressure dressing was applied to the bone marrow biopsy site.     COMPLICATIONS:  None. The patient tolerated the procedure well with no discomfort.      RECOMMENDATIONS:   The patient was placed in the supine position to maintain pressure on the biopsy site.   The patient was instructed to lie flat for 60 minutes and not remove dressing or bathe/shower for 24 hours post-procedure.     TESTS ORDERED:    Morphology    Flow cytometry    Chromosomes    FISH (PML/JESSICA)    Molecular - NGS AML panel     Sandra Diallo PA-C  Hematology/Oncology  Pager # [phone]  Phone # [phone]

## 2020-08-31 NOTE — PROGRESS NOTES
HEME MALIGNANCY ATTENDING BRIEF NOTE    Patient admitted from [facility] ED with labs suspicious for acute leukemia. Reviewed PB smear with fellow on arrival. Leukocytosis with predominantly blasts, anemia, thrombocytopenia. No obvious Aeur rods but several cells with bilobed and reniform nuclei, rare cells with course granules. Can't exclude microgranular APL so we will start ATRA until more information can be obtained. Hydrea for hyperleukocytosis, rasburicase for elevated uric acid with THERESA on CKD that may be related to TLS. Keep hydrated but lasix given clinical signs of volume overload. Cards involved with troponin leak. Patient clinically stable, lungs distant but clear. Some LE edema and dyspnea on exertion. Sats and VS OK. Discussed need for workup and current plan with patient. Plan for further workup including bone marrow biopsy ASAP.     Henok Davalos MD, PhD

## 2020-08-31 NOTE — PROGRESS NOTES
Rapid Response Team Note    Admission Diagnosis:     Hospital Course   Brief Summary of events leading to rapid response:   A rapid response was called for Angel Sanders due to lactic acidosis identified by abnormal vitals triggering the SIRS/Sepsis screening alert.    The patients is not known to have an infection.    Significant Comorbidities:   Reduced EF Heart Failure    Medications   Scheduled     allopurinol  300 mg Oral Daily     ceFEPIme (MAXIPIME) IV  1 g Intravenous Q12H     cyanocobalamin  100 mcg Oral Daily     fluticasone-vilanterol  1 puff Inhalation Daily     furosemide  40 mg Intravenous Once     hydroxyurea  2,000 mg Oral TID     metoprolol tartrate  50 mg Oral BID     micafungin  50 mg Intravenous Q24H     tretinoin  45 mg/m2/day Oral BID w/meals     umeclidinium  1 puff Inhalation Daily      PRN   albuterol, hydrALAZINE, ipratropium - albuterol 0.5 mg/2.5 mg/3 mL, LORazepam, magnesium sulfate, - MEDICATION INSTRUCTIONS -, ondansetron, polyethylene glycol, potassium chloride, potassium chloride with lidocaine, potassium chloride, potassium chloride, potassium chloride, potassium phosphate (KPHOS) in D5W IV, potassium phosphate (KPHOS) in D5W IV, potassium phosphate (KPHOS) in D5W IV, potassium phosphate (KPHOS) in D5W IV, prochlorperazine, sennosides   Allergies   Allergies   Allergen Reactions     Adhesive Tape Rash        Physical Exam   Temp: 98.1  F (36.7  C) Temp  Min: 97.6  F (36.4  C)  Max: 99.1  F (37.3  C)  Resp: 26 Resp  Min: 16  Max: 28  SpO2: 94 % SpO2  Min: 79 %  Max: 98 %  Pulse: 96 Pulse  Min: 68  Max: 106    No data recorded  BP: 138/76 Systolic (24hrs), Av , Min:124 , Max:160   Diastolic (24hrs), Av, Min:67, Max:97     I/Os: I/O last 3 completed shifts:  In:  [P.O.:660; I.V.:1450]  Out:  [Urine:]     Exam:   General: chronically ill appearing  Mental Status: AAOx4.      Significant Results and Procedures   Lactic Acid:   Recent Labs   Lab Test  08/31/20  1553   LACTS 6.3*     CBC:   Recent Labs   Lab Test 08/31/20  1210 08/31/20  0340 08/30/20  2147 08/30/20  1449   .8* 176.8* 188.1* 180.5*   HGB  --  7.7* 6.7* 5.9*   HCT  --  24.4* 22.5* 20.3*   PLT 21* 22* 26* 29*      Additional labs and diagnostic studies were significant for: elevated lactate, significant leukocytosis, thrombocytopenia    Assessment   In assessment a rapid response was called on Angel Sanders due to lactic acidosis and abnormal qSOFA score.     This presentation is likely due to underlying malignancy (Bone marrow biopsy done, pathology pending, suspect AML) and worsened by the comorbidities listed above. The patient is currently feeling short of breath on 3L nasal canula, 93% / /90 / afebrile / HR 85 and regular. He is comfortable appearing and denies any pain. In review of his medication history, he is a 69yo gentleman who presented to an OSH 24hr ago with shortness of breath and was found to have significant leukocytosis, thrombocytopenia concerning for AML and underwent bone marrow biopsy today, pathology pending. No baseline lactate had been drawn on admission. This afternoon, due to hypoxia ,the RN stefani a lactate and it was found to be 6.2 and RRT was called. Primary team (Dr. Sim of [facility] evening staff) arrived and provided an excellent history of the patient. Likely cause for lactic acidosis is underlying malignancy. Regardless, we will draw blood cultures. UA already checked and normal. CXR with pulmonary congestion from CHF exacerbation. Antibiotics are being switched from Levaquin to Cefepime. Primary team will follow up on an additional lactate at 8pm.     Sepsis Evaluation   Angel Sanders meets SIRS criteria but does NOT have a lactate >2 or other evidence of acute organ damage.  These vital sign, lab and physical exam findings are consistent with SEPSIS.    Sepsis Time-Zero (time Sepsis diagnosis confirmed): 16:45  08/31/20    Anti-infectives (From now,  onward)    Start     Dose/Rate Route Frequency Ordered Stop    08/31/20 1700  ceFEPIme (MAXIPIME) 1g vial to attach to  ml bag for ADULTS or NS 50 ml bag for PEDS      1 g  over 30 Minutes Intravenous EVERY 12 HOURS 08/31/20 1637      08/31/20 1400  micafungin (MYCAMINE) 50 mg in sodium chloride 0.9 % 100 mL intermittent infusion      50 mg  100 mL/hr over 60 Minutes Intravenous EVERY 24 HOURS 08/31/20 1153          Current antibiotic coverage requires additional antibiotics for pulmonary source.     Plan       Disposition: The patient will remain on the current unit. We will continue to monitor this patient closely..    The Hematology/Oncology primary team was able to be reached and they are in agreement with the above plan.    Reassessment   Reassessment and plan follow-up will be performed by the primary team. The current agreed upon plan for reassessment/follow-up includes the following labs: lactate and will be completed at 8pm per request of primary team.     Bedside nurse to notify provider responsible for patient reassessment or primary team if no reassessment indicated for .    Time Spent on this Encounter   Total Critical Care time spent by me, excluding procedures, was 30 minutes.    TRINIDAD Cedeno pager [phone]  [facility] RRT AMCOM Job Code Contact #[phone]

## 2020-08-31 NOTE — PLAN OF CARE
OT: Cancel, Pt with bone marrow biopsy done this PM, must lay flat for 1 hour.  Will reschedule evaluation for 9/1.

## 2020-08-31 NOTE — CONSULTS
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     Patient states that over the last 3 months he has been experiencing progressive shortness of breath with activity. Since few weeks ago unable to go up a flight of stairs without having to stop or can't walk one block on even surface. He also noticed both his legs were more swollen. He called nurse line who recommended he reach out to cardiology for possible diuretic need. Patient had been seen by Cardiology on a virtual visit about 8 weeks ago. Because of inability to secure a face to face visit patient went to the ED today.    He reports a chronic cough that he attributes to COPD, but stable for the past few years. He otherwise denies fever, chills, night sweats, sore throat, rhinorrhea, headaches, vision changes, sore throat, chest pain, palpitations, abdominal pain, nausea, vomiting, diarrhea, constipation, hematochezia, melena, dysuria, hematuria, prescynope, syncope, PND and orthopnea.     At OSH, vital signs were unremarkable. However, labs showed WBC of 180k, Hgb 5.9 and Plt 26k. Furthermore, troponin was 0.591-> 0.846, Cr 1.76 and uric acid ~ 11. Patient was admitted for possible blast crisis. Cardiology consulted due to elevated troponin.          Past Medical History:     Past Medical History:   Diagnosis Date     Chronic renal insufficiency      Complete AV block (H)      Coronary atherosclerosis of unspecified type of vessel,  native or graft     Acute inf MI; RCA stent; followed by Cardiology     Diverticulosis      Herniated cervical disc      Hyperlipidaemia      Hypertension      Occlusion and stenosis of carotid artery without mention of cerebral infarction     50-69% stenosis of left ICA             Past Surgical History:      Past Surgical History:   Procedure Laterality Date     ARTHROTOMY SHOULDER, ROTATOR CUFF REPAIR, COMBINED  10/23/2013    Procedure: COMBINED ARTHROTOMY SHOULDER, ROTATOR CUFF REPAIR;  Left Shoulder Open Decompression, Distal Clavical Resection, Rotator Cuff Repair , Bicep Tenolysis, and Bicep Tenodesis   ;  Surgeon: Zhang Mattson MD;  Location: [facility] OR     C NONSPECIFIC PROCEDURE      RCA stent; see [facility]     COLONOSCOPY N/A 5/15/2018    Procedure: COLONOSCOPY;  COLONOSCOPY ;  Surgeon: Cory Hernandez MD;  Location:  GI     PHACOEMULSIFICATION CLEAR CORNEA WITH STANDARD INTRAOCULAR LENS IMPLANT  2013    Procedure: PHACOEMULSIFICATION CLEAR CORNEA WITH STANDARD INTRAOCULAR LENS IMPLANT;  RIGHT PHACOEMULSIFICATION CLEAR CORNEA WITH STANDARD INTRAOCULAR LENS IMPLANT ;  Surgeon: Rikki Reddy MD;  Location: [facility]     PHACOEMULSIFICATION CLEAR CORNEA WITH STANDARD INTRAOCULAR LENS IMPLANT  12/3/2013    Procedure: PHACOEMULSIFICATION CLEAR CORNEA WITH STANDARD INTRAOCULAR LENS IMPLANT;  LEFT PHACOEMULSIFICATION CLEAR CORNEA WITH STANDARD INTRAOCULAR LENS IMPLANT;  Surgeon: Rikki Reddy MD;  Location: [facility]             Social History:     Social History     Tobacco Use     Smoking status: Former Smoker     Packs/day: 1.00     Years: 40.00     Pack years: 40.00     Types: Cigarettes     Start date:      Last attempt to quit: 2008     Years since quittin.6     Smokeless tobacco: Never Used   Substance Use Topics     Alcohol use: No     Alcohol/week: 0.0 standard drinks     Comment: Sober for 25 years             Family History:     Family History   Problem Relation Age  of Onset     Breast Cancer Mother      Heart Disease Father 74         of heart failure     Colon Cancer No family hx of              Allergies:     Allergies   Allergen Reactions     Adhesive Tape Rash             Medications:   Prior to admission medications:  Medications Prior to Admission   Medication Sig Dispense Refill Last Dose     apixaban ANTICOAGULANT (ELIQUIS) 5 MG tablet Take 1 tablet (5 mg) by mouth 2 times daily 180 tablet 3      ASPIRIN 81 MG OR TABS 1 TABLET DAILY        atorvastatin (LIPITOR) 80 MG tablet Take 1 tablet (80 mg) by mouth daily 90 tablet 3      cyanocobalamin (VITAMIN B-12) 100 MCG tablet Take 100 mcg by mouth daily        Docusate Calcium (STOOL SOFTENER PO) Take 100 mg by mouth daily         Ferrous Sulfate (IRON SUPPLEMENT PO) Take 325 mg by mouth daily (with breakfast)         lisinopril (PRINIVIL/ZESTRIL) 5 MG tablet Take 1 tablet (5 mg) by mouth daily 90 tablet 3      metoprolol tartrate (LOPRESSOR) 50 MG tablet Take 1 tablet (50 mg) by mouth 2 times daily 180 tablet 3      multivitamin, therapeutic with minerals (MULTI-VITAMIN) TABS tablet Take 1 tablet by mouth daily        nitroGLYcerin (NITROSTAT) 0.4 MG sublingual tablet Place 1 tablet (0.4 mg) under the tongue every 5 minutes as needed May repeat X 2 and if chest pain persists, call 911 25 tablet 2      PROAIR RESPICLICK 108 (90 Base) MCG/ACT inhaler 2 puffs every 4 hours as needed         TRELEGY ELLIPTA 100-62.5-25 MCG/INH oral inhaler 1 puff daily            Current medications:  Current Facility-Administered Medications   Medication     albuterol (PROAIR HFA/PROVENTIL HFA/VENTOLIN HFA) 108 (90 Base) MCG/ACT inhaler 2 puff     [START ON 2020] allopurinol (ZYLOPRIM) tablet 300 mg     allopurinol (ZYLOPRIM) tablet 600 mg     [START ON 2020] atorvastatin (LIPITOR) tablet 80 mg     [START ON 2020] cyanocobalamin (VITAMIN B-12) tablet 100 mcg     [START ON 2020] lisinopril (ZESTRIL) tablet 5 mg      "Medication Instruction     metoprolol tartrate (LOPRESSOR) tablet 50 mg     rasburicase (ELITEK) 6 mg in sodium chloride 0.9 % 50 mL infusion            Review of Systems:   12-point review of systems was negative except as mentioned in the HPI.          Exam:   BP (!) 151/82 (BP Location: Left arm)   Pulse 80   Temp 99.1  F (37.3  C) (Oral)   Resp 18   Ht 1.626 m (5' 4\")   Wt 85.5 kg (188 lb 6.4 oz)   SpO2 95%   BMI 32.34 kg/m      GEN: AAOx3, NAD   HEENT: PERRLA, no scleral icterus, mucus membranes moist  NECK: Supple, JVP ~[id number]   RESP: mild crackles in bilateral lungs by the bases  CV: RRR, 3/6 systolic murmur in aortic precordium, no rubs or gallops  ABD: Soft, nontender to palpation, +BS, no guarding  EXT: +1 pitting edema in both lower legs, WWPx2   NEURO: no focal neurological deficit  SKIN: Normal skin turgor, no rash on limited exam         Data:   Labs:  CMP  Recent Labs   Lab 08/30/20 2147 08/30/20  1449    135   POTASSIUM 4.2 4.5   CHLORIDE 106 106   CO2 18* 20   ANIONGAP 10 9   * 134*   BUN 38* 35*   CR 1.67* 1.76*   GFRESTIMATED 40* 38*   GFRESTBLACK 47* 44*   MARIA ALEJANDRA 8.7 8.6   MAG 2.4*  --    PHOS 3.1  --    PROTTOTAL 7.8 7.4   ALBUMIN 3.2* 3.3*   BILITOTAL 1.1 0.9   ALKPHOS 127 132   * 116*   ALT 67 51     CBC  Recent Labs   Lab 08/30/20 2147 08/30/20  1449   .1* 180.5*   RBC 2.23* 1.97*   HGB 6.7* 5.9*   HCT 22.5* 20.3*   * 103*   MCH 30.0 29.9   MCHC 29.8* 29.1*   RDW 21.2* 22.3*   PLT 26* 29*     INR  Recent Labs   Lab 08/30/20 2147 08/30/20  1449   INR 1.46* 1.29*     Lab Results   Component Value Date    TROPI 0.846 (HH) 08/30/2020    TROPI 0.591 () 08/30/2020    TROPI <0.015 11/25/2018    TROPONIN 0.02 11/25/2018    TROPONIN 0.00 04/01/2013     Recent Labs   Lab Test 10/07/19  0908 11/14/18  0914  09/23/15  0948 07/28/14  0942   CHOL 110 95   < > 103 101   HDL 37* 40   < > 40* 33*   LDL 59 42   < > 47 52   TRIG 69 66   < > 79 76   CHOLHDLRATIO  --   " --   --  2.6 3.0    < > = values in this interval not displayed.            Imaging:   [id number]      Echocardiogram:  20   The left ventricle is normal in size. Proximal septal thickening is noted.  Left ventricular systolic function is mildly reduced. The visual ejection  fraction is estimated at 45-50%. LVEF 50% based on biplane 2D tracing. Left  ventricular diastolic function is indeterminate. Septal wall motion  abnormality may reflect pacemaker activation. There is no thrombus seen in the  left ventricle.  The right ventricle is normal size. There is a catheter/pacemaker lead seen in  the right ventricle. The right ventricular systolic function is normal.  Mild left atrial enlargement.  Mild mitral and tricuspid regurgitation.  Mild valvular aortic stenosis.  In direct comparison to the previous study dated 2018, the patient is  now status post pacemaker implantation and there has been a mild interval  deterioration in left ventricular systolic function.    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 Bedside echocardiogram was performed and showed LVEF estimated as 40-45% on visual inspection which was unchanged to  previous echocardiogram from 06/2020. There were no new wall motion abnormalities. His physical exam had signs of some volume overload and echo showed IVC to be in the intermediate range. Unclear at this moment to ascertain what prompted CHF exacerbation beyond potential blast crisis; but arrhythmogenic, BP control and known past diastolic dysfunction could be contributing as well. His EKG without signs of ACS. Troponin trend 0.591 -> 0.864. As far as his current clinical picture representing true ACS, he had a myocardial perfusion study about 8 weeks ago that showed known fixed perfusion defects representing infarcted regions without evidence of ischemia. However, with his history of CAD and known non-obstructive lesions in his coronary system it is possible that he has developed a type 2 MI as his hemoglobin is 5.9. Additionally, there could be some leukostasis and increased blood viscosity since the WBC is 180k further affecting coronary perfusion.  - Formal echocardiogram pending  - Continue to trend troponin  - Transfuse for Hgb goal >8  - Would not start IV anticoagulation for ACS until Hgb corrected as troponin elevation could be reversible with treating anemia  - No acute coronary interventions due to above reasons and plt 29k  - Give one dose of furosemide 40mg IV, will need to monitor volume status closely especially if starting apheresis thearpies  - Ok to hold ACEi for now, would have hydralazine PRN for BP control  - Device interrogation pending  - Continue home BB   - Ok to hold aspirin for now due to low plt, but can resume once CBC stable and no evidence or concern for bleeding  - Ok to hold statin if worried about LFTs but can be resume once more stable     #CHB s/p PPM 2018  #Afib on apixaban (CHADVASC 4)  Patient had 12 minutes of afib upon device check few weeks ago.   - Device interrogation pending  - Once more stable CBC and if no evidence or concerns for bleeding, resume home apixaban  -  Diuresis as above  - BB as above    Patient to be staffed in the AM.    Goran Farley MD PhD  Cardiology Fellow  Pager: [phone]

## 2020-08-31 NOTE — PROGRESS NOTES
Patient admitted to: 5C  Admitted from: [facility]  Arrived by: Hospital transportation  Reason for admission: SOB; possible acute heme malignancy  Patient accompanied by: Self  Belongings: Wallet, phone, and keys remain with patient per pt request  Teaching: Orientation to unit, medications  Skin double check completed by: Shruthi Piper RN and Quynh Wheeler RN

## 2020-08-31 NOTE — PLAN OF CARE
Problem: Adult Inpatient Plan of Care  Goal: Plan of Care Review  8/31/2020 1458 by Carlos A, Fabiola Constance, RN  Goal: Patient-Specific Goal (Individualization)  Outcome: No Change  Goal: Absence of Hospital-Acquired Illness or Injury  8/31/2020 1458 by Fabiola Strauss RN  Goal: Optimal Comfort and Wellbeing  8/31/2020 1458 by Fabiola Strauss RN  Outcome: No Change  Goal: Readiness for Transition of Care  Outcome: No Change  Goal: Rounds/Family Conference  Outcome: No Change   On 2L nc and sats 92-94 percent. Respirations 22-24. Sob and received albuterol inhaler. Declined the breo and umeclidinium inhaler. Received hydrea for elevated white blood cell count. Echo done and bone marrow biopsy. Kidney Ultrasound scheduled for this afternoon. No pain. No nausea. On a regular diet and ate banana bread, 1/2 pineapple, kesha cracker and 3/4 coffee. Cyst on back. Labs were drawn see epic. Standby assist. Plan is for lab to do a lactic acid level.

## 2020-08-31 NOTE — PROGRESS NOTES
[facility], [place]    Hematology / Oncology Progress Note    Date of Service (when I saw the patient): 08/31/2020     Assessment & Plan   Angel Sanders is a 70 year old male with PMH of COPD, TALIA, HTN, HLD, CHF with mildly reduced ejection fraction, CAD and afib on aspirin and apixiban (new in June 2020), history of AV block pacemaker dependent. He initially presented to [facility] ED on 8/30/20 with 2 months of progressively worsening dyspnea, where he was found to have , Hgb 5.9, plt 25. He was then transferred for further work up.    HEME  #Acute leukemia, AML vs APL  #Leukocytosis  Patient initially presented to [facility] ED on 8/30/20 with 2 months of progressively worsening dyspnea. Patient reports that prior to visiting the ED, he could not even walk to the bathroom without feeling short of breath. After being unable to secure an in-person visit with an outpatient provider, he presented to the ED. He was found to have , Hgb 5.9, plt 25. He was then transferred for further work up and treatment.   - BMBx today 8/31 at 1 PM  - Peripheral blood FISH and flow ordered STAT, called lab  - Hydrea 2 g TID  - ATRA BID for possible APL    #PPX  - Levaquin 250 mg daily  - Micafungin 50 mg BID  - Await viral serologies prior to considering ACV    #Anemia, thrombocytopenia  - Transfuse if Hgb <7, plt <10    #Concern for TLS  Initially, uric acid 11.5, K 3.9, P 3.9, Ca 9.4. Now uric acid 3.0.  - Received rasburicase 6 mg once yesterday  - Allopurinol 300 mg daily  - TLS, DIC labs q8hrs    RENAL  #THERESA on CKD  Creatinine baseline ~1.2, now 1.74.  - /hr  - Lasix 40 IV  - Renal US ordered  - Monitor I/Os, goal net even  - Can consider Lasix 20-40 for worsening respiratory symptoms    CARDIOVASCULAR  #Elevated troponin, possible type 2 MI  Upon presentation in ED 8/30, first trop 0.59 > 0.846 > 1.01.   - Following with cardiology. Suspected etiology of elevated  "troponins 2/2 anemia and possible leukostasis/increased blood viscosity.   - Echo 8/31 showed: ejection fraction estimated 40-45%, inferior wall akinesis is present ... IVC diameter >2.1 cm collapsing <50% with sniff suggests a high RA pressure estimated at 15 mmHg or greater.\"  - Diurese as above    #Hypertension  - Hold PTA Lisinopril 5 mg daily  - Continue PTA metoprolol  - Hydralazine PRN per cardiology    #HLD  - Hold statin while inpatient    #Atrial fibrillation  - Pacemaker interrogated 8/31  - Holding aspirin and apixaban. Once more stable CBC and if no evidence or concerns for bleeding, resume home apixaban per cardiology.     #H/o CAD  Per patient history, MI January 2008, stent placed in RCA. Carotid artery stenosis s/p stent in 2009.    PULM  #COPD  Uses Trelegy at home.  - Incruse and Breo Ellipta in hospital  - Albuterol available PRN    #TALIA  CPAP x 2.5 months.  - Continue in hospital.    Kelya Narayanan PA-C  Pager #[phone]    Interval History   Recent patient history as above. Patient is extremely short of breath with minor movements. Upon repositioning for BMBx, patient has to take 5 minute breaks after repositioning. Patient also endorses weakness and peripheral edema, overall feels poorly. Patient denies headache, vision changes, chest pain, abdominal pain, and nausea.     Awaiting diagnostic labs. Hydrea to treat high WBC in meantime. Patient fluid overloaded, diuresing with Lasix. Evaluating THERESA. Elevated troponins likely 2/2 anemia and possible leukostasis/increased blood viscosity, following with cardiology.    Vital Signs with Ranges  Temp:  [97.6  F (36.4  C)-99.1  F (37.3  C)] 97.6  F (36.4  C)  Pulse:  [] 97  Resp:  [16-55] 22  BP: (113-160)/(54-97) 145/86  SpO2:  [79 %-98 %] 93 %  I/O last 3 completed shifts:  In: 650 [I.V.:650]  Out: 700 [Urine:700]    Physical Exam   General: Sitting in chair, alert, NAD. Pleasant and conversational. Short of breath with minor " movements/repositioning.   Skin: No concerning lesions, rash, jaundice, cyanosis, erythema, or ecchymoses on exposed surfaces.   HEENT: NCAT. Anicteric sclera. MMM with no lesions, erythema, or thrush.   Respiratory: Non-labored breathing, good air exchange, lungs clear to auscultation bilaterally.  Cardiovascular: RRR. Systolic murmur. No rub.   Gastrointestinal: Normoactive BS. Abdomen soft, ND, NT. No palpable masses.  Extremities: Mild LE edema.   Neurologic: A&O x 3, speech normal, no deficits grossly.    Medications     - MEDICATION INSTRUCTIONS -       sodium chloride 100 mL/hr at 08/31/20 0010       allopurinol  300 mg Oral Daily     cyanocobalamin  100 mcg Oral Daily     fluticasone-vilanterol  1 puff Inhalation Daily     furosemide  40 mg Intravenous Once     hydroxyurea  2,000 mg Oral TID     lisinopril  5 mg Oral Daily     metoprolol tartrate  50 mg Oral BID     midazolam  1-2 mg Intravenous Once     [COMPLETED] perflutren diluted 1mL to 2mL with saline  6 mL Intravenous Once     sodium chloride (PF)  10 mL Intravenous Once     tretinoin  45 mg/m2/day Oral BID w/meals     umeclidinium  1 puff Inhalation Daily     Data   Results for orders placed or performed during the hospital encounter of 08/30/20 (from the past 24 hour(s))   Cardiology General Adult IP Consult: Patient to be seen: ASAP within 4 hrs; Call back #: [phone]; Eval for NSTEMI; Consultant may enter orders: Yes; Requesting provider? 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     Patient states that over the last 3 months he has been   experiencing progressive shortness of breath with activity. Since   few weeks ago unable to go up a flight of stairs without having   to stop or can't walk one block on even surface. He also noticed   both his legs were more swollen. He called nurse line who   recommended he reach out to cardiology for possible diuretic   need. Patient had been seen by Cardiology on a virtual visit   about 8 weeks ago. Because of inability to secure a face to face   visit patient went to the ED today.    He reports a chronic cough that he attributes to COPD, but stable   for the past few years. He otherwise denies fever, chills, night   sweats, sore throat, rhinorrhea, headaches, vision changes, sore   throat, chest pain, palpitations, abdominal pain, nausea,   vomiting, diarrhea, constipation, hematochezia, melena, dysuria,   hematuria, prescynope, syncope, PND and orthopnea.     At OSH, vital signs were unremarkable. However, labs showed WBC   of 180k, Hgb 5.9 and Plt 26k. Furthermore, troponin was 0.591->   0.846, Cr 1.76 and uric acid ~ 11. Patient was admitted for   possible blast crisis. Cardiology consulted due to elevated   troponin.          Past Medical History:     Past Medical History:   Diagnosis Date     Chronic renal insufficiency      Complete AV block (H)      Coronary atherosclerosis of unspecified type of vessel, native   or graft 2008    Acute inf MI; RCA stent; followed by Cardiology     Diverticulosis      Herniated cervical disc      Hyperlipidaemia      Hypertension      Occlusion and stenosis of carotid artery without mention of   cerebral infarction 2008    50-69% stenosis of left ICA             Past Surgical History:      Past Surgical History:   Procedure Laterality Date     ARTHROTOMY SHOULDER,  ROTATOR CUFF REPAIR, COMBINED  10/23/2013    Procedure: COMBINED ARTHROTOMY SHOULDER, ROTATOR CUFF REPAIR;    Left Shoulder Open Decompression, Distal Clavical Resection,   Rotator Cuff Repair , Bicep Tenolysis, and Bicep Tenodesis   ;  Surgeon: Zhang Mattson MD;  Location: [facility] OR     C NONSPECIFIC PROCEDURE      RCA stent; see [facility]     COLONOSCOPY N/A 5/15/2018    Procedure: COLONOSCOPY;  COLONOSCOPY ;  Surgeon: Cory Hernandez MD;  Location:  GI     PHACOEMULSIFICATION CLEAR CORNEA WITH STANDARD INTRAOCULAR LENS   IMPLANT  2013    Procedure: PHACOEMULSIFICATION CLEAR CORNEA WITH STANDARD   INTRAOCULAR LENS IMPLANT;  RIGHT PHACOEMULSIFICATION CLEAR CORNEA   WITH STANDARD INTRAOCULAR LENS IMPLANT ;  Surgeon: Rikki Reddy MD;  Location: [facility]     PHACOEMULSIFICATION CLEAR CORNEA WITH STANDARD INTRAOCULAR LENS   IMPLANT  12/3/2013    Procedure: PHACOEMULSIFICATION CLEAR CORNEA WITH STANDARD   INTRAOCULAR LENS IMPLANT;  LEFT PHACOEMULSIFICATION CLEAR CORNEA   WITH STANDARD INTRAOCULAR LENS IMPLANT;  Surgeon: Rikki Reddy MD;  Location: [facility]             Social History:     Social History     Tobacco Use     Smoking status: Former Smoker     Packs/day: 1.00     Years: 40.00     Pack years: 40.00     Types: Cigarettes     Start date:      Last attempt to quit: 2008     Years since quittin.6     Smokeless tobacco: Never Used   Substance Use Topics     Alcohol use: No     Alcohol/week: 0.0 standard drinks     Comment: Sober for 25 years             Family History:     Family History   Problem Relation Age of Onset     Breast Cancer Mother      Heart Disease Father 74         of heart failure     Colon Cancer No family hx of              Allergies:     Allergies   Allergen Reactions     Adhesive Tape Rash             Medications:   Prior to admission medications:  Medications Prior to Admission   Medication Sig Dispense Refill Last Dose     apixaban ANTICOAGULANT  (ELIQUIS) 5 MG tablet Take 1 tablet (5   mg) by mouth 2 times daily 180 tablet 3      ASPIRIN 81 MG OR TABS 1 TABLET DAILY        atorvastatin (LIPITOR) 80 MG tablet Take 1 tablet (80 mg) by   mouth daily 90 tablet 3      cyanocobalamin (VITAMIN B-12) 100 MCG tablet Take 100 mcg by   mouth daily        Docusate Calcium (STOOL SOFTENER PO) Take 100 mg by mouth daily           Ferrous Sulfate (IRON SUPPLEMENT PO) Take 325 mg by mouth daily   (with breakfast)         lisinopril (PRINIVIL/ZESTRIL) 5 MG tablet Take 1 tablet (5 mg)   by mouth daily 90 tablet 3      metoprolol tartrate (LOPRESSOR) 50 MG tablet Take 1 tablet (50   mg) by mouth 2 times daily 180 tablet 3      multivitamin, therapeutic with minerals (MULTI-VITAMIN) TABS   tablet Take 1 tablet by mouth daily        nitroGLYcerin (NITROSTAT) 0.4 MG sublingual tablet Place 1   tablet (0.4 mg) under the tongue every 5 minutes as needed May   repeat X 2 and if chest pain persists, call 911 25 tablet 2      PROAIR RESPICLICK 108 (90 Base) MCG/ACT inhaler 2 puffs every 4   hours as needed         TRELEGY ELLIPTA 100-62.5-25 MCG/INH oral inhaler 1 puff daily              Current medications:  Current Facility-Administered Medications   Medication     albuterol (PROAIR HFA/PROVENTIL HFA/VENTOLIN HFA) 108 (90 Base)   MCG/ACT inhaler 2 puff     [START ON 8/31/2020] allopurinol (ZYLOPRIM) tablet 300 mg     allopurinol (ZYLOPRIM) tablet 600 mg     [START ON 8/31/2020] atorvastatin (LIPITOR) tablet 80 mg     [START ON 8/31/2020] cyanocobalamin (VITAMIN B-12) tablet 100   mcg     [START ON 8/31/2020] lisinopril (ZESTRIL) tablet 5 mg     Medication Instruction     metoprolol tartrate (LOPRESSOR) tablet 50 mg     rasburicase (ELITEK) 6 mg in sodium chloride 0.9 % 50 mL   infusion            Review of Systems:   12-point review of systems was negative except as mentioned in   the HPI.          Exam:   BP (!) 151/82 (BP Location: Left arm)   Pulse 80   Temp 99.1  F   (37.3  " C) (Oral)   Resp 18   Ht 1.626 m (5' 4\")   Wt 85.5 kg   (188 lb 6.4 oz)   SpO2 95%   BMI 32.34 kg/m      GEN: AAOx3, NAD   HEENT: PERRLA, no scleral icterus, mucus membranes moist  NECK: Supple, JVP ~[id number]   RESP: mild crackles in bilateral lungs by the bases  CV: RRR, 3/6 systolic murmur in aortic precordium, no rubs or   gallops  ABD: Soft, nontender to palpation, +BS, no guarding  EXT: +1 pitting edema in both lower legs, WWPx2   NEURO: no focal neurological deficit  SKIN: Normal skin turgor, no rash on limited exam         Data:   Labs:  CMP  Recent Labs   Lab 20  1449    135   POTASSIUM 4.2 4.5   CHLORIDE 106 106   CO2 18* 20   ANIONGAP 10 9   * 134*   BUN 38* 35*   CR 1.67* 1.76*   GFRESTIMATED 40* 38*   GFRESTBLACK 47* 44*   MARIA ALEJANDRA 8.7 8.6   MAG 2.4*  --    PHOS 3.1  --    PROTTOTAL 7.8 7.4   ALBUMIN 3.2* 3.3*   BILITOTAL 1.1 0.9   ALKPHOS 127 132   * 116*   ALT 67 51     CBC  Recent Labs   Lab 20  1449   .1* 180.5*   RBC 2.23* 1.97*   HGB 6.7* 5.9*   HCT 22.5* 20.3*   * 103*   MCH 30.0 29.9   MCHC 29.8* 29.1*   RDW 21.2* 22.3*   PLT 26* 29*     INR  Recent Labs   Lab 20  1449   INR 1.46* 1.29*     Lab Results   Component Value Date    TROPI 0.846 () 2020    TROPI 0.591 () 2020    TROPI <0.015 2018    TROPONIN 0.02 2018    TROPONIN 0.00 2013     Recent Labs   Lab Test 10/07/19  0908 18  0914  09/23/15  0948 14  0942   CHOL 110 95   < > 103 101   HDL 37* 40   < > 40* 33*   LDL 59 42   < > 47 52   TRIG 69 66   < > 79 76   CHOLHDLRATIO  --   --   --  2.6 3.0    < > = values in this interval not displayed.            Imaging:   [id number]      Echocardiogram:  20   The left ventricle is normal in size. Proximal septal thickening   is noted.  Left ventricular systolic function is mildly reduced. The visual   ejection  fraction is estimated at 45-50%. LVEF 50% " based on biplane 2D   tracing. Left  ventricular diastolic function is indeterminate. Septal wall   motion  abnormality may reflect pacemaker activation. There is no   thrombus seen in the  left ventricle.  The right ventricle is normal size. There is a catheter/pacemaker   lead seen in  the right ventricle. The right ventricular systolic function is   normal.  Mild left atrial enlargement.  Mild mitral and tricuspid regurgitation.  Mild valvular aortic stenosis.  In direct comparison to the previous study dated 11/26/2018, the   patient is  now status post pacemaker implantation and there has been a mild   interval  deterioration in left ventricular systolic function.    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 Bedside   echocardiogram was performed and showed LVEF estimated as 40-45%   on visual inspection which was unchanged to previous   echocardiogram from 06/2020. There were no new wall motion   abnormalities. His physical exam had signs of some volume   overload and echo showed IVC to be in the intermediate range.   Unclear at this moment to ascertain what prompted CHF   exacerbation beyond potential blast crisis; but  arrhythmogenic,   BP control and known past diastolic dysfunction could be   contributing as well. His EKG without signs of ACS. Troponin   trend 0.591 -> 0.864. As far as his current clinical picture   representing true ACS, he had a myocardial perfusion study about   8 weeks ago that showed known fixed perfusion defects   representing infarcted regions without evidence of ischemia.   However, with his history of CAD and known non-obstructive   lesions in his coronary system it is possible that he has   developed a type 2 MI as his hemoglobin is 5.9. Additionally,   there could be some leukostasis and increased blood viscosity   since the WBC is 180k further affecting coronary perfusion.  - Formal echocardiogram pending  - Continue to trend troponin  - Transfuse for Hgb goal >8  - Would not start IV anticoagulation for ACS until Hgb corrected   as troponin elevation could be reversible with treating anemia  - No acute coronary interventions due to above reasons and plt   29k  - Give one dose of furosemide 40mg IV, will need to monitor   volume status closely especially if starting apheresis thearpies  - Ok to hold ACEi for now, would have hydralazine PRN for BP   control  - Device interrogation pending  - Continue home BB   - Ok to hold aspirin for now due to low plt, but can resume once   CBC stable and no evidence or concern for bleeding  - Ok to hold statin if worried about LFTs but can be resume once   more stable     #CHB s/p PPM 2018  #Afib on apixaban (CHADVASC 4)  Patient had 12 minutes of afib upon device check few weeks ago.   - Device interrogation pending  - Once more stable CBC and if no evidence or concerns for   bleeding, resume home apixaban  - Diuresis as above  - BB as above    Patient to be staffed in the AM.    Goran Farley MD PhD  Cardiology Fellow  Pager: [phone]             ABO/Rh type and screen   Result Value Ref Range    Units Ordered 1     ABO O     RH(D) Pos     Antibody Screen Neg      Test Valid Only At          [facility]    Specimen Expires 09/02/2020     Crossmatch Red Blood Cells    Blood component   Result Value Ref Range    Unit Number [id number]     Blood Component Type Red Blood Cells LeukoReduced Irradiated     Division Number 00     Status of Unit Released to care unit     Blood Product Code [id number]     Unit Status ISS    CMV Antibody IgG   Result Value Ref Range    CMV Antibody IgG <0.2 0.0 - 0.8 AI   Comprehensive metabolic panel   Result Value Ref Range    Sodium 133 133 - 144 mmol/L    Potassium 4.2 3.4 - 5.3 mmol/L    Chloride 106 94 - 109 mmol/L    Carbon Dioxide 18 (L) 20 - 32 mmol/L    Anion Gap 10 3 - 14 mmol/L    Glucose 132 (H) 70 - 99 mg/dL    Urea Nitrogen 38 (H) 7 - 30 mg/dL    Creatinine 1.67 (H) 0.66 - 1.25 mg/dL    GFR Estimate 40 (L) >60 mL/min/[1.73_m2]    GFR Estimate If Black 47 (L) >60 mL/min/[1.73_m2]    Calcium 8.7 8.5 - 10.1 mg/dL    Bilirubin Total 1.1 0.2 - 1.3 mg/dL    Albumin 3.2 (L) 3.4 - 5.0 g/dL    Protein Total 7.8 6.8 - 8.8 g/dL    Alkaline Phosphatase 127 40 - 150 U/L    ALT 67 0 - 70 U/L     (H) 0 - 45 U/L   EBV Capsid Antibody IgG   Result Value Ref Range    EBV Capsid Antibody IgG 0.4 0.0 - 0.8 AI   Fibrinogen activity   Result Value Ref Range    Fibrinogen 433 (H) 200 - 420 mg/dL   HIV Antigen Antibody Combo   Result Value Ref Range    HIV Antigen Antibody Combo Nonreactive NR^Nonreactive       INR   Result Value Ref Range    INR 1.46 (H) 0.86 - 1.14   Lactate Dehydrogenase   Result Value Ref Range    Lactate Dehydrogenase 1,300 (H) 85 - 227 U/L   Magnesium   Result Value Ref Range    Magnesium 2.4 (H) 1.6 - 2.3 mg/dL   Phosphorus   Result Value Ref Range    Phosphorus 3.1 2.5 - 4.5 mg/dL   Troponin I   Result Value Ref Range    Troponin I ES 0.846 (HH) 0.000 - 0.045 ug/L   Uric acid   Result Value Ref Range    Uric Acid 11.4 (H) 3.5 - 7.2 mg/dL   CBC with platelets differential   Result  Value Ref Range    .1 (HH) 4.0 - 11.0 10e9/L    RBC Count 2.23 (L) 4.4 - 5.9 10e12/L    Hemoglobin 6.7 (LL) 13.3 - 17.7 g/dL    Hematocrit 22.5 (L) 40.0 - 53.0 %     (H) 78 - 100 fl    MCH 30.0 26.5 - 33.0 pg    MCHC 29.8 (L) 31.5 - 36.5 g/dL    RDW 21.2 (H) 10.0 - 15.0 %    Platelet Count 26 (LL) 150 - 450 10e9/L    Diff Method PENDING    Reticulocyte count   Result Value Ref Range    % Retic 2.4 (H) 0.5 - 2.0 %    Absolute Retic 53.1 25 - 95 10e9/L   UA with Microscopic   Result Value Ref Range    Color Urine Yellow     Appearance Urine Clear     Glucose Urine Negative NEG^Negative mg/dL    Bilirubin Urine Negative NEG^Negative    Ketones Urine Negative NEG^Negative mg/dL    Specific Gravity Urine 1.010 1.003 - 1.035    Blood Urine Trace (A) NEG^Negative    pH Urine 5.5 5.0 - 7.0 pH    Protein Albumin Urine 30 (A) NEG^Negative mg/dL    Urobilinogen mg/dL Normal 0.0 - 2.0 mg/dL    Nitrite Urine Negative NEG^Negative    Leukocyte Esterase Urine Negative NEG^Negative    Source Midstream Urine     WBC Urine 0 0 - 5 /HPF    RBC Urine <1 0 - 2 /HPF    Squamous Epithelial /HPF Urine <1 0 - 1 /HPF    Transitional Epi <1 0 - 1 /HPF    Mucous Urine Present (A) NEG^Negative /LPF    Hyaline Casts 3 (H) 0 - 2 /LPF   Partial thromboplastin time   Result Value Ref Range    PTT Canceled, Test credited 22 - 37 sec   INR   Result Value Ref Range    INR Canceled, Test credited 0.86 - 1.14   Fibrinogen activity   Result Value Ref Range    Fibrinogen Canceled, Test credited 200 - 420 mg/dL   CBC with platelets differential   Result Value Ref Range    .8 (HH) 4.0 - 11.0 10e9/L    RBC Count 2.54 (L) 4.4 - 5.9 10e12/L    Hemoglobin 7.7 (L) 13.3 - 17.7 g/dL    Hematocrit 24.4 (L) 40.0 - 53.0 %    MCV 96 78 - 100 fl    MCH 30.3 26.5 - 33.0 pg    MCHC 31.6 31.5 - 36.5 g/dL    RDW 20.8 (H) 10.0 - 15.0 %    Platelet Count 22 (LL) 150 - 450 10e9/L    Diff Method PENDING    Troponin I   Result Value Ref Range    Troponin I  ES 1.010 (HH) 0.000 - 0.045 ug/L   Basic metabolic panel   Result Value Ref Range    Sodium 134 133 - 144 mmol/L    Potassium 3.9 3.4 - 5.3 mmol/L    Chloride 107 94 - 109 mmol/L    Carbon Dioxide 17 (L) 20 - 32 mmol/L    Anion Gap 10 3 - 14 mmol/L    Glucose 107 (H) 70 - 99 mg/dL    Urea Nitrogen 40 (H) 7 - 30 mg/dL    Creatinine 1.74 (H) 0.66 - 1.25 mg/dL    GFR Estimate 38 (L) >60 mL/min/[1.73_m2]    GFR Estimate If Black 45 (L) >60 mL/min/[1.73_m2]    Calcium 8.8 8.5 - 10.1 mg/dL   Lactate Dehydrogenase   Result Value Ref Range    Lactate Dehydrogenase 1,495 (H) 85 - 227 U/L   Phosphorus   Result Value Ref Range    Phosphorus 3.2 2.5 - 4.5 mg/dL   Uric acid   Result Value Ref Range    Uric Acid 7.0 3.5 - 7.2 mg/dL   Fibrinogen activity   Result Value Ref Range    Fibrinogen 372 200 - 420 mg/dL   INR   Result Value Ref Range    INR 1.54 (H) 0.86 - 1.14   Partial thromboplastin time   Result Value Ref Range    PTT 38 (H) 22 - 37 sec   Echocardiogram Complete    Narrative    [id number]  [id number]  [id number]  [id number]^SHILPI^BIA           [facility]  Echocardiography Laboratory  [address] [place] [id number]     Name: TIMBO RICH  MRN: [id number]  : 1950  Study Date: 2020 10:50 AM  Age: 70 yrs  Gender: Male  Patient Location: [facility]  Reason For Study: Heart Failure, MI  Ordering Physician: BIA DOBBINS  Referring Physician: ANA ROSA MYRICK  Performed By: Velma Cintron RDCS     BSA: 1.9 m2  Height: 64 in  Weight: 188 lb  HR: 88  BP: 145/86 mmHg  _____________________________________________________________________________  __        Procedure  Complete Portable Echo Adult. Contrast Optison. Technically difficult  study.Extremely poor acoustic windows. Optison (NDC #2370-1754-37) given  intravenously. Patient was given 6 ml mixture of 3 ml Optison and 6 ml saline.  3 ml  wasted.  _____________________________________________________________________________  __        Interpretation Summary  Technically difficult study.Extremely poor acoustic windows.  The Ejection Fraction is estimated at 40-45%.  Inferior wall akinesis is present.  Right ventricular function, chamber size, wall motion, and thickness are  normal.  Right ventricular systolic pressure is 54mmHg above the right atrial pressure.  IVC diameter >2.1 cm collapsing <50% with sniff suggests a high RA pressure  estimated at 15 mmHg or greater.  No pericardial effusion is present.  _____________________________________________________________________________  __        Left Ventricle  Left ventricular size is normal. Mild concentric wall thickening consistent  with left ventricular hypertrophy is present. The Ejection Fraction is  estimated at 40-45%. Grade I or early diastolic dysfunction. Inferior wall  akinesis is present.     Right Ventricle  Right ventricular function, chamber size, wall motion, and thickness are  normal.     Atria  Both atria appear normal.     Mitral Valve  Mild mitral annular calcification is present. Trace to mild mitral  insufficiency is present.        Aortic Valve  Moderate aortic valve calcification is present. Mild aortic stenosis is  present. The mean AoV pressure gradient is 18.0 mmHg.     Tricuspid Valve  The tricuspid valve is normal. Mild tricuspid insufficiency is present. Right  ventricular systolic pressure is 54mmHg above the right atrial pressure.     Pulmonic Valve  The pulmonic valve is normal.     Vessels  The thoracic aorta is normal. The pulmonary artery and bifurcation cannot be  assessed. IVC diameter >2.1 cm collapsing <50% with sniff suggests a high RA  pressure estimated at 15 mmHg or greater.     Pericardium  No pericardial effusion is present.     _____________________________________________________________________________  __  MMode/2D Measurements & Calculations  IVSd:  1.3 cm  LVIDd: 4.6 cm  LVPWd: 1.5 cm  LV mass(C)d: 247.5 grams  LV mass(C)dI: 129.9 grams/m2  LVOT diam: 2.4 cm  LVOT area: 4.4 cm2        EF(MOD-bp): 49.1 %  RWT: 0.65        Doppler Measurements & Calculations  MV E max ankit: 114.0 cm/sec  MV A max ankit: 125.0 cm/sec  MV E/A: 0.91  MV dec slope: 138.0 cm/sec2  Ao V2 max: 274.3 cm/sec  Ao max P.0 mmHg  Ao V2 mean: 201.0 cm/sec  Ao mean P.0 mmHg  Ao V2 VTI: 59.6 cm  NYLA(I,D): 2.2 cm2  NYLA(V,D): 2.4 cm2  LV V1 max P.9 mmHg  LV V1 max: 149.0 cm/sec  LV V1 VTI: 29.3 cm  SV(LVOT): 128.4 ml  SI(LVOT): 67.4 ml/m2  TR max ankit: 366.0 cm/sec  TR max P.6 mmHg  AV Ankit Ratio (DI): 0.54  NYLA Index (cm2/m2): 1.1  E/E' av.5  Lateral E/e': 7.5  Medial E/e': 11.4        _____________________________________________________________________________  __        Report approved by: Ivis Morrow 2020 12:04 PM      Uric acid   Result Value Ref Range    Uric Acid 3.0 (L) 3.5 - 7.2 mg/dL   Phosphorus   Result Value Ref Range    Phosphorus 3.5 2.5 - 4.5 mg/dL   Calcium   Result Value Ref Range    Calcium 8.9 8.5 - 10.1 mg/dL   INR   Result Value Ref Range    INR 1.54 (H) 0.86 - 1.14   Partial thromboplastin time   Result Value Ref Range    PTT 39 (H) 22 - 37 sec   Fibrinogen activity   Result Value Ref Range    Fibrinogen 334 200 - 420 mg/dL   Troponin I   Result Value Ref Range    Troponin I ES 1.105 () 0.000 - 0.045 ug/L   WBC count   Result Value Ref Range    .8 (HH) 4.0 - 11.0 10e9/L   Platelet count   Result Value Ref Range    Platelet Count 21 (LL) 150 - 450 10e9/L

## 2020-09-01 NOTE — PROVIDER NOTIFICATION
08/31/20 1700   Call Information   Date of Call 08/31/20   Time of Call 1620   Name of person requesting the team Ana   Title of person requesting team RN   RRT Arrival time 1625   Time RRT ended 1645   Reason for call   Type of RRT Adult   Primary reason for call Sepsis suspected   Sepsis Suspected Elevated Lactate level   Was patient transferred from the ED, ICU, or PACU within last 24 hours prior to RRT call? Yes   SBAR   Situation LA=6.3   Background Pt admitted from OSH for management of newly diagnosed Leukemia   Notable History/Conditions COPD;Cardiac;Congestive heart failure;Cancer   Assessment WALKER, O2 saturation 93% on 3L other VSS,   Interventions Meds;Labs   Patient Outcome   Patient Outcome Stabilized on unit   RRT Team   Attending/Primary/Covering Physician Dr Sim   Date Attending Physician notified 08/31/20   Time Attending Physician notified 1620   Physician(s) TRINIDAD Richardson   Lead RN Kiya Perez   RT Ria M   Post RRT Intervention Assessment   Post RRT Assessment Stable/Improved   Date Follow Up Done 08/31/20   Time Follow Up Done 2030

## 2020-09-01 NOTE — CONSULTS
Transfusion Medicine Consultation    Angel Sanders [id number]   YOB: 1950 Age: 70 year old   Date of Consult: 9/1/2020      Reason for consult: Leukodepletion            Assessment and Plan:   70 year old male is seen for consultation for initiation of leukapheresis for hyperviscosity related to elevated white blood cell count.  He has a history of , TALIA, CKD, HTN, HLD, CHF with mildly reduced ejection fraction, CAD and afib on aspirin and apixiban (new in June 2020), history of AV block pacemaker dependent. He initially presented to [facility] ED on 8/30/20 with 2 months of progressively worsening dyspnea, where he was found to have , Hgb 5.9, plt 25. He was then transferred for further work up of AML.  Due to worsening symptoms including shortness of breath and worsening troponin levels, leukapheresis was requested to improve the white blood cell count and hopefully improve the manifestations of hyperviscosity.      - The plan is to complete the leukapheresis procedure, monitor the WBC count to determine effectiveness of the procedure.  We will follow up with the clinical team to determine if additional procedures are needed in the future. The patient has a catheter in place that will be used for the procedure    - ACD-A will be used for anticoagulation of the apheresis equipment with calcium gluconate given throughout to offset the effects.           Chief Complaint:   Transfusion medicine consultation.         History of Present Illness:   Angel Sanders is a 70 year old male who is seen for consultation for leukapheresis  for hyperviscosity due to the elevated white blood cell count.  His past medical history includes TALIA, CKD, HTN, HLD, CHF with mildly reduced ejection fraction, CAD and afib on aspirin and apixiban (new in June 2020), history of AV block pacemaker dependent. He initially presented to [facility] ED on 8/30/20 with 2 months of progressively worsening dyspnea,  where he was found to have , Hgb 5.9, plt 25. He was then transferred for further work up of AML.   The procedure, risks/benefits were discussed with the patient and all of his questions were addressed at that time.  Consent was obtained.              Past Medical History:     Past Medical History:   Diagnosis Date     Chronic renal insufficiency      Complete AV block (H)      Coronary atherosclerosis of unspecified type of vessel, native or graft 2008    Acute inf MI; RCA stent; followed by Cardiology     Diverticulosis      Herniated cervical disc      Hyperlipidaemia      Hypertension      Occlusion and stenosis of carotid artery without mention of cerebral infarction 2008    50-69% stenosis of left ICA             Past Surgical History:     Past Surgical History:   Procedure Laterality Date     ARTHROTOMY SHOULDER, ROTATOR CUFF REPAIR, COMBINED  10/23/2013    Procedure: COMBINED ARTHROTOMY SHOULDER, ROTATOR CUFF REPAIR;  Left Shoulder Open Decompression, Distal Clavical Resection, Rotator Cuff Repair , Bicep Tenolysis, and Bicep Tenodesis   ;  Surgeon: Zhang Mattson MD;  Location: [facility] OR     C NONSPECIFIC PROCEDURE  2008    RCA stent; see PMH     COLONOSCOPY N/A 5/15/2018    Procedure: COLONOSCOPY;  COLONOSCOPY ;  Surgeon: Cory Hernandez MD;  Location:  GI     PHACOEMULSIFICATION CLEAR CORNEA WITH STANDARD INTRAOCULAR LENS IMPLANT  11/20/2013    Procedure: PHACOEMULSIFICATION CLEAR CORNEA WITH STANDARD INTRAOCULAR LENS IMPLANT;  RIGHT PHACOEMULSIFICATION CLEAR CORNEA WITH STANDARD INTRAOCULAR LENS IMPLANT ;  Surgeon: Rikki Reddy MD;  Location: [facility]     PHACOEMULSIFICATION CLEAR CORNEA WITH STANDARD INTRAOCULAR LENS IMPLANT  12/3/2013    Procedure: PHACOEMULSIFICATION CLEAR CORNEA WITH STANDARD INTRAOCULAR LENS IMPLANT;  LEFT PHACOEMULSIFICATION CLEAR CORNEA WITH STANDARD INTRAOCULAR LENS IMPLANT;  Surgeon: Rikki Reddy MD;  Location: [facility]              Social History:   Lives  in [place]            Allergies:     Allergies   Allergen Reactions     Adhesive Tape Rash             Medications:     Current Facility-Administered Medications   Medication     albuterol (PROAIR HFA/PROVENTIL HFA/VENTOLIN HFA) 108 (90 Base) MCG/ACT inhaler 2 puff     allopurinol (ZYLOPRIM) tablet 300 mg     artificial saliva (BIOTENE DRY MOUTHWASH) liquid 15 mL     artificial saliva (BIOTENE MT) solution 1 spray     carboxymethylcellulose PF (REFRESH PLUS) 0.5 % ophthalmic solution 1 drop     cyanocobalamin (VITAMIN B-12) tablet 100 mcg     fluticasone-vilanterol (BREO ELLIPTA) 200-25 MCG/INH inhaler 1 puff     hydrALAZINE (APRESOLINE) injection 10-20 mg     hydroxyurea (HYDREA) capsule 2,000 mg     ipratropium - albuterol 0.5 mg/2.5 mg/3 mL (DUONEB) neb solution 3 mL     ipratropium - albuterol 0.5 mg/2.5 mg/3 mL (DUONEB) neb solution 3 mL     [START ON 9/2/2020] levofloxacin (LEVAQUIN) tablet 250 mg     LORazepam (ATIVAN) half-tab 0.25 mg     magnesium sulfate 4 g in 100 mL sterile water (premade)     Medication Instruction     metoprolol tartrate (LOPRESSOR) tablet 50 mg     micafungin (MYCAMINE) 50 mg in sodium chloride 0.9 % 100 mL intermittent infusion     No lozenges or gum should be given while patient on BIPAP/AVAPS/AVAPS AE     ondansetron (ZOFRAN) tablet 4 mg     Patient may continue current oral medications     polyethylene glycol (MIRALAX) Packet 17 g     potassium chloride (KLOR-CON) Packet 20-40 mEq     potassium chloride 10 mEq in 100 mL intermittent infusion with 10 mg lidocaine     potassium chloride 10 mEq in 100 mL sterile water intermittent infusion (premix)     potassium chloride 20 mEq in 50 mL intermittent infusion     potassium chloride ER (KLOR-CON M) CR tablet 20-40 mEq     potassium phosphate 15 mmol in D5W 250 mL intermittent infusion     potassium phosphate 20 mmol in D5W 250 mL intermittent infusion     potassium phosphate 20 mmol in D5W 500 mL intermittent infusion     potassium  "phosphate 25 mmol in D5W 500 mL intermittent infusion     prochlorperazine (COMPAZINE) tablet 5 mg     sennosides (SENOKOT) tablet 8.6 mg     umeclidinium (INCRUSE ELLIPTA) 62.5 MCG/INH inhaler 1 puff             Review of Systems:     Positive for Shortness of Breath.  Chest soreness felt to be related to his extra effort breathing.  Denies nausea, vomiting, diarrhea, numbness, tingling, seizures.           Vital Signs:   /87 (BP Location: Left arm)   Pulse 105   Temp 97.9  F (36.6  C) (Axillary)   Resp (!) 36   Ht 1.626 m (5' 4\")   Wt 88.1 kg (194 lb 3.2 oz)   SpO2 94%   BMI 33.33 kg/m     Alert.  Breathing appears distressed, on supplemental oxygen.  Central line has been placed in the right neck.                Data:     CBC RESULTS:   Recent Labs   Lab Test 09/01/20  0333   .8*   RBC 1.97*   HGB 5.8*   HCT 19.1*   MCV 97   MCH 29.4   MCHC 30.4*   RDW 21.6*   PLT 15*               Sonu Cintron MD  Transfusion Medicine Attending  Laboratory Medicine and Pathology  Pager [phone]         "

## 2020-09-01 NOTE — PLAN OF CARE
Pt very WALKER; has oxygen on at 5L/NC.  Lungs are clear. Condom cath placed for rest. Lasix 40 given with poor results - approx 700 ml of urine (incontinent part of the time).   Lactic ac 6.3 (rapid response team called; blood cultures done and cefepime started) at 1600; awaiting 2000 results. VSS and afebrile. On IV fluids at 100/hr.  Denied pain, denied nausea and ate well. Bone marrow biopsy site clean, dry/intact.  Renal US done.  Started on hydrea and tretinoin.    Problem: Adult Inpatient Plan of Care  Goal: Plan of Care Review  8/31/2020 0969 by Ana Mg, RN  Outcome: No Change

## 2020-09-01 NOTE — PLAN OF CARE
Patient initially restless, climbing out of bed and sitting on edge of bed. Condom catheter wrapped behind legs; dislodging condom catheter, He had pulled off oxygen tubing. Intermittent episodes of dyspnea/anxiety. Sats decreased tto  88% without supplemental oxygen. Initially using nasal cannula with humidification at 5 L. But dyspnea continued. Given Albuterol inhaler with some improvement. Also tried Duoneb per RT.Provider notified. Patient tachypneic. Working harder to breathe Patient reported that he uses a cpap machine at night for sleep apnea.  Switched to Bipap at 50%, Continuous oximetry monitoring started. Maintenance IV fluid infusing at 100 ml/hour, per peripheral IV. New order for Lasix 80 mg IV, which was given. Wearing condom catheter, which he repeatedly pulled off overnight. So Intake/output off. Patient also having hypertension; medicated with Hydralazine IV once. Bed alarm activated  for safety. Patient forgetting alarm was on and kept trying to get up. Temp max 99.AX. Heart rate labile. Has pacemaker. Lactic acid rechecked at 2400; per VPT:  3.4 (decreased). Antibiotic coverage with Cefepime. Venipuncture blood draw. Hemoglobin decreased to 5.8; PRBC's started in morning; transfusing via peripheral IV. Patient had episode of heightened anxiety and dyspnea in morning; Despite bed alarm on, patient was found in bathroom having  pulled off condom catheter, oxygen tubing and clothes. Leaving IV pole near bed. He was anxious and short of breath, He was assisted back to bed. Complaining of feeling chilled. Coughing and wheezing.  Given warm blanket. Repeated duoneb. Switched to oxyplus mask,at 6 L;  which he tolerated better, Patient stated he felt pain near right knee which extended up thigh and around to back of leg. Back also sore from bone marrow biopsy yesterday. Patient determined to require sitter/attendant for safety.

## 2020-09-01 NOTE — PROGRESS NOTES
Rapid Response Team Note    Admission Diagnosis: Acute leukemia  Leukemia (H)     Hospital Course   Brief Summary of events leading to rapid response:   A rapid response was called for Angel Sanders due to lactic acidosis identified by abnormal vitals triggering the SIRS/Sepsis screening alert.    Mr. 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 At the time of his RRT, his Hematology/Oncology team is taking over his care and was happy to continue his work up and treatment. Verbal recommendations were to consider adding broad spectrum antibiotic coverage for possible pneumonia given his ongoing hypoxic respiratory distress, elevated lactate, and congestion on CXR preventing good visualization of any infiltrates.     The patients is not known to have an infection.    Significant Comorbidities:   New AML, tumor lysis syndrome, significant heart disease, A.fib with pacemaker in place on anticoagulation    Medications   Scheduled     allopurinol  300 mg Oral Daily     cyanocobalamin  100 mcg Oral Daily     fluticasone-vilanterol  1 puff Inhalation Daily     hydroxyurea  2,000 mg Oral TID     ipratropium - albuterol 0.5 mg/2.5 mg/3 mL  3 mL Nebulization Q4H While awake     [START ON 9/2/2020] levofloxacin  250 mg Oral Daily     metoprolol tartrate  50 mg Oral BID     micafungin  50 mg Intravenous Q24H     umeclidinium  1 puff Inhalation Daily      PRN   albuterol, artificial saliva, artificial saliva, artificial tears ophthalmic solution, hydrALAZINE, ipratropium - albuterol 0.5 mg/2.5 mg/3 mL, LORazepam, magnesium sulfate, - MEDICATION INSTRUCTIONS -, - MEDICATION INSTRUCTIONS -, ondansetron, - MEDICATION INSTRUCTIONS -, polyethylene glycol, potassium chloride, potassium chloride with lidocaine, potassium chloride,  potassium chloride, potassium chloride, potassium phosphate (KPHOS) in D5W IV, potassium phosphate (KPHOS) in D5W IV, potassium phosphate (KPHOS) in D5W IV, potassium phosphate (KPHOS) in D5W IV, prochlorperazine, sennosides   Allergies   Allergies   Allergen Reactions     Adhesive Tape Rash        Physical Exam   Temp: 97.9  F (36.6  C) Temp  Min: 97.2  F (36.2  C)  Max: 99.4  F (37.4  C)  Resp: (!) 36 Resp  Min: 24  Max: 36  SpO2: 94 % SpO2  Min: 93 %  Max: 100 %  Pulse: 105 Pulse  Min: 59  Max: 114    No data recorded  BP: 129/87 Systolic (24hrs), Av , Min:103 , Max:181   Diastolic (24hrs), Av, Min:68, Max:94     I/Os: I/O last 3 completed shifts:  In: 3130 [P.O.:910; I.V.:1920]  Out: 2775 [Urine:2775]         Significant Results and Procedures   Lactic Acid:   Recent Labs   Lab Test 20  1708 20  2355 20  1553   LACT  --  3.4*  --    LACTS 4.1*  --  6.3*     CBC:   Recent Labs   Lab Test 20  0333 20  1210 20  0340 20  2147   .8* 160.8* 176.8* 188.1*   HGB 5.8*  --  7.7* 6.7*   HCT 19.1*  --  24.4* 22.5*   PLT 15* 21* 22* 26*        TRINIDAD Cedeno  [facility] Job Code Contact #[id number]

## 2020-09-01 NOTE — IR NOTE
Interventional Radiology Intra-procedural Nursing Note    Patient Name: Angel Sanders  Medical Record Number: [id number]  Today's Date: September 1, 2020    Procedure: non tunneled central venous catheter placement    Attending MD in room during timeout: Dr Gaona  Proceduralists: Dr Melecio Moore    Procedure Start Time: 1610  Procedure Puncture time: 1610  Procedure End Time: 1620    Sedation notes: none given    Report given to: Henok SEPULVEDA 5C    D: Patient brought to IR room 4 at 1600. Verified Patient's ID using two identifiers. Informed consent obtained by Dr Gaona.  A: Patient was positioned supine with head of bed up 30 degrees. He was monitored per IR conscious sedation protocol. Patient tolerated the procedure without apparent incident. The right internal jugular non tunneled central venous catheter placement was verified using fluoroscopy and is ready for immediate use per Dr Majano. Platelet transfusion completed at 1620. No signs of transfusion reaction were noticed.  P: Patient returned to  for post procedure recovery and continued cares.    Em Pillai RN  [phone]

## 2020-09-01 NOTE — PROVIDER NOTIFICATION
Paged [phone]  Pt has increased SOB and edema. BP increased. On IVMF 100cc/hr but very little urine output. Did not respond well to Lasix. Also, lactic acid has been high. Will give neb and hydralazine.

## 2020-09-01 NOTE — PROCEDURES
[facility], [place]    Procedure: IR Procedure Note    Date/Time: 9/1/2020 4:22 PM  Performed by: Dominik Vela MD  Authorized by: Dominik Vela MD     UNIVERSAL PROTOCOL   Site Marked: NA  Prior Images Obtained and Reviewed:  Yes  Required items: Required blood products, implants, devices and special equipment available    Patient identity confirmed:  Verbally with patient, arm band, provided demographic data and hospital-assigned identification number  Patient was reevaluated immediately before administering moderate or deep sedation or anesthesia  Confirmation Checklist:  Patient's identity using two indicators, relevant allergies, procedure was appropriate and matched the consent or emergent situation and correct equipment/implants were available  Time out: Immediately prior to the procedure a time out was called    Universal Protocol: the Joint Commission Universal Protocol was followed    Preparation: Patient was prepped and draped in usual sterile fashion           ANESTHESIA    Anesthesia: Local infiltration  Local Anesthetic:  Lidocaine 1% without epinephrine      SEDATION    Patient Sedated: No    See dictated procedure note for full details.  Findings: R internal jugular non-tunneled line placed: tip in right atrium    Specimens: none    Complications: None    Condition: Stable    Plan: May use now    PROCEDURE   Patient Tolerance:  Patient tolerated the procedure well with no immediate complications    Length of time physician/provider present for 1:1 monitoring during sedation: 0

## 2020-09-01 NOTE — PROGRESS NOTES
[facility], [place]    Hematology / Oncology Progress Note    Date of Service (when I saw the patient): 09/01/2020     Assessment & Plan      Angel Sanders is a 70 year old male with PMH of COPD, TALIA, CKD, HTN, HLD, CHF with mildly reduced ejection fraction, CAD and afib on aspirin and apixiban (new in June 2020), history of AV block pacemaker dependent. He initially presented to [facility] ED on 8/30/20 with 2 months of progressively worsening dyspnea, where he was found to have , Hgb 5.9, plt 25. He was then transferred for further work up of AML.    Today:  - Patient in respiratory distress on 6L oxymask. Plan for Duoneb QID. Continue to diurese. Has received Lasix 40 (8/31 1800), 80 (9/1 0200), 60 (9/1 1000), then 100 this afternoon. Goal for I/O net negative 1L.   - Miner placed  - Leukophoresis this evening. Line placed by IR.  - PT mixing studies ordered. 9/1 INR 2.05, PTT 42, fibrinogen 269.  - VBG   - TLS/DIC BID. Hold plasma at this time given leukocytosis.   - LFTs elevated today, continue to monitor  - Venous US right leg negative for DVT  - Trend troponins until flat per cardiology. Repeat EKG if chest pain continues.  - Had discussion about code status today with patient and wife, Cynthia. They said they would think about it, revisit tomorrow.    HEME  #AML  #Leukocytosis  Patient initially presented to [facility] ED on 8/30/20 with 2 months of progressively worsening dyspnea. Patient reports that prior to visiting the ED, he could not even walk to the bathroom without feeling short of breath. After being unable to secure an in-person visit with an outpatient provider, he presented to the ED. He was found to have , Hgb 5.9, plt 25. He was then transferred for further work up and treatment.   - Peripheral flow shows 71% myeloid blasts, PML/JESSICA negative  - BMBx yesterday, awaiting cytogenetics, molecular  - HLA ordered  - Viral serologies: CMV-, EBV-,  hep B/C-, HIV-, HSV1/2 pending  - Leukophoresis this evening  - Continue Hydrea 2 g TID for cytoreduction. Goal WBC 25 for treatment.  - If Hydrea ineffective or respiratory distress worsens, may consider leukophoresis.  - Discontinued ATRA as APL has been ruled out  - Plan to start venetoclex/azacitadine     #PPX  - Levaquin 250 mg daily  - Micafungin 50 mg BID  - Awaiting viral serologies prior to considering ACV     #Anemia, thrombocytopenia  - Transfuse if Hgb <7, plt <10     #Concern for TLS/DIC  Initially, uric acid 11.5, K 3.9, P 3.9, Ca 9.4. Now uric acid 1.2. Today noted INR increase form 1.54 to 2.05. PTT 42, fibrinogen 269.  - Received rasburicase 6 mg once 8/30  - Allopurinol 300 mg daily  - TLS, DIC labs BID   - Mixing studies ordered    #Lactic acidosis - resolving  #Hyperchloremia  RRT called 1600 8/31 for lactic acid 6.3, drawn because of worsening respiratory distress. Likely 2/2 leukemia/cardiac demand, no e/o infection. Most recent lactic acid 3.4.   - BCx pending. UA negative.  - Patient was started on cefepime, now discontinued.  - VBG ordered    PULM  #Acute hypoxic respiratory failure   #Fluid volume overload  #COPD  Uses Trelegy at home but no home O2. Patient is notably volume up secondary to fluids given for TLS. Physical exam showed bilateral basilar crackles, peripheral edema, labored breathing with accessory muscle use on 6L oxymask. Weight up from 188 to 194 in one day.  - Duoneb q4hr  - Continue to diurese. Has received Lasix 40 (8/31 1800), 80 (9/1 0200), 60 (9/1 0930), 60 (9/1 1230), 100 (9/1 1700)  - Goal for I/O net negative 1L  - Incruse and Breo Ellipta in hospital. Resume Trelegy on discharge.  - Albuterol available PRN    #Central and TALIA with Cheyne-gamboa respirations  Diagnosed with Obstructive and central sleep apnea on home sleep apnea test May 2020. He has been using a home BiPAP ASV (adapto-servo-ventilator) compliantly for x 2.5 months.  - Continue in hospital at night  "and during day as needed    RENAL  #THERESA on CKD - resolving  Creatinine baseline ~1.2. 1.74 upon admission, down to 1.45 today.   - Stop continuous fluids  - Continue to diurese as above  - Renal US negative     CARDIOVASCULAR  #Elevated troponin, possible type 2 MI  Upon presentation in ED 8/30, first trop 0.59 > 0.846 > 1.01.   - Seen by cardiology. Suspected etiology of elevated troponins 2/2 anemia and possible leukostasis/increased blood viscosity.   - Echo 8/31 stable per cardiology, showed: \"ejection fraction estimated 40-45%, inferior wall akinesis is present ... IVC diameter >2.1 cm collapsing <50% with sniff suggests a high RA pressure estimated at 15 mmHg or greater.\"  - Diurese as above, goal net -2L per cardiology  - Cardiology states no concern for cardiomyopathy or for chemotherapy treatment moving forward  - Trend troponins till flat per cardiology  - Repeat ECG if patient reports chest pain     #Hypertension  - Hold PTA Lisinopril 5 mg daily  - Continue PTA metoprolol  - Hydralazine PRN per cardiology     #HLD  - Hold statin while inpatient     #Atrial fibrillation  - Pacemaker interrogated 8/31  - Holding aspirin and apixaban. Once more stable CBC and if no evidence or concerns for bleeding, resume home apixaban per cardiology.      #H/o CAD  Per patient history, MI January 2008, stent placed in RCA. Carotid artery stenosis s/p stent in 2009.    MISC  #Elevated LFTs  Likely 2/2 leukemia, fluid status, type 2 MI.  - Continue to monitor    #Right calf pain  Pulses intact, no tenderness to palpation, no edema/erythema of RLE. Despite thrombocytopenia, evaluate for DVT due to new malignancy.  - Venous US ordered to r/o DVT, negative    #FEN  - IVF bolus PRN  - Tolerated regular diet well    Patient was discussed with Dr. Davalos.    ARIANA CareyC  Pager #[phone]    Interval History   RRT called 1600 8/31 for lactic acid 6.3, drawn because of worsening respiratory distress. BCx drawn. Patient was " started on cefepime, now discontinued.     Flow results indicate AML. Awaiting cytogenetics and molecular.    Overnight and throughout the day today patient has significant difficultly breathing, even with minor movements, with 6L on oxymask. Duoneb q4hr. Has home BiPAP bedside. We are trying aggressively diuresis as this seems to be secondary to volume overload. Monitoring I/Os. Trops are uptrending - ECG WNL, cardiology following. Leukophoresis this evening. ICU notified of patient's tenuous status.     Vital Signs with Ranges  Temp:  [97.2  F (36.2  C)-99  F (37.2  C)] 98.1  F (36.7  C)  Pulse:  [] 67  Resp:  [22-30] 24  BP: (103-160)/(68-90) 132/82  FiO2 (%):  [45 %-50 %] 45 %  SpO2:  [92 %-100 %] 95 %  I/O last 3 completed shifts:  In: 3230 [P.O.:710; I.V.:2520]  Out: 2375 [Urine:2375]    Physical Exam   General: Reclined in bed, alert, in respiratory distress. Pleasant and conversational as breathing allows.  Skin: No concerning lesions, rash, jaundice, cyanosis, erythema, or ecchymoses on exposed surfaces.   HEENT: NCAT. Anicteric sclera. MMM with no lesions, erythema, or thrush.   Respiratory: Labored breathing on 6L oxymask. Accessory muscle use. Bilateral basilar crackles. Wheezing in upper lobes.  Cardiovascular: RRR. Systolic murmur. No gallops/rub.   Gastrointestinal: Normoactive BS. Abdomen soft, ND, NT. No palpable masses.  Extremities: Mild LE edema.   Neurologic: A&O x 3, speech normal, no deficits grossly.    Medications     - MEDICATION INSTRUCTIONS -       - MEDICATION INSTRUCTIONS -       - MEDICATION INSTRUCTIONS -         allopurinol  300 mg Oral Daily     cyanocobalamin  100 mcg Oral Daily     fluticasone-vilanterol  1 puff Inhalation Daily     furosemide  60 mg Intravenous Once     hydroxyurea  2,000 mg Oral TID     ipratropium - albuterol 0.5 mg/2.5 mg/3 mL  3 mL Nebulization 4x daily     [START ON 9/2/2020] levofloxacin  250 mg Oral Daily     metoprolol tartrate  50 mg Oral BID      micafungin  50 mg Intravenous Q24H     umeclidinium  1 puff Inhalation Daily     Data   Results for orders placed or performed during the hospital encounter of 08/30/20 (from the past 24 hour(s))   Leukemia Lymphoma Evaluation Non CSF   Result Value Ref Range    Copath Report       Patient Name: TIMBO RICH  MR#: [id number]  Specimen #: [id number]  Collected: 8/31/2020 12:07  Received: 8/31/2020 14:43  Reported: 8/31/2020 17:51  Ordering Phy(s): SURESH YOST    For improved result formatting, select 'View Enhanced Report Format' under   Linked Documents section.  _________________________________________    SPECIMEN(S):  Blood    INTERPRETATION:  Blood:       Increased myeloid blasts (71%)       See comment    COMMENT:  The blast gate merges with the granulocyte gate, which may affect accurate   blast estimation based on CD45  versus side scatter. The findings are consistent with acute myeloid   leukemia as review of the peripheral smear  shows >20% blasts.  Subclassification requires correlation with clinical   history, morphology, and genetic  studies.    Dr. Davalos was notified of these results on 8/31/20 at 1750. As   peripheral blood flow studies are  diagnostic, flow on the bone marrow core is cancelled with Dr. Davalos's   agreement.    RESUL TS:  Percentages reported below are based on the total number of CD45 positive   viable leukocytes. If applicable,  percentage of plasma cells is from total viable nucleated cells.    The blast gate merges with the granulocyte gate and a combination of CD45   versus side scatter and CD15 is used  to gate the blast population.    71% blasts with the following immunophenotype:  Positive for partial CD10 (predominantly negative), dim-to-negative CD13,   partial CD15, CD33, dim CD38, dim  CD45, partial CD64, partial , (in a small subset), and cytoplasmic   myeloperoxidase (although there is  merging between the blast and the granulocyte gate, a  conservative gating   strategy still shows MPO positivity  on the blasts).  Negative for CD2, CD3 (surface and cytoplasmic), CD4, CD5, CD7, CD8,   CD11b, CD16, CD19, CD20, CD22, CD36,  CD56, CD58, CD61, cytoplasmic CD79a, glycophorin A, nuclear terminal   deoxynucleotidyl transferase (TdT).    99% of the blasts are positive for CD33 (clone P67. 6 in APC-R700, relative   to background lymphocytes).    Resident/Fellow Review by:  Dr. Anna Parker    A resident/fellow in an ACGME accredited training program was involved in   the selection of testing, review of  flow scattergrams, and/or interpretation of this case. I, as the senior   physician, attest that I: (i)  confirmed appropriate testing, (ii) examined the relevant flow   scattergrams for the specimen(s); and (ii)  rendered or confirmed the interpretation(s).    ANTIBODIES:  Multi-color flow analysis is performed for the following markers: CD2,   surface and cytoplasmic CD3, CD4, CD5,  CD7, CD8, CD10, CD11b, CD13, CD14, CD15, CD16, CD19, CD20, CD22, CD33,   CD34, CD36, CD38, CD45, CD56, CD58,  CD61, CD64, , glycophorin A, HLA-DR, cytoplasmic CD79a, cytoplasmic   myeloperoxidase, and nuclear terminal  deoxynucleotidyl transferase (TdT). Cells are gated to isolate populations   (CD45 versus side scatter and  forward scatter versus side scatter), to exclude debris (forwar d scatter   versus side scatter) and to exclude  cell doublets (forward scatter height versus forward scatter width and   side scatter height versus side scatter  width). Forward scatter varies with cell size. Side scatter varies with   the amount of cytoplasmic granules.  Intensity for CD45 usually increases as hematolymphoid cells mature. The   analytic sensitivity of this assay to  detect abnormal B-lymphoblasts as rare flow events is 0.01%.    CLINICAL HISTORY:  70 year old male with shortness of breath and leukocytosis    I have personally reviewed all specimens and/or slides, including  the   listed special stains, and used them  with my medical judgment to determine the final diagnosis.    Electronically signed out by:  SUNDEEP Alicea    This test was developed and its performance characteristics determined by   [facility]. It has not been cleared or   approved by the US Food and Drug  Administration.   FDA does not require this test to go through premarket   FDA review. This test is used for  clinical purposes and should not be regarded as investigational or for   research.  This laboratory is certified  under the Clinical Laboratory Improvement Amendments (CLIA) as qualified   to perform high complexity clinical  laboratory testing.    CPT Codes:  A: 51345-20-PSOH01(29), 64513-MI, 02641-RMBF>15    TESTING LAB LOCATION:  [address] [id number] [phone]    COLLECTION SITE:  Client:  [facility]  [facility]  [facility]     Uric acid   Result Value Ref Range    Uric Acid 3.0 (L) 3.5 - 7.2 mg/dL   Phosphorus   Result Value Ref Range    Phosphorus 3.5 2.5 - 4.5 mg/dL   Calcium   Result Value Ref Range    Calcium 8.9 8.5 - 10.1 mg/dL   INR   Result Value Ref Range    INR 1.54 (H) 0.86 - 1.14   Partial thromboplastin time   Result Value Ref Range    PTT 39 (H) 22 - 37 sec   Fibrinogen activity   Result Value Ref Range    Fibrinogen 334 200 - 420 mg/dL   Hepatitis B surface antigen   Result Value Ref Range    Hep B Surface Agn Nonreactive NR^Nonreactive   Hepatitis B core antibody   Result Value Ref Range    Hepatitis B Core Ladi Nonreactive NR^Nonreactive   Hepatitis B Surface Antibody   Result Value Ref Range    Hepatitis B Surface Antibody 0.00 <8.00 m[IU]/mL   Hepatits C antibody   Result Value Ref Range    Hepatitis C Antibody Nonreactive NR^Nonreactive   Troponin I   Result Value Ref Range     Troponin I ES 1.105 (HH) 0.000 - 0.045 ug/L   FISH With Professional Interpretation   Result Value Ref Range    Copath Report       Patient Name: TIMBO RICH  MR#: [id number]  Specimen #: [id number]  Collected: 8/31/2020 12:10  Received: 8/31/2020 14:15  Reported: 9/1/2020 10:50  Ordering Phy(s): SURESH YOST  Additional Phy(s): ANA ROSA MYRICK    For improved result formatting, select 'View Enhanced Report Format' under   Linked Documents section.  __________________________________________    TEST(S) REQUESTED:  FISH Interphase    SPECIMEN DESCRIPTION:  Peripheral Blood (Leukemic)    CLINICAL COMMENTS:  New acute leukemia vs APL    METHODS:  Specimen: Uncultured specimen  Test performed: Fluorescence in situ hybridization (FISH)  Interphase cells examined: 200  Probes:  - PML (15q24) / JESSICA (17q21.1-21.2) - [employer]    RESULTS:  NORMAL  - No evidence of PML-JESSICA fusion    INTERPRETATION:  None (0%) of the interphase cells examined had a signal pattern indicative   of a PML-JESSICA gene fusion. Thus, no  evidence was found of acute promyelocytic leukemia.    A verbal report of these results was gi martha to Dr. Henok Davalos on   9-1-20.    ISCN:  nuc anna(PML,JESSICA)x2[200]    ADDITIONAL COMMENTS:  Control range:   PML-JESSICA: 0-0.1%    Analyte Specific Reagents (ASRs) are used in many laboratory tests   necessary for standard medical care and  generally do not require FDA approval.  This test was developed and its   performance characteristics determined  by the [facility].  It has not been cleared or  approved by the U.S. Food and Drug Administration.    Electronically Signed Out By:  Dhara Carvajal M.D., [facility]    CPT Codes:  A: 40747-VPJT, 51595-AXTAY  B: 82400-HAUK, 69590-LYHS <CR>    TESTING LAB LOCATION:  [facility]  [address] [facility], [address]  [address]  [phone]    COLLECTION SITE:  Client:  [facility]  Location:  [facility])     WBC count   Result Value Ref Range    .8 (HH) 4.0 - 11.0 10e9/L   Platelet count   Result Value Ref Range    Platelet Count 21 (LL) 150 - 450 10e9/L   Magnesium   Result Value Ref Range    Magnesium 2.2 1.6 - 2.3 mg/dL   Leukemia Lymphoma Evaluation Non CSF   Result Value Ref Range    Copath Report       Patient Name: TIMBO RICH  MR#: [id number]  Specimen #: [id number]  Collected: 8/31/2020 14:25  Received: 9/1/2020 07:38  Reported: 9/1/2020 07:43  Ordering Phy(s): JUAN WARREN    For improved result formatting, select 'View Enhanced Report Format' under   Linked Documents section.  _________________________________________    SPECIMEN(S):  Canceled Test, Bone marrow core, left    INTERPRETATION:  Canceled-Test Credited. The physician, Dr. Anna Parker, notified/approved   on 08/31/2020 at 16:00.    I have personally reviewed all specimens and/or slides, including the   listed special stains, and used them  with my medical judgment to determine the final diagnosis.    Electronically signed out by:    [employer] Technical Staff    CPT Codes:    TESTING LAB LOCATION:  [place] [id number] [address]  [phone]    COLLECTION SITE:  Client:  [facility]  [facility]     Lactic acid level STAT   Result Value Ref Range    Lactate for Sepsis Protocol 6.3 (HH) 0.7 - 2.0 mmol/L   Cardiac Device Check - Inpatient   Result Value Ref Range    Date Time Interrogation Session [id number]     Implantable Pulse Generator  [employer]     Implantable Pulse Generator Model L331 ACCOLADE MRI EL     Implantable Pulse Generator Serial Number [id number]     Type Interrogation Session In Clinic     Clinic Name [facility]      Implantable Pulse Generator Type Pacemaker     Implantable Pulse Generator Implant Date 20181126     Implantable Lead  [employer]     Implantable Lead Model 7740 Ingevity MRI     Implantable Lead Serial Number [id number]     Implantable Lead Implant Date 20181126     Implantable Lead Polarity Type Bipolar Lead     Implantable Lead Location Detail 1 UNKNOWN     Implantable Lead Location Right Atrium     Implantable Lead  [employer]     Implantable Lead Model 7741 [employer] MRI     Implantable Lead Serial Number [id number]     Implantable Lead Implant Date 20181126     Implantable Lead Polarity Type Bipolar Lead     Implantable Lead Location Detail 1 UNKNOWN     Implantable Lead Location Right Ventricle     [place] Setting Mode (NBG Code) DDD     [place]ton Setting Lower Rate Limit 60 [beats]/min    [place] Setting Maximum Tracking Rate 130 [beats]/min    [place]ton Setting Maximum Sensor Rate 130 [beats]/min    [place]ton Setting Hysterisis Rate Off     [place]ton Setting LEMUEL Delay Low 200.0 ms    [place]ton Setting PAV Delay Low 200.0 ms    [place] Setting PAV Delay High 150.0 ms    [place]ton Setting LEMUEL Delay High 150.0 ms    [place]ton Setting AT Mode Switch Rate 170 [beats]/min    [place] Setting AT Mode Switch Mode VDIR     Lead Channel Setting Sensing Polarity Bipolar     Lead Channel Setting Sensing Sensitivity 0.2 mV    Lead Channel Setting Sensing Adaptation Mode Adaptive     Lead Channel Setting Sensing Polarity Bipolar     Lead Channel Setting Sensing Sensitivity 1.5 mV    Lead Channel Setting Sensing Adaptation Mode Adaptive     Lead Channel Setting Pacing Polarity Bipolar     Lead Channel Setting Pacing Pulse Width 0.4 ms    Lead Channel Setting Pacing Amplitude 5.0 V    Lead Channel Setting Pacing Capture Mode Adaptive     Lead Channel Setting Pacing Polarity Bipolar     Lead Channel Setting Pacing Pulse Width 0.4 ms    Lead Channel Setting Pacing Amplitude 3.5 V    Lead Channel Setting Pacing Capture Mode Adaptive      Zone Setting Type Category VT     Zone Setting Vendor Type Category VT     Zone Setting Detection Interval 375.0 ms    Lead Channel Impedance Value 595.0 ohm    Lead Channel Sensing Intrinsic Amplitude 0.5 mV    Lead Channel Pacing Threshold Amplitude 1.0000 V    Lead Channel Pacing Threshold Pulse Width 0.4 ms    Lead Channel Impedance Value 635.0 ohm    Lead Channel Sensing Intrinsic Amplitude Paced     Lead Channel Pacing Threshold Amplitude 1.6000 V    Lead Channel Pacing Threshold Pulse Width 0.4 ms    Battery Date Time of Measurements 20200831080417     Battery Status Beginning of Service     Battery Remaining Longevity 114 mo    Kolton Statistic Date Time Start 20200219000000     Kolton Statistic Date Time End 20200831080417     Kolton Statistic RA Percent Paced 26 %    [place] Statistic RV Percent Paced 100 %    Episode Statistic Total Count 3     Episode Statistic Type Category VT     Episode Statistic Vendor Type Category NSVT     Episode Statistic Total Date Time End 20200831000000     Episode Statistic Recent Count 0     Episode Statistic Type Category VT     Episode Statistic Vendor Type Category NSVT     Episode Statistic Recent Date Time Start [id number]     Episode Statistic Recent Date Time End 20200831000000     Narrative    Patient seen on 5 C for evaluation and iterative programming of a [employer] Dual lead pacemaker per MD orders.  Normal pacemaker function.   No ventricular episodes recorded. 6 ATR episodes recorded since 2-19-20,   longest episode 12 minutes. Patient reports he was on Eliquis BID, but it   was recently stopped due to some bleeding issues. Intrinsic rhythm = SR w/   CHB, no R waves @ VVI 30 bpm. AP = 26%.  = 100%. Estimated battery   longevity to ANGELO = 9.5 years. RA lead trends display a decrease in P waves   in March and Auto RA threshold trends display a threshold of 3V @ 0.4 ms,   today's manual RA threshold = 1 V @ 0.4ms. RV lead trends appear stable.   Plan  for patient to continue follow up with [facility], which is his primary   device clinic. MARCO Alegre RN, BSN.      Dual lead pacemaker    I have reviewed and interpreted the device interrogation, settings,   programming and nurse's summary. The device is functioning within normal   device parameters. I agree with the current findings, assessment and plan.   Blood culture    Specimen: Blood    Left Arm   Result Value Ref Range    Specimen Description Blood Left Arm     Culture Micro No growth after 11 hours    US Renal Complete    Narrative    EXAMINATION: US RENAL COMPLETE, 8/31/2020 7:07 PM     COMPARISON: CT 6/3/2020    HISTORY: New leukemia with a catheter    TECHNIQUE: The kidneys and bladder were scanned in the standard  fashion with specialized ultrasound transducer(s) using both gray  scale and limited color/spectral Doppler techniques.    FINDINGS:    Right kidney: Measures 10.3 cm in length. Parenchyma is of normal  thickness and echogenicity. No focal mass. A few echogenic  nonshadowing foci within the lower pole, favored to represent vascular  structures. No hydronephrosis.    Left kidney: Measures 12.8 cm in length. Parenchyma is of normal  thickness and echogenicity. No focal mass. No hydronephrosis.     Bladder: Unremarkable.      Impression    IMPRESSION:  Normal renal ultrasound.    I have personally reviewed the examination and initial interpretation  and I agree with the findings.    CLARY REYNOLDS MD   Blood culture    Specimen: Blood    Left Arm   Result Value Ref Range    Specimen Description Blood Left Arm     Culture Micro No growth after 8 hours    Uric acid   Result Value Ref Range    Uric Acid 1.4 (L) 3.5 - 7.2 mg/dL   Phosphorus   Result Value Ref Range    Phosphorus 3.0 2.5 - 4.5 mg/dL   Calcium   Result Value Ref Range    Calcium 7.4 (L) 8.5 - 10.1 mg/dL   INR   Result Value Ref Range    INR 1.91 (H) 0.86 - 1.14   Partial thromboplastin time   Result Value Ref Range    PTT 40 (H) 22 -  37 sec   Fibrinogen activity   Result Value Ref Range    Fibrinogen 271 200 - 420 mg/dL   Potassium   Result Value Ref Range    Potassium 4.0 3.4 - 5.3 mmol/L   Lactic acid whole blood   Result Value Ref Range    Lactic Acid 3.4 (H) 0.7 - 2.0 mmol/L   CBC with platelets differential   Result Value Ref Range    .8 (HH) 4.0 - 11.0 10e9/L    RBC Count 1.97 (L) 4.4 - 5.9 10e12/L    Hemoglobin 5.8 (LL) 13.3 - 17.7 g/dL    Hematocrit 19.1 (L) 40.0 - 53.0 %    MCV 97 78 - 100 fl    MCH 29.4 26.5 - 33.0 pg    MCHC 30.4 (L) 31.5 - 36.5 g/dL    RDW 21.6 (H) 10.0 - 15.0 %    Platelet Count 15 (LL) 150 - 450 10e9/L    Diff Method PENDING    Lactate Dehydrogenase   Result Value Ref Range    Lactate Dehydrogenase 1,612 (H) 85 - 227 U/L   Uric acid   Result Value Ref Range    Uric Acid 1.2 (L) 3.5 - 7.2 mg/dL   Phosphorus   Result Value Ref Range    Phosphorus 3.7 2.5 - 4.5 mg/dL   INR   Result Value Ref Range    INR 2.05 (H) 0.86 - 1.14   Partial thromboplastin time   Result Value Ref Range    PTT 42 (H) 22 - 37 sec   Fibrinogen activity   Result Value Ref Range    Fibrinogen 269 200 - 420 mg/dL   Comprehensive metabolic panel   Result Value Ref Range    Sodium 140 133 - 144 mmol/L    Potassium 4.0 3.4 - 5.3 mmol/L    Chloride 116 (H) 94 - 109 mmol/L    Carbon Dioxide 14 (L) 20 - 32 mmol/L    Anion Gap 11 3 - 14 mmol/L    Glucose 112 (H) 70 - 99 mg/dL    Urea Nitrogen 51 (H) 7 - 30 mg/dL    Creatinine 1.45 (H) 0.66 - 1.25 mg/dL    GFR Estimate 48 (L) >60 mL/min/[1.73_m2]    GFR Estimate If Black 56 (L) >60 mL/min/[1.73_m2]    Calcium 7.4 (L) 8.5 - 10.1 mg/dL    Bilirubin Total 1.0 0.2 - 1.3 mg/dL    Albumin 2.5 (L) 3.4 - 5.0 g/dL    Protein Total 6.2 (L) 6.8 - 8.8 g/dL    Alkaline Phosphatase 99 40 - 150 U/L     (H) 0 - 70 U/L     (H) 0 - 45 U/L   Hemoglobin A1c   Result Value Ref Range    Hemoglobin A1C Canceled, Test credited 0 - 5.6 %   US Lower Extremity Venous Duplex Right    Narrative    Exam:  "Ultrasound of the deep venous system of right leg dated 9/1/2020  10:50 AM    Clinical information: Right calf pain, new leukemia    Comparison: None    Ordering provider: ANA ROSA MYRICK    Technique: Gray-scale evaluation with compression and Doppler  assessment of deep venous system for spontaneous and phasic flow, as  well as the presence of distal augmentation. Color flow images  obtained as needed. Gray-scale images with compression of the great  saphenous vein obtained as needed.    Findings:    Right leg:    CFV: Thrombus: No, Phasic: Yes  Femoral vein, proximal: Thrombus: No, Phasic: Yes  Femoral vein, mid: Thrombus: No, Phasic: Yes  Femoral vein, distal: Thrombus: No, Phasic: Yes  Popliteal vein: Thrombus: No, Phasic: Yes  PTV: Thrombus: No  Peroneal vein: Thrombus: No    Left leg:    CFV: Thrombus: No, Phasic: Yes      Impression    Impression: No evidence of deep venous thrombosis in the right lower  extremity.      Reference: \"Duplex Ultrasound in the Diagnosis of Lower-Extremity Deep  Venous Thrombosis\"- Shanae Mclean MD, S; Sulaiman Duran MD  (Circulation. 2014;129:917-921. http://circ.ahajournals.org )    I have personally reviewed the examination and initial interpretation  and I agree with the findings.    VIOLETA MAGANA MD       "

## 2020-09-01 NOTE — PROGRESS NOTES
Sepsis Evaluation Progress Note    I was called to see Angel Sanders due to abnormal vital signs triggering the Sepsis SIRS screening alert. He is not known to have an infection.     Physical Exam   Vital Signs:  Temp: 97.9  F (36.6  C) Temp src: Axillary BP: 129/87 Pulse: 105   Resp: (!) 36 SpO2: 94 % O2 Device: Oxi Plus Oxygen Delivery: 45 LPM  @[facility] [place]([id number],1,,1)@  General: acutely ill appearing  Mental Status: AAOx4.     Remainder of physical exam is significant for tachypnea but able to speak in full sentences; coarse bilateral breath sounds; tachycardia    Data   Lactic Acid   Date Value Ref Range Status   08/31/2020 3.4 (H) 0.7 - 2.0 mmol/L Final     Lactate for Sepsis Protocol   Date Value Ref Range Status   09/01/2020 4.1 (HH) 0.7 - 2.0 mmol/L Final     Comment:     Critical result called to [facility] ROSS and read back by EO   08/31/2020 6.3 (HH) 0.7 - 2.0 mmol/L Final     Comment:     Value above critical limit  Critical Value called to and read back by  COC[facility] COREAS ON 08/31/20 AT 1613 BY [facility]         Assessment & Plan   NO EVIDENCE OF SEPSIS at this time.  Vital sign, physical exam, and lab findings are likely due to leukostasis. However given his acuity of illness, would have low threshold to consider antimicrobial therapy should he not show evidence of improvement with leukapheresis, which is scheduled to start this evening.     Disposition: The patient will remain on the current unit. We will continue to monitor this patient closely..  Mary Hartley MD    Sepsis Criteria   Sepsis: 2+ SIRS criteria due to infection  Severe Sepsis: Sepsis AND 1+ new sign of acute organ dysfunction (Note: lactate >2 or acute encephalopathy each qualify as organ dysfunction)  Septic Shock: Sepsis AND hypotension despite volume resuscitation with 30 ml/kg crystalloid or lactate >=4  Note: HYPOTENSION is defined as 2 BP readings measured 3 hrs apart that have a SBP <90, MAP <65, or decrease >40  mmHg, occurring 6 hrs before or after t-zero

## 2020-09-01 NOTE — PLAN OF CARE
Patient has had a strenuous day. Respiratory rate 30 to 40 all day otherwise vital signs pretty stable. He is currently on high flow NC with saturations in the mid 90's A urinary catheter was placed this morning without incident, he had a return of 700 ml after insertion. He has been diuresed heavily today but outputs have been low.  He was seen by multiple teams today. He had a RIJ catheter placed this afternoon for leukopheresis. He received platelets during the insertion. He had a elevated lactate of 4.1 the MD was notified and a rapid response was called. His wife was at bedside for most of the day. Continue to monitor closely.  Problem: Adult Inpatient Plan of Care  Goal: Plan of Care Review  Outcome: No Change  Flowsheets (Taken 9/1/2020 0800)  Plan of Care Reviewed With: patient

## 2020-09-01 NOTE — PLAN OF CARE
OT 5C: eval orders received.  Per chart review and discussion with RN. Pt. not medically appropriate for eval this date.

## 2020-09-01 NOTE — PLAN OF CARE
PT5C: per RN, not medically appropriate for PT today 2/2 increased respiratory needs. Will re-schedule for tomorrow.

## 2020-09-01 NOTE — PROGRESS NOTES
"          Cross Cover Note     Subjective:  Called by nurse to see patient for increased shortness of breath, hypertension, and decreased urine output. On initial assessment, patient appears dyspneic although states his breathing has not changed significantly since admission.     Objective:  Blood pressure (!) 160/90, pulse 99, temperature 97.2  F (36.2  C), temperature source Axillary, resp. rate 26, height 1.626 m (5' 4\"), weight 85.5 kg (188 lb 6.4 oz), SpO2 93 %.  Physical Exam:   General: AAOx3   HEENT: MMM, PERRLA, EOM intact  CV: RRR, normal S1S2, no murmur, clicks, rubs appreciated  Resp: in mild respiratory distress, good air movement, slight wheeze anteriorly, bibasilar crackles  Abd: Soft, non-tender, BS+, no masses appreciated  Extremities: Radial and pedal pulses intact and symmetric, bilateral pitting edema  Neuro: No lateralizing symptoms or focal neurologic deficits    Assessment and Plan:  70 year old man with suspected leukemia, COPD, and mildly reduced LVEF now with increased work of breathing and decreased urine output after 40 mg of furosemide this afternoon in the setting of continued IVF for TLS.   - BIPAP overnight (uses CPAP at home)  - Duoneb now  - 80 mg IV furosemide once  - Continue to monitor closely    Josef Ornelas MD  Internal Medicine  [phone]        "

## 2020-09-01 NOTE — PROGRESS NOTES
Cardiology Progress Note  Angel Sanders MRN: [id number]  Age: 70 year old, : 1950            Assessment and Plan:     #Acute hypoxia   #Acute leukemia   #NSTEMI query type II  #Hx of STEMI s/p PCI JUANCARLOS to RCA in   #HFmrEF (LVEF 45-50%, NYHA II, Stage B)  #Acute on chronic systolic heart failure  #Anemia, thrombocytopenia    Overall patient appears to be decompensated in terms of his volume status leading to acute hypoxia, edema. Additionally given his acute presentation of leukemia he has developed what appears to be a significant type II NSTE ACS, type 1 plaque rupture less likely.    Given his worsening O2 demand, HFrEF would agree with aggressive diuresis tonight. Would aim for a goal of 2 L net negative in a 24 hour period. Received 60 mg IV lasix at 1 PM would redose with 100 mg at roughly 5 PM. Consideration for further doses after I/O check. GIven his presentation would also consider PE in the differential and need for possible investigation with CT scan. If patient PO intake appears to be off setting the diuresis could place him on a fluid restriction.    In terms of his troponin elevation. Probable stress type II NSTE ACS. ECG unchanged. Would recommend repeating ECG if new classic chest pain to look for STEMI.    At this time given his plt and hgb he would have significant risks to proceeding with angiogram. Unclear if he could tolerate DAPT given current presentation. Additionally if this route was perused and stent deployed, if DAPT had to be stopped at any point could lead to in stent rethrombosis.    Regarding Venetoclex and Aza would not think any contraindications to proceeding with these from a cardiac perspective. Side effect include chest pain, edema        Jelani Hsu   Cardiology Fellow, PGY 4  Pager: [phone]            Subjective/Interval Events     Worsening shortness of breath. Overnight on BiPAP. Now on HFNC. SOme chest tightness mid chest  "around noon.          Objective     BP (!) 146/85   Pulse 108   Temp 98.4  F (36.9  C)   Resp (!) 36   Ht 1.626 m (5' 4\")   Wt 88.1 kg (194 lb 3.2 oz)   SpO2 95%   BMI 33.33 kg/m    Temp:  [97.2  F (36.2  C)-99.4  F (37.4  C)] 98.4  F (36.9  C)  Pulse:  [] 108  Resp:  [24-36] 36  BP: (103-160)/(68-94) 146/85  FiO2 (%):  [45 %-50 %] 45 %  SpO2:  [93 %-100 %] 95 %  Wt Readings from Last 2 Encounters:   09/01/20 88.1 kg (194 lb 3.2 oz)   08/30/20 89.5 kg (197 lb 5 oz)     I/O last 3 completed shifts:  In: 3130 [P.O.:910; I.V.:1920]  Out: 2775 [Urine:2775]      Gen: Appears uncomfortable  HEENT: NC/AT, PERRL, EOM intact, MMM, OP without exudates  PULM/THORAX: Lungs clear, some mild crackles at bases. tachypnic on HFNC   CV: Tachycardic, S1 S2 , JVD to jawline   ABD: Soft, NTND,  EXT: Lower extremity edema present             Data:     Recent Results (from the past 24 hour(s))   Cardiac Device Check - Inpatient    Collection Time: 08/31/20  4:43 PM   Result Value Ref Range    Date Time Interrogation Session [id number]     Implantable Pulse Generator  [employer]     Implantable Pulse Generator Model L331 ACCOLADE MRI EL     Implantable Pulse Generator Serial Number [id number]     Type Interrogation Session In Clinic     Clinic Name [facility]     Implantable Pulse Generator Type Pacemaker     Implantable Pulse Generator Implant Date 20181126     Implantable Lead  [employer]     Implantable Lead Model 7740 Ingevity MRI     Implantable Lead Serial Number [id number]     Implantable Lead Implant Date 20181126     Implantable Lead Polarity Type Bipolar Lead     Implantable Lead Location Detail 1 UNKNOWN     Implantable Lead Location Right Atrium     Implantable Lead  [employer]     Implantable Lead Model 7741 Ingevity MRI     Implantable Lead Serial Number [id number]     Implantable Lead Implant Date 20181126     Implantable Lead Polarity " Type Bipolar Lead     Implantable Lead Location Detail 1 UNKNOWN     Implantable Lead Location Right Ventricle     [place] Setting Mode (NBG Code) DDD     [place] Setting Lower Rate Limit 60 [beats]/min    [place] Setting Maximum Tracking Rate 130 [beats]/min    [place] Setting Maximum Sensor Rate 130 [beats]/min    [place] Setting Hysterisis Rate Off     [place] Setting LEMUEL Delay Low 200.0 ms    [place] Setting PAV Delay Low 200.0 ms    [place] Setting PAV Delay High 150.0 ms    [place] Setting LEMUEL Delay High 150.0 ms    [place] Setting AT Mode Switch Rate 170 [beats]/min    [place] Setting AT Mode Switch Mode VDIR     Lead Channel Setting Sensing Polarity Bipolar     Lead Channel Setting Sensing Sensitivity 0.2 mV    Lead Channel Setting Sensing Adaptation Mode Adaptive     Lead Channel Setting Sensing Polarity Bipolar     Lead Channel Setting Sensing Sensitivity 1.5 mV    Lead Channel Setting Sensing Adaptation Mode Adaptive     Lead Channel Setting Pacing Polarity Bipolar     Lead Channel Setting Pacing Pulse Width 0.4 ms    Lead Channel Setting Pacing Amplitude 5.0 V    Lead Channel Setting Pacing Capture Mode Adaptive     Lead Channel Setting Pacing Polarity Bipolar     Lead Channel Setting Pacing Pulse Width 0.4 ms    Lead Channel Setting Pacing Amplitude 3.5 V    Lead Channel Setting Pacing Capture Mode Adaptive     Zone Setting Type Category VT     Zone Setting Vendor Type Category VT     Zone Setting Detection Interval 375.0 ms    Lead Channel Impedance Value 595.0 ohm    Lead Channel Sensing Intrinsic Amplitude 0.5 mV    Lead Channel Pacing Threshold Amplitude 1.0000 V    Lead Channel Pacing Threshold Pulse Width 0.4 ms    Lead Channel Impedance Value 635.0 ohm    Lead Channel Sensing Intrinsic Amplitude Paced     Lead Channel Pacing Threshold Amplitude 1.6000 V    Lead Channel Pacing Threshold Pulse Width 0.4 ms    Battery Date Time of Measurements [id number]     Battery Status Beginning of Service     Battery Remaining  Longevity 114 mo    [place]olton Statistic Date Time Start [id number]     [place] Statistic Date Time End [id number]     K[place]ton Statistic RA Percent Paced 26 %    [place] Statistic RV Percent Paced 100 %    Episode Statistic Total Count 3     Episode Statistic Type Category VT     Episode Statistic Vendor Type Category NSVT     Episode Statistic Total Date Time End [id number]     Episode Statistic Recent Count 0     Episode Statistic Type Category VT     Episode Statistic Vendor Type Category NSVT     Episode Statistic Recent Date Time Start [id number]     Episode Statistic Recent Date Time End [id number]    Blood culture    Collection Time: 08/31/20  5:06 PM    Specimen: Blood    Left Arm   Result Value Ref Range    Specimen Description Blood Left Arm     Culture Micro No growth after 19 hours    Blood culture    Collection Time: 08/31/20  8:38 PM    Specimen: Blood    Left Arm   Result Value Ref Range    Specimen Description Blood Left Arm     Culture Micro No growth after 16 hours    Uric acid    Collection Time: 08/31/20  8:40 PM   Result Value Ref Range    Uric Acid 1.4 (L) 3.5 - 7.2 mg/dL   Phosphorus    Collection Time: 08/31/20  8:40 PM   Result Value Ref Range    Phosphorus 3.0 2.5 - 4.5 mg/dL   Calcium    Collection Time: 08/31/20  8:40 PM   Result Value Ref Range    Calcium 7.4 (L) 8.5 - 10.1 mg/dL   INR    Collection Time: 08/31/20  8:40 PM   Result Value Ref Range    INR 1.91 (H) 0.86 - 1.14   Partial thromboplastin time    Collection Time: 08/31/20  8:40 PM   Result Value Ref Range    PTT 40 (H) 22 - 37 sec   Fibrinogen activity    Collection Time: 08/31/20  8:40 PM   Result Value Ref Range    Fibrinogen 271 200 - 420 mg/dL   Potassium    Collection Time: 08/31/20  8:40 PM   Result Value Ref Range    Potassium 4.0 3.4 - 5.3 mmol/L   Lactic acid whole blood    Collection Time: 08/31/20 11:55 PM   Result Value Ref Range    Lactic Acid 3.4 (H) 0.7 - 2.0 mmol/L   CBC with platelets differential     Collection Time: 09/01/20  3:33 AM   Result Value Ref Range    .8 (HH) 4.0 - 11.0 10e9/L    RBC Count 1.97 (L) 4.4 - 5.9 10e12/L    Hemoglobin 5.8 (LL) 13.3 - 17.7 g/dL    Hematocrit 19.1 (L) 40.0 - 53.0 %    MCV 97 78 - 100 fl    MCH 29.4 26.5 - 33.0 pg    MCHC 30.4 (L) 31.5 - 36.5 g/dL    RDW 21.6 (H) 10.0 - 15.0 %    Platelet Count 15 (LL) 150 - 450 10e9/L    Diff Method PENDING    Lactate Dehydrogenase    Collection Time: 09/01/20  3:33 AM   Result Value Ref Range    Lactate Dehydrogenase 1,612 (H) 85 - 227 U/L   Uric acid    Collection Time: 09/01/20  3:33 AM   Result Value Ref Range    Uric Acid 1.2 (L) 3.5 - 7.2 mg/dL   Phosphorus    Collection Time: 09/01/20  3:33 AM   Result Value Ref Range    Phosphorus 3.7 2.5 - 4.5 mg/dL   INR    Collection Time: 09/01/20  3:33 AM   Result Value Ref Range    INR 2.05 (H) 0.86 - 1.14   Partial thromboplastin time    Collection Time: 09/01/20  3:33 AM   Result Value Ref Range    PTT 42 (H) 22 - 37 sec   Fibrinogen activity    Collection Time: 09/01/20  3:33 AM   Result Value Ref Range    Fibrinogen 269 200 - 420 mg/dL   Comprehensive metabolic panel    Collection Time: 09/01/20  3:33 AM   Result Value Ref Range    Sodium 140 133 - 144 mmol/L    Potassium 4.0 3.4 - 5.3 mmol/L    Chloride 116 (H) 94 - 109 mmol/L    Carbon Dioxide 14 (L) 20 - 32 mmol/L    Anion Gap 11 3 - 14 mmol/L    Glucose 112 (H) 70 - 99 mg/dL    Urea Nitrogen 51 (H) 7 - 30 mg/dL    Creatinine 1.45 (H) 0.66 - 1.25 mg/dL    GFR Estimate 48 (L) >60 mL/min/[1.73_m2]    GFR Estimate If Black 56 (L) >60 mL/min/[1.73_m2]    Calcium 7.4 (L) 8.5 - 10.1 mg/dL    Bilirubin Total 1.0 0.2 - 1.3 mg/dL    Albumin 2.5 (L) 3.4 - 5.0 g/dL    Protein Total 6.2 (L) 6.8 - 8.8 g/dL    Alkaline Phosphatase 99 40 - 150 U/L     (H) 0 - 70 U/L     (H) 0 - 45 U/L   Hemoglobin A1c    Collection Time: 09/01/20  3:33 AM   Result Value Ref Range    Hemoglobin A1C Canceled, Test credited 0 - 5.6 %   Uric acid     Collection Time: 09/01/20  1:10 PM   Result Value Ref Range    Uric Acid 1.4 (L) 3.5 - 7.2 mg/dL   Phosphorus    Collection Time: 09/01/20  1:10 PM   Result Value Ref Range    Phosphorus 4.9 (H) 2.5 - 4.5 mg/dL   Calcium    Collection Time: 09/01/20  1:10 PM   Result Value Ref Range    Calcium 8.3 (L) 8.5 - 10.1 mg/dL   INR    Collection Time: 09/01/20  1:10 PM   Result Value Ref Range    INR 2.06 (H) 0.86 - 1.14   Partial thromboplastin time    Collection Time: 09/01/20  1:10 PM   Result Value Ref Range    PTT 42 (H) 22 - 37 sec   Fibrinogen activity    Collection Time: 09/01/20  1:10 PM   Result Value Ref Range    Fibrinogen 246 200 - 420 mg/dL   Blood gas venous    Collection Time: 09/01/20  1:10 PM   Result Value Ref Range    Ph Venous 7.34 7.32 - 7.43 pH    PCO2 Venous 33 (L) 40 - 50 mm Hg    PO2 Venous 17 (L) 25 - 47 mm Hg    Bicarbonate Venous 18 (L) 21 - 28 mmol/L    Base Deficit Venous 7.1 mmol/L    FIO2 6 liters     Troponin I    Collection Time: 09/01/20  1:10 PM   Result Value Ref Range    Troponin I ES 5.077 (HH) 0.000 - 0.045 ug/L   PT mixing studies    Collection Time: 09/01/20  1:10 PM   Result Value Ref Range    PT Patient 22.9 s    INR Patient 2.09 (H) 0.86 - 1.14    PT Dil X2 16.1 s    INR Mix 1:2 1.32 (H) 0.86 - 1.14   Platelets prepare order unit    Collection Time: 09/01/20  1:40 PM   Result Value Ref Range    Blood Component Type PLT Pheresis     Units Ordered 1    EKG 12-lead, complete    Collection Time: 09/01/20  2:45 PM   Result Value Ref Range    Interpretation ECG Click View Image link to view waveform and result    Platelets prepare order unit conditional    Collection Time: 09/01/20  2:52 PM   Result Value Ref Range    Blood Component Type PLT Pheresis     Units Ordered 1    Blood component    Collection Time: 09/01/20  2:52 PM   Result Value Ref Range    Unit Number [id number]     Blood Component Type PlateletPheresis,LeukoRed Irrad (Part 2)     Division Number 00     Status of  Unit Released to care unit 09/01/2020 1514     Blood Product Code [id number]     Unit Status ISS        Recent Results (from the past 24 hour(s))   Cardiac Device Check - Inpatient   Result Value    Date Time Interrogation Session [id number]    Implantable Pulse Generator  [employer]    Implantable Pulse Generator Model L331 ACCOLADE MRI EL    Implantable Pulse Generator Serial Number [id number]    Type Interrogation Session In Clinic    Clinic Name [facility]    Implantable Pulse Generator Type Pacemaker    Implantable Pulse Generator Implant Date 20181126    Implantable Lead  [employer]    Implantable Lead Model 7740 Ingevity MRI    Implantable Lead Serial Number [id number]    Implantable Lead Implant Date 20181126    Implantable Lead Polarity Type Bipolar Lead    Implantable Lead Location Detail 1 UNKNOWN    Implantable Lead Location Right Atrium    Implantable Lead  [employer]    Implantable Lead Model 7741 Ingevity MRI    Implantable Lead Serial Number [id number]    Implantable Lead Implant Date 20181126    Implantable Lead Polarity Type Bipolar Lead    Implantable Lead Location Detail 1 UNKNOWN    Implantable Lead Location Right Ventricle    [place] Setting Mode (NBG Code) DDD    [place] Setting Lower Rate Limit 60    [place] Setting Maximum Tracking Rate 130    [place] Setting Maximum Sensor Rate 130    [place] Setting Hysterisis Rate Off    [place] Setting LEMUEL Delay Low 200.0    [place]olton Setting PAV Delay Low 200.0    [place] Setting PAV Delay High 150.0    [place] Setting LEMUEL Delay High 150.0    [place] Setting AT Mode Switch Rate 170    [place] Setting AT Mode Switch Mode VDIR    Lead Channel Setting Sensing Polarity Bipolar    Lead Channel Setting Sensing Sensitivity 0.2    Lead Channel Setting Sensing Adaptation Mode Adaptive    Lead Channel Setting Sensing Polarity Bipolar    Lead Channel Setting Sensing Sensitivity 1.5    Lead Channel Setting Sensing  Adaptation Mode Adaptive    Lead Channel Setting Pacing Polarity Bipolar    Lead Channel Setting Pacing Pulse Width 0.4    Lead Channel Setting Pacing Amplitude 5.0    Lead Channel Setting Pacing Capture Mode Adaptive    Lead Channel Setting Pacing Polarity Bipolar    Lead Channel Setting Pacing Pulse Width 0.4    Lead Channel Setting Pacing Amplitude 3.5    Lead Channel Setting Pacing Capture Mode Adaptive    Zone Setting Type Category VT    Zone Setting Vendor Type Category VT    Zone Setting Detection Interval 375.0    Lead Channel Impedance Value 595.0    Lead Channel Sensing Intrinsic Amplitude 0.5    Lead Channel Pacing Threshold Amplitude 1.0000    Lead Channel Pacing Threshold Pulse Width 0.4    Lead Channel Impedance Value 635.0    Lead Channel Sensing Intrinsic Amplitude Paced    Lead Channel Pacing Threshold Amplitude 1.6000    Lead Channel Pacing Threshold Pulse Width 0.4    Battery Date Time of Measurements 20200831080417    Battery Status Beginning of Service    Battery Remaining Longevity 114    Kolton Statistic Date Time Start 20200219000000    Kolton Statistic Date Time End 20200831080417    Kolton Statistic RA Percent Paced 26    Kolton Statistic RV Percent Paced 100    Episode Statistic Total Count 3    Episode Statistic Type Category VT    Episode Statistic Vendor Type Category NSVT    Episode Statistic Total Date Time End [id number]    Episode Statistic Recent Count 0    Episode Statistic Type Category VT    Episode Statistic Vendor Type Category NSVT    Episode Statistic Recent Date Time Start [id number]    Episode Statistic Recent Date Time End [id number]    Narrative    Patient seen on 5 C for evaluation and iterative programming of a [employer] Dual lead pacemaker per MD orders.  Normal pacemaker function.   No ventricular episodes recorded. 6 ATR episodes recorded since 2-19-20,   longest episode 12 minutes. Patient reports he was on Eliquis BID, but it   was recently  stopped due to some bleeding issues. Intrinsic rhythm = SR w/   CHB, no R waves @ VVI 30 bpm. AP = 26%.  = 100%. Estimated battery   longevity to ANGELO = 9.5 years. RA lead trends display a decrease in P waves   in March and Auto RA threshold trends display a threshold of 3V @ 0.4 ms,   today's manual RA threshold = 1 V @ 0.4ms. RV lead trends appear stable.   Plan for patient to continue follow up with [facility], which is his primary   device clinic. MARCO Alegre RN, BSN.      Dual lead pacemaker    I have reviewed and interpreted the device interrogation, settings,   programming and nurse's summary. The device is functioning within normal   device parameters. I agree with the current findings, assessment and plan.   US Renal Complete    Narrative    EXAMINATION: US RENAL COMPLETE, 8/31/2020 7:07 PM     COMPARISON: CT 6/3/2020    HISTORY: New leukemia with a catheter    TECHNIQUE: The kidneys and bladder were scanned in the standard  fashion with specialized ultrasound transducer(s) using both gray  scale and limited color/spectral Doppler techniques.    FINDINGS:    Right kidney: Measures 10.3 cm in length. Parenchyma is of normal  thickness and echogenicity. No focal mass. A few echogenic  nonshadowing foci within the lower pole, favored to represent vascular  structures. No hydronephrosis.    Left kidney: Measures 12.8 cm in length. Parenchyma is of normal  thickness and echogenicity. No focal mass. No hydronephrosis.     Bladder: Unremarkable.      Impression    IMPRESSION:  Normal renal ultrasound.    I have personally reviewed the examination and initial interpretation  and I agree with the findings.    CLARY REYNOLDS MD   US Lower Extremity Venous Duplex Right    Narrative    Exam: Ultrasound of the deep venous system of right leg dated 9/1/2020  10:50 AM    Clinical information: Right calf pain, new leukemia    Comparison: None    Ordering provider: ANA ROSA MYRICK    Technique: Gray-scale evaluation  "with compression and Doppler  assessment of deep venous system for spontaneous and phasic flow, as  well as the presence of distal augmentation. Color flow images  obtained as needed. Gray-scale images with compression of the great  saphenous vein obtained as needed.    Findings:    Right leg:    CFV: Thrombus: No, Phasic: Yes  Femoral vein, proximal: Thrombus: No, Phasic: Yes  Femoral vein, mid: Thrombus: No, Phasic: Yes  Femoral vein, distal: Thrombus: No, Phasic: Yes  Popliteal vein: Thrombus: No, Phasic: Yes  PTV: Thrombus: No  Peroneal vein: Thrombus: No    Left leg:    CFV: Thrombus: No, Phasic: Yes      Impression    Impression: No evidence of deep venous thrombosis in the right lower  extremity.      Reference: \"Duplex Ultrasound in the Diagnosis of Lower-Extremity Deep  Venous Thrombosis\"- Shanae Mclean MD, S; Sulaiman Duran MD  (Circulation. 2014;129:917-921. http://circ.ahajournals.org )    I have personally reviewed the examination and initial interpretation  and I agree with the findings.    VIOLETA MAGANA MD             Medications     Current Facility-Administered Medications   Medication     albuterol (PROAIR HFA/PROVENTIL HFA/VENTOLIN HFA) 108 (90 Base) MCG/ACT inhaler 2 puff     allopurinol (ZYLOPRIM) tablet 300 mg     artificial saliva (BIOTENE DRY MOUTHWASH) liquid 15 mL     artificial saliva (BIOTENE MT) solution 1 spray     carboxymethylcellulose PF (REFRESH PLUS) 0.5 % ophthalmic solution 1 drop     cyanocobalamin (VITAMIN B-12) tablet 100 mcg     fluticasone-vilanterol (BREO ELLIPTA) 200-25 MCG/INH inhaler 1 puff     furosemide (LASIX) injection 100 mg     hydrALAZINE (APRESOLINE) injection 10-20 mg     hydroxyurea (HYDREA) capsule 2,000 mg     ipratropium - albuterol 0.5 mg/2.5 mg/3 mL (DUONEB) neb solution 3 mL     ipratropium - albuterol 0.5 mg/2.5 mg/3 mL (DUONEB) neb solution 3 mL     [START ON 9/2/2020] levofloxacin (LEVAQUIN) tablet 250 mg     LORazepam (ATIVAN) half-tab 0.25 " mg     magnesium sulfate 4 g in 100 mL sterile water (premade)     Medication Instruction     metoprolol tartrate (LOPRESSOR) tablet 50 mg     micafungin (MYCAMINE) 50 mg in sodium chloride 0.9 % 100 mL intermittent infusion     No lozenges or gum should be given while patient on BIPAP/AVAPS/AVAPS AE     ondansetron (ZOFRAN) tablet 4 mg     Patient may continue current oral medications     polyethylene glycol (MIRALAX) Packet 17 g     potassium chloride (KLOR-CON) Packet 20-40 mEq     potassium chloride 10 mEq in 100 mL intermittent infusion with 10 mg lidocaine     potassium chloride 10 mEq in 100 mL sterile water intermittent infusion (premix)     potassium chloride 20 mEq in 50 mL intermittent infusion     potassium chloride ER (KLOR-CON M) CR tablet 20-40 mEq     potassium phosphate 15 mmol in D5W 250 mL intermittent infusion     potassium phosphate 20 mmol in D5W 250 mL intermittent infusion     potassium phosphate 20 mmol in D5W 500 mL intermittent infusion     potassium phosphate 25 mmol in D5W 500 mL intermittent infusion     prochlorperazine (COMPAZINE) tablet 5 mg     sennosides (SENOKOT) tablet 8.6 mg     umeclidinium (INCRUSE ELLIPTA) 62.5 MCG/INH inhaler 1 puff

## 2020-09-01 NOTE — H&P
Vascular Access.  Platlets 15, INR 2.05.  Patient will need Platlets prior to picc placement. Francine ABDI notified.  RN to amanda VAS when Platelets are complete.

## 2020-09-01 NOTE — CONSULTS
"    Interventional Radiology Consult Service Note    Patient is on IR schedule 9/1 for an image guided placement of large bore, double lumen, non-tunneled CVC.   Platelets 15. Pt currently receiving platelet infusion.   No NPO required.  Consent will be done prior to procedure.     Please contact the IR [place] RN [place] [phone] for estimated time of procedure.     Case discussed with Dr. Gaona from IR and Keyla Narayanan PA-C. This is a 70 year old male with PMH of COPD, TALIA, HTN, HLD, CHF with mildly reduced ejection fraction, CAD and afib on aspirin and apixiban (new in June 2020), history of AV block pacemaker dependent. He initially presented to [facility] ED on 8/30/20 with 2 months of progressively worsening dyspnea, where he was found to have , Hgb 5.9, plt 25. He was then transferred for further work up of AML. Leukocytosis to 147 today and significant respiratory symptoms. Team has opted to start leukophoresis and IR has been consulted to NTCVC placement.    Vitals:   BP (!) 143/94   Pulse 86   Temp 99.4  F (37.4  C) (Axillary)   Resp (!) 35   Ht 1.626 m (5' 4\")   Wt 88.1 kg (194 lb 3.2 oz)   SpO2 94%   BMI 33.33 kg/m      Pertinent Labs:     Lab Results   Component Value Date    .8 (HH) 09/01/2020       Lab Results   Component Value Date    HGB 5.8 09/01/2020    HGB 7.7 08/31/2020    HGB 6.7 08/30/2020       Lab Results   Component Value Date    PLT 15 09/01/2020    PLT 21 08/31/2020    PLT 22 08/31/2020       Lab Results   Component Value Date    INR 2.06 (H) 09/01/2020    PTT 42 (H) 09/01/2020       Lab Results   Component Value Date    POTASSIUM 4.0 09/01/2020        Rohini Saleem DNP, APRN  Interventional Radiology  Pager: [phone]    "

## 2020-09-02 NOTE — PROGRESS NOTES
Admitted/transferred from:   Reason for admission/transfer: altered mental status, increased respiratory needs, requiring intubation  Patient status upon admission/transfer: patient lethargic and not following commands, respirations in low-mid 40s, pale  Interventions: patient intubated and code called. Time of death 00:37.

## 2020-09-02 NOTE — PROCEDURES
Transfusion Medicine  Apheresis Procedure Note    Angel Sanders [id number]   YOB: 1950 Age: 70 year old        Reason for consult: Leukodepletion            Assessment and Plan:   70 year old male undergoes leukapheresis for hyperviscosity related to elevated white blood cell count.  He has a history of , TALIA, CKD, HTN, HLD, CHF with mildly reduced ejection fraction, CAD and afib on aspirin and apixiban (new in June 2020), history of AV block pacemaker dependent. He initially presented to [facility] ED on 8/30/20 with 2 months of progressively worsening dyspnea, where he was found to have , Hgb 5.9, plt 25. He was then transferred for further work up of AML.  Due to worsening symptoms including shortness of breath and worsening troponin levels, leukapheresis was requested to improve the white blood cell count and hopefully improve the manifestations of hyperviscosity.      - He tolerated the procedure.  He does appear short of breath with distress while still on supplemental oxygen.    - The plan is to monitor the WBC count to determine effectiveness of the procedure.  We will follow up with the clinical team to determine if additional procedures are needed in the future.    - ACD-A will be used for anticoagulation of the apheresis equipment with calcium gluconate given throughout to offset the effects.             History of Present Illness:   Angel Sanders is a 70 year old male who is seen for leukapheresis  for hyperviscosity due to the elevated white blood cell count.  His past medical history includes TALIA, CKD, HTN, HLD, CHF with mildly reduced ejection fraction, CAD and afib on aspirin and apixiban (new in June 2020), history of AV block pacemaker dependent. He initially presented to [facility] ED on 8/30/20 with 2 months of progressively worsening dyspnea, where he was found to have , Hgb 5.9, plt 25. He was then transferred for further work up of AML.                 Past Medical History:     Past Medical History:   Diagnosis Date     Chronic renal insufficiency      Complete AV block (H)      Coronary atherosclerosis of unspecified type of vessel, native or graft 2008    Acute inf MI; RCA stent; followed by Cardiology     Diverticulosis      Herniated cervical disc      Hyperlipidaemia      Hypertension      Occlusion and stenosis of carotid artery without mention of cerebral infarction 2008    50-69% stenosis of left ICA             Past Surgical History:     Past Surgical History:   Procedure Laterality Date     ARTHROTOMY SHOULDER, ROTATOR CUFF REPAIR, COMBINED  10/23/2013    Procedure: COMBINED ARTHROTOMY SHOULDER, ROTATOR CUFF REPAIR;  Left Shoulder Open Decompression, Distal Clavical Resection, Rotator Cuff Repair , Bicep Tenolysis, and Bicep Tenodesis   ;  Surgeon: Zhang Mattson MD;  Location: [facility] OR     C NONSPECIFIC PROCEDURE  2008    RCA stent; see PMH     COLONOSCOPY N/A 5/15/2018    Procedure: COLONOSCOPY;  COLONOSCOPY ;  Surgeon: Cory Hernandez MD;  Location:  GI     PHACOEMULSIFICATION CLEAR CORNEA WITH STANDARD INTRAOCULAR LENS IMPLANT  11/20/2013    Procedure: PHACOEMULSIFICATION CLEAR CORNEA WITH STANDARD INTRAOCULAR LENS IMPLANT;  RIGHT PHACOEMULSIFICATION CLEAR CORNEA WITH STANDARD INTRAOCULAR LENS IMPLANT ;  Surgeon: Rikki Reddy MD;  Location: [facility]     PHACOEMULSIFICATION CLEAR CORNEA WITH STANDARD INTRAOCULAR LENS IMPLANT  12/3/2013    Procedure: PHACOEMULSIFICATION CLEAR CORNEA WITH STANDARD INTRAOCULAR LENS IMPLANT;  LEFT PHACOEMULSIFICATION CLEAR CORNEA WITH STANDARD INTRAOCULAR LENS IMPLANT;  Surgeon: Rikki Reddy MD;  Location: [facility]              Social History:   Lives in [place]            Allergies:     Allergies   Allergen Reactions     Adhesive Tape Rash             Medications:     Current Facility-Administered Medications   Medication     albuterol (PROAIR HFA/PROVENTIL HFA/VENTOLIN HFA) 108 (90 Base) MCG/ACT  inhaler 2 puff     allopurinol (ZYLOPRIM) tablet 300 mg     artificial saliva (BIOTENE DRY MOUTHWASH) liquid 15 mL     artificial saliva (BIOTENE MT) solution 1 spray     carboxymethylcellulose PF (REFRESH PLUS) 0.5 % ophthalmic solution 1 drop     cyanocobalamin (VITAMIN B-12) tablet 100 mcg     fluticasone-vilanterol (BREO ELLIPTA) 200-25 MCG/INH inhaler 1 puff     hydrALAZINE (APRESOLINE) injection 10-20 mg     hydroxyurea (HYDREA) capsule 2,000 mg     ipratropium - albuterol 0.5 mg/2.5 mg/3 mL (DUONEB) neb solution 3 mL     ipratropium - albuterol 0.5 mg/2.5 mg/3 mL (DUONEB) neb solution 3 mL     [START ON 9/2/2020] levofloxacin (LEVAQUIN) tablet 250 mg     LORazepam (ATIVAN) half-tab 0.25 mg     magnesium sulfate 4 g in 100 mL sterile water (premade)     Medication Instruction     metoprolol tartrate (LOPRESSOR) tablet 50 mg     micafungin (MYCAMINE) 50 mg in sodium chloride 0.9 % 100 mL intermittent infusion     No lozenges or gum should be given while patient on BIPAP/AVAPS/AVAPS AE     ondansetron (ZOFRAN) tablet 4 mg     Patient may continue current oral medications     polyethylene glycol (MIRALAX) Packet 17 g     potassium chloride (KLOR-CON) Packet 20-40 mEq     potassium chloride 10 mEq in 100 mL intermittent infusion with 10 mg lidocaine     potassium chloride 10 mEq in 100 mL sterile water intermittent infusion (premix)     potassium chloride 20 mEq in 50 mL intermittent infusion     potassium chloride ER (KLOR-CON M) CR tablet 20-40 mEq     potassium phosphate 15 mmol in D5W 250 mL intermittent infusion     potassium phosphate 20 mmol in D5W 250 mL intermittent infusion     potassium phosphate 20 mmol in D5W 500 mL intermittent infusion     potassium phosphate 25 mmol in D5W 500 mL intermittent infusion     prochlorperazine (COMPAZINE) tablet 5 mg     sennosides (SENOKOT) tablet 8.6 mg     umeclidinium (INCRUSE ELLIPTA) 62.5 MCG/INH inhaler 1 puff             Review of Systems:     See above           " Vital Signs:   BP (!) 152/65   Pulse 61   Temp 97.9  F (36.6  C) (Axillary)   Resp (!) 38   Ht 1.626 m (5' 4.02\")   Wt 88.1 kg (194 lb 3.6 oz)   SpO2 94%   BMI 33.32 kg/m     Alert.  Breathing appears distressed, on supplemental oxygen.  Central line accessed for the procedure              Data:     BMP  Recent Labs   Lab 09/01/20  1708 09/01/20  1310 09/01/20  0333 08/31/20  2040 08/31/20  0340 08/30/20  2147 08/30/20  1449   NA  --   --  140  --   --  134 133 135   POTASSIUM 4.6  --  4.0 4.0  --  3.9 4.2 4.5   CHLORIDE  --   --  116*  --   --  107 106 106   MARIA ALEJANDRA 8.3* 8.3* 7.4* 7.4*   < > 8.8 8.7 8.6   CO2  --   --  14*  --   --  17* 18* 20   BUN  --   --  51*  --   --  40* 38* 35*   CR  --   --  1.45*  --   --  1.74* 1.67* 1.76*   GLC  --   --  112*  --   --  107* 132* 134*    < > = values in this interval not displayed.     CBC  Recent Labs   Lab 09/01/20 2110 09/01/20  1845 09/01/20 0333 08/31/20  1210 08/31/20  0340   WBC 78.1* 149.9* 147.8* 160.8* 176.8*   RBC 2.50* 2.42* 1.97*  --  2.54*   HGB 7.4* 7.1* 5.8*  --  7.7*   HCT 23.4* 22.6* 19.1*  --  24.4*   MCV 94 93 97  --  96   MCH 29.6 29.3 29.4  --  30.3   MCHC 31.6 31.4* 30.4*  --  31.6   RDW 19.9* 19.7* 21.6*  --  20.8*   PLT 40* 62* 15* 21* 22*     INR  Recent Labs   Lab 09/01/20 1708 09/01/20  1310 09/01/20  0333 08/31/20  2040   INR 2.21* 2.06* 2.05* 1.91*                         Procedure Summary:   A 1.5 volume leukapheresis procedure was performed.   A dual lumen central line was used for access and allowed for appropriate flow during the procedure.  ACD-A was used for anticoagulation. To offset the effects of the citrate, calcium gluconate was given in the return line.  The patient tolerated the procedure without complication.  See apheresis flowsheet for additional details.          Attestation: During the procedure the patient was directly seen and evaluated by me, Sonu Cintron MD.  When seeing apheresis patients, I am wearing " masks and face shields and maximizing the distance between the patient and myself while in the room.    Sonu Cintron MD  Transfusion Medicine Attending  Laboratory Medicine & Pathology  Pager: [phone]

## 2020-09-02 NOTE — PROVIDER NOTIFICATION
09/01/20 2302   Call Information   Date of Call 09/01/20   Time of Call 2302   Name of person requesting the team Deepa   Title of person requesting team RN   RRT Arrival time 2304   Time RRT ended 0050   Reason for call   Type of RRT Adult   Primary reason for call Respiratory;Sudden or unanticipated change in patient condition   Respiratory Rate greater than 24;O2sat less than 88% for greater than 5 minutes despite O2;Labored breathing;Respiratory pattern change;Other (describe)  (aggitated/restless)   SBAR   Situation Pt oxygen sats 84% on oxiplus mask on 15l    Background Admitted 8/30/20 with newly diagnosed leukemia.  WBC elevated.  Dialysis access placed for plasmaphoresis to reduce WBC.  Hgb low.     Notable History/Conditions Cancer;Congestive heart failure;COPD;Hypertension   Assessment Pt aggitated/restless, oxygen at 15L sats in the 80%. skin looks red/blue from nipples down, not following commands   Interventions Neb treatment;Other (describe)  (tried placing him on Bipap but kept pulling it off)   Patient Outcome   Patient Outcome Transferred to  ()   RRT Team   Lead RN Sandhya Palomares   Other staff America   Post RRT Intervention Assessment   Comments On the ICU   Transferred via bed with America Unger and this writer to . Phoned ahead that he may need to be intubated.  Arrived approx 2320 on 4C and plan to intubate. After intubation patient got worse and ending up coding/shocking patient without success, see code paperwork.

## 2020-09-02 NOTE — DISCHARGE SUMMARY
[facility], [place]    Death Summary - Medicine & Pediatrics    Date of Admission:  2020  Date of Death: 2020  Attending Provider: Yahaira Chatman  Service: MICU    Discharge Diagnoses   Acute myeloid leukemia  Leukostasis  Acute hypoxic respiratory failure  Acute kidney injury on chronic kidney disease  Type 2 non-ST-elevation myocardial infarct  Lactic acidosis      Cause of death: Respiratory arrest    Hospital Course   Angel Sanders was admitted on 2020 with shortness of breath and dyspnea on exertion found to have a new diagnosis of acute myeloid leukemia.  The following problems were addressed during his hospitalization:    #AML  #Leukocytosis  Patient initially presented to [facility] ED on 20 with 2 months of progressively worsening dyspnea. Patient reports that prior to visiting the ED, he could not even walk to the bathroom without feeling short of breath. After being unable to secure an in-person visit with an outpatient provider, he presented to the ED. He was found to have , Hgb 5.9, plt 25. He was then transferred for further work up and treatment. Ultimately diagnosed with AML. He was initiated on hydroxyurea and allopurinol, and initiated on leukophoresis. Leukostasis from AML likely contributed to patient's acute respiratory failure and death.     #Acute hypoxic respiratory failure  Patient presented with shortness of breath and new oxygen requirement. Patient had a history of COPD and HFmrEF. He was treated with diuresis and bronchodilators. There was likely a component of symptomatic leukostasis contributing to his respiratory failure. Possible PE. Patient continued to have escalating O2 requirements until ultimately being transferred to the ICU and intubated. Shortly after intubation, the patient became hemodynamically stable, underwent cardiac arrest, and .     #Type 2 MI  Elevated troponin on admission, without ST elevation.  Cardiology was consulted, suspected demand ischemia secondary to anemia versus leukostasis versus pulmonary embolism. As above, after intubation, patient underwent cardiac arrest and .     Dominik Nielson MD  [facility], [place]    ______________________________________________________________________      Significant Results and Procedures   Most Recent 3 CBC's:  Recent Labs   Lab Test 20  1845   WBC 87.4* 78.1* 149.9*   HGB 6.9* 7.4* 7.1*   MCV 98 94 93   PLT 36* 40* 62*     Most Recent 3 BMP's:  Recent Labs   Lab Test 20  1708 20  1310 20  0333  20  0340     --   --  140  --  134   POTASSIUM 4.9 4.6  --  4.0   < > 3.9   CHLORIDE 107  --   --  116*  --  107   CO2 14*  --   --  14*  --  17*   BUN 78*  --   --  51*  --  40*   CR 2.36*  --   --  1.45*  --  1.74*   ANIONGAP 20*  --   --  11  --  10   MARIA ALEJANDRA 9.1 8.3* 8.3* 7.4*   < > 8.8   GLC 82  --   --  112*  --  107*    < > = values in this interval not displayed.     Most Recent INR's and Anticoagulation Dosing History:  Anticoagulation Dose History     Recent Dosing and Labs Latest Ref Rng & Units 2020    INR 0.86 - 1.14 1.54(H) 1.54(H) 1.91(H) 2.05(H) 2.06(H) 2.21(H) 2.86(H)        Most Recent 3 Troponin's:  Recent Labs   Lab Test 20  2217 20  1310  18  2359  13  0157   TROPI 10.006* 8.739* 5.077*   < >  --    < >  --    TROPONIN  --   --   --   --  0.02  --  0.00    < > = values in this interval not displayed.   ,   Results for orders placed or performed during the hospital encounter of 20   US Renal Complete    Narrative    EXAMINATION: US RENAL COMPLETE, 2020 7:07 PM     COMPARISON: CT 6/3/2020    HISTORY: New leukemia with a catheter    TECHNIQUE: The kidneys and bladder were scanned in the standard  fashion with specialized ultrasound  "transducer(s) using both gray  scale and limited color/spectral Doppler techniques.    FINDINGS:    Right kidney: Measures 10.3 cm in length. Parenchyma is of normal  thickness and echogenicity. No focal mass. A few echogenic  nonshadowing foci within the lower pole, favored to represent vascular  structures. No hydronephrosis.    Left kidney: Measures 12.8 cm in length. Parenchyma is of normal  thickness and echogenicity. No focal mass. No hydronephrosis.     Bladder: Unremarkable.      Impression    IMPRESSION:  Normal renal ultrasound.    I have personally reviewed the examination and initial interpretation  and I agree with the findings.    CLRAY REYNOLDS MD   US Lower Extremity Venous Duplex Right    Narrative    Exam: Ultrasound of the deep venous system of right leg dated 9/1/2020  10:50 AM    Clinical information: Right calf pain, new leukemia    Comparison: None    Ordering provider: ANA ROSA MYRICK    Technique: Gray-scale evaluation with compression and Doppler  assessment of deep venous system for spontaneous and phasic flow, as  well as the presence of distal augmentation. Color flow images  obtained as needed. Gray-scale images with compression of the great  saphenous vein obtained as needed.    Findings:    Right leg:    CFV: Thrombus: No, Phasic: Yes  Femoral vein, proximal: Thrombus: No, Phasic: Yes  Femoral vein, mid: Thrombus: No, Phasic: Yes  Femoral vein, distal: Thrombus: No, Phasic: Yes  Popliteal vein: Thrombus: No, Phasic: Yes  PTV: Thrombus: No  Peroneal vein: Thrombus: No    Left leg:    CFV: Thrombus: No, Phasic: Yes      Impression    Impression: No evidence of deep venous thrombosis in the right lower  extremity.      Reference: \"Duplex Ultrasound in the Diagnosis of Lower-Extremity Deep  Venous Thrombosis\"- Shanae Mclean MD, S; Sulaiman Duran MD  (Circulation. 2014;129:917-921. http://circ.ahajournals.org )    I have personally reviewed the examination and initial " interpretation  and I agree with the findings.    VIOLETA MAGANA MD   XR Chest Port 1 View    Narrative    Exam:  Chest X-ray 9/1/2020 4:56 PM    History: Severe shortness of breath, new dx AML, volume overloaded    Comparison: X-ray 8/30/2020    Findings: Frontal radiograph of the chest. Right IJ central venous  catheter with the tip projecting over the cavoatrial junction.  Unchanged left chest wall pacemaker and leads. The trachea is midline.  Unchanged cardiomediastinal silhouette. Bilateral interstitial  opacities are mildly increased from the prior exam. No pneumothorax.  The upper abdomen is unremarkable. No acute bone findings.      Impression    Impression:   Diffuse interstitial pulmonary opacities are slightly increased from  the prior exam. Differential considerations include pulmonary edema  or, less likely, infection (including atypical infections).    I have personally reviewed the examination and initial interpretation  and I agree with the findings.    DAVE KEITH DO   IR CVC Non Tunnel Placement    Narrative    Procedures:  1. Ultrasound guidance for vascular access.  2. Fluoroscopic guidance for catheter placement/positioning.    Clinical indication: Emergent leukapheresis    Comparison studies: Chest radiograph same day    PROCEDURE:        Staff Radiologist: Bryan Gaona MD    Fellow: Dominik Vela MD    Consent: verbal and written informed consent obtained prior to  procedure.    Procedure details: Patient placed in supine position. Right neck  prepped and draped in standard sterile fashion. Using ultrasound  guidance, micropuncture access was made to the right internal jugular  vein. An image was saved documenting the needle tip within the patent  vessel. This was exchanged over a micropuncture wire for the 4 Citizen of [place]  micropuncture transitional sheath. Using fluoroscopic imaging, the  wire was then used to measure the distance to the superior cavoatrial  junction. The sheath was then  exchanged over a Bentson wire for serial  fascial dilators, which were then exchanged for a 20 cm double lumen  catheter. A final fluoroscopic image of the catheter tip position in  the right atrium was saved. Approximately 3 cm of catheter outside the  skin. The catheter was secured in place with 2-0 Ethilon retention  suture. A sterile dressing was applied. The patient was transferred in  stable condition, having tolerated the procedure without immediate  complication.     Medications: No sedation.    Fluoroscopy time: 0.5 minutes    Complications: None.      Impression    IMPRESSION:     Ultrasound and fluoroscopic-guided positioning of a 20 cm central  venous catheter. Tip in the right atrium.    PLAN:    Line may be used immediately.        Echocardiogram Complete    Narrative    [id number]  [id number]  [id number]  [id number]^SHILPI^BIA           [facility],[place]  Echocardiography Laboratory  [address] [place] [address]     Name: TIMBO RICH  MRN: [id number]  : 1950  Study Date: 2020 10:50 AM  Age: 70 yrs  Gender: Male  Patient Location: [facility]  Reason For Study: Heart Failure, MI  Ordering Physician: BIA DOBBINS  Referring Physician: ANA ROSA MYRICK  Performed By: Velma Cintron RDCS     BSA: 1.9 m2  Height: 64 in  Weight: 188 lb  HR: 88  BP: 145/86 mmHg  _____________________________________________________________________________  __        Procedure  Complete Portable Echo Adult. Contrast Optison. Technically difficult  study.Extremely poor acoustic windows. Optison (NDC #1591-3543-82) given  intravenously. Patient was given 6 ml mixture of 3 ml Optison and 6 ml saline.  3 ml wasted.  _____________________________________________________________________________  __        Interpretation Summary  Technically difficult study.Extremely poor acoustic windows.  The Ejection Fraction is estimated at 40-45%.  Inferior wall akinesis is present.  Right  ventricular function, chamber size, wall motion, and thickness are  normal.  Right ventricular systolic pressure is 54mmHg above the right atrial pressure.  IVC diameter >2.1 cm collapsing <50% with sniff suggests a high RA pressure  estimated at 15 mmHg or greater.  No pericardial effusion is present.  _____________________________________________________________________________  __        Left Ventricle  Left ventricular size is normal. Mild concentric wall thickening consistent  with left ventricular hypertrophy is present. The Ejection Fraction is  estimated at 40-45%. Grade I or early diastolic dysfunction. Inferior wall  akinesis is present.     Right Ventricle  Right ventricular function, chamber size, wall motion, and thickness are  normal.     Atria  Both atria appear normal.     Mitral Valve  Mild mitral annular calcification is present. Trace to mild mitral  insufficiency is present.        Aortic Valve  Moderate aortic valve calcification is present. Mild aortic stenosis is  present. The mean AoV pressure gradient is 18.0 mmHg.     Tricuspid Valve  The tricuspid valve is normal. Mild tricuspid insufficiency is present. Right  ventricular systolic pressure is 54mmHg above the right atrial pressure.     Pulmonic Valve  The pulmonic valve is normal.     Vessels  The thoracic aorta is normal. The pulmonary artery and bifurcation cannot be  assessed. IVC diameter >2.1 cm collapsing <50% with sniff suggests a high RA  pressure estimated at 15 mmHg or greater.     Pericardium  No pericardial effusion is present.     _____________________________________________________________________________  __  MMode/2D Measurements & Calculations  IVSd: 1.3 cm  LVIDd: 4.6 cm  LVPWd: 1.5 cm  LV mass(C)d: 247.5 grams  LV mass(C)dI: 129.9 grams/m2  LVOT diam: 2.4 cm  LVOT area: 4.4 cm2        EF(MOD-bp): 49.1 %  RWT: 0.65        Doppler Measurements & Calculations  MV E max fabian: 114.0 cm/sec  MV A max fabian: 125.0 cm/sec  MV  E/A: 0.91  MV dec slope: 138.0 cm/sec2  Ao V2 max: 274.3 cm/sec  Ao max P.0 mmHg  Ao V2 mean: 201.0 cm/sec  Ao mean P.0 mmHg  Ao V2 VTI: 59.6 cm  NYLA(I,D): 2.2 cm2  NYLA(V,D): 2.4 cm2  LV V1 max P.9 mmHg  LV V1 max: 149.0 cm/sec  LV V1 VTI: 29.3 cm  SV(LVOT): 128.4 ml  SI(LVOT): 67.4 ml/m2  TR max ankit: 366.0 cm/sec  TR max P.6 mmHg  AV Ankit Ratio (DI): 0.54  NYLA Index (cm2/m2): 1.1  E/E' av.5  Lateral E/e': 7.5  Medial E/e': 11.4        _____________________________________________________________________________  __        Report approved by: Ivis Morrow 2020 12:04 PM      Cardiac Device Check - Inpatient     Value    Date Time Interrogation Session [id number]    Implantable Pulse Generator  [employer]    Implantable Pulse Generator Model L331 ACCOLADE [facility] EL    Implantable Pulse Generator Serial Number [id number]    Type Interrogation Session In Clinic    Clinic Name [facility]    Implantable Pulse Generator Type Pacemaker    Implantable Pulse Generator Implant Date 2018    Implantable Lead  [employer]    Implantable Lead Model 7740 Ingity MRI    Implantable Lead Serial Number [id number]    Implantable Lead Implant Date 2018    Implantable Lead Polarity Type Bipolar Lead    Implantable Lead Location Detail 1 UNKNOWN    Implantable Lead Location Right Atrium    Implantable Lead  [employer]    [facility]    Implantable Lead Serial Number [id number]    Implantable Lead Implant Date 2018    Implantable Lead Polarity Type Bipolar Lead    Implantable Lead Location Detail 1 UNKNOWN    Implantable Lead Location Right Ventricle    [place] Setting Mode (NBG Code) DDD    [place]ton Setting Lower Rate Limit 60    [place] Setting Maximum Tracking Rate 130    [place] Setting Maximum Sensor Rate 130    [place]ton Setting Hysterisis Rate Off    Kolton Setting LEMUEL Delay Low 200.0    [place]ton Setting PAV  Delay Low 200.0    [place] Setting PAV Delay High 150.0    [place]ton Setting LEMUEL Delay High 150.0    [place] Setting AT Mode Switch Rate 170    [place] Setting AT Mode Switch Mode VDIR    Lead Channel Setting Sensing Polarity Bipolar    Lead Channel Setting Sensing Sensitivity 0.2    Lead Channel Setting Sensing Adaptation Mode Adaptive    Lead Channel Setting Sensing Polarity Bipolar    Lead Channel Setting Sensing Sensitivity 1.5    Lead Channel Setting Sensing Adaptation Mode Adaptive    Lead Channel Setting Pacing Polarity Bipolar    Lead Channel Setting Pacing Pulse Width 0.4    Lead Channel Setting Pacing Amplitude 5.0    Lead Channel Setting Pacing Capture Mode Adaptive    Lead Channel Setting Pacing Polarity Bipolar    Lead Channel Setting Pacing Pulse Width 0.4    Lead Channel Setting Pacing Amplitude 3.5    Lead Channel Setting Pacing Capture Mode Adaptive    Zone Setting Type Category VT    Zone Setting Vendor Type Category VT    Zone Setting Detection Interval 375.0    Lead Channel Impedance Value 595.0    Lead Channel Sensing Intrinsic Amplitude 0.5    Lead Channel Pacing Threshold Amplitude 1.0000    Lead Channel Pacing Threshold Pulse Width 0.4    Lead Channel Impedance Value 635.0    Lead Channel Sensing Intrinsic Amplitude Paced    Lead Channel Pacing Threshold Amplitude 1.6000    Lead Channel Pacing Threshold Pulse Width 0.4    Battery Date Time of Measurements 20200831080417    Battery Status Beginning of Service    Battery Remaining Longevity 114    Kolton Statistic Date Time Start 20200219000000    [place] Statistic Date Time End 20200831080417    [place]ton Statistic RA Percent Paced 26    [place]ton Statistic RV Percent Paced 100    Episode Statistic Total Count 3    Episode Statistic Type Category VT    Episode Statistic Vendor Type Category NSVT    Episode Statistic Total Date Time End 20200831000000    Episode Statistic Recent Count 0    Episode Statistic Type Category VT    Episode Statistic Vendor Type Category  NSVT    Episode Statistic Recent Date Time Start 38130751929407    Episode Statistic Recent Date Time End [id number]    Narrative    Patient seen on 5 C for evaluation and iterative programming of a [employer] Dual lead pacemaker per MD orders.  Normal pacemaker function.   No ventricular episodes recorded. 6 ATR episodes recorded since 2-19-20,   longest episode 12 minutes. Patient reports he was on Eliquis BID, but it   was recently stopped due to some bleeding issues. Intrinsic rhythm = SR w/   CHB, no R waves @ VVI 30 bpm. AP = 26%.  = 100%. Estimated battery   longevity to ANGELO = 9.5 years. RA lead trends display a decrease in P waves   in March and Auto RA threshold trends display a threshold of 3V @ 0.4 ms,   today's manual RA threshold = 1 V @ 0.4ms. RV lead trends appear stable.   Plan for patient to continue follow up with [facility], which is his primary   device clinic. MARCO Alegre RN, BSN.      Dual lead pacemaker    I have reviewed and interpreted the device interrogation, settings,   programming and nurse's summary. The device is functioning within normal   device parameters. I agree with the current findings, assessment and plan.       Consultations This Hospital Stay   CARDIOLOGY GENERAL ADULT IP CONSULT  PHYSICAL THERAPY ADULT IP CONSULT  OCCUPATIONAL THERAPY ADULT IP CONSULT  VASCULAR ACCESS CARE ADULT IP CONSULT  VASCULAR ACCESS FOR PICC PLACEMENT ADULT IP CONSULT  INTERVENTIONAL RADIOLOGY ADULT/PEDS IP CONSULT  APHERESIS TRANSFUSION ADULT/PEDS IP CONSULT  INTERVENTIONAL RADIOLOGY ADULT/PEDS IP CONSULT    Primary Care Physician   Tony Peter

## 2020-09-02 NOTE — PROGRESS NOTES
Pt increasingly SOB, anxious, confused and desatting. Report called to Maris on 4C and pt transferred to her care post pheresis.

## 2020-09-02 NOTE — DEATH PRONOUNCEMENT
MD DEATH PRONOUNCEMENT     Physical Exam: Unresponsive to noxious stimuli, no spontaneous respirations. Breath and heart sounds absent, no pupillary light reflex or corneal reflex. No pulses present.      Patient was pronounced dead at: 2020      Active Problems:   Past Medical History:   Diagnosis Date     Chronic renal insufficiency      Complete AV block (H)      Coronary atherosclerosis of unspecified type of vessel, native or graft     Acute inf MI; RCA stent; followed by Cardiology     Diverticulosis      Herniated cervical disc      Hyperlipidaemia      Hypertension      Occlusion and stenosis of carotid artery without mention of cerebral infarction     50-69% stenosis of left ICA       Please consider an autopsy if any of the following exist:   NO  Unexpected or unexplained death during or following any dental, medical, or surgical diagnostic treatment procedures.    NO  Death of mother at or up to seven days after delivery.    NO  All  and pediatric deaths.    NO  Death where the cause is sufficiently obscure to delay completion of the death certificate.    NO  Deaths in which autopsy would confirm a suspected illness/condition that would affect surviving family members or recipients of transplanted organs.      The following deaths must be reported to the 's Office:   NO  A death that may be due entirely or in part to any factors other than natural disease (recent surgery, recent trauma, suspected abuse/neglect).    NO  A death that may be an accident, suicide, or homicide.    NO  Any sudden, unexpected death in which there is no prior history of significant heart disease or any other condition associated with sudden death.    NO  Any death which is apparently due to natural causes but in which the  does not have a personal physician familiar with the patient s medical history, social, or environmental situation or the circumstances of the terminal event.    NO  Any death  apparently due to Sudden Infant Death Syndrome.    NO  Deaths that occur during, in association with, or as consequences of a diagnostic, therapeutic, or anesthetic procedure.    NO  Any death in which a fracture of a major bone has occurred within the past (6) six months.    NO  Any death in which the  was an inmate of a public institution or was in the custody of Law Enforcement personnel.      Disposition: Autopsy was discussed with family member/POA, patient will undergo autopsy.   YANCI Nielson MD  Internal Medicine-Pediatrics, MP-3  [phone]    2020

## 2020-09-02 NOTE — PROGRESS NOTES
Prior Authorization Denial    voriconazole 200mg tabs  Date Initiated: 9/1/2020  Date Completed: 9/1/2020  Prior Auth Type: Clinical                Status: Denied    Denial Date: 9/1/2020    Insurance: [employer] - Phone [phone] Fax [phone]  ID: [id number]  Case Number: [id number]   Submitted Via: Luz Carvalho  [facility] Liaison  Ph: [phone] Page: [phone]

## 2020-09-02 NOTE — ANESTHESIA PROCEDURE NOTES
ANESTHESIOLOGY RESIDENT/CRNA INTUBATION NOTE  Indication for intubation: respiratory insufficiency, hemodynamic instability, altered level of consciousness, airway protection.  Provider Ordering Intubation: Yahaira Chatman MD  History regarding the most recent potassium obtained: Yes  History regarding renal failure obtained: Yes  History of presence or absence of CVA/stroke was obtained: Yes  History of presence or absence of NM disorder obtained: Yes  Post Intubation:  No apparent complications, Sedation to be ordered by primary/ICU team, Primary/ICU team to review CXR, Vent settings by primary/ICU team, ETT secured and Report given to primary nurse and/or team  After intubation, SBP dropped and Dr. Chatman at bedside to take over and start vasoactive drips. Please see flowsheets for specific interventions, per MD. Once pt is stabilized, staff will order sedation.

## 2020-09-02 NOTE — PROVIDER NOTIFICATION
09/01/20 1700   Call Information   Date of Call 09/01/20   Time of Call 1723   Name of person requesting the team Kaushal   Title of person requesting team RN   RRT Arrival time 1726   Time RRT ended 1803   Reason for call   Type of RRT Adult   Primary reason for call Sepsis suspected   Sepsis Suspected Elevated Lactate level;RR > 20, SaO2 <90% OR increasing O2 need;WBC <4 or >12   Was patient transferred from the ED, ICU, or PACU within last 24 hours prior to RRT call? No   SBAR   Situation LA 4.1   Background Admitted 8/30/20 with newly diagnosed leukemia.  WBC elevated.  Dialysis access placed for plasmaphoresis to reduce WBC.  Hgb low.     Notable History/Conditions Cancer;Congestive heart failure;COPD;Hypertension   Assessment Increasing O2 requirements.  Elevated trop.  Cards consulted attempting diuresis.    Interventions Other (describe)  (awaiting plasmapheresis)   Patient Outcome   Patient Outcome Stabilized on unit   RRT Team   Attending/Primary/Covering Physician Dr. Davalos   Date Attending Physician notified 09/01/20   Time Attending Physician notified 1723   Physician(s) Mary Hartley MD, Ania ABDI   Lead RN Alison RN   RN Kaushal SEPULVEDA   RT Tory RT   Post RRT Intervention Assessment   Post RRT Assessment Other (see comment)  (Waiting to see if pt improves after plasmapheresis. )   Date Follow Up Done 09/01/20   Time Follow Up Done 2025   Comments Pt is currently receiving plasmapheresisi.  Resp rate is continuing to increase.

## 2020-09-02 NOTE — H&P
MICU H&P  Angel Sanders ([id number]) admitted on 2020  Primary care provider: Tony Peter      Assessment & Plan   Angel Sanders is a 70 year old male with PMHx significant CAD, HFmrEF, and CKD, presented with shortness of breath and dyspnea on exertion and was found to be anemic with a WBC count of 180. Ultimately diagnosed with AML. Patient was started on hydroxyurea and underwent leukapheresis for symptomatic leukostasis. During the day on 2020, the patient developed progressively worsening respiratory distress, with increasing oxygen requirements. Our service was consulted by Dr. Hartley. Dr. Chatman accepted the transfer to the ICU. Upon arrival to the ICU, the patient was in severe respiratory distress on 30 L HFNC, not responding to verbal stimuli. The decision was made to intubate the patient. Patient was induced with propofol and rocuronium. After intubation, the patient became hemodynamically unstable, and ultimately coded and . See code blue note and discharge summary for more details.     Family update by me today: Yes   Code Status  Full Code    Patient seen and discussed with MICU attending Dr. Compa Nielson MD  Internal Medicine-Pediatrics, [facility]  [phone]    -----------------------------------------------------------------------------------------------------------------------  Primary Care Physician   Tony Peter    Chief Complaint    Acute hypoxic respiratory failure    History of Present Illness   Angel Sanders is a 70 year old male with PMHx significant CAD, HFmrEF, and CKD, presented with shortness of breath and dyspnea on exertion and was found to be anemic with a WBC count of 180. Ultimately diagnosed with AML. Patient was started on hydroxyurea and underwent leukapheresis for symptomatic leukostasis. During the day on 2020, the patient developed progressively worsening respiratory distress, with increasing oxygen requirements. Our  service was consulted by Dr. Hartley. Dr. Chatman accepted the transfer to the ICU. Upon arrival to the ICU, the patient was in severe respiratory distress on 30 L HFNC, not responding to verbal stimuli. The decision was made to intubate the patient. Patient was induced with propofol and rocuronium. After intubation, the patient became hemodynamically unstable, and ultimately coded and . See code blue note and discharge summary for more details.     Past Medical History    I have reviewed this patient's medical history and updated it with pertinent information if needed.   Past Medical History:   Diagnosis Date     Chronic renal insufficiency      Complete AV block (H)      Coronary atherosclerosis of unspecified type of vessel, native or graft     Acute inf MI; RCA stent; followed by Cardiology     Diverticulosis      Herniated cervical disc      Hyperlipidaemia      Hypertension      Occlusion and stenosis of carotid artery without mention of cerebral infarction     50-69% stenosis of left ICA       Past Surgical History   I have reviewed this patient's surgical history and updated it with pertinent information if needed.  Past Surgical History:   Procedure Laterality Date     ARTHROTOMY SHOULDER, ROTATOR CUFF REPAIR, COMBINED  10/23/2013    Procedure: COMBINED ARTHROTOMY SHOULDER, ROTATOR CUFF REPAIR;  Left Shoulder Open Decompression, Distal Clavical Resection, Rotator Cuff Repair , Bicep Tenolysis, and Bicep Tenodesis   ;  Surgeon: Zhang Mattson MD;  Location: [facility] OR     C NONSPECIFIC PROCEDURE      RCA stent; see [facility]     COLONOSCOPY N/A 5/15/2018    Procedure: COLONOSCOPY;  COLONOSCOPY ;  Surgeon: Cory Hernandez MD;  Location: [facility] GI     PHACOEMULSIFICATION CLEAR CORNEA WITH STANDARD INTRAOCULAR LENS IMPLANT  2013    Procedure: PHACOEMULSIFICATION CLEAR CORNEA WITH STANDARD INTRAOCULAR LENS IMPLANT;  RIGHT PHACOEMULSIFICATION CLEAR CORNEA WITH STANDARD INTRAOCULAR LENS IMPLANT ;   Surgeon: Rikki Reddy MD;  Location: [facility]     PHACOEMULSIFICATION CLEAR CORNEA WITH STANDARD INTRAOCULAR LENS IMPLANT  12/3/2013    Procedure: PHACOEMULSIFICATION CLEAR CORNEA WITH STANDARD INTRAOCULAR LENS IMPLANT;  LEFT PHACOEMULSIFICATION CLEAR CORNEA WITH STANDARD INTRAOCULAR LENS IMPLANT;  Surgeon: Rikki Reddy MD;  Location: [facility]       Prior to Admission Medications   Prior to Admission Medications   Prescriptions Last Dose Informant Patient Reported? Taking?   ASPIRIN 81 MG OR TABS Unknown at Unknown time Self Yes No   Si TABLET DAILY   Docusate Calcium (STOOL SOFTENER PO) Unknown at Unknown time Self Yes No   Sig: Take 100 mg by mouth daily    Ferrous Sulfate (IRON SUPPLEMENT PO) Unknown at Unknown time Self Yes No   Sig: Take 325 mg by mouth daily (with breakfast)    PROAIR RESPICLICK 108 (90 Base) MCG/ACT inhaler Unknown at Unknown time  Yes No   Si puffs every 4 hours as needed    TRELEGY ELLIPTA 100-62.5-25 MCG/INH oral inhaler Unknown at Unknown time  Yes No   Si puff daily    apixaban ANTICOAGULANT (ELIQUIS) 5 MG tablet Unknown at Unknown time  No No   Sig: Take 1 tablet (5 mg) by mouth 2 times daily   atorvastatin (LIPITOR) 80 MG tablet Unknown at Unknown time  No No   Sig: Take 1 tablet (80 mg) by mouth daily   cyanocobalamin (VITAMIN B-12) 100 MCG tablet Unknown at Unknown time  Yes No   Sig: Take 100 mcg by mouth daily   lisinopril (PRINIVIL/ZESTRIL) 5 MG tablet Unknown at Unknown time  No No   Sig: Take 1 tablet (5 mg) by mouth daily   metoprolol tartrate (LOPRESSOR) 50 MG tablet Unknown at Unknown time  No No   Sig: Take 1 tablet (50 mg) by mouth 2 times daily   multivitamin, therapeutic with minerals (MULTI-VITAMIN) TABS tablet Unknown at Unknown time Self Yes No   Sig: Take 1 tablet by mouth daily   nitroGLYcerin (NITROSTAT) 0.4 MG sublingual tablet Unknown at Unknown time Self No No   Sig: Place 1 tablet (0.4 mg) under the tongue every 5 minutes as needed May  repeat X 2 and if chest pain persists, call 911      Facility-Administered Medications: None     Allergies   Allergies   Allergen Reactions     Adhesive Tape Rash       Social History   I have reviewed this patient's social history and updated it with pertinent information if needed. Angel Sanders  reports that he quit smoking about 12 years ago. His smoking use included cigarettes. He started smoking about 52 years ago. He has a 40.00 pack-year smoking history. He has never used smokeless tobacco. He reports that he does not drink alcohol or use drugs.    Family History   I have reviewed this patient's family history and updated it with pertinent information if needed.   Family History   Problem Relation Age of Onset     Breast Cancer Mother      Heart Disease Father 74         of heart failure     Colon Cancer No family hx of        Review of Systems   The 10 point Review of Systems is negative other than noted in the HPI.    Physical Exam   Vital Signs with Ranges  Temp:  [97.9  F (36.6  C)-99.8  F (37.7  C)] 99.8  F (37.7  C)  Pulse:  [] 60  Resp:  [24-42] 40  BP: (103-181)/(65-94) 144/91  FiO2 (%):  [45 %-60 %] 60 %  SpO2:  [93 %-100 %] 94 %  194 lbs 3.6 oz    Physical exam upon presentation to the intensive care unit:  General: Profound respiratory distress, with accessory muscle use, not responding to verbal stimuli  HEENT: MMM, PERRL  CV: RRR, No appreciable murmurs or rubs  Resp: Wheezing and crackles, prolonged expiratory phase  Abd: Soft, NTND, normoactive BS, no rebound no guarding  Ext: WWP, no LE edema  Skin: No visible lesions, breakdown or rashes seen  Neuro: not responsive to verbal stimuli, unable to participate in neuro exam.

## 2020-09-02 NOTE — PROGRESS NOTES
Medicine Brief Cross Cover Note  Called regarding increased anxiety and tachypnea by bedside RN. Patient noted to be getting up more frequently, pulling off HFNC and appearing restless. Also noted to be slightly confused. Is still maintaining saturations at this time, but requiring increasing amounts of O2. Given this, high degree of concern for possible impending decompensation and given this, would recommend transfer to ICU team at this time for ongoing care while patient undergoes leukapheresis. Discussed case with Dr. Chatman and with ICU resident, Dr. Nielson, who have graciously accepted the patient in transfer. Will transfer to MICU.     Mary Hartley MD  [facility]  P: [phone]

## 2020-09-02 NOTE — PROGRESS NOTES
Prior Authorization Approval    venclexta 100mg tabs  Date Initiated: 9/1/2020  Date Completed: 9/1/2020  Prior Auth Type: Clinical                Status: Approved    Effective Date: 08/02/2020 - 09/01/2021  Copay: 43.50     Filling Pharmacy: [facility] - [place], [place] - [address] [place] [address]    Insurance: [employer] - Phone [phone] Fax [phone]  ID: [id number]  Case Number: [id number]   Submitted Via: Luz Carvalho  [facility] Liaison  Ph: [phone] Page: [phone]

## 2020-09-02 NOTE — PROGRESS NOTES
Prior Authorization Approval    posaconazole 100mg tabs  Date Initiated: 9/1/2020  Date Completed: 9/1/2020  Prior Auth Type: Clinical                Status: Approved    Effective Date: 08/02/2020 - 02/28/2021  Copay: 5.00     Filling Pharmacy: [facility] - [place], [place] - [address] [place] [address]    Insurance: [employer] - Phone [phone] Fax [phone]  ID: [id number]  Case Number: [id number]   Submitted Via: Luz Carvalho  [facility] Liaison  Ph: [phone] Page: [phone]

## 2020-09-03 ENCOUNTER — TELEPHONE (OUTPATIENT)
Dept: SURGERY | Facility: CLINIC | Age: 70
End: 2020-09-03

## 2020-09-03 LAB
COPATH REPORT: NORMAL

## 2020-09-03 NOTE — TELEPHONE ENCOUNTER
[facility] Call Center    Phone Message    May a detailed message be left on voicemail: yes     Reason for Call: Other: Leighann, with the  Home, called in and stated that she is needing the provider to go onto the [place] [facility] and sign off on the pt paperwork so they are able to proceed with his  services on Tuesday, 2020. Please follow up when available. Thank you     Action Taken: Message routed to:  [facility] Surgery    Travel Screening: Not Applicable

## 2020-09-04 PROBLEM — C92.00 ACUTE MYELOID LEUKEMIA (H): Status: ACTIVE | Noted: 2020-01-01

## 2020-09-04 LAB
A* LOCUS: NORMAL
A*: NORMAL
ABTEST METHOD: NORMAL
B* LOCUS: NORMAL
B*: NORMAL
BW-1: NORMAL
BW-2: NORMAL
C* LOCUS: NORMAL
DPA1* NMDP: NORMAL
DPA1*: NORMAL
DPB1* LOCUS NMDP: NORMAL
DPB1* NMDP: NORMAL
DPB1*: NORMAL
DPB1*LOCUS: NORMAL
DQA1*LOCUS: NORMAL
DQB1* LOCUS: NORMAL
DQB1*: NORMAL
DRB1* LOCUS: NORMAL
DRB1*: NORMAL
DRB5* LOCUS: NORMAL
DRSSO TEST METHOD: NORMAL

## 2020-09-04 NOTE — TELEPHONE ENCOUNTER
Dr. Chatman has been contacted and she said she will complete the form. She is aware she has 30 minutes per Leighann from the  Home before patients cremation. She will complete paperwork.

## 2020-09-04 NOTE — TELEPHONE ENCOUNTER
[facility] Call Center    Phone Message    May a detailed message be left on voicemail: yes     Reason for Call: Other:      [facility] is calling in again asking for Dr Chatman to complete the paperwork.     [facility] service is scheduled for tomorrow morning 20 and the paperwork with the cause of death is needed before he can be creamated.      Please complete the paperwork ASAP and email back.     *Mailbox is full at Dr Chatman's direct number      Action Taken: Message routed to:  [facility] General Surgery    Travel Screening: Not Applicable

## 2020-09-06 LAB
BACTERIA SPEC CULT: NO GROWTH
BACTERIA SPEC CULT: NO GROWTH
SPECIMEN SOURCE: NORMAL
SPECIMEN SOURCE: NORMAL

## 2020-09-09 LAB — COPATH REPORT: NORMAL

## 2020-09-10 LAB
COPATH REPORT: NORMAL
COPATH REPORT: NORMAL

## 2020-10-13 LAB — COPATH REPORT: NORMAL

## 2023-07-20 NOTE — PLAN OF CARE
Problem: Patient Care Overview  Goal: Plan of Care/Patient Progress Review  Outcome: No Change  VSS. Monitor remains Sinus rhythm. Pt. Denies pain. Plan for PPM today. Spouse at bedside. Continue to monitor.       n/a

## (undated) DEVICE — KIT ENDO TURNOVER/PROCEDURE W/CLEAN A SCOPE LINERS 103888

## (undated) RX ORDER — BUPIVACAINE HYDROCHLORIDE 2.5 MG/ML
INJECTION, SOLUTION EPIDURAL; INFILTRATION; INTRACAUDAL
Status: DISPENSED
Start: 2018-11-26

## (undated) RX ORDER — HEPARIN SODIUM (PORCINE) LOCK FLUSH IV SOLN 100 UNIT/ML 100 UNIT/ML
SOLUTION INTRAVENOUS
Status: DISPENSED
Start: 2020-01-01

## (undated) RX ORDER — FENTANYL CITRATE 50 UG/ML
INJECTION, SOLUTION INTRAMUSCULAR; INTRAVENOUS
Status: DISPENSED
Start: 2018-11-26

## (undated) RX ORDER — REGADENOSON 0.08 MG/ML
INJECTION, SOLUTION INTRAVENOUS
Status: DISPENSED
Start: 2020-01-01

## (undated) RX ORDER — LIDOCAINE HYDROCHLORIDE 10 MG/ML
INJECTION, SOLUTION EPIDURAL; INFILTRATION; INTRACAUDAL; PERINEURAL
Status: DISPENSED
Start: 2020-01-01

## (undated) RX ORDER — CEFAZOLIN SODIUM 2 G/100ML
INJECTION, SOLUTION INTRAVENOUS
Status: DISPENSED
Start: 2018-11-26

## (undated) RX ORDER — LIDOCAINE HYDROCHLORIDE 10 MG/ML
INJECTION, SOLUTION EPIDURAL; INFILTRATION; INTRACAUDAL; PERINEURAL
Status: DISPENSED
Start: 2018-11-26

## (undated) RX ORDER — FENTANYL CITRATE 50 UG/ML
INJECTION, SOLUTION INTRAMUSCULAR; INTRAVENOUS
Status: DISPENSED
Start: 2018-05-15